# Patient Record
Sex: FEMALE | Race: BLACK OR AFRICAN AMERICAN | Employment: OTHER | URBAN - METROPOLITAN AREA
[De-identification: names, ages, dates, MRNs, and addresses within clinical notes are randomized per-mention and may not be internally consistent; named-entity substitution may affect disease eponyms.]

---

## 2018-10-16 ENCOUNTER — APPOINTMENT (OUTPATIENT)
Dept: GENERAL RADIOLOGY | Age: 63
End: 2018-10-16
Payer: COMMERCIAL

## 2018-10-16 ENCOUNTER — HOSPITAL ENCOUNTER (OUTPATIENT)
Age: 63
Setting detail: OBSERVATION
Discharge: HOME | End: 2018-10-17
Attending: EMERGENCY MEDICINE | Admitting: INTERNAL MEDICINE
Payer: COMMERCIAL

## 2018-10-16 DIAGNOSIS — R07.9 CHEST PAIN, UNSPECIFIED TYPE: Primary | ICD-10-CM

## 2018-10-16 LAB
ANION GAP SERPL CALCULATED.3IONS-SCNC: 13 MMOL/L (ref 3–16)
BACTERIA: ABNORMAL /HPF
BASOPHILS ABSOLUTE: 0.1 K/UL (ref 0–0.2)
BASOPHILS RELATIVE PERCENT: 1.2 %
BILIRUBIN URINE: NEGATIVE
BLOOD, URINE: ABNORMAL
BUN BLDV-MCNC: 18 MG/DL (ref 7–20)
CALCIUM SERPL-MCNC: 9.2 MG/DL (ref 8.3–10.6)
CHLORIDE BLD-SCNC: 100 MMOL/L (ref 99–110)
CLARITY: ABNORMAL
CO2: 27 MMOL/L (ref 21–32)
COLOR: YELLOW
CREAT SERPL-MCNC: 0.9 MG/DL (ref 0.6–1.2)
D DIMER: 223 NG/ML DDU (ref 0–229)
EOSINOPHILS ABSOLUTE: 0.2 K/UL (ref 0–0.6)
EOSINOPHILS RELATIVE PERCENT: 1.7 %
EPITHELIAL CELLS, UA: 9 /HPF (ref 0–5)
GFR AFRICAN AMERICAN: >60
GFR NON-AFRICAN AMERICAN: >60
GLUCOSE BLD-MCNC: 180 MG/DL (ref 70–99)
GLUCOSE BLD-MCNC: 224 MG/DL (ref 70–99)
GLUCOSE BLD-MCNC: 254 MG/DL (ref 70–99)
GLUCOSE BLD-MCNC: 273 MG/DL (ref 70–99)
GLUCOSE URINE: NEGATIVE MG/DL
HCT VFR BLD CALC: 37.5 % (ref 36–48)
HEMOGLOBIN: 12.4 G/DL (ref 12–16)
HYALINE CASTS: 0 /LPF (ref 0–8)
KETONES, URINE: NEGATIVE MG/DL
LEUKOCYTE ESTERASE, URINE: ABNORMAL
LV EF: 58 %
LV EF: 66 %
LVEF MODALITY: NORMAL
LVEF MODALITY: NORMAL
LYMPHOCYTES ABSOLUTE: 3.7 K/UL (ref 1–5.1)
LYMPHOCYTES RELATIVE PERCENT: 40.8 %
MCH RBC QN AUTO: 30 PG (ref 26–34)
MCHC RBC AUTO-ENTMCNC: 33.2 G/DL (ref 31–36)
MCV RBC AUTO: 90.2 FL (ref 80–100)
MICROSCOPIC EXAMINATION: YES
MONOCYTES ABSOLUTE: 0.5 K/UL (ref 0–1.3)
MONOCYTES RELATIVE PERCENT: 5.8 %
NEUTROPHILS ABSOLUTE: 4.6 K/UL (ref 1.7–7.7)
NEUTROPHILS RELATIVE PERCENT: 50.5 %
NITRITE, URINE: NEGATIVE
PDW BLD-RTO: 14.3 % (ref 12.4–15.4)
PERFORMED ON: ABNORMAL
PH UA: 5.5
PLATELET # BLD: 284 K/UL (ref 135–450)
PMV BLD AUTO: 7.7 FL (ref 5–10.5)
POTASSIUM REFLEX MAGNESIUM: 3.7 MMOL/L (ref 3.5–5.1)
PROTEIN UA: 100 MG/DL
RBC # BLD: 4.16 M/UL (ref 4–5.2)
RBC UA: 2 /HPF (ref 0–4)
SODIUM BLD-SCNC: 140 MMOL/L (ref 136–145)
SPECIFIC GRAVITY UA: 1.02
TROPONIN: <0.01 NG/ML
URINE REFLEX TO CULTURE: YES
URINE TYPE: ABNORMAL
UROBILINOGEN, URINE: 0.2 E.U./DL
WBC # BLD: 9 K/UL (ref 4–11)
WBC UA: 8 /HPF (ref 0–5)

## 2018-10-16 PROCEDURE — 3430000000 HC RX DIAGNOSTIC RADIOPHARMACEUTICAL: Performed by: INTERNAL MEDICINE

## 2018-10-16 PROCEDURE — 93017 CV STRESS TEST TRACING ONLY: CPT

## 2018-10-16 PROCEDURE — 6360000002 HC RX W HCPCS: Performed by: INTERNAL MEDICINE

## 2018-10-16 PROCEDURE — 36415 COLL VENOUS BLD VENIPUNCTURE: CPT

## 2018-10-16 PROCEDURE — 2580000003 HC RX 258: Performed by: INTERNAL MEDICINE

## 2018-10-16 PROCEDURE — 96374 THER/PROPH/DIAG INJ IV PUSH: CPT

## 2018-10-16 PROCEDURE — A9502 TC99M TETROFOSMIN: HCPCS | Performed by: INTERNAL MEDICINE

## 2018-10-16 PROCEDURE — 71046 X-RAY EXAM CHEST 2 VIEWS: CPT

## 2018-10-16 PROCEDURE — 6370000000 HC RX 637 (ALT 250 FOR IP): Performed by: INTERNAL MEDICINE

## 2018-10-16 PROCEDURE — 6370000000 HC RX 637 (ALT 250 FOR IP): Performed by: EMERGENCY MEDICINE

## 2018-10-16 PROCEDURE — 80048 BASIC METABOLIC PNL TOTAL CA: CPT

## 2018-10-16 PROCEDURE — 73610 X-RAY EXAM OF ANKLE: CPT

## 2018-10-16 PROCEDURE — G0378 HOSPITAL OBSERVATION PER HR: HCPCS

## 2018-10-16 PROCEDURE — 99285 EMERGENCY DEPT VISIT HI MDM: CPT

## 2018-10-16 PROCEDURE — 85379 FIBRIN DEGRADATION QUANT: CPT

## 2018-10-16 PROCEDURE — 78452 HT MUSCLE IMAGE SPECT MULT: CPT

## 2018-10-16 PROCEDURE — 78451 HT MUSCLE IMAGE SPECT SING: CPT

## 2018-10-16 PROCEDURE — 84484 ASSAY OF TROPONIN QUANT: CPT

## 2018-10-16 PROCEDURE — 96372 THER/PROPH/DIAG INJ SC/IM: CPT

## 2018-10-16 PROCEDURE — 85025 COMPLETE CBC W/AUTO DIFF WBC: CPT

## 2018-10-16 PROCEDURE — 81001 URINALYSIS AUTO W/SCOPE: CPT

## 2018-10-16 PROCEDURE — 87086 URINE CULTURE/COLONY COUNT: CPT

## 2018-10-16 PROCEDURE — 96376 TX/PRO/DX INJ SAME DRUG ADON: CPT

## 2018-10-16 PROCEDURE — 93005 ELECTROCARDIOGRAM TRACING: CPT | Performed by: EMERGENCY MEDICINE

## 2018-10-16 PROCEDURE — 93306 TTE W/DOPPLER COMPLETE: CPT

## 2018-10-16 RX ORDER — PREGABALIN 75 MG/1
150 CAPSULE ORAL 2 TIMES DAILY
Status: DISCONTINUED | OUTPATIENT
Start: 2018-10-16 | End: 2018-10-17 | Stop reason: HOSPADM

## 2018-10-16 RX ORDER — SODIUM CHLORIDE 0.9 % (FLUSH) 0.9 %
10 SYRINGE (ML) INJECTION PRN
Status: DISCONTINUED | OUTPATIENT
Start: 2018-10-16 | End: 2018-10-17 | Stop reason: HOSPADM

## 2018-10-16 RX ORDER — ATORVASTATIN CALCIUM 10 MG/1
10 TABLET, FILM COATED ORAL DAILY
Status: DISCONTINUED | OUTPATIENT
Start: 2018-10-16 | End: 2018-10-17 | Stop reason: HOSPADM

## 2018-10-16 RX ORDER — SPIRONOLACTONE 25 MG/1
25 TABLET ORAL DAILY
COMMUNITY
End: 2018-10-16 | Stop reason: ALTCHOICE

## 2018-10-16 RX ORDER — OLMESARTAN MEDOXOMIL 40 MG/1
40 TABLET ORAL DAILY
Status: ON HOLD | COMMUNITY
End: 2018-10-17

## 2018-10-16 RX ORDER — NITROGLYCERIN 0.4 MG/1
0.4 TABLET SUBLINGUAL EVERY 5 MIN PRN
Status: DISCONTINUED | OUTPATIENT
Start: 2018-10-16 | End: 2018-10-17 | Stop reason: HOSPADM

## 2018-10-16 RX ORDER — DEXTROSE MONOHYDRATE 50 MG/ML
100 INJECTION, SOLUTION INTRAVENOUS PRN
Status: DISCONTINUED | OUTPATIENT
Start: 2018-10-16 | End: 2018-10-17 | Stop reason: HOSPADM

## 2018-10-16 RX ORDER — SODIUM CHLORIDE 0.9 % (FLUSH) 0.9 %
10 SYRINGE (ML) INJECTION EVERY 12 HOURS SCHEDULED
Status: DISCONTINUED | OUTPATIENT
Start: 2018-10-16 | End: 2018-10-17 | Stop reason: HOSPADM

## 2018-10-16 RX ORDER — HYDROCHLOROTHIAZIDE 25 MG/1
25 TABLET ORAL DAILY
Status: DISCONTINUED | OUTPATIENT
Start: 2018-10-16 | End: 2018-10-17 | Stop reason: HOSPADM

## 2018-10-16 RX ORDER — INSULIN GLARGINE 100 [IU]/ML
25 INJECTION, SOLUTION SUBCUTANEOUS NIGHTLY
Status: DISCONTINUED | OUTPATIENT
Start: 2018-10-16 | End: 2018-10-16

## 2018-10-16 RX ORDER — LOSARTAN POTASSIUM 50 MG/1
100 TABLET ORAL DAILY
Status: DISCONTINUED | OUTPATIENT
Start: 2018-10-16 | End: 2018-10-17 | Stop reason: HOSPADM

## 2018-10-16 RX ORDER — NICOTINE POLACRILEX 4 MG
15 LOZENGE BUCCAL PRN
Status: DISCONTINUED | OUTPATIENT
Start: 2018-10-16 | End: 2018-10-17 | Stop reason: HOSPADM

## 2018-10-16 RX ORDER — PREGABALIN 150 MG/1
150 CAPSULE ORAL EVERY EVENING
Status: ON HOLD | COMMUNITY
End: 2022-05-20 | Stop reason: HOSPADM

## 2018-10-16 RX ORDER — ALBUTEROL SULFATE 90 UG/1
2 AEROSOL, METERED RESPIRATORY (INHALATION) EVERY 4 HOURS PRN
Status: DISCONTINUED | OUTPATIENT
Start: 2018-10-16 | End: 2018-10-17 | Stop reason: HOSPADM

## 2018-10-16 RX ORDER — ASPIRIN 81 MG/1
81 TABLET, CHEWABLE ORAL DAILY
Status: DISCONTINUED | OUTPATIENT
Start: 2018-10-17 | End: 2018-10-17 | Stop reason: HOSPADM

## 2018-10-16 RX ORDER — AMLODIPINE BESYLATE 5 MG/1
5 TABLET ORAL DAILY
Status: DISCONTINUED | OUTPATIENT
Start: 2018-10-16 | End: 2018-10-16

## 2018-10-16 RX ORDER — HYDRALAZINE HYDROCHLORIDE 20 MG/ML
5 INJECTION INTRAMUSCULAR; INTRAVENOUS EVERY 6 HOURS PRN
Status: DISCONTINUED | OUTPATIENT
Start: 2018-10-16 | End: 2018-10-17 | Stop reason: HOSPADM

## 2018-10-16 RX ORDER — ACETAMINOPHEN 325 MG/1
650 TABLET ORAL EVERY 4 HOURS PRN
Status: DISCONTINUED | OUTPATIENT
Start: 2018-10-16 | End: 2018-10-17 | Stop reason: HOSPADM

## 2018-10-16 RX ORDER — ONDANSETRON 2 MG/ML
4 INJECTION INTRAMUSCULAR; INTRAVENOUS EVERY 6 HOURS PRN
Status: DISCONTINUED | OUTPATIENT
Start: 2018-10-16 | End: 2018-10-17 | Stop reason: HOSPADM

## 2018-10-16 RX ORDER — ASPIRIN 325 MG
325 TABLET ORAL ONCE
Status: COMPLETED | OUTPATIENT
Start: 2018-10-16 | End: 2018-10-16

## 2018-10-16 RX ORDER — ATORVASTATIN CALCIUM 10 MG/1
10 TABLET, FILM COATED ORAL DAILY
Status: ON HOLD | COMMUNITY
End: 2018-10-17

## 2018-10-16 RX ORDER — INSULIN GLARGINE 100 [IU]/ML
10 INJECTION, SOLUTION SUBCUTANEOUS NIGHTLY
Status: DISCONTINUED | OUTPATIENT
Start: 2018-10-16 | End: 2018-10-17 | Stop reason: HOSPADM

## 2018-10-16 RX ORDER — NIFEDIPINE 30 MG/1
30 TABLET, EXTENDED RELEASE ORAL DAILY
Status: DISCONTINUED | OUTPATIENT
Start: 2018-10-16 | End: 2018-10-16

## 2018-10-16 RX ORDER — DEXTROSE MONOHYDRATE 25 G/50ML
12.5 INJECTION, SOLUTION INTRAVENOUS PRN
Status: DISCONTINUED | OUTPATIENT
Start: 2018-10-16 | End: 2018-10-17 | Stop reason: HOSPADM

## 2018-10-16 RX ORDER — OLMESARTAN MEDOXOMIL 20 MG/1
40 TABLET ORAL DAILY
Status: DISCONTINUED | OUTPATIENT
Start: 2018-10-16 | End: 2018-10-16

## 2018-10-16 RX ADMIN — HYDROCHLOROTHIAZIDE 25 MG: 25 TABLET ORAL at 14:52

## 2018-10-16 RX ADMIN — HYDRALAZINE HYDROCHLORIDE 5 MG: 20 INJECTION INTRAMUSCULAR; INTRAVENOUS at 15:57

## 2018-10-16 RX ADMIN — REGADENOSON 0.4 MG: 0.08 INJECTION, SOLUTION INTRAVENOUS at 13:09

## 2018-10-16 RX ADMIN — HYDRALAZINE HYDROCHLORIDE 5 MG: 20 INJECTION INTRAMUSCULAR; INTRAVENOUS at 22:39

## 2018-10-16 RX ADMIN — INSULIN GLARGINE 10 UNITS: 100 INJECTION, SOLUTION SUBCUTANEOUS at 22:38

## 2018-10-16 RX ADMIN — NITROGLYCERIN 0.4 MG: 0.4 TABLET, ORALLY DISINTEGRATING SUBLINGUAL at 09:23

## 2018-10-16 RX ADMIN — Medication 10 ML: at 22:40

## 2018-10-16 RX ADMIN — NITROGLYCERIN 0.4 MG: 0.4 TABLET, ORALLY DISINTEGRATING SUBLINGUAL at 09:28

## 2018-10-16 RX ADMIN — AMLODIPINE BESYLATE 5 MG: 5 TABLET ORAL at 15:57

## 2018-10-16 RX ADMIN — INSULIN LISPRO 2 UNITS: 100 INJECTION, SOLUTION INTRAVENOUS; SUBCUTANEOUS at 22:38

## 2018-10-16 RX ADMIN — ASPIRIN 325 MG ORAL TABLET 325 MG: 325 PILL ORAL at 07:46

## 2018-10-16 RX ADMIN — ENOXAPARIN SODIUM 40 MG: 40 INJECTION SUBCUTANEOUS at 14:52

## 2018-10-16 RX ADMIN — TETROFOSMIN 30 MILLICURIE: 0.23 INJECTION, POWDER, LYOPHILIZED, FOR SOLUTION INTRAVENOUS at 13:14

## 2018-10-16 RX ADMIN — ATORVASTATIN CALCIUM 10 MG: 10 TABLET, FILM COATED ORAL at 17:15

## 2018-10-16 RX ADMIN — LOSARTAN POTASSIUM 100 MG: 50 TABLET ORAL at 17:15

## 2018-10-16 RX ADMIN — INSULIN LISPRO 10 UNITS: 100 INJECTION, SOLUTION INTRAVENOUS; SUBCUTANEOUS at 17:13

## 2018-10-16 RX ADMIN — ACETAMINOPHEN 650 MG: 325 TABLET, FILM COATED ORAL at 22:38

## 2018-10-16 RX ADMIN — PREGABALIN 150 MG: 75 CAPSULE ORAL at 22:38

## 2018-10-16 RX ADMIN — INSULIN LISPRO 2 UNITS: 100 INJECTION, SOLUTION INTRAVENOUS; SUBCUTANEOUS at 17:13

## 2018-10-16 ASSESSMENT — ENCOUNTER SYMPTOMS
PHOTOPHOBIA: 0
ABDOMINAL PAIN: 0
SHORTNESS OF BREATH: 0
VOMITING: 0
WHEEZING: 0
BACK PAIN: 0
RHINORRHEA: 0
COUGH: 0
SINUS PAIN: 0
COLOR CHANGE: 0

## 2018-10-16 ASSESSMENT — PAIN DESCRIPTION - LOCATION
LOCATION: HEAD
LOCATION: CHEST
LOCATION: CHEST;LEG

## 2018-10-16 ASSESSMENT — PAIN DESCRIPTION - ORIENTATION
ORIENTATION: RIGHT
ORIENTATION: RIGHT;LEFT

## 2018-10-16 ASSESSMENT — PAIN DESCRIPTION - FREQUENCY: FREQUENCY: CONTINUOUS

## 2018-10-16 ASSESSMENT — PAIN DESCRIPTION - PROGRESSION: CLINICAL_PROGRESSION: GRADUALLY IMPROVING

## 2018-10-16 ASSESSMENT — PAIN DESCRIPTION - PAIN TYPE
TYPE: ACUTE PAIN

## 2018-10-16 ASSESSMENT — PAIN SCALES - GENERAL
PAINLEVEL_OUTOF10: 6
PAINLEVEL_OUTOF10: 0
PAINLEVEL_OUTOF10: 1
PAINLEVEL_OUTOF10: 5

## 2018-10-16 ASSESSMENT — PAIN DESCRIPTION - DESCRIPTORS
DESCRIPTORS: ACHING
DESCRIPTORS: ACHING
DESCRIPTORS: CONSTANT

## 2018-10-16 ASSESSMENT — HEART SCORE: ECG: 0

## 2018-10-16 NOTE — PROGRESS NOTES
Patient is admitted to room 4257 from ER. Patient has arrived to the floor at 1145 via stretcher and ambulated/transfered to bed. Oriented to room. Call light and bedside table within reach. Patient is a medium fall risk. Pt is agreeable to use call light before getting up. Denies further needs at this time. Will continue to monitor.

## 2018-10-16 NOTE — PROGRESS NOTES
Patient BP still elevated after receiving multiple BP meds PO and IV. RN notified MD. Orders to just watch BP for now.

## 2018-10-16 NOTE — H&P
mEq by mouth daily    Yes Historical Provider, MD   hydrochlorothiazide (HYDRODIURIL) 25 MG tablet Take 25 mg by mouth daily. Yes Historical Provider, MD   albuterol sulfate HFA (PROVENTIL HFA) 108 (90 Base) MCG/ACT inhaler Inhale 2 puffs into the lungs every 4 hours as needed for Wheezing or Shortness of Breath (Cough) PLEASE DISPENSE SPACER 1/16/18   Lexi Whitaker MD       Allergies:  Patient has no known allergies. Social History:       reports that she has been smoking. She has never used smokeless tobacco. She reports that she does not drink alcohol or use drugs. Family History:  Reviewed in detail and negative for DM, Early CAD, Cancer, CVA. Positive as follows:    History reviewed. No pertinent family history. REVIEW OF SYSTEMS:   Positive review  noted in the HPI. All other systems reviewed and negative.     PHYSICAL EXAM:    BP (!) 200/94   Pulse 93   Temp 98.5 °F (36.9 °C) (Oral)   Resp 14   Ht 5' (1.524 m)   Wt 178 lb 9.2 oz (81 kg)   SpO2 98%   BMI 34.88 kg/m²   General Appearance: alert and oriented to person, place and time, well developed and well- nourished, in no acute distress  Skin: warm and dry, no rash or erythema  Head: normocephalic and atraumatic  Eyes: pupils equal, round, and reactive to light, extraocular eye movements intact, conjunctivae normal  ENT: tympanic membrane, external ear and ear canal normal bilaterally, nose without deformity, nasal mucosa and turbinates normal without polyps  Neck: supple and non-tender without mass, no thyromegaly or thyroid nodules, no cervical lymphadenopathy  Pulmonary/Chest: clear to auscultation bilaterally- no wheezes, rales or rhonchi, normal air movement, no respiratory distress  Cardiovascular: normal rate, regular rhythm, normal S1 and S2, no murmurs, rubs, clicks, or gallops, Peripheral pulses good, Cap refill <3 sec, no carotid bruits  Abdomen: soft, non-tender, non-distended, normal bowel sounds, no masses or organomegaly  Extremities: no cyanosis, clubbing or edema  Musculoskeletal: normal range of motion, no joint swelling, deformity or tenderness  Neurologic: reflexes normal and symmetric, no cranial nerve deficit, gait, coordination and speech normal      LABS:    CXR:  I have reviewed the CXR with the following interpretation: no abnormality    EKG:  I have reviewed the EKG with the following interpretation: LVH with no st elevation   CBC   Recent Labs      10/16/18   0750   WBC  9.0   HGB  12.4   HCT  37.5   PLT  284      RENAL  Recent Labs      10/16/18   0750   NA  140   K  3.7   CL  100   CO2  27   BUN  18   CREATININE  0.9     LFT'S  No results for input(s): AST, ALT, ALB, BILIDIR, BILITOT, ALKPHOS in the last 72 hours. COAG  No results for input(s): INR in the last 72 hours. CARDIAC ENZYMES  Recent Labs      10/16/18   0750  10/16/18   1144   TROPONINI  <0.01  <0.01       U/A:    Lab Results   Component Value Date    COLORU YELLOW 10/16/2018    WBCUA 8 10/16/2018    RBCUA 2 10/16/2018    BACTERIA RARE 10/16/2018    CLARITYU CLOUDY 10/16/2018    SPECGRAV 1.016 10/16/2018    LEUKOCYTESUR SMALL 10/16/2018    BLOODU SMALL 10/16/2018    GLUCOSEU Negative 10/16/2018       ABG  No results found for: CUA1MBZ, BEART, O6TWKXQN, PHART, THGBART, OLH2URY, PO2ART, OLT2MHY    UA:  Recent Labs      10/16/18   0934   COLORU  YELLOW   PHUR  5.5   WBCUA  8*   RBCUA  2   BACTERIA  RARE*   CLARITYU  CLOUDY*   SPECGRAV  1.016   LEUKOCYTESUR  SMALL*   UROBILINOGEN  0.2   BILIRUBINUR  Negative   BLOODU  SMALL*   GLUCOSEU  Negative   KETUA  Negative       Microbiology:  No results for input(s): LABGRAM, LABANAE, ORG, CXSURG in the last 72 hours. Nasal Culture: No results for input(s): ORG, MRSAPCR in the last 72 hours. Blood Culture: No results for input(s): BC, BLOODCULT2, ORG in the last 72 hours. Fungal Culture:   No results for input(s): FUNGSM in the last 72 hours.   No results for input(s): FUNCXBLD in the last 72

## 2018-10-16 NOTE — ED NOTES
Patient states chest pain has completely resolved after 2 doses of Nitro.       Guillaume Ko RN  10/16/18 1136

## 2018-10-16 NOTE — ED PROVIDER NOTES
[facility]  eMERGENCY dEPARTMENTeNCOUnter      Pt Name: Monalisa Benítez  MRN: [id number]  [place] 1955  Date ofevaluation: 10/16/2018  Provider: Deandre Alcantara MD    CHIEF COMPLAINT       Chief Complaint   Patient presents with    Chest Pain     pt states that she has had chest pain and bilateral leg pain since Saturday    Leg Pain         HISTORY OF PRESENT ILLNESS   (Location/Symptom, Timing/Onset,Context/Setting, Quality, Duration, Modifying Factors, Severity)  Note limiting factors. Monalisa Benítez is a 66 y.o. female who presents to the emergency department With a chief complaint of chest pain, myalgias and left ankle pain. States she's been having persistent substernal chest pain that she notices more when she is exerting herself. Patient states she's never had pain like this before. Patient denies difficulties breathing, fevers nausea vomiting abdominal pain or change in bowel or urinary function. Patient states she's also been having bilateral lower extremity pain which she describes as muscle aches patient denies recent travel, estrogen use or history of DVT or PE. Patient denies a history of malignancy . Patient states she does have a history of diabetes hypertension hyperlipidemia and peripheral vascular disease . Patient will likely require admission due to her having many risk factors for coronary artery disease . HPI    NursingNotes were reviewed. REVIEW OF SYSTEMS    (2-9 systems for level 4, 10 or more for level 5)     Review of Systems   Constitutional: Negative for activity change, fatigue and fever. HENT: Negative for congestion, rhinorrhea and sinus pain. Eyes: Negative for photophobia and visual disturbance. Respiratory: Negative for cough, shortness of breath and wheezing. Cardiovascular: Positive for chest pain and leg swelling. Negative for palpitations. Gastrointestinal: Negative for abdominal pain and vomiting.

## 2018-10-17 VITALS
HEIGHT: 60 IN | HEART RATE: 92 BPM | BODY MASS INDEX: 34.37 KG/M2 | WEIGHT: 175.04 LBS | RESPIRATION RATE: 18 BRPM | DIASTOLIC BLOOD PRESSURE: 79 MMHG | TEMPERATURE: 98.5 F | OXYGEN SATURATION: 99 % | SYSTOLIC BLOOD PRESSURE: 151 MMHG

## 2018-10-17 LAB
ANION GAP SERPL CALCULATED.3IONS-SCNC: 12 MMOL/L (ref 3–16)
BUN BLDV-MCNC: 23 MG/DL (ref 7–20)
CALCIUM SERPL-MCNC: 8.9 MG/DL (ref 8.3–10.6)
CHLORIDE BLD-SCNC: 101 MMOL/L (ref 99–110)
CHOLESTEROL, TOTAL: 149 MG/DL (ref 0–199)
CO2: 29 MMOL/L (ref 21–32)
CREAT SERPL-MCNC: 1.3 MG/DL (ref 0.6–1.2)
GFR AFRICAN AMERICAN: 50
GFR NON-AFRICAN AMERICAN: 42
GLUCOSE BLD-MCNC: 154 MG/DL (ref 70–99)
GLUCOSE BLD-MCNC: 164 MG/DL (ref 70–99)
GLUCOSE BLD-MCNC: 203 MG/DL (ref 70–99)
HDLC SERPL-MCNC: 42 MG/DL (ref 40–60)
LDL CHOLESTEROL CALCULATED: 90 MG/DL
PERFORMED ON: ABNORMAL
PERFORMED ON: ABNORMAL
POTASSIUM REFLEX MAGNESIUM: 3.8 MMOL/L (ref 3.5–5.1)
SODIUM BLD-SCNC: 142 MMOL/L (ref 136–145)
TRIGL SERPL-MCNC: 86 MG/DL (ref 0–150)
URINE CULTURE, ROUTINE: NORMAL
VLDLC SERPL CALC-MCNC: 17 MG/DL

## 2018-10-17 PROCEDURE — 80048 BASIC METABOLIC PNL TOTAL CA: CPT

## 2018-10-17 PROCEDURE — 80061 LIPID PANEL: CPT

## 2018-10-17 PROCEDURE — 2580000003 HC RX 258: Performed by: INTERNAL MEDICINE

## 2018-10-17 PROCEDURE — 6370000000 HC RX 637 (ALT 250 FOR IP): Performed by: INTERNAL MEDICINE

## 2018-10-17 PROCEDURE — 96372 THER/PROPH/DIAG INJ SC/IM: CPT

## 2018-10-17 PROCEDURE — 94760 N-INVAS EAR/PLS OXIMETRY 1: CPT

## 2018-10-17 PROCEDURE — G0378 HOSPITAL OBSERVATION PER HR: HCPCS

## 2018-10-17 PROCEDURE — 36415 COLL VENOUS BLD VENIPUNCTURE: CPT

## 2018-10-17 PROCEDURE — 6360000002 HC RX W HCPCS: Performed by: INTERNAL MEDICINE

## 2018-10-17 RX ORDER — ATORVASTATIN CALCIUM 10 MG/1
10 TABLET, FILM COATED ORAL DAILY
Qty: 30 TABLET | Refills: 3 | Status: ON HOLD | OUTPATIENT
Start: 2018-10-17 | End: 2022-05-20 | Stop reason: HOSPADM

## 2018-10-17 RX ORDER — AMLODIPINE BESYLATE 5 MG/1
5 TABLET ORAL DAILY
Status: DISCONTINUED | OUTPATIENT
Start: 2018-10-17 | End: 2018-10-17 | Stop reason: HOSPADM

## 2018-10-17 RX ORDER — OLMESARTAN MEDOXOMIL 40 MG/1
40 TABLET ORAL DAILY
Qty: 30 TABLET | Refills: 3 | Status: ON HOLD | OUTPATIENT
Start: 2018-10-17 | End: 2022-05-06 | Stop reason: HOSPADM

## 2018-10-17 RX ORDER — HYDROCHLOROTHIAZIDE 25 MG/1
25 TABLET ORAL DAILY
Qty: 30 TABLET | Refills: 3 | Status: ON HOLD | OUTPATIENT
Start: 2018-10-17 | End: 2022-05-06 | Stop reason: HOSPADM

## 2018-10-17 RX ORDER — AMLODIPINE BESYLATE 5 MG/1
5 TABLET ORAL DAILY
Qty: 30 TABLET | Refills: 3 | Status: SHIPPED | OUTPATIENT
Start: 2018-10-17 | End: 2022-06-16 | Stop reason: SDUPTHER

## 2018-10-17 RX ADMIN — ASPIRIN 81 MG CHEWABLE TABLET 81 MG: 81 TABLET CHEWABLE at 08:08

## 2018-10-17 RX ADMIN — Medication 10 ML: at 08:10

## 2018-10-17 RX ADMIN — ATORVASTATIN CALCIUM 10 MG: 10 TABLET, FILM COATED ORAL at 08:08

## 2018-10-17 RX ADMIN — HYDROCHLOROTHIAZIDE 25 MG: 25 TABLET ORAL at 08:08

## 2018-10-17 RX ADMIN — LOSARTAN POTASSIUM 100 MG: 50 TABLET ORAL at 08:08

## 2018-10-17 RX ADMIN — INSULIN LISPRO 1 UNITS: 100 INJECTION, SOLUTION INTRAVENOUS; SUBCUTANEOUS at 08:06

## 2018-10-17 RX ADMIN — INSULIN LISPRO 1 UNITS: 100 INJECTION, SOLUTION INTRAVENOUS; SUBCUTANEOUS at 11:59

## 2018-10-17 RX ADMIN — PREGABALIN 150 MG: 75 CAPSULE ORAL at 08:08

## 2018-10-17 RX ADMIN — AMLODIPINE BESYLATE 5 MG: 5 TABLET ORAL at 12:00

## 2018-10-17 RX ADMIN — INSULIN LISPRO 10 UNITS: 100 INJECTION, SOLUTION INTRAVENOUS; SUBCUTANEOUS at 11:59

## 2018-10-17 RX ADMIN — ENOXAPARIN SODIUM 40 MG: 40 INJECTION SUBCUTANEOUS at 08:07

## 2018-10-17 ASSESSMENT — PAIN SCALES - GENERAL
PAINLEVEL_OUTOF10: 0

## 2018-10-17 NOTE — DISCHARGE SUMMARY
She has been counseled not to stop her blood pressure medicines abruptly      Consults:     None        Disposition: home    Discharged Condition: Stable    Code Status: Full Code    Activity: activity as tolerated    Diet: cardiac diet          SUBJECTIVE / Interval History:  Patient feels ok, no more CP     Exam:  TEMPERATURE:  Current - Temp: 98.3 °F (36.8 °C); Max - Temp  Av.1 °F (36.7 °C)  Min: 97.5 °F (36.4 °C)  Max: 98.6 °F (37 °C)  RESPIRATIONS RANGE: Resp  Av.2  Min: 14  Max: 18  PULSE RANGE: Pulse  Av  Min: 80  Max: 101  BLOOD PRESSURE RANGE:  Systolic (15OEH), KTD:576 , Min:136 , VNA:730   ; Diastolic (46ICP), OYX:78, Min:76, Max:98    PULSE OXIMETRY RANGE: SpO2  Av.5 %  Min: 97 %  Max: 100 %  24HR INTAKE/OUTPUT:      Intake/Output Summary (Last 24 hours) at 10/17/18 1400  Last data filed at 10/16/18 2200   Gross per 24 hour   Intake              240 ml   Output                0 ml   Net              240 ml      Wt Readings from Last 1 Encounters:   10/17/18 175 lb 0.7 oz (79.4 kg)     BMI Readings from Last 1 Encounters:   10/17/18 34.19 kg/m²     General appearance: No apparent distress, appears stated age and cooperative. HEENT Normal cephalic, atraumatic without obvious deformity. Pupils equal, round, and reactive to light. Extra ocular muscles intact. Conjunctivae/corneas clear. Neck: Supple, No jugular venous distention/bruits. Trachea midline without thyromegaly or adenopathy with full range of motion. Lungs: Clear to ascultation, bilaterally without Rales/Wheezes/Rhonchi with good respiratory effort. Heart: Regular rate and rhythm with Normal S1/S2 without  murmurs, rubs or gallops, point of maximum impulse non-displaced  Abdomen: Soft, non-tender or non-distended without rigidity or guarding and positive bowel sounds all four quadrants. Extremities: No clubbing, cyanosis, or edema bilaterally. Full range of motion without deformity and normal gait intact.   Skin: Skin color, texture, turgor normal.  No rashes or lesions. Neurologic: Alert and oriented X 3,  neurovascularly intact with sensory/motor intact upper extremities/lower extremities, bilaterally. Cranial nerves:II-XII intact, grossly non-focal.  Mental status: Alert, oriented, thought content appropriate      Labs: For convenience and continuity at follow-up the following most recent labs are provided:    CBC:   Lab Results   Component Value Date    WBC 9.0 10/16/2018    HGB 12.4 10/16/2018    HCT 37.5 10/16/2018     10/16/2018       RENAL:   Lab Results   Component Value Date     10/17/2018    K 3.8 10/17/2018     10/17/2018    CO2 29 10/17/2018    BUN 23 10/17/2018    CREATININE 1.3 10/17/2018           Discharge Medications:    Alayne Felty   [place] Medication Instructions KINJAL:[id number]    Printed on:10/17/18 1400   Medication Information                      albuterol sulfate HFA (PROVENTIL HFA) 108 (90 Base) MCG/ACT inhaler  Inhale 2 puffs into the lungs every 4 hours as needed for Wheezing or Shortness of Breath (Cough) PLEASE DISPENSE SPACER             amLODIPine (NORVASC) 5 MG tablet  Take 1 tablet by mouth daily             atorvastatin (LIPITOR) 10 MG tablet  Take 1 tablet by mouth daily             hydrochlorothiazide (HYDRODIURIL) 25 MG tablet  Take 1 tablet by mouth daily             insulin glargine (LANTUS) 100 UNIT/ML injection  Inject 25 Units into the skin nightly. insulin lispro (HUMALOG) 100 UNIT/ML injection  Inject 0-12 Units into the skin 3 times daily (with meals). L-METHYLFOLATE-B6-B12 PO  Take 1 tablet by mouth daily             olmesartan (BENICAR) 40 MG tablet  Take 1 tablet by mouth daily             pregabalin (LYRICA) 150 MG capsule  Take 150 mg by mouth 2 times daily. .                    Time Spent on discharge is more than 45 minutes in the examination, evaluation, counseling and review of medications and discharge

## 2018-10-18 LAB
EKG ATRIAL RATE: 88 BPM
EKG DIAGNOSIS: NORMAL
EKG P AXIS: 60 DEGREES
EKG P-R INTERVAL: 138 MS
EKG Q-T INTERVAL: 408 MS
EKG QRS DURATION: 78 MS
EKG QTC CALCULATION (BAZETT): 493 MS
EKG R AXIS: 7 DEGREES
EKG T AXIS: 27 DEGREES
EKG VENTRICULAR RATE: 88 BPM

## 2019-07-11 ENCOUNTER — APPOINTMENT (OUTPATIENT)
Dept: GENERAL RADIOLOGY | Age: 64
End: 2019-07-11
Payer: COMMERCIAL

## 2019-07-11 ENCOUNTER — HOSPITAL ENCOUNTER (EMERGENCY)
Age: 64
Discharge: HOME | End: 2019-07-11
Attending: EMERGENCY MEDICINE
Payer: COMMERCIAL

## 2019-07-11 VITALS
SYSTOLIC BLOOD PRESSURE: 182 MMHG | TEMPERATURE: 98 F | OXYGEN SATURATION: 96 % | HEART RATE: 85 BPM | DIASTOLIC BLOOD PRESSURE: 81 MMHG | RESPIRATION RATE: 19 BRPM

## 2019-07-11 DIAGNOSIS — R55 SYNCOPE AND COLLAPSE: Primary | ICD-10-CM

## 2019-07-11 DIAGNOSIS — N18.9 CHRONIC KIDNEY DISEASE, UNSPECIFIED CKD STAGE: ICD-10-CM

## 2019-07-11 LAB
A/G RATIO: 1 (ref 1.1–2.2)
ALBUMIN SERPL-MCNC: 3.8 G/DL (ref 3.4–5)
ALP BLD-CCNC: 87 U/L (ref 40–129)
ALT SERPL-CCNC: 21 U/L (ref 10–40)
ANION GAP SERPL CALCULATED.3IONS-SCNC: 10 MMOL/L (ref 3–16)
AST SERPL-CCNC: 27 U/L (ref 15–37)
BACTERIA: ABNORMAL /HPF
BASOPHILS ABSOLUTE: 0.1 K/UL (ref 0–0.2)
BASOPHILS RELATIVE PERCENT: 1.4 %
BILIRUB SERPL-MCNC: <0.2 MG/DL (ref 0–1)
BILIRUBIN URINE: NEGATIVE
BLOOD, URINE: NEGATIVE
BUN BLDV-MCNC: 29 MG/DL (ref 7–20)
CALCIUM SERPL-MCNC: 9.4 MG/DL (ref 8.3–10.6)
CHLORIDE BLD-SCNC: 105 MMOL/L (ref 99–110)
CLARITY: CLEAR
CO2: 28 MMOL/L (ref 21–32)
COLOR: YELLOW
CREAT SERPL-MCNC: 1.4 MG/DL (ref 0.6–1.2)
EOSINOPHILS ABSOLUTE: 0.1 K/UL (ref 0–0.6)
EOSINOPHILS RELATIVE PERCENT: 2.1 %
EPITHELIAL CELLS, UA: 4 /HPF (ref 0–5)
GFR AFRICAN AMERICAN: 46
GFR NON-AFRICAN AMERICAN: 38
GLOBULIN: 4 G/DL
GLUCOSE BLD-MCNC: 95 MG/DL (ref 70–99)
GLUCOSE URINE: NEGATIVE MG/DL
HCT VFR BLD CALC: 39.1 % (ref 36–48)
HEMOGLOBIN: 12.9 G/DL (ref 12–16)
HYALINE CASTS: 2 /LPF (ref 0–8)
KETONES, URINE: NEGATIVE MG/DL
LEUKOCYTE ESTERASE, URINE: NEGATIVE
LYMPHOCYTES ABSOLUTE: 3 K/UL (ref 1–5.1)
LYMPHOCYTES RELATIVE PERCENT: 44.3 %
MCH RBC QN AUTO: 29.7 PG (ref 26–34)
MCHC RBC AUTO-ENTMCNC: 32.9 G/DL (ref 31–36)
MCV RBC AUTO: 90.3 FL (ref 80–100)
MICROSCOPIC EXAMINATION: YES
MONOCYTES ABSOLUTE: 0.5 K/UL (ref 0–1.3)
MONOCYTES RELATIVE PERCENT: 6.8 %
NEUTROPHILS ABSOLUTE: 3.1 K/UL (ref 1.7–7.7)
NEUTROPHILS RELATIVE PERCENT: 45.4 %
NITRITE, URINE: NEGATIVE
PDW BLD-RTO: 14.3 % (ref 12.4–15.4)
PH UA: 6 (ref 5–8)
PLATELET # BLD: 245 K/UL (ref 135–450)
PMV BLD AUTO: 8.6 FL (ref 5–10.5)
POTASSIUM REFLEX MAGNESIUM: 4.1 MMOL/L (ref 3.5–5.1)
PROTEIN UA: 100 MG/DL
RBC # BLD: 4.33 M/UL (ref 4–5.2)
RBC UA: 2 /HPF (ref 0–4)
SODIUM BLD-SCNC: 143 MMOL/L (ref 136–145)
SPECIFIC GRAVITY UA: 1.02 (ref 1–1.03)
TOTAL PROTEIN: 7.8 G/DL (ref 6.4–8.2)
TROPONIN: <0.01 NG/ML
URINE REFLEX TO CULTURE: ABNORMAL
URINE TYPE: ABNORMAL
UROBILINOGEN, URINE: 0.2 E.U./DL
WBC # BLD: 6.8 K/UL (ref 4–11)
WBC UA: 4 /HPF (ref 0–5)

## 2019-07-11 PROCEDURE — 84484 ASSAY OF TROPONIN QUANT: CPT

## 2019-07-11 PROCEDURE — 80053 COMPREHEN METABOLIC PANEL: CPT

## 2019-07-11 PROCEDURE — 99284 EMERGENCY DEPT VISIT MOD MDM: CPT

## 2019-07-11 PROCEDURE — 81001 URINALYSIS AUTO W/SCOPE: CPT

## 2019-07-11 PROCEDURE — 85025 COMPLETE CBC W/AUTO DIFF WBC: CPT

## 2019-07-11 PROCEDURE — 93005 ELECTROCARDIOGRAM TRACING: CPT | Performed by: EMERGENCY MEDICINE

## 2019-07-11 PROCEDURE — 71046 X-RAY EXAM CHEST 2 VIEWS: CPT

## 2019-07-11 ASSESSMENT — PAIN SCALES - GENERAL: PAINLEVEL_OUTOF10: 5

## 2019-07-11 ASSESSMENT — PAIN DESCRIPTION - ORIENTATION: ORIENTATION: RIGHT

## 2019-07-11 ASSESSMENT — PAIN DESCRIPTION - LOCATION: LOCATION: SHOULDER

## 2019-07-11 ASSESSMENT — PAIN DESCRIPTION - PAIN TYPE: TYPE: ACUTE PAIN

## 2019-07-11 NOTE — ED NOTES
Bed: 22  Expected date:   Expected time:   Means of arrival:   Comments:  45 Evi Muñoz, JOSE  07/11/19 7527

## 2019-07-12 LAB
EKG ATRIAL RATE: 80 BPM
EKG DIAGNOSIS: NORMAL
EKG P AXIS: 59 DEGREES
EKG P-R INTERVAL: 142 MS
EKG Q-T INTERVAL: 416 MS
EKG QRS DURATION: 78 MS
EKG QTC CALCULATION (BAZETT): 479 MS
EKG R AXIS: 22 DEGREES
EKG T AXIS: 15 DEGREES
EKG VENTRICULAR RATE: 80 BPM

## 2019-07-12 PROCEDURE — 93010 ELECTROCARDIOGRAM REPORT: CPT | Performed by: INTERNAL MEDICINE

## 2019-07-12 NOTE — ED PROVIDER NOTES
Medical: Not on file     Non-medical: Not on file   Tobacco Use    Smoking status: Light Tobacco Smoker    Smokeless tobacco: Never Used   Substance and Sexual Activity    Alcohol use: No    Drug use: No    Sexual activity: Not on file   Lifestyle    Physical activity:     Days per week: Not on file     Minutes per session: Not on file    Stress: Not on file   Relationships    Social connections:     Talks on phone: Not on file     Gets together: Not on file     Attends Nondenominational service: Not on file     Active member of club or organization: Not on file     Attends meetings of clubs or organizations: Not on file     Relationship status: Not on file    Intimate partner violence:     Fear of current or ex partner: Not on file     Emotionally abused: Not on file     Physically abused: Not on file     Forced sexual activity: Not on file   Other Topics Concern    Not on file   Social History Narrative    Not on file     No current facility-administered medications for this encounter. Current Outpatient Medications   Medication Sig Dispense Refill    spironolactone (ALDACTONE) 25 MG tablet TAKE 1 TABLET BY MOUTH EVERY DAY 30 tablet 3    atorvastatin (LIPITOR) 10 MG tablet Take 1 tablet by mouth daily 30 tablet 3    olmesartan (BENICAR) 40 MG tablet Take 1 tablet by mouth daily 30 tablet 3    hydrochlorothiazide (HYDRODIURIL) 25 MG tablet Take 1 tablet by mouth daily 30 tablet 3    amLODIPine (NORVASC) 5 MG tablet Take 1 tablet by mouth daily 30 tablet 3    pregabalin (LYRICA) 150 MG capsule Take 150 mg by mouth 2 times daily. .      L-METHYLFOLATE-B6-B12 PO Take 1 tablet by mouth daily      albuterol sulfate HFA (PROVENTIL HFA) 108 (90 Base) MCG/ACT inhaler Inhale 2 puffs into the lungs every 4 hours as needed for Wheezing or Shortness of Breath (Cough) PLEASE DISPENSE SPACER 1 Inhaler 0    insulin glargine (LANTUS) 100 UNIT/ML injection Inject 25 Units into the skin nightly.  (Patient taking differently: Inject 10 Units into the skin nightly ) 1 vial 1    insulin lispro (HUMALOG) 100 UNIT/ML injection Inject 0-12 Units into the skin 3 times daily (with meals). (Patient taking differently: Inject 10-30 Units into the skin 3 times daily (with meals) ) 1 Pen 1     No Known Allergies    REVIEW OF SYSTEMS  10 systems reviewed, pertinent positives per HPI otherwise noted to be negative    PHYSICAL EXAM  BP (!) 182/81   Pulse 85   Temp 98 °F (36.7 °C) (Oral)   Resp 19   SpO2 96%   GENERAL APPEARANCE: Awake and alert. Cooperative. In no acute distress. HEAD: Normocephalic. Atraumatic. EYES: PERRL. EOM's grossly intact. ENT: Mucous membranes are pink and moist.   NECK: Supple. HEART: RRR. No murmurs. LUNGS: Respirations unlabored. CTAB. Good air exchange. ABDOMEN: Soft. Non-distended. Non-tender. No masses. No organomegaly. No guarding or rebound. EXTREMITIES: No peripheral edema. Moves all extremities equally. All extremities neurovascularly intact. SKIN: Warm and dry. No acute rashes. NEUROLOGICAL: Alert and oriented. Renal nerves II through XII intact. Strength 5/5, sensation intact. Gait normal.   PSYCHIATRIC: Normal mood and affect. No HI or SI expressed to me.     RADIOLOGY    See below     EKG:     See below      ED COURSE/MDM        ED Course as of Jul 11 2309   Thu Jul 11, 2019 2307 Troponin:    Troponin <0.01 [WL]   2307 Urinalysis Reflex to Culture(!):    Color, UA YELLOW   Clarity, UA Clear   Glucose, UA Negative   Bilirubin, Urine Negative   Ketones, Urine Negative   Specific Gravity, UA 1.020   Blood, Urine Negative   pH, UA 6.0   Protein, (!)   Urobilinogen, Urine 0.2   Nitrite, Urine Negative   Leukocyte Esterase, Urine Negative   Microscopic Examination YES   Urine Reflex to Culture Not Indicated   Urine Type Not Specified [WL]   2307 Comprehensive Metabolic Panel w/ Reflex to MG(!):    Sodium 143   Potassium 4.1   Chloride 105   CO2 28   Anion Gap 10   Glucose 95 BUN 29(!)   Creatinine 1.4(!)   GFR Non- 38(!)   GFR  46(!)   Calcium 9.4   Total Protein 7.8   Albumin 3.8   Albumin/Globulin Ratio 1.0(!)   Bilirubin <0.2   Alk Phos 87   ALT 21   AST 27   Globulin 4.0 [WL]   2307 Microscopic Urinalysis(!):    Bacteria, UA RARE(!)   Hyaline Casts, UA 2   WBC, UA 4   RBC, UA 2   Epi Cells 4 [WL]   2307 CBC Auto Differential:    WBC 6.8   RBC 4.33   Hemoglobin Quant 12.9   Hematocrit 39.1   MCV 90.3   MCH 29.7   MCHC 32.9   RDW 14.3   Platelet Count 410   MPV 8.6   Neutrophils % 45.4   Lymphocyte % 44.3   Monocytes % 6.8   Eosinophils % 2.1   Basophils % 1.4   Neutrophils # 3.1   Lymphocytes # 3.0   Monocytes # 0.5   Eosinophils # 0.1   Basophils # 0.1 [WL]   2308 Renal function appears at baseline but is steadily worsening. She additionally has proteinuria. Recommend close follow-up with nephrology in the setting of diabetes, hypertension, and worsening kidney disease    [WL]   2308 Sinus rhythm at 80. Normal axis. . No acute ST-T changes. EKG 12 Lead [WL]   2308 No infiltrate or effusion   XR CHEST STANDARD (2 VW) [WL]   2308 Patient with syncope yesterday, and worsening chronic kidney disease. Due to risk factors, I believe she does benefit from outpatient follow-up with cardiology. I do not believe she needs inpatient admission with no evidence or history of heart failure, no chest pain, symptoms occurring yesterday and did have some prodrome. Pain close return precautions were given an outpatient follow-up with cardiology and nephrology    [WL]      ED Course User Index  [WL] Retia Hence, DO       Old records were reviewed when applicable.  The ED course and plan were reviewed and results discussed with the patient    CLINICAL IMPRESSION and DISPOSITION  Ericka Haro was stable and diagnosed with syncope, CKD          CRITICAL CARE TIME:   N/A                       Retia Hence, DO  07/11/19 2310

## 2022-04-26 ENCOUNTER — APPOINTMENT (OUTPATIENT)
Dept: CT IMAGING | Age: 67
DRG: 286 | End: 2022-04-26
Payer: COMMERCIAL

## 2022-04-26 ENCOUNTER — HOSPITAL ENCOUNTER (INPATIENT)
Age: 67
LOS: 10 days | Discharge: HOME | DRG: 286 | End: 2022-05-06
Attending: INTERNAL MEDICINE | Admitting: INTERNAL MEDICINE
Payer: COMMERCIAL

## 2022-04-26 ENCOUNTER — APPOINTMENT (OUTPATIENT)
Dept: GENERAL RADIOLOGY | Age: 67
DRG: 286 | End: 2022-04-26
Payer: COMMERCIAL

## 2022-04-26 DIAGNOSIS — N17.9 AKI (ACUTE KIDNEY INJURY) (HCC): ICD-10-CM

## 2022-04-26 DIAGNOSIS — I50.31 ACUTE DIASTOLIC CONGESTIVE HEART FAILURE (HCC): ICD-10-CM

## 2022-04-26 DIAGNOSIS — R77.8 ELEVATED TROPONIN: ICD-10-CM

## 2022-04-26 DIAGNOSIS — J10.1 INFLUENZA A: Primary | ICD-10-CM

## 2022-04-26 DIAGNOSIS — J96.01 ACUTE RESPIRATORY FAILURE WITH HYPOXIA (HCC): ICD-10-CM

## 2022-04-26 DIAGNOSIS — I50.9 ACUTE CONGESTIVE HEART FAILURE, UNSPECIFIED HEART FAILURE TYPE (HCC): ICD-10-CM

## 2022-04-26 PROBLEM — I50.33 ACUTE ON CHRONIC DIASTOLIC (CONGESTIVE) HEART FAILURE (HCC): Status: ACTIVE | Noted: 2022-04-26

## 2022-04-26 LAB
A/G RATIO: 0.9 (ref 1.1–2.2)
ALBUMIN SERPL-MCNC: 3.1 G/DL (ref 3.4–5)
ALP BLD-CCNC: 159 U/L (ref 40–129)
ALT SERPL-CCNC: 77 U/L (ref 10–40)
ANION GAP SERPL CALCULATED.3IONS-SCNC: 8 MMOL/L (ref 3–16)
AST SERPL-CCNC: 47 U/L (ref 15–37)
BASOPHILS ABSOLUTE: 0.1 K/UL (ref 0–0.2)
BASOPHILS RELATIVE PERCENT: 1 %
BILIRUB SERPL-MCNC: <0.2 MG/DL (ref 0–1)
BUN BLDV-MCNC: 24 MG/DL (ref 7–20)
CALCIUM SERPL-MCNC: 8.9 MG/DL (ref 8.3–10.6)
CHLORIDE BLD-SCNC: 98 MMOL/L (ref 99–110)
CO2: 30 MMOL/L (ref 21–32)
CREAT SERPL-MCNC: 2 MG/DL (ref 0.6–1.2)
EOSINOPHILS ABSOLUTE: 0.1 K/UL (ref 0–0.6)
EOSINOPHILS RELATIVE PERCENT: 2.3 %
GFR AFRICAN AMERICAN: 30
GFR NON-AFRICAN AMERICAN: 25
GLUCOSE BLD-MCNC: 346 MG/DL (ref 70–99)
HCT VFR BLD CALC: 33.6 % (ref 36–48)
HEMOGLOBIN: 11.1 G/DL (ref 12–16)
LYMPHOCYTES ABSOLUTE: 2.1 K/UL (ref 1–5.1)
LYMPHOCYTES RELATIVE PERCENT: 35.1 %
MCH RBC QN AUTO: 29.9 PG (ref 26–34)
MCHC RBC AUTO-ENTMCNC: 33 G/DL (ref 31–36)
MCV RBC AUTO: 90.7 FL (ref 80–100)
MONOCYTES ABSOLUTE: 0.4 K/UL (ref 0–1.3)
MONOCYTES RELATIVE PERCENT: 6.1 %
NEUTROPHILS ABSOLUTE: 3.4 K/UL (ref 1.7–7.7)
NEUTROPHILS RELATIVE PERCENT: 55.5 %
PDW BLD-RTO: 15 % (ref 12.4–15.4)
PLATELET # BLD: 227 K/UL (ref 135–450)
PMV BLD AUTO: 8.5 FL (ref 5–10.5)
POTASSIUM REFLEX MAGNESIUM: 4 MMOL/L (ref 3.5–5.1)
PRO-BNP: 4266 PG/ML (ref 0–124)
RAPID INFLUENZA  B AGN: NEGATIVE
RAPID INFLUENZA A AGN: NEGATIVE
RBC # BLD: 3.71 M/UL (ref 4–5.2)
SODIUM BLD-SCNC: 136 MMOL/L (ref 136–145)
TOTAL PROTEIN: 6.7 G/DL (ref 6.4–8.2)
TROPONIN: 0.03 NG/ML
WBC # BLD: 6.1 K/UL (ref 4–11)

## 2022-04-26 PROCEDURE — 71045 X-RAY EXAM CHEST 1 VIEW: CPT

## 2022-04-26 PROCEDURE — 71046 X-RAY EXAM CHEST 2 VIEWS: CPT

## 2022-04-26 PROCEDURE — 96374 THER/PROPH/DIAG INJ IV PUSH: CPT

## 2022-04-26 PROCEDURE — 83880 ASSAY OF NATRIURETIC PEPTIDE: CPT

## 2022-04-26 PROCEDURE — 87804 INFLUENZA ASSAY W/OPTIC: CPT

## 2022-04-26 PROCEDURE — 99285 EMERGENCY DEPT VISIT HI MDM: CPT

## 2022-04-26 PROCEDURE — 85025 COMPLETE CBC W/AUTO DIFF WBC: CPT

## 2022-04-26 PROCEDURE — 6370000000 HC RX 637 (ALT 250 FOR IP): Performed by: PHYSICIAN ASSISTANT

## 2022-04-26 PROCEDURE — 1200000000 HC SEMI PRIVATE

## 2022-04-26 PROCEDURE — 93005 ELECTROCARDIOGRAM TRACING: CPT | Performed by: PHYSICIAN ASSISTANT

## 2022-04-26 PROCEDURE — 84484 ASSAY OF TROPONIN QUANT: CPT

## 2022-04-26 PROCEDURE — 80053 COMPREHEN METABOLIC PANEL: CPT

## 2022-04-26 PROCEDURE — 6360000002 HC RX W HCPCS: Performed by: PHYSICIAN ASSISTANT

## 2022-04-26 RX ORDER — FUROSEMIDE 10 MG/ML
40 INJECTION INTRAMUSCULAR; INTRAVENOUS ONCE
Status: COMPLETED | OUTPATIENT
Start: 2022-04-26 | End: 2022-04-26

## 2022-04-26 RX ORDER — ASPIRIN 81 MG/1
81 TABLET ORAL DAILY
COMMUNITY
End: 2022-06-16 | Stop reason: SDUPTHER

## 2022-04-26 RX ORDER — ACETAMINOPHEN 325 MG/1
650 TABLET ORAL ONCE
Status: COMPLETED | OUTPATIENT
Start: 2022-04-26 | End: 2022-04-26

## 2022-04-26 RX ORDER — OSELTAMIVIR PHOSPHATE 75 MG/1
75 CAPSULE ORAL ONCE
Status: COMPLETED | OUTPATIENT
Start: 2022-04-26 | End: 2022-04-26

## 2022-04-26 RX ORDER — METOPROLOL SUCCINATE 25 MG/1
25 TABLET, EXTENDED RELEASE ORAL DAILY
Status: ON HOLD | COMMUNITY
End: 2022-05-06 | Stop reason: HOSPADM

## 2022-04-26 RX ORDER — POTASSIUM CHLORIDE 750 MG/1
10 CAPSULE, EXTENDED RELEASE ORAL DAILY
COMMUNITY
End: 2022-06-16 | Stop reason: ALTCHOICE

## 2022-04-26 RX ADMIN — NITROGLYCERIN 1 INCH: 20 OINTMENT TOPICAL at 22:11

## 2022-04-26 RX ADMIN — FUROSEMIDE 40 MG: 10 INJECTION, SOLUTION INTRAMUSCULAR; INTRAVENOUS at 20:34

## 2022-04-26 RX ADMIN — ACETAMINOPHEN 650 MG: 325 TABLET ORAL at 22:51

## 2022-04-26 RX ADMIN — OSELTAMIVIR PHOSPHATE 75 MG: 75 CAPSULE ORAL at 20:34

## 2022-04-26 ASSESSMENT — ENCOUNTER SYMPTOMS
VOICE CHANGE: 0
COLOR CHANGE: 0
SINUS PRESSURE: 0
ABDOMINAL PAIN: 0
COUGH: 1
SHORTNESS OF BREATH: 1
SORE THROAT: 0
VOMITING: 0
DIARRHEA: 0
CHEST TIGHTNESS: 0
RHINORRHEA: 1
WHEEZING: 0
NAUSEA: 0
TROUBLE SWALLOWING: 0

## 2022-04-26 ASSESSMENT — VISUAL ACUITY: OU: 1

## 2022-04-26 ASSESSMENT — PAIN SCALES - GENERAL
PAINLEVEL_OUTOF10: 5
PAINLEVEL_OUTOF10: 2

## 2022-04-26 NOTE — LETTER
Sammie Veliz visited Doug Luh in the hospital on May 4th, 2022. If you have any questions or concerns, please don't hesitate to call.       Electronically signed by Kendra Batista RN on 5/4/2022 at 2:10 PM

## 2022-04-27 PROBLEM — R77.8 ELEVATED TROPONIN: Status: ACTIVE | Noted: 2022-04-27

## 2022-04-27 PROBLEM — J81.0 ACUTE PULMONARY EDEMA (HCC): Status: ACTIVE | Noted: 2022-04-27

## 2022-04-27 PROBLEM — R79.89 ELEVATED TROPONIN: Status: ACTIVE | Noted: 2022-04-27

## 2022-04-27 PROBLEM — N17.9 ACUTE RENAL FAILURE (ARF) (HCC): Status: ACTIVE | Noted: 2022-04-27

## 2022-04-27 PROBLEM — E66.01 MORBID OBESITY DUE TO EXCESS CALORIES (HCC): Status: ACTIVE | Noted: 2022-04-27

## 2022-04-27 PROBLEM — J10.1 INFLUENZA A: Status: ACTIVE | Noted: 2022-04-27

## 2022-04-27 PROBLEM — J96.01 ACUTE RESPIRATORY FAILURE WITH HYPOXIA (HCC): Status: ACTIVE | Noted: 2022-04-27

## 2022-04-27 PROBLEM — I16.0 HYPERTENSIVE URGENCY: Status: ACTIVE | Noted: 2022-04-27

## 2022-04-27 PROBLEM — E78.5 HYPERLIPIDEMIA: Status: ACTIVE | Noted: 2022-04-27

## 2022-04-27 LAB
ANION GAP SERPL CALCULATED.3IONS-SCNC: 10 MMOL/L (ref 3–16)
BASOPHILS ABSOLUTE: 0.1 K/UL (ref 0–0.2)
BASOPHILS RELATIVE PERCENT: 1 %
BUN BLDV-MCNC: 23 MG/DL (ref 7–20)
CALCIUM SERPL-MCNC: 8.2 MG/DL (ref 8.3–10.6)
CHLORIDE BLD-SCNC: 102 MMOL/L (ref 99–110)
CO2: 29 MMOL/L (ref 21–32)
CREAT SERPL-MCNC: 1.8 MG/DL (ref 0.6–1.2)
EKG ATRIAL RATE: 81 BPM
EKG DIAGNOSIS: NORMAL
EKG P AXIS: 63 DEGREES
EKG P-R INTERVAL: 138 MS
EKG Q-T INTERVAL: 406 MS
EKG QRS DURATION: 82 MS
EKG QTC CALCULATION (BAZETT): 471 MS
EKG R AXIS: 50 DEGREES
EKG T AXIS: -46 DEGREES
EKG VENTRICULAR RATE: 81 BPM
EOSINOPHILS ABSOLUTE: 0.1 K/UL (ref 0–0.6)
EOSINOPHILS RELATIVE PERCENT: 2.3 %
FERRITIN: 185.4 NG/ML (ref 15–150)
GFR AFRICAN AMERICAN: 34
GFR NON-AFRICAN AMERICAN: 28
GLUCOSE BLD-MCNC: 155 MG/DL (ref 70–99)
GLUCOSE BLD-MCNC: 180 MG/DL (ref 70–99)
GLUCOSE BLD-MCNC: 200 MG/DL (ref 70–99)
GLUCOSE BLD-MCNC: 230 MG/DL (ref 70–99)
GLUCOSE BLD-MCNC: 282 MG/DL (ref 70–99)
GLUCOSE BLD-MCNC: 299 MG/DL (ref 70–99)
HCT VFR BLD CALC: 32.3 % (ref 36–48)
HEMOGLOBIN: 10.6 G/DL (ref 12–16)
IRON SATURATION: 21 % (ref 15–50)
IRON: 48 UG/DL (ref 37–145)
LV EF: 58 %
LVEF MODALITY: NORMAL
LYMPHOCYTES ABSOLUTE: 2.5 K/UL (ref 1–5.1)
LYMPHOCYTES RELATIVE PERCENT: 42.8 %
MCH RBC QN AUTO: 29.7 PG (ref 26–34)
MCHC RBC AUTO-ENTMCNC: 32.9 G/DL (ref 31–36)
MCV RBC AUTO: 90.2 FL (ref 80–100)
MONOCYTES ABSOLUTE: 0.4 K/UL (ref 0–1.3)
MONOCYTES RELATIVE PERCENT: 6.7 %
NEUTROPHILS ABSOLUTE: 2.8 K/UL (ref 1.7–7.7)
NEUTROPHILS RELATIVE PERCENT: 47.2 %
PDW BLD-RTO: 14.7 % (ref 12.4–15.4)
PERFORMED ON: ABNORMAL
PLATELET # BLD: 224 K/UL (ref 135–450)
PMV BLD AUTO: 8.1 FL (ref 5–10.5)
POTASSIUM REFLEX MAGNESIUM: 3.6 MMOL/L (ref 3.5–5.1)
RBC # BLD: 3.59 M/UL (ref 4–5.2)
REPORT: NORMAL
RESPIRATORY PANEL PCR: NORMAL
SODIUM BLD-SCNC: 141 MMOL/L (ref 136–145)
TOTAL IRON BINDING CAPACITY: 231 UG/DL (ref 260–445)
TROPONIN: 0.02 NG/ML
TROPONIN: 0.03 NG/ML
WBC # BLD: 5.9 K/UL (ref 4–11)

## 2022-04-27 PROCEDURE — 1200000000 HC SEMI PRIVATE

## 2022-04-27 PROCEDURE — 2580000003 HC RX 258: Performed by: INTERNAL MEDICINE

## 2022-04-27 PROCEDURE — 93306 TTE W/DOPPLER COMPLETE: CPT

## 2022-04-27 PROCEDURE — 36415 COLL VENOUS BLD VENIPUNCTURE: CPT

## 2022-04-27 PROCEDURE — 6370000000 HC RX 637 (ALT 250 FOR IP): Performed by: INTERNAL MEDICINE

## 2022-04-27 PROCEDURE — 83550 IRON BINDING TEST: CPT

## 2022-04-27 PROCEDURE — 99255 IP/OBS CONSLTJ NEW/EST HI 80: CPT | Performed by: INTERNAL MEDICINE

## 2022-04-27 PROCEDURE — 93010 ELECTROCARDIOGRAM REPORT: CPT | Performed by: INTERNAL MEDICINE

## 2022-04-27 PROCEDURE — 99223 1ST HOSP IP/OBS HIGH 75: CPT | Performed by: INTERNAL MEDICINE

## 2022-04-27 PROCEDURE — 97162 PT EVAL MOD COMPLEX 30 MIN: CPT

## 2022-04-27 PROCEDURE — 6360000002 HC RX W HCPCS: Performed by: INTERNAL MEDICINE

## 2022-04-27 PROCEDURE — 80048 BASIC METABOLIC PNL TOTAL CA: CPT

## 2022-04-27 PROCEDURE — 0202U NFCT DS 22 TRGT SARS-COV-2: CPT

## 2022-04-27 PROCEDURE — 82728 ASSAY OF FERRITIN: CPT

## 2022-04-27 PROCEDURE — 97530 THERAPEUTIC ACTIVITIES: CPT

## 2022-04-27 PROCEDURE — 84484 ASSAY OF TROPONIN QUANT: CPT

## 2022-04-27 PROCEDURE — 2700000000 HC OXYGEN THERAPY PER DAY

## 2022-04-27 PROCEDURE — 85025 COMPLETE CBC W/AUTO DIFF WBC: CPT

## 2022-04-27 PROCEDURE — 83540 ASSAY OF IRON: CPT

## 2022-04-27 PROCEDURE — 94761 N-INVAS EAR/PLS OXIMETRY MLT: CPT

## 2022-04-27 PROCEDURE — 97166 OT EVAL MOD COMPLEX 45 MIN: CPT

## 2022-04-27 RX ORDER — ATORVASTATIN CALCIUM 10 MG/1
10 TABLET, FILM COATED ORAL DAILY
Status: DISCONTINUED | OUTPATIENT
Start: 2022-04-27 | End: 2022-05-06 | Stop reason: HOSPADM

## 2022-04-27 RX ORDER — DEXTROSE MONOHYDRATE 50 MG/ML
100 INJECTION, SOLUTION INTRAVENOUS PRN
Status: DISCONTINUED | OUTPATIENT
Start: 2022-04-27 | End: 2022-05-06 | Stop reason: HOSPADM

## 2022-04-27 RX ORDER — ONDANSETRON 2 MG/ML
4 INJECTION INTRAMUSCULAR; INTRAVENOUS EVERY 4 HOURS PRN
Status: DISCONTINUED | OUTPATIENT
Start: 2022-04-27 | End: 2022-05-06 | Stop reason: HOSPADM

## 2022-04-27 RX ORDER — SODIUM CHLORIDE 0.9 % (FLUSH) 0.9 %
10 SYRINGE (ML) INJECTION PRN
Status: DISCONTINUED | OUTPATIENT
Start: 2022-04-27 | End: 2022-05-06 | Stop reason: HOSPADM

## 2022-04-27 RX ORDER — AMLODIPINE BESYLATE 5 MG/1
5 TABLET ORAL NIGHTLY
Status: DISCONTINUED | OUTPATIENT
Start: 2022-04-27 | End: 2022-05-02

## 2022-04-27 RX ORDER — OSELTAMIVIR PHOSPHATE 6 MG/ML
30 FOR SUSPENSION ORAL DAILY
Status: DISCONTINUED | OUTPATIENT
Start: 2022-04-27 | End: 2022-04-27

## 2022-04-27 RX ORDER — ACETAMINOPHEN 650 MG/1
650 SUPPOSITORY RECTAL EVERY 4 HOURS PRN
Status: DISCONTINUED | OUTPATIENT
Start: 2022-04-27 | End: 2022-05-06 | Stop reason: HOSPADM

## 2022-04-27 RX ORDER — DEXTROSE MONOHYDRATE 25 G/50ML
12.5 INJECTION, SOLUTION INTRAVENOUS PRN
Status: DISCONTINUED | OUTPATIENT
Start: 2022-04-27 | End: 2022-05-06 | Stop reason: HOSPADM

## 2022-04-27 RX ORDER — SODIUM CHLORIDE 9 MG/ML
INJECTION, SOLUTION INTRAVENOUS PRN
Status: DISCONTINUED | OUTPATIENT
Start: 2022-04-27 | End: 2022-05-06 | Stop reason: HOSPADM

## 2022-04-27 RX ORDER — METOPROLOL SUCCINATE 50 MG/1
50 TABLET, EXTENDED RELEASE ORAL 2 TIMES DAILY
Status: DISCONTINUED | OUTPATIENT
Start: 2022-04-27 | End: 2022-04-28

## 2022-04-27 RX ORDER — NICOTINE POLACRILEX 4 MG
15 LOZENGE BUCCAL PRN
Status: DISCONTINUED | OUTPATIENT
Start: 2022-04-27 | End: 2022-05-06 | Stop reason: HOSPADM

## 2022-04-27 RX ORDER — INSULIN GLARGINE 100 [IU]/ML
10 INJECTION, SOLUTION SUBCUTANEOUS NIGHTLY
Status: DISCONTINUED | OUTPATIENT
Start: 2022-04-27 | End: 2022-05-04

## 2022-04-27 RX ORDER — OSELTAMIVIR PHOSPHATE 6 MG/ML
75 FOR SUSPENSION ORAL 2 TIMES DAILY
Status: DISCONTINUED | OUTPATIENT
Start: 2022-04-27 | End: 2022-04-27 | Stop reason: DRUGHIGH

## 2022-04-27 RX ORDER — HYDRALAZINE HYDROCHLORIDE 20 MG/ML
10 INJECTION INTRAMUSCULAR; INTRAVENOUS EVERY 4 HOURS PRN
Status: DISCONTINUED | OUTPATIENT
Start: 2022-04-27 | End: 2022-05-06 | Stop reason: HOSPADM

## 2022-04-27 RX ORDER — FUROSEMIDE 10 MG/ML
40 INJECTION INTRAMUSCULAR; INTRAVENOUS 2 TIMES DAILY
Status: DISCONTINUED | OUTPATIENT
Start: 2022-04-27 | End: 2022-04-28

## 2022-04-27 RX ORDER — ACETAMINOPHEN 325 MG/1
650 TABLET ORAL EVERY 4 HOURS PRN
Status: DISCONTINUED | OUTPATIENT
Start: 2022-04-27 | End: 2022-05-06 | Stop reason: HOSPADM

## 2022-04-27 RX ORDER — INSULIN LISPRO 100 [IU]/ML
0-6 INJECTION, SOLUTION INTRAVENOUS; SUBCUTANEOUS NIGHTLY
Status: DISCONTINUED | OUTPATIENT
Start: 2022-04-27 | End: 2022-04-29

## 2022-04-27 RX ORDER — HEPARIN SODIUM 5000 [USP'U]/ML
5000 INJECTION, SOLUTION INTRAVENOUS; SUBCUTANEOUS EVERY 8 HOURS SCHEDULED
Status: DISCONTINUED | OUTPATIENT
Start: 2022-04-27 | End: 2022-05-06 | Stop reason: HOSPADM

## 2022-04-27 RX ORDER — PREGABALIN 75 MG/1
150 CAPSULE ORAL 2 TIMES DAILY
Status: DISCONTINUED | OUTPATIENT
Start: 2022-04-27 | End: 2022-05-06 | Stop reason: HOSPADM

## 2022-04-27 RX ORDER — ALBUTEROL SULFATE 90 UG/1
2 AEROSOL, METERED RESPIRATORY (INHALATION) EVERY 6 HOURS PRN
Status: DISCONTINUED | OUTPATIENT
Start: 2022-04-27 | End: 2022-05-06 | Stop reason: HOSPADM

## 2022-04-27 RX ORDER — INSULIN LISPRO 100 [IU]/ML
0-12 INJECTION, SOLUTION INTRAVENOUS; SUBCUTANEOUS
Status: DISCONTINUED | OUTPATIENT
Start: 2022-04-27 | End: 2022-04-29

## 2022-04-27 RX ORDER — SODIUM CHLORIDE 0.9 % (FLUSH) 0.9 %
10 SYRINGE (ML) INJECTION EVERY 12 HOURS SCHEDULED
Status: DISCONTINUED | OUTPATIENT
Start: 2022-04-27 | End: 2022-05-06 | Stop reason: HOSPADM

## 2022-04-27 RX ORDER — POLYETHYLENE GLYCOL 3350 17 G/17G
17 POWDER, FOR SOLUTION ORAL DAILY PRN
Status: DISCONTINUED | OUTPATIENT
Start: 2022-04-27 | End: 2022-05-06 | Stop reason: HOSPADM

## 2022-04-27 RX ORDER — ASPIRIN 81 MG/1
81 TABLET ORAL DAILY
Status: DISCONTINUED | OUTPATIENT
Start: 2022-04-27 | End: 2022-05-06 | Stop reason: HOSPADM

## 2022-04-27 RX ORDER — METOPROLOL SUCCINATE 25 MG/1
25 TABLET, EXTENDED RELEASE ORAL DAILY
Status: DISCONTINUED | OUTPATIENT
Start: 2022-04-27 | End: 2022-04-27

## 2022-04-27 RX ADMIN — FUROSEMIDE 40 MG: 10 INJECTION, SOLUTION INTRAMUSCULAR; INTRAVENOUS at 17:47

## 2022-04-27 RX ADMIN — INSULIN GLARGINE 10 UNITS: 100 INJECTION, SOLUTION SUBCUTANEOUS at 01:28

## 2022-04-27 RX ADMIN — INSULIN LISPRO 2 UNITS: 100 INJECTION, SOLUTION INTRAVENOUS; SUBCUTANEOUS at 17:46

## 2022-04-27 RX ADMIN — INSULIN LISPRO 4 UNITS: 100 INJECTION, SOLUTION INTRAVENOUS; SUBCUTANEOUS at 09:49

## 2022-04-27 RX ADMIN — INSULIN LISPRO 2 UNITS: 100 INJECTION, SOLUTION INTRAVENOUS; SUBCUTANEOUS at 21:08

## 2022-04-27 RX ADMIN — PREGABALIN 150 MG: 75 CAPSULE ORAL at 01:26

## 2022-04-27 RX ADMIN — PREGABALIN 150 MG: 75 CAPSULE ORAL at 20:58

## 2022-04-27 RX ADMIN — SODIUM CHLORIDE, PRESERVATIVE FREE 10 ML: 5 INJECTION INTRAVENOUS at 21:00

## 2022-04-27 RX ADMIN — ATORVASTATIN CALCIUM 10 MG: 10 TABLET, FILM COATED ORAL at 09:49

## 2022-04-27 RX ADMIN — METOPROLOL SUCCINATE 50 MG: 50 TABLET, EXTENDED RELEASE ORAL at 20:58

## 2022-04-27 RX ADMIN — INSULIN GLARGINE 10 UNITS: 100 INJECTION, SOLUTION SUBCUTANEOUS at 21:08

## 2022-04-27 RX ADMIN — INSULIN LISPRO 3 UNITS: 100 INJECTION, SOLUTION INTRAVENOUS; SUBCUTANEOUS at 01:28

## 2022-04-27 RX ADMIN — ASPIRIN 81 MG: 81 TABLET, COATED ORAL at 09:49

## 2022-04-27 RX ADMIN — AMLODIPINE BESYLATE 5 MG: 5 TABLET ORAL at 01:25

## 2022-04-27 RX ADMIN — INSULIN LISPRO 2 UNITS: 100 INJECTION, SOLUTION INTRAVENOUS; SUBCUTANEOUS at 13:03

## 2022-04-27 RX ADMIN — HEPARIN SODIUM 5000 UNITS: 5000 INJECTION INTRAVENOUS; SUBCUTANEOUS at 06:29

## 2022-04-27 RX ADMIN — HEPARIN SODIUM 5000 UNITS: 5000 INJECTION INTRAVENOUS; SUBCUTANEOUS at 21:02

## 2022-04-27 RX ADMIN — FUROSEMIDE 40 MG: 10 INJECTION, SOLUTION INTRAMUSCULAR; INTRAVENOUS at 09:49

## 2022-04-27 RX ADMIN — SODIUM CHLORIDE, PRESERVATIVE FREE 10 ML: 5 INJECTION INTRAVENOUS at 09:48

## 2022-04-27 RX ADMIN — BENZOCAINE AND MENTHOL 1 LOZENGE: 15; 3.6 LOZENGE ORAL at 01:24

## 2022-04-27 RX ADMIN — ACETAMINOPHEN 650 MG: 325 TABLET ORAL at 20:58

## 2022-04-27 RX ADMIN — AMLODIPINE BESYLATE 5 MG: 5 TABLET ORAL at 20:58

## 2022-04-27 RX ADMIN — PREGABALIN 150 MG: 75 CAPSULE ORAL at 09:48

## 2022-04-27 RX ADMIN — METOPROLOL SUCCINATE 25 MG: 25 TABLET, EXTENDED RELEASE ORAL at 09:49

## 2022-04-27 ASSESSMENT — PAIN DESCRIPTION - LOCATION
LOCATION: HEAD
LOCATION: THROAT
LOCATION: THROAT

## 2022-04-27 ASSESSMENT — PAIN SCALES - GENERAL
PAINLEVEL_OUTOF10: 3
PAINLEVEL_OUTOF10: 0
PAINLEVEL_OUTOF10: 2
PAINLEVEL_OUTOF10: 3
PAINLEVEL_OUTOF10: 0

## 2022-04-27 ASSESSMENT — PAIN DESCRIPTION - ORIENTATION
ORIENTATION: ANTERIOR
ORIENTATION: INNER
ORIENTATION: INNER

## 2022-04-27 ASSESSMENT — PAIN DESCRIPTION - DESCRIPTORS
DESCRIPTORS: ACHING
DESCRIPTORS: SORE
DESCRIPTORS: SORE

## 2022-04-27 ASSESSMENT — PAIN - FUNCTIONAL ASSESSMENT
PAIN_FUNCTIONAL_ASSESSMENT: ACTIVITIES ARE NOT PREVENTED
PAIN_FUNCTIONAL_ASSESSMENT: ACTIVITIES ARE NOT PREVENTED

## 2022-04-27 NOTE — PLAN OF CARE
Problem: Discharge Planning  Goal: Discharge to home or other facility with appropriate resources  4/27/2022 1133 by Steven Gupta RN  Outcome: Progressing  4/27/2022 0139 by Austen Mendez RN  Outcome: Progressing     Problem: Pain  Goal: Verbalizes/displays adequate comfort level or baseline comfort level  4/27/2022 1133 by Steven Gupta RN  Outcome: Progressing  4/27/2022 0139 by Austen Mendez RN  Outcome: Progressing     Problem: Safety - Adult  Goal: Free from fall injury  4/27/2022 1133 by Steven Gupta RN  Outcome: Progressing  4/27/2022 0139 by Austen Mendez RN  Outcome: Progressing     Problem: Chronic Conditions and Co-morbidities  Goal: Patient's chronic conditions and co-morbidity symptoms are monitored and maintained or improved  Outcome: Progressing

## 2022-04-27 NOTE — CONSULTS
Infectious Diseases Inpatient Consult Note      Reason for Consult:  SOB, lower leg edema and INFLUENZA A on rapid testing     Requesting Physician:        Primary Care Physician:  Amber Abraham MD    History Obtained From:  [facility] and Patient     CHIEF COMPLAINT:     Chief Complaint   Patient presents with    Cough     cough, sob, chest pain. onset 4 ays ago. denies fever. nonproductive cough. BLE edema         HISTORY OF PRESENT ILLNESS:  61 y.o. woman with CKD and now admitted with acute onset of SOB, lower leg edema and BMI at  36,now on  3 lt oxygen due to hypoxic resp failure BP at  211/9 on admit and CXR with vascular congestion. She has no sick contact no fevers some chills cough + ,  no sputum and was tested at [facility] for influenza A + rapid testing before admit. Now sent to ED for admit and evaluation due to worsening resp status. BNP at  4266, Trop mild elevation, Lft elevation we are consulted for recommendations. DM poor control Hb1C at  10.3 and has Neuropathy and poor mobility.    Location :  Lower leg swelling  + pain + sob     Quality : aching +       Severity : 5/10     Duration : 4 days      Timing : intermittent   Context : SOB+ lower leg edema+    Modifying factors : none   Associated signs and symptoms: No fevers no chills        Past Medical History:    Past Medical History:   Diagnosis Date    Ankle fracture, left 1/17/2014    HTN (hypertension), benign 1/17/2014    Hypertension        Past Surgical History:    Past Surgical History:   Procedure Laterality Date    FRACTURE SURGERY Left 01/17/2014    orif tibula/fibula fixation    HYSTERECTOMY         Current Medications:    Outpatient Medications Marked as Taking for the 4/26/22 encounter [facility] Encounter)   Medication Sig Dispense Refill    aspirin 81 MG EC tablet Take 81 mg by mouth daily      metoprolol succinate (TOPROL XL) 25 MG extended release tablet Take 25 mg by mouth daily      potassium chloride (MICRO-K) 10 MEQ extended release capsule Take 10 mEq by mouth daily      Cholecalciferol (VITAMIN D3) 125 MCG (5000 UT) CAPS Take 2 capsules by mouth once a week         Allergies:  Patient has no known allergies. Immunizations :   Immunization History   Administered Date(s) Administered    COVID-19, Moderna, Primary or Immunocompromised, PF, 100mcg/0.5mL 03/18/2021         Social History:   Social History     Tobacco Use    Smoking status: Light Tobacco Smoker    Smokeless tobacco: Never Used   Substance Use Topics    Alcohol use: No    Drug use: No     Social History     Tobacco Use   Smoking Status Light Tobacco Smoker   Smokeless Tobacco Never Used      Family History : no DVT no COPD        REVIEW OF SYSTEMS:     Constitutional:  negative for fevers, chills, night sweats  Eyes:  negative for blurred vision, eye discharge, visual disturbance   HEENT:  negative for hearing loss, ear drainage,nasal congestion  Respiratory:  negative for cough + , shortness of breath +  or hemoptysis   Cardiovascular:  negative for chest pain, palpitations, syncope  Gastrointestinal:  negative for nausea, vomiting, diarrhea, constipation, abdominal pain  Genitourinary:  negative for frequency, dysuria, urinary incontinence, hematuria  Hematologic/Lymphatic:  negative for easy bruising, bleeding and lymphadenopathy  Allergic/Immunologic:  negative for recurrent infections, angioedema, anaphylaxis   Endocrine:  negative for weight changes, polyuria, polydipsia and polyphagia  Musculoskeletal:  negative for joint  Pain, ++  swelling, decreased range of motion  Integumentary: No rashes, skin lesions  Neurological:  negative for headaches, slurred speech, unilateral weakness  Psychiatric: negative for hallucinations,confusion,agitation.      PHYSICAL EXAM:      Vitals:    BP (!) 147/66   Pulse 73   Temp 98.7 °F (37.1 °C) (Oral)   Resp 16   Ht 5' (1.524 m)   Wt 185 lb 3 oz (84 kg)   SpO2 96%   BMI 36.17 kg/m²     General Appearance: alert,in some acute distress, ++ pallor, no icterus nasal cannula   Skin: warm and dry, no rash or erythema  Head: normocephalic and atraumatic  Eyes: pupils equal, round, and reactive to light, conjunctivae normal  ENT: tympanic membrane, external ear and ear canal normal bilaterally, nose without deformity, nasal mucosa and turbinates normal without polyps  Neck: supple and non-tender without mass, no thyromegaly  no cervical lymphadenopathy  Pulmonary/Chest: Bi basal crepts++  wheezes, rales or rhonchi, normal air movement, in some  respiratory distress  Cardiovascular:  S1 and S2, no murmurs, rubs, clicks, or gallops, no carotid bruits  Abdomen: soft, non-tender, non-distended, normal bowel sounds, no masses or organomegaly  Extremities: no cyanosis, clubbing or ++ edema  Musculoskeletal: normal range of motion, no joint swelling, deformity or tenderness  Integumentary: No rashes, no abnormal skin lesions, no petechiae  Neurologic: reflexes normal and symmetric, no cranial nerve deficit  Psych:  Orientation, sensorium, mood normal   Lines: IV    DATA:    CBC:   Lab Results   Component Value Date    WBC 5.9 04/27/2022    HGB 10.6 (L) 04/27/2022    HCT 32.3 (L) 04/27/2022    MCV 90.2 04/27/2022     04/27/2022     RENAL:   Lab Results   Component Value Date    CREATININE 1.8 (H) 04/27/2022    BUN 23 (H) 04/27/2022     04/27/2022    K 3.6 04/27/2022     04/27/2022    CO2 29 04/27/2022     SED RATE: No results found for: SEDRATE  CK: No results found for: CKTOTAL  CRP: No results found for: CRP  Hepatic Function Panel:   Lab Results   Component Value Date    ALKPHOS 159 04/26/2022    ALT 77 04/26/2022    AST 47 04/26/2022    PROT 6.7 04/26/2022    BILITOT <0.2 04/26/2022    LABALBU 3.1 04/26/2022     UA:  Lab Results   Component Value Date    COLORU YELLOW 07/11/2019    CLARITYU Clear 07/11/2019    GLUCOSEU Negative 07/11/2019    BILIRUBINUR Negative 07/11/2019    KETUA Negative 07/11/2019    SPECGRAV 1.020 07/11/2019    BLOODU Negative 07/11/2019    PHUR 6.0 07/11/2019    PROTEINU 100 07/11/2019    UROBILINOGEN 0.2 07/11/2019    NITRU Negative 07/11/2019    LEUKOCYTESUR Negative 07/11/2019    LABMICR YES 07/11/2019    URINETYPE Not Specified 07/11/2019      Urine Microscopic:   Lab Results   Component Value Date    BACTERIA RARE 07/11/2019    HYALCAST 2 07/11/2019    WBCUA 4 07/11/2019    RBCUA 2 07/11/2019    EPIU 4 07/11/2019     Urine Reflex to Culture:   Lab Results   Component Value Date    URRFLXCULT Not Indicated 07/11/2019     Creat  2    BNP  4266         MICRO: cultures reviewed and updated by me      Date/Time      Rapid influenza A/B antigens [[id number]] Collected: 04/26/22 2222   Order Status: Completed Specimen: Nasopharyngeal Updated: 04/26/22 2324    Rapid Influenza A Ag Negative    Rapid Influenza B Ag Negative         POCT QUIDEL ROC 2 FLU AND SARS ANTIIGEN MARIELA IMMUNOASSAY (04/26/2022 5:14 PM EDT)  POCT QUIDEL ROC 2 FLU AND SARS ANTIIGEN MARIELA IMMUNOASSAY (04/26/2022 5:14 PM EDT)   Component Value Ref Range Test Method Analysis Time Performed At [facility]    ,[address]       [address] [id number]       [facility]     COVID-19 ROC SARS ANTIGEN [facility]IELA Negative Negative     [facility]     RAPID INFLUENZA A AG EXTERNAL LAB Positive Nonreactive     [facility]     RAPID INFLUENZA B AG EXTERNAL LAB Negative       [facility]       POCT QUIDEL ROC 2 FLU AND SARS ANTIIGEN MARIELA IMMUNOASSAY (04/26/2022 5:14 PM EDT)   Specimen (Source) Anatomical Location / Laterality Collection Method / Volume Collection Time Received Time   Swab (Nasopharyngeal)     04/26/2022 5:14 PM EDT         Blood Culture: No results found for: BC, BLOODCULT2    Viral Culture:    No results found for: COVID19  Urine Culture: No results for input(s): Darnelle Eaves in the last 72 hours.     Scheduled Meds:   pregabalin  150 mg Oral BID    insulin glargine  10 Units SubCUTAneous Nightly    atorvastatin  10 mg Oral Daily    amLODIPine  5 mg Oral Nightly    furosemide  40 mg IntraVENous BID    metoprolol succinate  25 mg Oral Daily    aspirin  81 mg Oral Daily    insulin lispro  0-12 Units SubCUTAneous TID     insulin lispro  0-6 Units SubCUTAneous Nightly    sodium chloride flush  10 mL IntraVENous 2 times per day    heparin (porcine)  5,000 Units SubCUTAneous 3 times per day    oseltamivir 6mg/ml  30 mg Oral Daily       Continuous Infusions:   dextrose      sodium chloride         PRN Meds:  albuterol sulfate HFA, glucose, dextrose, glucagon (rDNA), dextrose, sodium chloride flush, sodium chloride, ondansetron, polyethylene glycol, acetaminophen **OR** acetaminophen, benzocaine-menthol, hydrALAZINE    Imaging:   XR CHEST (2 VW)   Final Result   Bilateral parahilar infiltrates could represent edema or atypical pneumonia         CT CHEST PULMONARY EMBOLISM W CONTRAST    (Results Pending)       All pertinent images and reports for the current Hospitalization were reviewed by me.     IMPRESSION:    Patient Active Problem List   Diagnosis    Ankle fracture, left    DMII (diabetes mellitus, type 2) ([address]    Essential hypertension    Chest pain    Acute on chronic diastolic (congestive) heart failure (HCC)    Acute respiratory failure with hypoxia (HCC)    Influenza A    Elevated troponin    Acute pulmonary edema (HCC)    Acute renal failure (ARF) ([address]    Hypertensive urgency    Hyperlipidemia    Morbid obesity due to excess calories (HCC)     Acute on chronic Diastolic CHF  Acute pulmonary edema  HTN Urgency  CHLOE on CKD 3a  Obesity +  Lower leg edema+  DM poor control   Influenza A on rapid test could be falsely positive       Suspect presentation is from CHF, pulmonary edema and worsening of renal failure no concern for infection    She is fully vaccinated and boosted against COVID 19 per patient     Will check Resp virus panel to confer and if negative can d/c isolation        Labs, Microbiology, Radiology and pertinent results from current hospitalization and care every where were reviewed by me as a part of the consultation. PLAN :  1. Check resp virus panel   2. Will be able to d/c Tamiflu if negative  3. Cont supportive care per Primary   4. Control DM   5. Cont HTN   6. Counseling done     Discussed with patient/Family and Nursing   Risk of Complications/Morbidity: High      · Illness(es)/ Infection present that pose threat to bodily function. · There is potential for severe exacerbation of infection/side effects of treatment. · Therapy requires intensive monitoring for antimicrobial agent toxicity. Thanks for allowing me to participate in your patient's care please call me with any questions or concerns.     Dr. Marisol Badillo MD  [address] [place] [address] Physician  Phone: [phone]   Fax : [phone]

## 2022-04-27 NOTE — H&P
Hospital Medicine  History and Physical    PCP: Adelaide Gamboa MD  Patient Name: Edis Cowart    Date of Service: Pt seen/examined on 4/26/22 and admitted to Inpatient with expected LOS greater than two midnights due to medical therapy. CHIEF COMPLAINT:  Pt c/o shortness of breath, non-productive cough, lower extremity edema and chest pain  HISTORY OF PRESENT ILLNESS: Pt is an 61y.o. year-old female with a history of hypertension, hyperlipidemia, type II diabetes mellitus and CHF. She presents to the emergency room for evaluation following a 4-day history of worsening shortness of breath, a nonproductive cough and increasing lower extremity edema. She reports that she has dull aching pain in her chest when she takes a deep breath. She states that her shortness of breath is now severe, worse with exertion and improved with rest.  She was seen at the clinic at [facility] and diagnosed with influenza A. In the emergency room here she was found to have an acute CHF exacerbation with increased lower extremity edema and pulmonary edema. She was also found to have acute renal failure. She is being admitted for further evaluation and treatment. Associated signs and symptoms do not include typical chest pain, lightheaded, dizziness, diaphoresis, orthopnea, paroxysmal nocturnal dyspnea, fever or chills. No recent medication changes. Past Medical History:        Diagnosis Date    Ankle fracture, left 1/17/2014    HTN (hypertension), benign 1/17/2014    Hypertension        Past Surgical History:        Procedure Laterality Date    FRACTURE SURGERY Left 01/17/2014    orif tibula/fibula fixation    HYSTERECTOMY         Allergies:  Patient has no known allergies. Medications Prior to Admission:    Prior to Admission medications    Medication Sig Start Date End Date Taking?  Authorizing Provider   aspirin 81 MG EC tablet Take 81 mg by mouth daily   Yes Historical Provider, MD   metoprolol succinate (TOPROL XL) 25 MG extended release tablet Take 25 mg by mouth daily   Yes Historical Provider, MD   potassium chloride (MICRO-K) 10 MEQ extended release capsule Take 10 mEq by mouth daily   Yes Historical Provider, MD   Cholecalciferol (VITAMIN D3) 125 MCG (5000 UT) CAPS Take 2 capsules by mouth once a week   Yes Historical Provider, MD   atorvastatin (LIPITOR) 10 MG tablet Take 1 tablet by mouth daily 10/17/18   Leonardo Robertson MD   olmesartan (BENICAR) 40 MG tablet Take 1 tablet by mouth daily 10/17/18   Leonardo Robertson MD   hydrochlorothiazide (HYDRODIURIL) 25 MG tablet Take 1 tablet by mouth daily 10/17/18   Leonardo Robertson MD   amLODIPine (NORVASC) 5 MG tablet Take 1 tablet by mouth daily  Patient taking differently: Take 5 mg by mouth every evening  10/17/18   Leonardo Robertson MD   pregabalin (LYRICA) 150 MG capsule Take 150 mg by mouth every evening. Historical Provider, MD   L-METHYLFOLATE-B6-B12 PO Take 1 tablet by mouth daily    Historical Provider, MD   insulin glargine (LANTUS) 100 UNIT/ML injection Inject 25 Units into the skin nightly. 1/22/14   Estefania Ibarra MD   insulin lispro (HUMALOG) 100 UNIT/ML injection Inject 0-12 Units into the skin 3 times daily (with meals). Patient taking differently: Inject 18-36 Units into the skin 3 times daily (with meals) 18 units with Breakfast and Lunch and 36 units with Dinner 1/20/14   Devangerik GuzmanKelsea       Family History:   Family history is negative for accelerated coronary artery disease, diabetes or malignancies. Social History:   TOBACCO:   reports that she has been smoking. She has never used smokeless tobacco.  ETOH:   reports no history of alcohol use.   OCCUPATION:      REVIEW OF SYSTEMS:  A full review of systems was performed and is negative except for that which appears in the HPI    Physical Exam:    Vitals: BP (!) 198/92   Pulse 83   Temp 98.6 °F (37 °C) (Oral)   Resp 16   Ht 5' (1.524 m)   Wt 190 lb 7.6 oz (86.4 kg)   SpO2 99%   BMI 37.20 kg/m²   General appearance: WD/WN 61y.o. year-old female who is alert, appears stated age and is cooperative  HEENT: Head: Normocephalic, no lesions, without obvious abnormality. Eye: Normal external eye, conjunctiva, lids cornea, PEERL. Ears: Normal external ears. Non-tender. Nose: Normal external nose, mucus membranes and septum. Pharynx: Dental Hygiene adequate. Normal buccal mucosa. Normal pharynx. Neck: no adenopathy, no carotid bruit, no JVD, supple, symmetrical, trachea midline and thyroid not enlarged, symmetric, no tenderness/mass/nodules  Lungs: scattered crackles on auscultation bilaterally and no use of accessory muscles  Heart: regular rate and rhythm, S1, S2 normal, no murmur, click, rub or gallop and normal apical impulse  Abdomen: soft, non-tender; bowel sounds normal; no masses, no organomegaly  Extremities: extremities atraumatic, no cyanosis, 2+ edema bilateral lower extremities and Homans sign is negative, no sign of DVT. Capillary Refill: Acceptable < 3 seconds   Peripheral Pulses: +3 easily felt, not easily obliterated with pressures   Skin: Skin color, texture, turgor normal. No rashes or lesions on exposed skin  Neurologic: Neurovascularly intact without any focal sensory/motor deficits. Cranial nerves: II-XII intact, grossly non-focal. Gait was not tested.   Mental Status: Alert and oriented, thought content appropriate, normal insight        CBC:   Recent Labs     04/26/22 2014   WBC 6.1   HGB 11.1*        BMP:    Recent Labs     04/26/22 2014      K 4.0   CL 98*   CO2 30   BUN 24*   CREATININE 2.0*   GLUCOSE 346*     Troponin:   Recent Labs     04/26/22 2014   TROPONINI 0.03*     PT/INR:  No results found for: PTINR  U/A:    Lab Results   Component Value Date    LEUKOCYTESUR Negative 07/11/2019    RBCUA 2 07/11/2019    SPECGRAV 1.020 07/11/2019    UROBILINOGEN 0.2 07/11/2019    BILIRUBINUR Negative 07/11/2019    BLOODU Negative 07/11/2019    GLUCOSEU Negative 07/11/2019    PROTEINU 100 07/11/2019         RAD:   I have independently reviewed and interpreted the imaging studies below and based my recommendations to the patient on those findings. XR CHEST (2 VW)    Result Date: 4/26/2022  EXAMINATION: TWO XRAY VIEWS OF THE CHEST 4/26/2022 7:30 pm COMPARISON: 07/11/2019 HISTORY: ORDERING SYSTEM PROVIDED HISTORY: influenza a, hypoxia TECHNOLOGIST PROVIDED HISTORY: Reason for exam:->influenza a, hypoxia Reason for Exam: influenza a, hypoxia FINDINGS: Mild cardiomegaly. Bilateral parahilar increased markings. No pneumothorax. No pleural effusion. Bilateral parahilar infiltrates could represent edema or atypical pneumonia         Assessment:   Principal Problem:    Acute on chronic diastolic (congestive) heart failure (HCC)  Active Problems:    Acute respiratory failure with hypoxia (HCC)    Influenza A    Elevated troponin    Acute pulmonary edema (HCC)    Acute renal failure (ARF) (HCC)    Hypertensive urgency    Hyperlipidemia    DMII (diabetes mellitus, type 2) ([facility]    Essential hypertension  Resolved Problems:    * No resolved hospital problems. *      Plan:       CHF - Acute on chronic diastolic dysfunction with acute pulmonary edema. A 2D Echocardiogram on 10/16/2018 shows an EF of 55-60%. A follow up Echocardiogram has been ordered to reassess cardiac function. Diurese with IV Lasix. Monitor strict I&Os and daily weights. A low sodium fluid restricted diet has been ordered. Acute respiratory failure with hypoxia - as evidenced by an oxygen saturation of less than 90% on room air  - Due to CHF exacerbaton  -We will provide oxygen as necessary to maintain an oxygen saturation of 92% or higher on room air      Influenza A - Pt tested positive at [facility], negative here. Will start Tamiflu 75 mg bid for 5 days and provide supportive care with antiemetics, fluids and will monitor and replace electrolytes as indicated. Droplet isolation.     Acute renal failure (ARF) ([facility] - likely poor clearance in the setting of CHF. hold Benicar and HCTZ due to renal failure. Will monitor for improvement with diuresis    Elevated troponin - initial Troponin is 0.03. Pt has chest pain, but seems pleuritic. We will monitor on telemetry and follow serial troponin levels. Diabetes mellitus II - Lantus, SSI and carb control diet    Hypertensive urgency, Essential (primary) hypertension - hold Benicar and HCTZ due to renal failure. Start prn IV Hydralazine and monitor blood pressure    Hyperlipidemia - No current evidence of Rhabdomyolysis or other adverse effects. Continue statin therapy while in the hospital    Morbid obesity due to excess calories (Body mass index is 37.2 kg/m². ) - Complicating assessment and treatment. Placing patient at risk for multiple co-morbidities as well as early death and contributing to the patient's presentation.  on weight loss when appropriate. Prognosis:  Good  Code Status: Full Code  DVT Prophylaxis: Heparin  GI prophylaxis: N/a  Antibiotic prophylaxis indicated:  No  VAP:  N/A      Diet: ADULT DIET; Regular; 5 carb choices (75 gm/meal); Low Sodium (2 gm); 1500 ml    (Advanced care planning has been discussed with patient and/or responsible family member and is reflected in the code status.  Further orders associated with this have been entered if appropriate)    Disposition: Anticipate that patient will remain in the hospital for 3 or more midnights depending on further evaluation and clinical course    Please note that over 50 minutes was spent in evaluating the patient, review of records and results, discussion with staff/family, etc.      Kyle Maradiaga MD

## 2022-04-27 NOTE — ED NOTES
Report called to Tha Brunson on 4N. Denies further questions.      Edith Christiansen RN  04/27/22 [id number]

## 2022-04-27 NOTE — PROGRESS NOTES
Physical Therapy  Facility/Department: [facility]  Physical Therapy Initial Assessment and Discharge    Name: Eddi Crane  : 1955  MRN: [id number]  Date of Service: 2022    Discharge Recommendations:  Home independently          Patient Diagnosis(es): The primary encounter diagnosis was Influenza A. Diagnoses of Acute respiratory failure with hypoxia ([facility] [address] Elevated troponin, Acute congestive heart failure, unspecified heart failure type ([facility] [address] and CHLOE (acute kidney injury) ([facility] [address] [facility] were also pertinent to this visit. Past Medical History:  has a past medical history of Ankle fracture, left, HTN (hypertension), benign, and Hypertension. Past Surgical History:  has a past surgical history that includes Hysterectomy and fracture surgery (Left, 2014). Assessment   Assessment: Pt is a 61 y.o. female who presented to the emergency room on 22 for evaluation following a 4-day history of worsening shortness of breath, a nonproductive cough and increasing lower extremity edema. Pt found to have influenza. Pt currently functioning near baseline. Anticipate return home independently.   Therapy Prognosis: Good  Decision Making: Medium Complexity  History: see above  Exam: see above  Clinical Presentation: evolving  Requires PT Follow-Up: No  Activity Tolerance  Activity Tolerance: Patient tolerated treatment well     Plan   Safety Devices  Type of Devices: Call light within reach,Gait belt,Left in chair,Chair alarm in place (chair alarm in place due to O2 line)     Restrictions  Restrictions/Precautions  Restrictions/Precautions: Isolation  Position Activity Restriction  Other position/activity restrictions: Influenza; 3L O2     Subjective   General  Chart Reviewed: Yes  Patient assessed for rehabilitation services?: Yes  Additional Pertinent Hx: Pt is a 61 y.o. female who presented to the emergency room on 22 for evaluation following a 4-day history of worsening shortness of breath, a nonproductive cough and increasing lower extremity edema. Pt found to have influenza. Response To Previous Treatment: Not applicable  Family / Caregiver Present: No  Referring Practitioner: Rachel Fallon MD  Referral Date : 04/27/22  Diagnosis: Influenza  Follows Commands: Within Functional Limits  Subjective  Subjective: Pt is agreeable to PT         Social/Functional History  Social/Functional History  Lives With:  (mother)  Type of Home: House  Home Layout: One level,Laundry in basement  Home Access: Level entry  Bathroom Shower/Tub: Tub/Shower unit,Shower chair with back  Bathroom Toilet: Standard  Bathroom Equipment: Shower chair  Home Equipment: [employer]  Has the patient had two or more falls in the past year or any fall with injury in the past year?: No  ADL Assistance: Independent  Homemaking Assistance: Independent  Ambulation Assistance: Independent (with [address])  Transfer Assistance: Independent  Active : Yes  Mode of Transportation: Car  IADL Comments: sleep in regular bed     Vision/Hearing  Hearing: Within functional limits      Cognition   Orientation  Overall Orientation Status: Within Functional Limits  Cognition  Overall Cognitive Status: WFL     Objective   Gross Assessment  Strength: Within functional limits      Transfers  Sit to Stand: Independent  Stand to sit: Independent  Ambulation  Surface: level tile  Device: Single point cane  Other Apparatus: O2 (3L)  Assistance: Supervision (due to O2)  Gait Deviations: None  Distance: 40'  Comments: Pt stood at sink to wash hands. Pt requires cues for O2 line.      Balance  Posture: Good  Sitting - Static: Good  Sitting - Dynamic: Good  Standing - Static: Good  Standing - Dynamic: Good             AM-PAC Score  AM-PAC Inpatient Mobility Raw Score : 24 (04/27/22 0915)  AM-PAC Inpatient T-Scale Score : 61.14 (04/27/22 0915)  Mobility Inpatient CMS 0-100% Score: 0 (04/27/22 0915)  Mobility Inpatient CMS G-Code Modifier : [facility] (04/27/22 5565)        Therapy Time   Individual Concurrent Group Co-treatment   Time In 7400         Time Out 0913         Minutes 23         Timed Code Treatment Minutes: 10 Minutes       Yonathan Gil PT

## 2022-04-27 NOTE — FLOWSHEET NOTE
Pt arrived to 4253 via stretcher from Ed with Dx:  CHF. AAO x 4 and able to answer all admission questions. From home alone. Pt states that CHF is a new dx for her. Pt has 2+ edema to BLE's. 1500 ml  fluid restriction. Up with SBA and use of cane. Wears pull ups for occasional incontinence. Pt is on fall precautions d/t being slightly unsteady and  Has O2 on at 3 lpm/nc. Pt does not wear O2 at home. Per Ed pt was flu tested at a clinic and was positive but was tested here and was neg. Oriented to room and call light. AC/HS. No skin impairments. Call light in reach.

## 2022-04-27 NOTE — FLOWSHEET NOTE
4 Eyes Skin Assessment     NAME:  Jasiel Becerra  YOB: 1955  MEDICAL RECORD NUMBER:  [id number]    The patient is being assess for  Admission    I agree that 2 RN's have performed a thorough Head to Toe Skin Assessment on the patient. ALL assessment sites listed below have been assessed. Areas assessed by both nurses:    Head, Face, Ears, Shoulders, Back, Chest, Arms, Elbows, Hands, Sacrum. Buttock, Coccyx, Ischium and Legs. Feet and Heels        Does the Patient have a Wound?  No noted wound(s)       Ric Prevention initiated:  No   Wound Care Orders initiated:  No    Pressure Injury (Stage 3,4, Unstageable, DTI, NWPT, and Complex wounds) if present place consult order under [de-identified] No    New and Established Ostomies if present place consult order under : No      Nurse 1 eSignature: Electronically signed by Michael Hardy RN on 4/27/22 at 2:27 AM EDT    **SHARE this note so that the co-signing nurse is able to place an eSignature**    Nurse 2 eSignature: Electronically signed by Cuate Reich RN on 4/27/22 at 2:59 AM EDT

## 2022-04-27 NOTE — PROGRESS NOTES
Hospital Medicine Progress Note      Admit Date: 4/26/2022       CC: F/U for Pt c/o shortness of breath, non-productive cough, lower extremity edema and chest pain    HPI: Pt is an 61y.o. year-old female with a history of hypertension, hyperlipidemia, type II diabetes mellitus and CHF. She presents to the emergency room for evaluation following a 4-day history of worsening shortness of breath, a nonproductive cough and increasing lower extremity edema. She reports that she has dull aching pain in her chest when she takes a deep breath. She states that her shortness of breath is now severe, worse with exertion and improved with rest.  She was seen at the clinic at [facility] and diagnosed with influenza A. In the emergency room here she was found to have an acute CHF exacerbation with increased lower extremity edema and pulmonary edema. She was also found to have acute renal failure. She is being admitted for further evaluation and treatment. Associated signs and symptoms do not include typical chest pain, lightheaded, dizziness, diaphoresis, orthopnea, paroxysmal nocturnal dyspnea, fever or chills. No recent medication changes.       Interval History/Subjective: patient states she started with SOB about 4 days ago and was tested for the flu at [facility], which was positive yesterday. She is indifferent on taking it at this point. Does have some leg edema bilaterally, 2+. She does not wear O2 normally at home. Is requiring 2L now here. She reports a cough but is nonproductive. She is not aware of any sick contacts lately. Review of Systems:       The patient denied headaches, visual changes, LOC, SOB, CP, ABD pain, N/V/D, skin changes, new or worsening weakness or neuromuscular deficits. Comprehensive ROS negative except as mentioned above.     Past Medical History:        Diagnosis Date    Ankle fracture, left 1/17/2014    HTN (hypertension), benign 1/17/2014    Hypertension        Past Surgical History:        Procedure Laterality Date    FRACTURE SURGERY Left 01/17/2014    orif tibula/fibula fixation    HYSTERECTOMY         Allergies:  Patient has no known allergies. Past medical and surgical history reviewed. Any changes have been noted. PHYSICAL EXAM:  BP (!) 147/66   Pulse 73   Temp 98.7 °F (37.1 °C) (Oral)   Resp 16   Ht 5' (1.524 m)   Wt 185 lb 3 oz (84 kg)   SpO2 96%   BMI 36.17 kg/m²       Intake/Output Summary (Last 24 hours) at 4/27/2022 1141  Last data filed at 4/27/2022 0057  Gross per 24 hour   Intake 350 ml   Output 500 ml   Net -150 ml        General appearance:   No apparent distress, appears stated age. Cooperative. HEENT:  Normocephalic, atraumatic. PERRLA. EOMi. Conjunctivae/corneas clear, no icterus, non-injected. Neck: Supple, with full range of motion. No jugular venous distention. Trachea midline. Respiratory:  Normal respiratory effort. Crackles in posterior RLL   Cardiovascular:  Regular rate and rhythm without murmurs, rubs or gallops. Abdomen: Soft, non-tender, non-distended, without rebound or guarding. Normal bowel sounds. Musculoskeletal:  No clubbing, cyanosis or edema bilaterally. Full range of motion without deformity. Skin: Skin color, texture, turgor normal.  No rashes or lesions. Neurologic:  Neurovascularly intact without any focal sensory/motor deficits. Cranial nerves: II-XII intact, grossly intact. No facial asymmetry, tongue midline.    Psychiatric:  Alert and oriented, thought content appropriate  Capillary Refill: Brisk,< 3 seconds   Peripheral Pulses: +2 palpable, equal bilaterally       LABS:    Lab Results   Component Value Date    WBC 5.9 04/27/2022    HGB 10.6 (L) 04/27/2022    HCT 32.3 (L) 04/27/2022    MCV 90.2 04/27/2022     04/27/2022    LYMPHOPCT 42.8 04/27/2022    RBC 3.59 (L) 04/27/2022    MCH 29.7 04/27/2022    MCHC 32.9 04/27/2022    RDW 14.7 04/27/2022       Lab Results   Component Value Date    CREATININE 1.8 (H) 04/27/2022    BUN 23 (H) 04/27/2022     04/27/2022    K 3.6 04/27/2022     04/27/2022    CO2 29 04/27/2022       No results found for: MG    Lab Results   Component Value Date    ALT 77 (H) 04/26/2022    AST 47 (H) 04/26/2022    ALKPHOS 159 (H) 04/26/2022    BILITOT <0.2 04/26/2022        No flowsheet data found. Lab Results   Component Value Date    LABA1C 12.4 01/18/2014       Imaging:  XR CHEST (2 VW)   Final Result   Bilateral parahilar infiltrates could represent edema or atypical pneumonia         CT CHEST PULMONARY EMBOLISM W CONTRAST    (Results Pending)       Scheduled and prn Medications:    Scheduled Meds:   pregabalin  150 mg Oral BID    insulin glargine  10 Units SubCUTAneous Nightly    atorvastatin  10 mg Oral Daily    amLODIPine  5 mg Oral Nightly    furosemide  40 mg IntraVENous BID    metoprolol succinate  25 mg Oral Daily    aspirin  81 mg Oral Daily    insulin lispro  0-12 Units SubCUTAneous TID WC    insulin lispro  0-6 Units SubCUTAneous Nightly    sodium chloride flush  10 mL IntraVENous 2 times per day    heparin (porcine)  5,000 Units SubCUTAneous 3 times per day    oseltamivir 6mg/ml  30 mg Oral Daily     Continuous Infusions:   dextrose      sodium chloride       PRN Meds:.albuterol sulfate HFA, glucose, dextrose, glucagon (rDNA), dextrose, sodium chloride flush, sodium chloride, ondansetron, polyethylene glycol, acetaminophen **OR** acetaminophen, benzocaine-menthol, hydrALAZINE    Assessment & Plan:          CHF - Acute on chronic diastolic dysfunction with acute pulmonary edema.   - Echo on 10/16/2018 shows an EF of 55-60%. - follow up Echocardiogram has been ordered to reassess cardiac function.   - Diurese with IV Lasix- currently on 40mg IV lasix bid  -  Monitor strict I&Os and daily weights. -  low sodium fluid restricted diet has been ordered.    - trop 0.02- 0.03 - denies chest pain  - home dose aldactone 25mg daily and HCTZ 25mg daily    Acute respiratory failure with hypoxia - as evidenced by an oxygen saturation of less than 90% on room air  - Due to CHF exacerbaton  -We will provide oxygen as necessary to maintain an oxygen saturation of 92% or higher on room air      Influenza A   - Pt tested positive at [facility], negative here. - Will start Tamiflu 75 mg bid for 5 days and provide supportive care with antiemetics, fluids and will monitor and replace electrolytes as indicated. - Droplet isolation.  - Infectious Disease has already been consulted for guidance since she was not positive here.      Acute renal failure (ARF) ([address] - likely poor clearance in the setting of CHF. hold Benicar and HCTZ due to renal failure. Will monitor for improvement with diuresis     Elevated troponin - initial Troponin is 0.03. Pt has chest pain, but seems pleuritic. We will monitor on telemetry and follow serial troponin levels.     Diabetes mellitus II - Lantus, SSI and carb control diet     Hypertensive urgency, Essential (primary) hypertension - hold Benicar and HCTZ due to renal failure. Start prn IV Hydralazine and monitor blood pressure     Hyperlipidemia - No current evidence of Rhabdomyolysis or other adverse effects. Continue statin therapy while in the hospital     Morbid obesity due to excess calories (Body mass index is 37.2 kg/m². ) - Complicating assessment and treatment. Placing patient at risk for multiple co-morbidities as well as early death and contributing to the patient's presentation.  on weight loss when appropriate.       Continue current regimen/therapies. Monitor. Adjust medical regimen as appropriate. Body mass index is 36.17 kg/m². The patient and / or the family were informed of the results of any tests, a time was given to answer questions, a plan was proposed and they agreed with plan. DVT ppx: hep       Diet: ADULT DIET; Regular; 5 carb choices (75 gm/meal);  Low Sodium (2 gm); 1500 ml    Consults:  IP CONSULT TO INFECTIOUS DISEASES    DISPO/placement plan: pending    Code Status: Full Code      Willo Denver, APRN - CNP  04/27/22

## 2022-04-27 NOTE — PLAN OF CARE
Problem: Discharge Planning  Goal: Discharge to home or other facility with appropriate resources  Outcome: Progressing   Continuing to work with patient and health care team on discharge plan. Discharge instructions and medication management will be reviewed prior to discharge. Problem: Pain  Goal: Verbalizes/displays adequate comfort level or baseline comfort level  Outcome: Progressing   Pt able to express presence/absence of pain and rate pain appropriately using numerical scale. Pain/discomfort being managed with PRN analgesics per MD orders (see MAR). Pain assessed every shift and after interventions. Problem: Safety - Adult  Goal: Free from fall injury  Outcome: Progressing   Pt free from falls this shift. Fall precautions in place at all times. Call light always within reach. Pt able and agreeable to contact for safety appropriately.

## 2022-04-27 NOTE — PROGRESS NOTES
Pharmacy Medication Reconciliation Note     List of medications Christiano Bal is currently taking is complete. Source of information:   1. Conversation with patient at bedside  2. EMR  3. Med list provided       Notes regarding home medications:   1. Patient reports taking all AM meds PTA in the ED  2.  Med list has bolus insulin at 18 units with breakfast and lunch and 36 units with supper and has basal insulin at 25 units QHS    Patient denies taking any OTC or herbal medications     Erica Henao, Pharmacy Intern  4/26/2022  10:41 PM

## 2022-04-27 NOTE — PROGRESS NOTES
Occupational Therapy  Facility/Department: [facility]  Occupational Therapy Initial Assessment    Name: Ruben Schaffer  : 1955  MRN: [id number]  Date of Service: 2022    Discharge Recommendations:  Home with assist PRN  OT Equipment Recommendations  Equipment Needed: No  Ruben Schaffer scored a 23/24 on the AM-PAC ADL Inpatient form. At this time, no further OT is recommended upon discharge due to pt nearing baseline. Recommend patient returns to prior setting with prior services. Patient Diagnosis(es): The primary encounter diagnosis was Influenza A. Diagnoses of Acute respiratory failure with hypoxia ([facility] [address] Elevated troponin, Acute congestive heart failure, unspecified heart failure type ([facility] [address] and CHLOE (acute kidney injury) ([facility] [address] were also pertinent to this visit. Past Medical History:  has a past medical history of Ankle fracture, left, HTN (hypertension), benign, and Hypertension. Past Surgical History:  has a past surgical history that includes Hysterectomy and fracture surgery (Left, 2014). Assessment   Assessment: Pt is a 62 yo F w/ PMHx HTN, HLD, DM, CHF, who presents with worsening SOB. PTA, pt lives in a house w/ her mother where she was independent in self-care, fxl mobility using SPC, and most homemaking. She is functioning near her baseline at this time, completing fxl transfers, fxl mobility, and standing grooming w/ supervision/SBA using SPC. Anticipate that the pt will be safe to return home w/ assist PRN at d/c. No further OT warranted at this time.   Prognosis: Good  Decision Making: Low Complexity  REQUIRES OT FOLLOW-UP: No  Activity Tolerance  Activity Tolerance: Patient Tolerated treatment well        Plan   Plan  Times per Week: d/c OT     Restrictions  Restrictions/Precautions  Restrictions/Precautions: Isolation  Position Activity Restriction  Other position/activity restrictions: Influenza; 3L O2    Subjective   General  Chart Reviewed: Yes  Patient assessed for rehabilitation services?: Yes  Additional Pertinent Hx: per H&P: \"Pt is an 61y.o. year-old female with a history of hypertension, hyperlipidemia, type II diabetes mellitus and CHF. She presents to the emergency room for evaluation following a 4-day history of worsening shortness of breath, a nonproductive cough and increasing lower extremity edema. She reports that she has dull aching pain in her chest when she takes a deep breath. She states that her shortness of breath is now severe, worse with exertion and improved with rest.  She was seen at the clinic at [place] and diagnosed with influenza A. In the emergency room here she was found to have an acute CHF exacerbation with increased lower extremity edema and pulmonary edema. She was also found to have acute renal failure. She is being admitted for further evaluation and treatment. Associated signs and symptoms do not include typical chest pain, lightheaded, dizziness, diaphoresis, orthopnea, paroxysmal nocturnal dyspnea, fever or chills\"  Family / Caregiver Present: No  Referring Practitioner: Denise Dia  Diagnosis: CHF  Subjective  Subjective: Pt met b/s for OT eval/tx w/ PT. Pt in bed, agreeable to therapy.  Pt denied pain  General Comment  Comments: Per RN ok to see    Social/Functional History  Social/Functional History  Lives With:  (mother)  Type of Home: House  Home Layout: One level,Laundry in basement  Home Access: Level entry  Bathroom Shower/Tub: Tub/Shower unit,Shower chair with back  Bathroom Toilet: Standard  Bathroom Equipment: Shower chair  Home Equipment: [employer]  Has the patient had two or more falls in the past year or any fall with injury in the past year?: No  ADL Assistance: Independent  Homemaking Assistance: Independent  Ambulation Assistance: Independent (with [facility])  Transfer Assistance: Independent  Active : Yes  Mode of Transportation: Car  IADL Comments: sleep in regular bed Objective   Hearing: Within functional limits          Safety Devices  Type of Devices: Call light within reach;Gait belt;Left in chair;Chair alarm in place (chair alarm in place due to O2 line)  Balance  Sitting Balance: Independent  Standing Balance: Supervision  Functional Mobility  Functional - Mobility Device: Cane  Activity: To/from bathroom  Assist Level: Stand by assistance  Functional Mobility Comments: Pt completed fxl mobility from bed > BR > recliner w/ SBA using SPC.  Required cues/assist to manage O2 line     ADL  Grooming: Supervision (Pt washed hands and face standing at the sink)  Additional Comments: Anticipate pt would be independent w/ feeding and UB ADLs, SBA for LB ADLs/toileting based on ROM, strength, endurance this session     Activity Tolerance  Activity Tolerance: Patient tolerated treatment well  Bed mobility  Supine to Sit: Modified independent  Sit to Supine: Unable to assess  Transfers  Sit to stand: Supervision  Stand to sit: Supervision     Cognition  Overall Cognitive Status: [facility]                  Education Given To: Patient  Education Provided: Role of Therapy  Barriers to Learning: None  LUE AROM (degrees)  LUE AROM : WFL  RUE AROM (degrees)  RUE AROM : [facility]        AM-PAC Score  AM-[facility] Inpatient Daily Activity Raw Score: 23 (04/27/22 0915)  AM-PAC Inpatient ADL T-Scale Score : 51.12 (04/27/22 0915)  ADL Inpatient CMS 0-100% Score: 15.86 (04/27/22 0915)  ADL Inpatient CMS G-Code Modifier : CI (04/27/22 0915)    Goals  Short Term Goals  Time Frame for Short term goals: No acute care OT goals identified, pt near baseline  Patient Goals   Patient goals : to go home       Therapy Time   Individual Concurrent Group Co-treatment   Time In 0850         Time Out 0913         Minutes 23         Timed Code Treatment Minutes: 8 Minutes (15 min eval)       Amaya Callahan, OTR/L [id number]

## 2022-04-28 LAB
ANION GAP SERPL CALCULATED.3IONS-SCNC: 10 MMOL/L (ref 3–16)
BASOPHILS ABSOLUTE: 0 K/UL (ref 0–0.2)
BASOPHILS RELATIVE PERCENT: 0.8 %
BUN BLDV-MCNC: 30 MG/DL (ref 7–20)
CALCIUM SERPL-MCNC: 8.8 MG/DL (ref 8.3–10.6)
CHLORIDE BLD-SCNC: 100 MMOL/L (ref 99–110)
CO2: 32 MMOL/L (ref 21–32)
CREAT SERPL-MCNC: 1.9 MG/DL (ref 0.6–1.2)
EOSINOPHILS ABSOLUTE: 0.2 K/UL (ref 0–0.6)
EOSINOPHILS RELATIVE PERCENT: 3.4 %
GFR AFRICAN AMERICAN: 32
GFR NON-AFRICAN AMERICAN: 27
GLUCOSE BLD-MCNC: 102 MG/DL (ref 70–99)
GLUCOSE BLD-MCNC: 130 MG/DL (ref 70–99)
GLUCOSE BLD-MCNC: 170 MG/DL (ref 70–99)
GLUCOSE BLD-MCNC: 236 MG/DL (ref 70–99)
GLUCOSE BLD-MCNC: 269 MG/DL (ref 70–99)
HCT VFR BLD CALC: 31.3 % (ref 36–48)
HEMOGLOBIN: 10.6 G/DL (ref 12–16)
LYMPHOCYTES ABSOLUTE: 3 K/UL (ref 1–5.1)
LYMPHOCYTES RELATIVE PERCENT: 50.9 %
MCH RBC QN AUTO: 30.4 PG (ref 26–34)
MCHC RBC AUTO-ENTMCNC: 34 G/DL (ref 31–36)
MCV RBC AUTO: 89.5 FL (ref 80–100)
MONOCYTES ABSOLUTE: 0.5 K/UL (ref 0–1.3)
MONOCYTES RELATIVE PERCENT: 7.7 %
NEUTROPHILS ABSOLUTE: 2.2 K/UL (ref 1.7–7.7)
NEUTROPHILS RELATIVE PERCENT: 37.2 %
PDW BLD-RTO: 14.8 % (ref 12.4–15.4)
PERFORMED ON: ABNORMAL
PLATELET # BLD: 219 K/UL (ref 135–450)
PMV BLD AUTO: 8.2 FL (ref 5–10.5)
POTASSIUM REFLEX MAGNESIUM: 3.6 MMOL/L (ref 3.5–5.1)
RBC # BLD: 3.49 M/UL (ref 4–5.2)
SODIUM BLD-SCNC: 142 MMOL/L (ref 136–145)
WBC # BLD: 5.9 K/UL (ref 4–11)

## 2022-04-28 PROCEDURE — 2700000000 HC OXYGEN THERAPY PER DAY

## 2022-04-28 PROCEDURE — 99233 SBSQ HOSP IP/OBS HIGH 50: CPT | Performed by: INTERNAL MEDICINE

## 2022-04-28 PROCEDURE — 85025 COMPLETE CBC W/AUTO DIFF WBC: CPT

## 2022-04-28 PROCEDURE — 6360000002 HC RX W HCPCS: Performed by: INTERNAL MEDICINE

## 2022-04-28 PROCEDURE — 6370000000 HC RX 637 (ALT 250 FOR IP): Performed by: INTERNAL MEDICINE

## 2022-04-28 PROCEDURE — 36415 COLL VENOUS BLD VENIPUNCTURE: CPT

## 2022-04-28 PROCEDURE — 1200000000 HC SEMI PRIVATE

## 2022-04-28 PROCEDURE — 2580000003 HC RX 258: Performed by: INTERNAL MEDICINE

## 2022-04-28 PROCEDURE — 94760 N-INVAS EAR/PLS OXIMETRY 1: CPT

## 2022-04-28 PROCEDURE — 80048 BASIC METABOLIC PNL TOTAL CA: CPT

## 2022-04-28 RX ORDER — POTASSIUM CHLORIDE 750 MG/1
20 TABLET, FILM COATED, EXTENDED RELEASE ORAL ONCE
Status: COMPLETED | OUTPATIENT
Start: 2022-04-28 | End: 2022-04-28

## 2022-04-28 RX ORDER — FUROSEMIDE 10 MG/ML
60 INJECTION INTRAMUSCULAR; INTRAVENOUS 2 TIMES DAILY
Status: DISCONTINUED | OUTPATIENT
Start: 2022-04-28 | End: 2022-05-04

## 2022-04-28 RX ADMIN — HYDRALAZINE HYDROCHLORIDE 10 MG: 20 INJECTION INTRAMUSCULAR; INTRAVENOUS at 13:46

## 2022-04-28 RX ADMIN — HEPARIN SODIUM 5000 UNITS: 5000 INJECTION INTRAVENOUS; SUBCUTANEOUS at 21:10

## 2022-04-28 RX ADMIN — INSULIN LISPRO 4 UNITS: 100 INJECTION, SOLUTION INTRAVENOUS; SUBCUTANEOUS at 18:21

## 2022-04-28 RX ADMIN — SODIUM CHLORIDE, PRESERVATIVE FREE 10 ML: 5 INJECTION INTRAVENOUS at 21:12

## 2022-04-28 RX ADMIN — METOPROLOL SUCCINATE 50 MG: 50 TABLET, EXTENDED RELEASE ORAL at 09:23

## 2022-04-28 RX ADMIN — HEPARIN SODIUM 5000 UNITS: 5000 INJECTION INTRAVENOUS; SUBCUTANEOUS at 13:46

## 2022-04-28 RX ADMIN — SODIUM CHLORIDE, PRESERVATIVE FREE 10 ML: 5 INJECTION INTRAVENOUS at 09:23

## 2022-04-28 RX ADMIN — HEPARIN SODIUM 5000 UNITS: 5000 INJECTION INTRAVENOUS; SUBCUTANEOUS at 06:19

## 2022-04-28 RX ADMIN — METOPROLOL SUCCINATE 75 MG: 50 TABLET, EXTENDED RELEASE ORAL at 21:10

## 2022-04-28 RX ADMIN — PREGABALIN 150 MG: 75 CAPSULE ORAL at 09:23

## 2022-04-28 RX ADMIN — INSULIN LISPRO 3 UNITS: 100 INJECTION, SOLUTION INTRAVENOUS; SUBCUTANEOUS at 21:13

## 2022-04-28 RX ADMIN — AMLODIPINE BESYLATE 5 MG: 5 TABLET ORAL at 21:10

## 2022-04-28 RX ADMIN — FUROSEMIDE 60 MG: 10 INJECTION, SOLUTION INTRAMUSCULAR; INTRAVENOUS at 18:22

## 2022-04-28 RX ADMIN — PREGABALIN 150 MG: 75 CAPSULE ORAL at 21:11

## 2022-04-28 RX ADMIN — ACETAMINOPHEN 650 MG: 325 TABLET ORAL at 09:25

## 2022-04-28 RX ADMIN — ASPIRIN 81 MG: 81 TABLET, COATED ORAL at 09:23

## 2022-04-28 RX ADMIN — ATORVASTATIN CALCIUM 10 MG: 10 TABLET, FILM COATED ORAL at 09:23

## 2022-04-28 RX ADMIN — FUROSEMIDE 40 MG: 10 INJECTION, SOLUTION INTRAMUSCULAR; INTRAVENOUS at 09:22

## 2022-04-28 RX ADMIN — ACETAMINOPHEN 650 MG: 325 TABLET ORAL at 13:46

## 2022-04-28 RX ADMIN — POTASSIUM CHLORIDE 20 MEQ: 750 TABLET, EXTENDED RELEASE ORAL at 13:46

## 2022-04-28 RX ADMIN — INSULIN GLARGINE 10 UNITS: 100 INJECTION, SOLUTION SUBCUTANEOUS at 21:13

## 2022-04-28 ASSESSMENT — PAIN SCALES - GENERAL
PAINLEVEL_OUTOF10: 6
PAINLEVEL_OUTOF10: 3
PAINLEVEL_OUTOF10: 0
PAINLEVEL_OUTOF10: 6

## 2022-04-28 ASSESSMENT — PAIN DESCRIPTION - LOCATION
LOCATION: HEAD

## 2022-04-28 ASSESSMENT — PAIN - FUNCTIONAL ASSESSMENT: PAIN_FUNCTIONAL_ASSESSMENT: ACTIVITIES ARE NOT PREVENTED

## 2022-04-28 ASSESSMENT — PAIN DESCRIPTION - DESCRIPTORS
DESCRIPTORS: ACHING;SORE
DESCRIPTORS: ACHING

## 2022-04-28 NOTE — PLAN OF CARE
Problem: Discharge Planning  Goal: Discharge to home or other facility with appropriate resources  4/28/2022 1407 by Sarahy Drake RN  Outcome: Progressing  4/28/2022 0059 by Samir Love RN  Outcome: Progressing     Problem: Pain  Goal: Verbalizes/displays adequate comfort level or baseline comfort level  4/28/2022 1407 by Sarahy Drake RN  Outcome: Progressing  4/28/2022 0059 by Samir Love RN  Outcome: Progressing     Problem: Safety - Adult  Goal: Free from fall injury  4/28/2022 1407 by Sarahy Drake RN  Outcome: Progressing  4/28/2022 0059 by Samir Love RN  Outcome: Progressing     Problem: Chronic Conditions and Co-morbidities  Goal: Patient's chronic conditions and co-morbidity symptoms are monitored and maintained or improved  4/28/2022 1407 by Sarahy Drake RN  Outcome: Progressing  4/28/2022 0059 by Samir Love RN  Outcome: Progressing     Problem: ABCDS Injury Assessment  Goal: Absence of physical injury  4/28/2022 1407 by Sarahy Drake RN  Outcome: Progressing  4/28/2022 0059 by Samir oLve RN  Outcome: Progressing

## 2022-04-28 NOTE — ACP (ADVANCE CARE PLANNING)
Advance Care Planning     Advance Care Planning Activator (Inpatient)  Conversation Note      Date of ACP Conversation: 4/28/2022     Conversation Conducted with: Patient with Decision Making Capacity    ACP Activator: [address] Horner Decision Maker:     Current Designated Health Care Decision Maker:     Primary Decision Maker: Marisol Mike - [phone]    Today we documented Decision Maker(s). The patient will provide ACP documents. Care Preferences    Ventilation: \"If you were in your present state of health and suddenly became very ill and were unable to breathe on your own, what would your preference be about the use of a ventilator (breathing machine) if it were available to you? \"      Would the patient desire the use of ventilator (breathing machine)?: no    \"If your health worsens and it becomes clear that your chance of recovery is unlikely, what would your preference be about the use of a ventilator (breathing machine) if it were available to you? \"     Would the patient desire the use of ventilator (breathing machine)?: No      Resuscitation  \"CPR works best to restart the heart when there is a sudden event, like a heart attack, in someone who is otherwise healthy. Unfortunately, CPR does not typically restart the heart for people who have serious health conditions or who are very sick. \"    \"In the event your heart stopped as a result of an underlying serious health condition, would you want attempts to be made to restart your heart (answer \"yes\" for attempt to resuscitate) or would you prefer a natural death (answer \"no\" for do not attempt to resuscitate)? \" no       [] Yes   [x] No   Educated Patient / [place] regarding differences between Advance Directives and portable DNR orders.     Length of ACP Conversation in minutes:  5    Conversation Outcomes:  [x] ACP discussion completed  [] Existing advance directive reviewed with patient; no changes to patient's previously recorded wishes  [] New Advance Directive completed  [] Portable Do Not Rescitate prepared for Provider review and signature  [] POLST/POST/MOLST/MOST prepared for Provider review and signature      Follow-up plan:    [x] Schedule follow-up conversation to continue planning  [x] Referred individual to Provider for additional questions/concerns   [] Advised patient/agent/surrogate to review completed ACP document and update if needed with changes in condition, patient preferences or care setting    [x] This note routed to one or more involved healthcare providers    Perfectserve sent to Dr. Cindy Deal to request that he talk with pt about her request for a DNR.     Electronically signed by Lyndon Luque on 4/28/2022 at 9:38 AM

## 2022-04-28 NOTE — PROGRESS NOTES
Hospital Medicine Progress Note      Admit Date: 4/26/2022       CC: F/U shortness of breath, non-productive cough, lower extremity edema and chest pain    HPI:  Pt is an 61 y. o. year-old female with a history of hypertension, hyperlipidemia, type II diabetes mellitus and CHF.  She presents to the emergency room for evaluation following a 4-day history of worsening shortness of breath, a nonproductive cough and increasing lower extremity edema.  She reports that she has dull aching pain in her chest when she takes a deep breath.  She states that her shortness of breath is now severe, worse with exertion and improved with rest.  She was seen at the clinic at [place] and diagnosed with influenza A.  In the emergency room here she was found to have an acute CHF exacerbation with increased lower extremity edema and pulmonary edema.  She was also found to have acute renal failure.  She is being admitted for further evaluation and treatment. Associated signs and symptoms do not include typical chest pain, lightheaded, dizziness, diaphoresis, orthopnea, paroxysmal nocturnal dyspnea, fever or chills. No recent medication changes.        4/27: patient states she started with SOB about 4 days ago and was tested for the flu at [place], which was positive yesterday. She is indifferent on taking it at this point and is actually 4 days after her sx onset, so unlikely it will help much. Does have some leg edema bilaterally, 2+. She does not wear O2 normally at home. Is requiring 2L now here. She reports a cough but is nonproductive. She is not aware of any sick contacts lately.      Patient being treated for acute on chronic diastolic decompensated heart failure and pulmonary hypertension. IV lasix was increased to 60mg IV bid for better diuresis. Still on O2 and does not wear any oxygen at baseline. Viral resp panel all neg. Cont to diurese with IV lasix for CHF. Hopefully home soon when off O2.      Interval History/Subjective: in chair on my exam. Feeling a little better. Still on O2, cont to try to wean down, as she is not on home O2. Viral resp panel all neg. Cont to diurese with IV lasix for CHF. Hopefully home soon when off O2. Review of Systems:     The patient denied headaches, visual changes, LOC, SOB, CP, ABD pain, N/V/D, skin changes, new or worsening weakness or neuromuscular deficits. Comprehensive ROS negative except as mentioned above. Past Medical History:        Diagnosis Date    Ankle fracture, left 1/17/2014    HTN (hypertension), benign 1/17/2014    Hypertension        Past Surgical History:        Procedure Laterality Date    FRACTURE SURGERY Left 01/17/2014    orif tibula/fibula fixation    HYSTERECTOMY         Allergies:  Patient has no known allergies. Past medical and surgical history reviewed. Any changes have been noted. PHYSICAL EXAM:  BP (!) 166/91   Pulse 66   Temp 97.8 °F (36.6 °C) (Oral)   Resp 16   Ht 5' (1.524 m)   Wt 184 lb 11.9 oz (83.8 kg)   SpO2 95%   BMI 36.08 kg/m²       Intake/Output Summary (Last 24 hours) at 4/28/2022 0943  Last data filed at 4/28/2022 3159  Gross per 24 hour   Intake 820 ml   Output 2100 ml   Net -1280 ml        General appearance:   No apparent distress, appears stated age. Cooperative. HEENT:  Normocephalic, atraumatic. PERRLA. EOMi. Conjunctivae/corneas clear, no icterus, non-injected. Neck: Supple, with full range of motion. No jugular venous distention. Trachea midline. Respiratory:  Normal respiratory effort. Crackles in posterior RLL   Cardiovascular:  Regular rate and rhythm without murmurs, rubs or gallops. Abdomen: Soft, non-tender, non-distended, without rebound or guarding. Normal bowel sounds. Musculoskeletal:  No clubbing, cyanosis or edema bilaterally. Full range of motion without deformity. Skin: Skin color, texture, turgor normal.  No rashes or lesions.   Neurologic:  Neurovascularly intact without any focal sensory/motor deficits. Cranial nerves: II-XII intact, grossly intact. No facial asymmetry, tongue midline. Psychiatric:  Alert and oriented, thought content appropriate  Capillary Refill: Brisk,< 3 seconds   Peripheral Pulses: +2 palpable, equal bilaterally          LABS:    Lab Results   Component Value Date    WBC 5.9 04/28/2022    HGB 10.6 (L) 04/28/2022    HCT 31.3 (L) 04/28/2022    MCV 89.5 04/28/2022     04/28/2022    LYMPHOPCT 50.9 04/28/2022    RBC 3.49 (L) 04/28/2022    MCH 30.4 04/28/2022    MCHC 34.0 04/28/2022    RDW 14.8 04/28/2022       Lab Results   Component Value Date    CREATININE 1.9 (H) 04/28/2022    BUN 30 (H) 04/28/2022     04/28/2022    K 3.6 04/28/2022     04/28/2022    CO2 32 04/28/2022       No results found for: MG    Lab Results   Component Value Date    ALT 77 (H) 04/26/2022    AST 47 (H) 04/26/2022    ALKPHOS 159 (H) 04/26/2022    BILITOT <0.2 04/26/2022        No flowsheet data found.     Lab Results   Component Value Date    LABA1C 12.4 01/18/2014       Imaging:  XR CHEST (2 VW)   Final Result   Bilateral parahilar infiltrates could represent edema or atypical pneumonia         CT CHEST PULMONARY EMBOLISM W CONTRAST    (Results Pending)       Scheduled and prn Medications:    Scheduled Meds:   pregabalin  150 mg Oral BID    insulin glargine  10 Units SubCUTAneous Nightly    atorvastatin  10 mg Oral Daily    amLODIPine  5 mg Oral Nightly    furosemide  40 mg IntraVENous BID    aspirin  81 mg Oral Daily    insulin lispro  0-12 Units SubCUTAneous TID WC    insulin lispro  0-6 Units SubCUTAneous Nightly    sodium chloride flush  10 mL IntraVENous 2 times per day    heparin (porcine)  5,000 Units SubCUTAneous 3 times per day    metoprolol succinate  50 mg Oral BID     Continuous Infusions:   dextrose      sodium chloride       PRN Meds:.albuterol sulfate HFA, glucose, dextrose, glucagon (rDNA), dextrose, sodium chloride flush, sodium chloride, ondansetron, polyethylene glycol, acetaminophen **OR** acetaminophen, benzocaine-menthol, hydrALAZINE    Assessment & Plan:      Patient being treated for acute on chronic diastolic decompensated heart failure and pulmonary hypertension. IV lasix was increased to 60mg IV bid for better diuresis. Still on O2 and does not wear any oxygen at baseline. Viral resp panel all neg. Cont to diurese with IV lasix for CHF. Hopefully home soon when off O2. CHF - Acute on chronic diastolic dysfunction with acute pulmonary edema. NYHA class III. Decompensated. - Echo on 10/16/2018 shows an EF of 55-60%. - repeat echo:EF 55% - 60 %. Grade 2 DD and pulm htn  - Diurese with IV Lasix- 60mg IV bid now up from 40mg IV lasix bid for better diuresis  -  Monitor strict I&Os and daily weights. -  low sodium fluid restricted diet  - trop 0.02- 0.03 - denies chest pain  - home dose aldactone 25mg daily and HCTZ 25mg daily   - will need chemical stress test when HF improves to r/o ischemia       Acute respiratory failure with hypoxia   - as evidenced by an oxygen saturation of less than 90% on room air  - Due to CHF exacerbaton  -maintain an oxygen saturation of 92% or higher  - resp viral panel ordered- all neg   - d/c tamiflu  - supportive care         Influenza A- ruled out with viral panel.    - Pt tested positive at [facility], negative here.   - Was started onTamiflu initially, now d/c'd in light of days since sx onset and neg viral panel   - Droplet isolation--removed  - resp viral panel neg  - has been vaccinated fully and boosted against COVID 19 per pt  - Infectious Disease was consulted on admission by Adm MD    Acute renal failure likely due to DM and HTN  - appears baseline Cr around 1.42-1.65 based on labs 6/2021 and 3/18/22 respectively with [facility]   - likely poor clearance in the setting of CHF.    hold Benicar and HCTZ due to renal failure and while on lasix IV   Will monitor for improvement with diuresis       Elevated troponin   - initial Troponin is 0.03. Pt has chest pain, but seems pleuritic.  We will monitor on telemetry and follow serial troponin levels. - cardiology following  - stress myoview when stable with improvement of HF to r/o ischemia   - cont to monitor        Diabetes mellitus II   - Lantus, SSI and carb control diet       Hypertensive urgency, Essential (primary) hypertension   - hold Benicar and HCTZ due to renal failure. Start prn IV Hydralazine and monitor blood pressure  - metoprolol dose increased for better BP control- 50mg daily now       Hyperlipidemia - No current evidence of Rhabdomyolysis or other adverse effects. Continue statin therapy while in the hospital       Morbid obesity due to excess calories (Body mass index is 37.2 kg/m². ) - Complicating assessment and treatment. Placing patient at risk for multiple co-morbidities as well as early death and contributing to the patient's presentation.  on weight loss when appropriate. Continue current regimen/therapies. Monitor. Adjust medical regimen as appropriate. Body mass index is 36.08 kg/m². The patient and / or the family were informed of the results of any tests, a time was given to answer questions, a plan was proposed and they agreed with plan. DVT ppx: hep       Diet: ADULT DIET; Regular; 5 carb choices (75 gm/meal);  Low Sodium (2 gm); 1500 ml    Consults:  IP CONSULT TO INFECTIOUS DISEASES  IP CONSULT TO CARDIOLOGY  IP CONSULT TO HEART FAILURE NURSE/COORDINATOR    DISPO/placement plan: pending     Code Status: DNR-SARA Andrew CNP  04/28/22

## 2022-04-28 NOTE — PLAN OF CARE
Problem: Discharge Planning  Goal: Discharge to home or other facility with appropriate resources  4/28/2022 0059 by Missael Mejia RN  Outcome: Progressing  4/27/2022 1133 by Juliana Dobbs RN  Outcome: Progressing     Problem: Pain  Goal: Verbalizes/displays adequate comfort level or baseline comfort level  4/28/2022 0059 by Missael Mejia RN  Outcome: Progressing  4/27/2022 1133 by Juliana Dobbs RN  Outcome: Progressing     Problem: Safety - Adult  Goal: Free from fall injury  4/28/2022 0059 by Missael Mejia RN  Outcome: Progressing  4/27/2022 1133 by Juliana Dobbs RN  Outcome: Progressing     Problem: Chronic Conditions and Co-morbidities  Goal: Patient's chronic conditions and co-morbidity symptoms are monitored and maintained or improved  4/28/2022 0059 by Missael Mejia RN  Outcome: Progressing  4/27/2022 1133 by Juliana Dobbs RN  Outcome: Progressing     Problem: ABCDS Injury Assessment  Goal: Absence of physical injury  Outcome: Progressing

## 2022-04-28 NOTE — PROGRESS NOTES
Pt c/o of headache tylenol given for HA. Pt /77. Gave PRN Hydralazine and will recheck BP.      1535:  Pt /74 with still having complaints of HA. Will continue to monitor.

## 2022-04-28 NOTE — CARE COORDINATION
DISCHARGE PLAN: Pt plans to return home at d/c. Pts friend or son will transport her home. No d/c needs noted at d/c.    ______________________________________  INITIAL ASSESSMENT  Spoke w/pt to address barriers to dc. HOME: Pt reported that she resides with her mother in a single family home. Laundry is located in the basement of the home. There are 0 JUAN ALBERTO the home. Disease Specific: Influenza A, CHLOE, Acute respiratory failure    COVID Vaccination: Yes    DME/O2: Pt reported that she has a shower chair, w/c and a cane. No other DME needs noted at this time. ACTIVE SERVICES: Pt reported that she was independent with all self care PTA. Pt denied the need for any additional assistance upon d/c. TRANSPORTATION: Pt is an active  and reported that her friend or son will transport her home. PHARMACY: Denies difficulty obtaining/taking meds. Pt has all medication filled at a mail order pharmacy and they are delivered. PCP: Dr. Jone Hendrix  Last appointment on 3/18/22    DEMOGRAPHICS: Verified address/phone number as correct    INSURANCE:  [address] [place] [address] Drive: [place]    HD/PD: No    THERAPY RECS    PHYSICAL THERAPY  \"Discharge Recommendations:  Home independently \"    OCCUPATIONAL THERAPY  \"Discharge Recommendations:  Home with assist PRN  OT Equipment Recommendations  Equipment Needed: Marcy Crane scored a 23/24 on the AM-PAC ADL Inpatient form. At this time, no further OT is recommended upon discharge due to pt nearing baseline. Recommend patient returns to prior setting with prior services. \"    Discharge planning team will remain available for needs. Please consult for any specifics not addressed in this note.     Melody Myers [place]  [phone]  Electronically signed by Geovanny Hi on 4/28/2022 at 9:33 AM

## 2022-04-28 NOTE — DISCHARGE INSTR - COC
Continuity of Care Form    Patient Name: Gordo Aguirre   :  1955  MRN:  [id number]    Admit date:  2022  Discharge date:  ***    Code Status Order: DNR-CCA   Advance Directives:      Admitting Physician:  Ishmael Greco MD  PCP: Blair Randel MD    Discharging Nurse: [facility] Unit/Room#: [id number]/9237-46  Discharging Unit Phone Number: ***    Emergency Contact:   Extended Emergency Contact Information  Primary Emergency Contact: Suni Vallejo, [address] Phone: [phone]  Relation: Child    Past Surgical History:  Past Surgical History:   Procedure Laterality Date    FRACTURE SURGERY Left 2014    orif tibula/fibula fixation    HYSTERECTOMY         Immunization History:   Immunization History   Administered Date(s) Administered    COVID-19, [place] [facility], Primary or Immunocompromised, PF, 100mcg/0.5mL 2021       Active Problems:  Patient Active Problem List   Diagnosis Code    Ankle fracture, left S82.892A    DMII (diabetes mellitus, type 2) ([facility] E11.9    Primary hypertension I10    Chest pain R07.9    Acute on chronic diastolic (congestive) heart failure (HCC) I50.33    Acute respiratory failure with hypoxia ([facility] J96.01    Influenza A J10.1    Elevated troponin R77.8    Acute pulmonary edema ([facility] J81.0    Acute renal failure (ARF) ([facility] N17.9    Hypertensive urgency I16.0    Hyperlipidemia E78.5    Morbid obesity due to excess calories (HCC) E66.01    Acute congestive heart failure (HCC) I50.9       Isolation/Infection:   Isolation            No Isolation          Patient Infection Status       Infection Onset Added Last Indicated Last Indicated By Review Planned Expiration Resolved Resolved By    None active    Resolved    COVID-19 (Rule Out) 22 Respiratory Panel, Molecular, with COVID-19 (Restricted: peds pts or suitable admitted adults) (Ordered)   22 Rule-Out Test Resulted    INFLUENZA 22 Nadiya Tavares RN 04/28/22 Kellen Mccarty RN    Added from external infection. Nurse Assessment:  Last Vital Signs: BP (!) 166/91   Pulse 66   Temp 97.8 °F (36.6 °C) (Oral)   Resp 16   Ht 5' (1.524 m)   Wt 184 lb 11.9 oz (83.8 kg)   SpO2 95%   BMI 36.08 kg/m²     Last documented pain score (0-10 scale): Pain Level: 3  Last Weight:   Wt Readings from Last 1 Encounters:   04/28/22 184 lb 11.9 oz (83.8 kg)     Mental Status:  {IP PT MENTAL STATUS:20030}    IV Access:  { JEFF IV ACCESS:927697399}    Nursing Mobility/ADLs:  Walking   {CHP DME UJBC:635533905}  Transfer  {CHP DME UQFR:641378930}  Bathing  {CHP DME HUAF:658662609}  Dressing  {CHP DME ZMDQ:444149775}  Toileting  {CHP DME KAPE:388837191}  Feeding  {P DME BWRL:960565770}  Med Admin  {P DME IGJS:136540626}  Med Delivery   { JEFF MED Delivery:977106769}    Wound Care Documentation and Therapy:  Incision 01/17/14 Ankle Left (Active)   Number of days: 7917        Elimination:  Continence: Bowel: {YES / KZ:84005}  Bladder: {YES / BB:96243}  Urinary Catheter: {Urinary Catheter:941296779}   Colostomy/Ileostomy/Ileal Conduit: {YES / JF:90592}       Date of Last BM: ***    Intake/Output Summary (Last 24 hours) at 4/28/2022 1140  Last data filed at 4/28/2022 0659  Gross per 24 hour   Intake 700 ml   Output 2100 ml   Net -1400 ml     I/O last 3 completed shifts:   In: 9829 [P.O.:1170]  Out: 2600 [Urine:2600]    Safety Concerns:     [address] Safety Concerns:793914361}    Impairments/Disabilities:      [address] Impairments/Disabilities:442490583}    Nutrition Therapy:  Current Nutrition Therapy:   [address] Diet List:537441470}    Routes of Feeding: {CHP DME Other Feedings:869363893}  Liquids: {Slp liquid thickness:94493}  Daily Fluid Restriction: {CHP DME Yes amt example:862651764}  Last Modified Barium Swallow with Video (Video Swallowing Test): {Done Not Done YEOO:613526341}    Treatments at the Time of Hospital Discharge:   Respiratory Treatments: ***  Oxygen Therapy:  {Therapy; copd oxygen:36600}  Ventilator:    { JATINDER Vent TBZI:307712100}    Rehab Therapies: {THERAPEUTIC INTERVENTION:6300072870}  Weight Bearing Status/Restrictions: { CC Weight Bearin}  Other Medical Equipment (for information only, NOT a DME order):  {EQUIPMENT:320445689}  Other Treatments: ***    Heart Failure Instructions for Daily Management  Patient was treated for acute diastolic heart failure. she  will require the following:    Please weigh daily on the same scale and approximately the same time of day. Report weight gain of 3 pounds/day or 5 pounds/week to : Amber Abraham [phone] and [facility] [phone]. Please use hospital discharge weight as baseline reference. Please monitor for signs and symptoms of and report to MD:  Worsening Heart Failure: sudden weight gain, shortness of breath, lower extremity or general edema/swelling, abdominal bloating/swelling, inability to lie flat, intolerance to usual activity, or cough (especially at night). Report these finding even if no increase in weight. Dehydration:  having difficulty or a decrease in urination, dizziness, worsening fatigue, or new onset/worsening of generalized weakness. Please continue a LOW SODIUM diet and LIMIT fluid intake to 48 - 64 ounces ( 1.5 - 2 liters) per day. Call Amber Abraham [phone] and [facility] [phone] and/or Radha Horner @ [phone] with any questions or concerns. Please continue heart failure education to patient and family/support system. See After Visit Summary for hospital follow up appointment details. Consider spiritual care referral for support and/or completion of advance directives [phone].   [facility] if patient agreeable and able to participate, palliative care consult for ongoing goals of care, end of life, and/or chronic disease management discussions, and referral to [facility] ([phone]) once SNF/HHC complete. Patient's personal belongings (please select all that are sent with patient):  {CHP DME Belongings:498120301}    RN SIGNATURE:  {Esignature:241131961}    CASE MANAGEMENT/SOCIAL WORK SECTION    Inpatient Status Date: ***    Readmission Risk Assessment Score:  Readmission Risk              Risk of Unplanned Readmission:  14           Discharging to Facility/ Agency   Name:   Address:  Phone:  Fax:    Dialysis Facility (if applicable)   Name:  Address:  Dialysis Schedule:  Phone:  Fax:    / signature: {Esignature:738962762}    PHYSICIAN SECTION    Prognosis: {Prognosis:1301109306}    Condition at Discharge: [address] [place] [address] Patient Condition:356220739}    Rehab Potential (if transferring to Rehab): {Prognosis:8426542951}    Recommended Labs or Other Treatments After Discharge: ***    Physician Certification: I certify the above information and transfer of [place]  is necessary for the continuing treatment of the diagnosis listed and that she requires {Admit to Appropriate Level of Care:97875} for {GREATER/LESS:004416480} 30 days.      Update Admission H&P: {CHP DME Changes in VJLZX:121737098}    PHYSICIAN SIGNATURE:  {Esignature:846195452}

## 2022-04-28 NOTE — PROGRESS NOTES
[address] [address]   Daily Progress Note      Admit Date:  4/26/2022    CC: \"  This is a 42-year-old female who presented  to the hospital with worsening shortness of breath, nonproductive cough,  lower extremity edema, and chest pain for the last four days. She says  her symptoms started four days ago and has been steadily worsening. She  gets short of breath with minimal amount of exertion. She also noticed  that she had fever. She went to [facility] where she was  diagnosed with influenza A. Since her symptoms steadily worsened, she  decided to come to the emergency room. In the emergency room, her  workup was consistent with decompensated heart failure. She also was  found to have worsening renal insufficiency and she was admitted. She  complains of burning in her chest which is constant and not worsened  with the exertion. There is no diaphoresis with this. She has no  palpitation. Subjective:  Pt with no acute overnight events. Denies chest pain, palpitations, and dyspnea. Objective:   BP (!) 166/91   Pulse 66   Temp 97.8 °F (36.6 °C) (Oral)   Resp 16   Ht 5' (1.524 m)   Wt 184 lb 11.9 oz (83.8 kg)   SpO2 95%   BMI 36.08 kg/m²     Intake/Output Summary (Last 24 hours) at 4/28/2022 1152  Last data filed at 4/28/2022 8334  Gross per 24 hour   Intake 700 ml   Output 2100 ml   Net -1400 ml     Wt Readings from Last 3 Encounters:   04/28/22 184 lb 11.9 oz (83.8 kg)   10/17/18 175 lb 0.7 oz (79.4 kg)   01/16/18 160 lb (72.6 kg)     Telemetry:none    Physical Exam:  General:  NAD, Awake, alert and oriented X4  Skin:  Warm and dry  Neck:  Supple, no JVP appreciated, no bruit  Chest:  Clear to auscultation, no wheezes/rhonchi/rales  Cardiovascular:  Regular rate.  S1S2  Abdomen:  Soft, nontender, +bowel sounds  Extremities:  No LE edema    Cardiac Diagnosis:  diabetes, hypertension and hyperlipidemia    Medications:    pregabalin  150 mg Oral BID    insulin glargine  10 Units SubCUTAneous Nightly    atorvastatin  10 mg Oral Daily    amLODIPine  5 mg Oral Nightly    furosemide  40 mg IntraVENous BID    aspirin  81 mg Oral Daily    insulin lispro  0-12 Units SubCUTAneous TID WC    insulin lispro  0-6 Units SubCUTAneous Nightly    sodium chloride flush  10 mL IntraVENous 2 times per day    heparin (porcine)  5,000 Units SubCUTAneous 3 times per day    metoprolol succinate  50 mg Oral BID      dextrose      sodium chloride       albuterol sulfate HFA, glucose, dextrose, glucagon (rDNA), dextrose, sodium chloride flush, sodium chloride, ondansetron, polyethylene glycol, acetaminophen **OR** acetaminophen, benzocaine-menthol, hydrALAZINE    Lab Data:  CBC:   Recent Labs     04/26/22 2014 04/27/22 0333 04/28/22  0625   WBC 6.1 5.9 5.9   HGB 11.1* 10.6* 10.6*    224 219     BMP:    Recent Labs     04/26/22 2014 04/27/22 0333 04/28/22  0625    141 142   K 4.0 3.6 3.6   CO2 30 29 32   BUN 24* 23* 30*   CREATININE 2.0* 1.8* 1.9*     LIVR:   Recent Labs     04/26/22 2014   AST 47*   ALT 77*     INR:  No results for input(s): INR in the last 72 hours. APTT: No results for input(s): APTT in the last 72 hours. BNP:  No results for input(s): BNP in the last 72 hours. Imaging:Global left ventricular function is normal with ejection fraction estimated   from 55 % to 60 %. Moderate concentric left ventricular hypertrophy. Grade II diastolic dysfunction with elevated LV filling pressures. Mild mitral regurgitation is present. The left atrium is mildly dilated. The right ventricle is normal in size and function. Systolic pulmonary artery pressure (SPAP) is estimated at 56 mmHg consistent   with moderate pulmonary hypertension (RA pressure 3 mmHg). Mild tricuspid   regurgitation. Assessment:Plan:  1) acute on chronic diastolic heart failure. NYHA class III on admission  -Patient had mild diuresis in last 24 hours and loss 1 pound.   -BP still remains elevated  -We will continue IV Lasix and increase it to 60 mg IV twice daily  -Will increase metoprolol 75 twice daily  -Echocardiogram shows normal LV systolic function with evidence of grade 2 diastolic dysfunction and pulmonary hypertension  -Patient will need chemical stress test once her heart failure improves to rule out underlying ischemic heart disease.     Hypertension  -Blood pressure is elevated today  -We will increase metoprolol  -BP goal is less than 130/80      Chronic kidney disease  -Renal function abnormal but remained stable in spite of being on IV Lasix  -We will continue to monitor closely    Hypokalemia  -K is 3.6 today due to being on diuretic therapy  -We will add potassium supplement    Anemia  -Most likely due to chronic kidney disease as anemia work-up shows no evidence of iron deficiency anemia      Electronically signed by Bhavin Mchugh MD on 4/28/2022 at 11:52 AM

## 2022-04-28 NOTE — CONSULTS
[address] [facility]  HEART FAILURE PROGRAM      NAME:  Frandy Almanza  MEDICAL RECORD NUMBER:  [id number]  AGE: 61 y.o.    GENDER: female  : 1955  TODAY'S DATE:  2022    Subjective:     VISIT TYPE: evaluation     ADMITTING PHYSICIAN:  Phillip Gibson MD    PAST MEDICAL HISTORY        Diagnosis Date    Ankle fracture, left 2014    HTN (hypertension), benign 2014    Hypertension        SOCIAL HISTORY    Social History     Tobacco Use    Smoking status: Light Tobacco Smoker    Smokeless tobacco: Never Used   Substance Use Topics    Alcohol use: No    Drug use: No       ALLERGIES    No Known Allergies    MEDICATIONS  Scheduled Meds:   metoprolol succinate  75 mg Oral BID    furosemide  60 mg IntraVENous BID    pregabalin  150 mg Oral BID    insulin glargine  10 Units SubCUTAneous Nightly    atorvastatin  10 mg Oral Daily    amLODIPine  5 mg Oral Nightly    aspirin  81 mg Oral Daily    insulin lispro  0-12 Units SubCUTAneous TID WC    insulin lispro  0-6 Units SubCUTAneous Nightly    sodium chloride flush  10 mL IntraVENous 2 times per day    heparin (porcine)  5,000 Units SubCUTAneous 3 times per day       ADMIT DATE: 2022      Objective:     ADMISSION DIAGNOSIS:   Influenza A [J10.1]  Elevated troponin [R77.8]  CHLOE (acute kidney injury) ([facility] [address] [N17.9]  Acute respiratory failure with hypoxia (HCC) [J96.01]  Acute on chronic diastolic (congestive) heart failure (HCC) [I50.33]  Acute congestive heart failure, unspecified heart failure type (HCC) [I50.9]     BP (!) 179/77   Pulse 73   Temp 98.8 °F (37.1 °C) (Oral)   Resp 16   Ht 5' (1.524 m)   Wt 184 lb 11.9 oz (83.8 kg)   SpO2 100%   BMI 36.08 kg/m²     ADMIT:  Weight: 183 lb 13.8 oz (83.4 kg)    TODAY: Weight: 184 lb 11.9 oz (83.8 kg)    Wt Readings from Last 10 Encounters:   22 184 lb 11.9 oz (83.8 kg)   10/17/18 175 lb 0.7 oz (79.4 kg)   18 160 lb (72.6 kg)   01/16/15 170 lb (77.1 kg) 07/15/14 166 lb (75.3 kg)   05/14/14 170 lb (77.1 kg)   03/18/14 170 lb (77.1 kg)   02/04/14 170 lb (77.1 kg)          Intake/Output Summary (Last 24 hours) at 4/28/2022 1424  Last data filed at 4/28/2022 1310  Gross per 24 hour   Intake 580 ml   Output 1500 ml   Net -920 ml       LABS  BMP:   Lab Results   Component Value Date     04/28/2022    K 3.6 04/28/2022     04/28/2022    CO2 32 04/28/2022    BUN 30 04/28/2022    LABALBU 3.1 04/26/2022    CREATININE 1.9 04/28/2022    CALCIUM 8.8 04/28/2022    GFRAA 32 04/28/2022    LABGLOM 27 04/28/2022    GLUCOSE 130 04/28/2022     CBC:   Recent Labs     04/26/22  2014 04/27/22  0333 04/28/22  0625   WBC 6.1 5.9 5.9   HGB 11.1* 10.6* 10.6*   HCT 33.6* 32.3* 31.3*   MCV 90.7 90.2 89.5    224 219     BNP: No results found for: BNP    ECHOCARDIOGRAM:   4/27/22  Summary   Global left ventricular function is normal with ejection fraction estimated   from 55 % to 60 %. Moderate concentric left ventricular hypertrophy. Grade II diastolic dysfunction with elevated LV filling pressures. Mild mitral regurgitation is present. The left atrium is mildly dilated. The right ventricle is normal in size and function. Systolic pulmonary artery pressure (SPAP) is estimated at 56 mmHg consistent   with moderate pulmonary hypertension (RA pressure 3 mmHg). Mild tricuspid   regurgitation. Assessment:     CONSULTS:   IP CONSULT TO INFECTIOUS DISEASES  IP CONSULT TO CARDIOLOGY  IP CONSULT TO HEART FAILURE NURSE/COORDINATOR    Patient has a CARDIOLOGY CONSULT: Yes- Dr Abelardo Davis. Pt had no prior cardiologist so will be new to [facility]. I showed her in the booklet where their address is and phone number.         Patient taking an ACEI/ARB:  No:  EF > 40%      Patient taking a BETA BLOCKER:  Yes: toprol     SCALE AVAILABLE:  Yes- pt states she has a working digital scale at home and is agreeable to initiate daily wts     [place] (inclued but not limited to the checked below): Patient   [x]  Provided both written and verbal education on Heart Failure signs/symptoms. [x]  Provided instructions on daily medications. (and we reviewed med page in HF ed book)  [x]  Provided instructions to monitor and record weight daily. [x]  Provided instructions to call if weight increases 3 lbs in one day or 5 lbs in one week. [x]  Received verbal acknowledgment/understanding of Heart Failure related causes. [x]  Provided instructions on how to maintain a low sodium diet and 2 liter FR.   []  Provided recommendations for smoking cessation programs (n/a)  [x]  Provided recommendations on activity and exercise      [x]  Other:    HF RN consulted, chart reviewed. Pt came in with c/o SOB, cough, and some chest pain/pressure. It was noted she tested + for flu at a [place] clinic. She was also found to be in a new onset HF. Infectious disease was consulted, viral panel, ran, flu negative (likely false + on the rapid). Cardiology was consulted and echo ordered. Pt found to have Grade II diastolic dysfunction with EF 55-60%. Pt being diuresed with IV lasix 40mg BID and is negative 1.3 liters thus far. Upon entering room, pt was sitting up in recliner. I introduced myself and my role in her care. She was agreeable to spend time with me. \"So do I really have congestive heart failure? \"  I explained her viral panel came back negative and she is being treated for new HF. I reviewed the [place] HF Pt education booklet in detail with pt. Teaching done on 'What is HF', an echocardiogram, diastolic dysfunction \"stiff heart\", the different grades of DD, risk factors for HF to include HTN, DM, and possible CAD, S&S to monitor for, and importance of initiation daily wts. Pt was very much engaged and asked appropriate questions. She states she does have a working digital scale at home. I went over the need to log her daily wts, 3/5 rule (pt even asked \"to clarify is it overnight or in a day. \" I praised her understanding to question that.) I explained it is overnight so reviewed how she would weigh herself daily (same time, same scale, hard surface etc). We discussed importance of safety while weighing. I went over the HF Zones in detail and who and when to call with any S&S. She is new to [facility] so I explained the process of calling their office. Teaching done on the need to follow a low Na diet and 2 liter FR. Pt shared she stopped using a salt shaker \"years ago\" but is aware there is \"hidden salt in a lot of foods. \" We reviewed foods that are high in Na content and some alternatives as well as some alternative seasoning. Pt shared for breakfast she usually eats \"oatmeal, 1 piece toast, eggs, and rodriguez\". For lunch she eats \"taco bell or usually something fast food\" and for dinner she stated \"it varies but usually healthier foods like chicken and vegetables. \" After review of her fluids, it doesn't appear she drinks over 2 liters but she does drink regular pop and with her DM I instructed better alternative. Teaching done. I have placed a consult to the Dietician so can reiterate this material. I will also place a consult to our Diabetic Educator as pt's DM appears uncontrolled with her high HgbA1C of 10. I reviewed her new medications with her. She denies any issues affording her meds. She uses a mail order pharmacy and if a new script can use a local pharmacy. Pt is an active . She resides in a home with her mother. She does not have any other family but does have some friends as a support system. She was independent with her ADLs prior to her admit. I talked with her about activity/ exercise as well as need to decrease caloric intake to be in healthy BMI range (BMI 36). She shared she does have neuropathy which can limit her at times. Teaching done on need to be safe with activity ie use hand rales, grab bars, etc.    Pt was very much engaged and asked good questions.  I do feel she would benefit from further education. I will place a consult to our [facility] and ask them to reach out to pt after a week so that way there will be no confusion with her 7 day hospital f/u appt. I did inform pt that I will be making her a 7 day hospital follow up appt and it will be on her AVS.    Pt was very appreciative of the time spent (30+ mins) and \"for explaining things in a way I can understand them. \" Pt's goal is to better her health. Praise given to pt for being engaged and for showing motivation to better her health. CURRENT DIET: ADULT DIET; Regular; 5 carb choices (75 gm/meal); Low Sodium (2 gm); 1500 ml    EDUCATIONAL PACKETS PROVIDED- PRINTED FROM [employer]. Titles and material given:  Yes   []  What is Heart Failure? []  Heart Failure: Warning Signs of a Flare-Up  []  Heart Failure: Making Changes to Your Diet  []  Heart Failure: Medications to Help Your Heart   [x]  Other:  [facility] HF Education Booklet    PATIENT/CAREGIVER TEACHING:    Level of patient/caregiver understanding able to:   [x] Verbalize understanding   [] Demonstrate understanding       [x] Teach back        [] Needs reinforcement     []  Other:      TEACHING TIME:  30+ minutes       Plan:       DISCHARGE PLAN:  Placement for patient upon discharge: home with support of mother and friends   Hospice Care:  no  Code Status: DNR-CCA  Discharge appointment scheduled: I spoke with [facility] (Isaias Guerrero). She is aware of the need for a 7 day hospital f/u appt. Pt informed appt will be on her AVS.      RECOMMENDATIONS:   [x]  Encourage to call Heart Failure Resource Line with any questions or concerns. [x]  Educate further Ms. Shavon Cary on fluid restriction 48 oz- 64 oz during inpatient stay so she can understand how to measure intake at home. [x]  Continue to educate on S/S of Heart Failure. [x]  Emphasize daily weights, diet, and if changes, to call Heart Failure Resource Line  [x]  Other: Cardiac Rehab n/a as EF > 40%.   I have placed a referral to our [facility].             Electronically signed by Malina Justice RN, BSN CHFN  on 4/28/2022 at 2:24 PM

## 2022-04-28 NOTE — CONSULTS
[address]                                  CONSULTATION    PATIENT NAME: Sumit Christiansen                    :        1955  MED REC NO:   [id number]                          ROOM:       9425  ACCOUNT NO:   [de-identified]                           ADMIT DATE: 2022  PROVIDER:     Rai Arreola MD    CARDIOLOGY CONSULTATION    CONSULT DATE:  2022    HISTORY OF PRESENT ILLNESS:  This is a 81-year-old female who presented  to the hospital with worsening shortness of breath, nonproductive cough,  lower extremity edema, and chest pain for the last four days. She says  her symptoms started four days ago and has been steadily worsening. She  gets short of breath with minimal amount of exertion. She also noticed  that she had fever. She went to [address] [place] [address] where she was  diagnosed with influenza A. Since her symptoms steadily worsened, she  decided to come to the emergency room. In the emergency room, her  workup was consistent with decompensated heart failure. She also was  found to have worsening renal insufficiency and she was admitted. She  complains of burning in her chest which is constant and not worsened  with the exertion. There is no diaphoresis with this. She has no  palpitation. REVIEW OF SYSTEMS:  Please see HPI. All other systems are reviewed and  they are negative. PAST MEDICAL HISTORY:  1. History of diabetes. 2.  History of hypertension. 3.  Hyperlipidemia. SOCIAL HISTORY:  Denies any smoking or alcohol abuse. PAST SURGICAL HISTORY:  She had a hysterectomy and tibia-fibula fixation  on the left side. FAMILY HISTORY:  Negative for any early or premature atherosclerosis. MEDICINES:  Have been reviewed. ALLERGIES:  Have been reviewed.     PHYSICAL EXAMINATION:  VITAL SIGNS:  Her pulse is 76 and regular, blood pressure is 186/93,  respirations are 16, oxygen saturation 100%. CONSTITUTIONAL:  She is alert and oriented. HEENT:  Neck is supple. I am unable to appreciate her jugular venous  pulse. No carotid bruit. No thyromegaly. Eyes show no pale  conjunctivae or icterus. CARDIAC:  Regular rate and rhythm. No gallop, murmur, or rub  appreciated. LUNGS:  Bilateral extensive crackles. ABDOMEN:  Soft, nontender. Bowel sounds are present. EXTREMITIES:  Show 2 to 3+ lower extremity edema. NEUROLOGICAL:  Alert, oriented. Cranial nerves II through XII intact. No focal deficit. SKIN:  No rashes. LABORATORY DATA:  Sodium is 141, potassium 3.6, chloride 102, bicarb 29,  BUN is 23, creatinine 1.8. Her ProBNP was 4268. Troponin was 0.03 x2. Albumin 3.1. Her liver functions are mildly abnormal.  White count 5.9,  hemoglobin 10.6, hematocrit is 32.2. Chest x-ray shows pulmonary congestion. EKG showed sinus rhythm, no acute ST and T-wave abnormalities. Echocardiogram currently pending. IMPRESSION:  1. This is a 47-year-old female who has presented with worsening  shortness of breath, appears to have acute-on-chronic hypoxic  respiratory failure. This is most likely due to her decompensated heart  failure. She was positive for influenza in the [facility] though her  repeat influenza workup is currently negative. 2.  Acute decompensated heart failure, need to rule out systolic versus  diastolic heart failure. Multiple etiologies will have to be considered  including hypertensive heart disease, ischemic heart disease, occult  cardiomyopathy. 3.  Acute kidney injury, probably related to her diabetes and  hypertension. 4.  Hypertension, currently uncontrolled. RECOMMENDATIONS:  1. We will agree with keeping the patient on IV Lasix and follow I's  and O's, electrolytes, kidney function very closely. 2.  We will evaluate the patient's echocardiogram which has been done  not too long ago. 3.  Fluid and sodium restriction.   4.  We will increase the patient's metoprolol to 50 mg daily due to  uncontrolled blood pressure. 5.  The patient was on hydrochlorothiazide which is currently on hold. 6.  We will make further recommendation after initial response to the  above-mentioned measures. I appreciate the opportunity to participate in the care of this pleasant  female.         Eduarda Prado MD    D: 04/27/2022 15:54:35       T: 04/27/2022 16:52:15     [email or web address]  Job#: [id number]     Doc#: [id number]    CC:

## 2022-04-29 ENCOUNTER — APPOINTMENT (OUTPATIENT)
Dept: CT IMAGING | Age: 67
DRG: 286 | End: 2022-04-29
Payer: COMMERCIAL

## 2022-04-29 LAB
ANION GAP SERPL CALCULATED.3IONS-SCNC: 9 MMOL/L (ref 3–16)
BUN BLDV-MCNC: 35 MG/DL (ref 7–20)
CALCIUM SERPL-MCNC: 8.3 MG/DL (ref 8.3–10.6)
CHLORIDE BLD-SCNC: 102 MMOL/L (ref 99–110)
CO2: 32 MMOL/L (ref 21–32)
CREAT SERPL-MCNC: 1.8 MG/DL (ref 0.6–1.2)
GFR AFRICAN AMERICAN: 34
GFR NON-AFRICAN AMERICAN: 28
GLUCOSE BLD-MCNC: 105 MG/DL (ref 70–99)
GLUCOSE BLD-MCNC: 151 MG/DL (ref 70–99)
GLUCOSE BLD-MCNC: 158 MG/DL (ref 70–99)
GLUCOSE BLD-MCNC: 191 MG/DL (ref 70–99)
GLUCOSE BLD-MCNC: 99 MG/DL (ref 70–99)
MAGNESIUM: 1.5 MG/DL (ref 1.8–2.4)
PERFORMED ON: ABNORMAL
POTASSIUM REFLEX MAGNESIUM: 3.2 MMOL/L (ref 3.5–5.1)
PRO-BNP: 3434 PG/ML (ref 0–124)
SODIUM BLD-SCNC: 143 MMOL/L (ref 136–145)

## 2022-04-29 PROCEDURE — 94760 N-INVAS EAR/PLS OXIMETRY 1: CPT

## 2022-04-29 PROCEDURE — 6360000002 HC RX W HCPCS: Performed by: INTERNAL MEDICINE

## 2022-04-29 PROCEDURE — 83880 ASSAY OF NATRIURETIC PEPTIDE: CPT

## 2022-04-29 PROCEDURE — 6370000000 HC RX 637 (ALT 250 FOR IP): Performed by: INTERNAL MEDICINE

## 2022-04-29 PROCEDURE — 2580000003 HC RX 258: Performed by: INTERNAL MEDICINE

## 2022-04-29 PROCEDURE — 2700000000 HC OXYGEN THERAPY PER DAY

## 2022-04-29 PROCEDURE — 80048 BASIC METABOLIC PNL TOTAL CA: CPT

## 2022-04-29 PROCEDURE — 6370000000 HC RX 637 (ALT 250 FOR IP): Performed by: STUDENT IN AN ORGANIZED HEALTH CARE EDUCATION/TRAINING PROGRAM

## 2022-04-29 PROCEDURE — 99233 SBSQ HOSP IP/OBS HIGH 50: CPT | Performed by: INTERNAL MEDICINE

## 2022-04-29 PROCEDURE — 83735 ASSAY OF MAGNESIUM: CPT

## 2022-04-29 PROCEDURE — 36415 COLL VENOUS BLD VENIPUNCTURE: CPT

## 2022-04-29 PROCEDURE — 1200000000 HC SEMI PRIVATE

## 2022-04-29 RX ORDER — SPIRONOLACTONE 25 MG/1
25 TABLET ORAL DAILY
Status: DISCONTINUED | OUTPATIENT
Start: 2022-04-29 | End: 2022-05-01

## 2022-04-29 RX ORDER — INSULIN LISPRO 100 [IU]/ML
3 INJECTION, SOLUTION INTRAVENOUS; SUBCUTANEOUS
Status: DISCONTINUED | OUTPATIENT
Start: 2022-04-29 | End: 2022-05-04

## 2022-04-29 RX ORDER — INSULIN LISPRO 100 [IU]/ML
0-3 INJECTION, SOLUTION INTRAVENOUS; SUBCUTANEOUS NIGHTLY
Status: DISCONTINUED | OUTPATIENT
Start: 2022-04-29 | End: 2022-05-05

## 2022-04-29 RX ORDER — POTASSIUM CHLORIDE 750 MG/1
40 TABLET, FILM COATED, EXTENDED RELEASE ORAL 2 TIMES DAILY
Status: COMPLETED | OUTPATIENT
Start: 2022-04-29 | End: 2022-04-29

## 2022-04-29 RX ORDER — INSULIN LISPRO 100 [IU]/ML
0-6 INJECTION, SOLUTION INTRAVENOUS; SUBCUTANEOUS
Status: DISCONTINUED | OUTPATIENT
Start: 2022-04-29 | End: 2022-05-05

## 2022-04-29 RX ORDER — METOLAZONE 5 MG/1
2.5 TABLET ORAL ONCE
Status: COMPLETED | OUTPATIENT
Start: 2022-04-29 | End: 2022-04-29

## 2022-04-29 RX ADMIN — SODIUM CHLORIDE, PRESERVATIVE FREE 10 ML: 5 INJECTION INTRAVENOUS at 09:41

## 2022-04-29 RX ADMIN — METOPROLOL SUCCINATE 75 MG: 50 TABLET, EXTENDED RELEASE ORAL at 09:41

## 2022-04-29 RX ADMIN — AMLODIPINE BESYLATE 5 MG: 5 TABLET ORAL at 21:25

## 2022-04-29 RX ADMIN — METOLAZONE 2.5 MG: 5 TABLET ORAL at 18:15

## 2022-04-29 RX ADMIN — SPIRONOLACTONE 25 MG: 25 TABLET ORAL at 18:15

## 2022-04-29 RX ADMIN — POTASSIUM CHLORIDE 40 MEQ: 750 TABLET, EXTENDED RELEASE ORAL at 09:42

## 2022-04-29 RX ADMIN — INSULIN GLARGINE 10 UNITS: 100 INJECTION, SOLUTION SUBCUTANEOUS at 21:26

## 2022-04-29 RX ADMIN — FUROSEMIDE 60 MG: 10 INJECTION, SOLUTION INTRAMUSCULAR; INTRAVENOUS at 09:41

## 2022-04-29 RX ADMIN — FUROSEMIDE 60 MG: 10 INJECTION, SOLUTION INTRAMUSCULAR; INTRAVENOUS at 18:14

## 2022-04-29 RX ADMIN — SODIUM CHLORIDE, PRESERVATIVE FREE 10 ML: 5 INJECTION INTRAVENOUS at 21:54

## 2022-04-29 RX ADMIN — INSULIN LISPRO 1 UNITS: 100 INJECTION, SOLUTION INTRAVENOUS; SUBCUTANEOUS at 18:16

## 2022-04-29 RX ADMIN — PREGABALIN 150 MG: 75 CAPSULE ORAL at 21:25

## 2022-04-29 RX ADMIN — POTASSIUM CHLORIDE 40 MEQ: 750 TABLET, EXTENDED RELEASE ORAL at 21:25

## 2022-04-29 RX ADMIN — HEPARIN SODIUM 5000 UNITS: 5000 INJECTION INTRAVENOUS; SUBCUTANEOUS at 05:49

## 2022-04-29 RX ADMIN — METOPROLOL SUCCINATE 75 MG: 50 TABLET, EXTENDED RELEASE ORAL at 21:25

## 2022-04-29 RX ADMIN — HEPARIN SODIUM 5000 UNITS: 5000 INJECTION INTRAVENOUS; SUBCUTANEOUS at 15:19

## 2022-04-29 RX ADMIN — INSULIN LISPRO 1 UNITS: 100 INJECTION, SOLUTION INTRAVENOUS; SUBCUTANEOUS at 21:26

## 2022-04-29 RX ADMIN — PREGABALIN 150 MG: 75 CAPSULE ORAL at 09:41

## 2022-04-29 RX ADMIN — INSULIN LISPRO 2 UNITS: 100 INJECTION, SOLUTION INTRAVENOUS; SUBCUTANEOUS at 12:58

## 2022-04-29 RX ADMIN — INSULIN LISPRO 3 UNITS: 100 INJECTION, SOLUTION INTRAVENOUS; SUBCUTANEOUS at 18:16

## 2022-04-29 RX ADMIN — ASPIRIN 81 MG: 81 TABLET, COATED ORAL at 09:41

## 2022-04-29 RX ADMIN — HEPARIN SODIUM 5000 UNITS: 5000 INJECTION INTRAVENOUS; SUBCUTANEOUS at 21:25

## 2022-04-29 RX ADMIN — ATORVASTATIN CALCIUM 10 MG: 10 TABLET, FILM COATED ORAL at 09:41

## 2022-04-29 NOTE — PLAN OF CARE
Problem: Pain  Goal: Verbalizes/displays adequate comfort level or baseline comfort level  4/28/2022 2221 by Aurora Boston RN  Outcome: Progressing  4/28/2022 1407 by Luca Franco RN  Outcome: Progressing    Pain/discomfort being managed with PRN analgesics per MD orders. Pt able to express presence and absence of pain and rate pain appropriately using numerical scale. Non-pharmacologic comfort measures implemented. Rest and comfort promoted. Problem: Safety - Adult  Goal: Free from fall injury  4/28/2022 2221 by Aurora Boston RN  Outcome: Progressing  4/28/2022 1407 by Luca Franco RN  Outcome: Progressing     Problem: ABCDS Injury Assessment  Goal: Absence of physical injury  4/28/2022 2221 by Aurora Boston RN  Outcome: Progressing  4/28/2022 1407 by Luca Franco RN  Outcome: Progressing    Patient uses call light appropriately to express needs. Bed to lowest position with door open and call light in reach. All fall precautions implemented at this time. Siderails up x2. Non skid footwear in place. Seen at intervals to ensure safety. All needs attended.       Problem: Chronic Conditions and Co-morbidities  Goal: Patient's chronic conditions and co-morbidity symptoms are monitored and maintained or improved  4/28/2022 2221 by Aurora Boston RN  Outcome: Progressing  4/28/2022 1407 by Luca Franco RN  Outcome: Progressing     Problem: Cardiovascular - Adult  Goal: Maintains optimal cardiac output and hemodynamic stability  Outcome: Progressing     Problem: Musculoskeletal - Adult  Goal: Return mobility to safest level of function  Outcome: Progressing     Problem: Musculoskeletal - Adult  Goal: Maintain proper alignment of affected body part  Outcome: Progressing     Problem: Musculoskeletal - Adult  Goal: Return ADL status to a safe level of function  Outcome: Progressing     Problem: Discharge Planning  Goal: Discharge to home or other facility with appropriate resources  4/28/2022 2221 by Chirag Purvis JOSE Hemphill  Outcome: Progressing  4/28/2022 1407 by Riana Chen RN  Outcome: Progressing

## 2022-04-29 NOTE — PROGRESS NOTES
Nutrition Education    Heart Failure Diet Education:    Per hospital protocol or consult, patient being seen for Heart Failure diet guidelines. Patient's Lifestyle Questions:   Is HF a new Diagnosis? []Yes [x]No    Has patient had prior education? [x]Yes []No    Who does Grocery shopping and cooking? Patient    Frequency of eating out? Regularly goes to [facility]    Typical Eating Habits:  Breakfast- Scrambled egg, rodriguez  Lunch soup and sandwich/[employer]  [place] and vegetable      Instructed the Patient on:   [x] High/Low Sodium foods    [x] Moderation/portion control  [x] Eating out  [x] Fluid Restriction    [x] Label reading  [x] Carb Counting   [] Other:      Educational Materials Provided:   [x] Nutrition Care Manual (NCM) Heart Failure Nutrition Therapy   [x] NCM Sodium (Salt) Content of Foods  [] NCM Sodium Free Flavoring Tips    [x] Heart Healthy Eating Label Reading Tips   [] Healthy Eating when Eating Out  [] Controlling Your Fluids   [x] Fast Food Guide (from [employer])  [] NCM Carbohydrate Counting for People with Diabetes   [] Managing Your Diabetes Plate Method     -Diet Recommendations: 2000 mg Sodium/day, 64 oz Fluids/day         Monitoring/Evaluation:   -Barriers: Overwhelmed with information and combination of diets  -Evaluation of education: Needs reinforcement. -Expected compliance: fair. Additional Comments:   I feel patient will initially attempt to follow information given, but may find difficult. She seems to be somewhat overwhelmed with information and the combination of diets. Stressed fluid restriction as she is currently drinking a lot of juice and sweetened soft drinks. Suggested using Crystal Lite type product as she does not like water, however needs to limit amounts of the unsweetened due to the CHF. Made lunch time suggestions to replace the use of deli meats. Also provided fast food list specific for [facility]. Suggested limiting visits there. Did not do extensive CC education as patient had just been seen by Diabetes Educator. She did make reference to what both CHF nurse and Diabetes Educator had told her about. Total time involved in patient education: 20 minutes       5/2/22  Saw pt today to review diet education and reinforce what was covered last week. Pt had read some of the materials and asked questions. Seemed to have much better understanding. Discussed fluid restriction further. Clarified why changes were needed regarding both the CHF and CCC diet. Fluids seems to be of concern. She was taking sugar free so fine for DM, but stressed even if calorie free needs to be within allowed fluid restriction. Pt mentioned her sister is stressing to her the need to follow diet and stressed the value of a support person to help make and maintain diet changes. Seems to understand much better and be much more committed to following than on initial visit. Encouraged to call with questions after discharge. Pt very appreciative of time spent.  15 minutes     Electronically signed by Alli Londono RD, LD on 4/29/2022 at 11:55 AM              Alli Londono RD, LD  Contact Number: [phone]

## 2022-04-29 NOTE — PROGRESS NOTES
[facility]   Daily Progress Note      Admit Date:  4/26/2022    CC: \"  This is a 45-year-old female who presented  to the hospital with worsening shortness of breath, nonproductive cough,  lower extremity edema, and chest pain for the last four days. She says  her symptoms started four days ago and has been steadily worsening. She  gets short of breath with minimal amount of exertion. She also noticed  that she had fever. She went to [facility] where she was  diagnosed with influenza A. Since her symptoms steadily worsened, she  decided to come to the emergency room. In the emergency room, her  workup was consistent with decompensated heart failure. She also was  found to have worsening renal insufficiency and she was admitted. She  complains of burning in her chest which is constant and not worsened  with the exertion. There is no diaphoresis with this. She has no  palpitation. Subjective:  Pt with no acute overnight events. Denies chest pain, palpitations, and dyspnea. Interval history 4/29/2022  -Patient continues to show improvement now on 1 L of oxygen. He had modest urine output but have lost 5 pounds in last 24 hours. She continues to have at least 2+ lower extremity edema  Objective:   BP (!) 144/63   Pulse 64   Temp 98.3 °F (36.8 °C) (Oral)   Resp 18   Ht 5' (1.524 m)   Wt 179 lb 3.7 oz (81.3 kg)   SpO2 99%   BMI 35.00 kg/m²       Intake/Output Summary (Last 24 hours) at 4/29/2022 1607  Last data filed at 4/29/2022 1247  Gross per 24 hour   Intake 720 ml   Output 900 ml   Net -180 ml     Wt Readings from Last 3 Encounters:   04/29/22 179 lb 3.7 oz (81.3 kg)   10/17/18 175 lb 0.7 oz (79.4 kg)   01/16/18 160 lb (72.6 kg)     Telemetry:none    Physical Exam:  General:  NAD, Awake, alert and oriented X4  Skin:  Warm and dry  Neck:  Supple, no JVP appreciated, no bruit  Chest: Bibasilar  Cardiovascular:  Regular rate.  S1S2  Abdomen:  Soft, nontender, +bowel sounds  Extremities: 2+ LE edema    Cardiac Diagnosis:  diabetes, hypertension and hyperlipidemia    Medications:    potassium chloride  40 mEq Oral BID    insulin lispro  3 Units SubCUTAneous TID WC    insulin lispro  0-6 Units SubCUTAneous TID WC    insulin lispro  0-3 Units SubCUTAneous Nightly    metoprolol succinate  75 mg Oral BID    furosemide  60 mg IntraVENous BID    pregabalin  150 mg Oral BID    insulin glargine  10 Units SubCUTAneous Nightly    atorvastatin  10 mg Oral Daily    amLODIPine  5 mg Oral Nightly    aspirin  81 mg Oral Daily    sodium chloride flush  10 mL IntraVENous 2 times per day    heparin (porcine)  5,000 Units SubCUTAneous 3 times per day      dextrose      sodium chloride       albuterol sulfate HFA, glucose, dextrose, glucagon (rDNA), dextrose, sodium chloride flush, sodium chloride, ondansetron, polyethylene glycol, acetaminophen **OR** acetaminophen, benzocaine-menthol, hydrALAZINE    Lab Data:  CBC:   Recent Labs     04/26/22 2014 04/27/22 0333 04/28/22  0625   WBC 6.1 5.9 5.9   HGB 11.1* 10.6* 10.6*    224 219     BMP:    Recent Labs     04/27/22 0333 04/28/22  0625 04/29/22  0619    142 143   K 3.6 3.6 3.2*   CO2 29 32 32   BUN 23* 30* 35*   CREATININE 1.8* 1.9* 1.8*     LIVR:   Recent Labs     04/26/22 2014   AST 47*   ALT 77*     INR:  No results for input(s): INR in the last 72 hours. APTT: No results for input(s): APTT in the last 72 hours. BNP:  No results for input(s): BNP in the last 72 hours. Imaging:Global left ventricular function is normal with ejection fraction estimated   from 55 % to 60 %. Moderate concentric left ventricular hypertrophy. Grade II diastolic dysfunction with elevated LV filling pressures. Mild mitral regurgitation is present. The left atrium is mildly dilated. The right ventricle is normal in size and function.    Systolic pulmonary artery pressure (SPAP) is estimated at 56 mmHg consistent   with moderate pulmonary hypertension (RA pressure 3 mmHg). Mild tricuspid   regurgitation. Assessment:Plan:  1) acute on chronic diastolic heart failure. NYHA class III on admission  -Patient had mild diuresis in last 24 hours and loss 5 pounds since yesterday. -BP still remains elevated  -We will continue IV Lasix 60 mg  twice daily  -Still appears mildly decompensated.  -Will give her 2.5 mg of Zaroxolyn before next dose of Lasix  -We will consider switching her to oral diuretic therapy in the next 24 to 48 hours depending on her response to Zaroxolyn and Lasix combination   metoprolol 75 twice daily  -Echocardiogram shows normal LV systolic function with evidence of grade 2 diastolic dysfunction and pulmonary hypertension  -Patient will need chemical stress test once her heart failure improves to rule out underlying ischemic heart disease.     Hypertension  -Blood pressure is elevated today but improved from before  -We will  continue Toprol  -BP goal is less than 130/80      Chronic kidney disease  -Renal function abnormal but remained stable in spite of being on IV Lasix  -We will continue to monitor closely    Hypokalemia  -K is 3.3 today due to being on diuretic therapy  -We will add potassium supplement  -We will add Aldactone but will closely follow patient's renal function and potassium  Anemia  -Most likely due to chronic kidney disease as anemia work-up shows no evidence of iron deficiency anemia      Electronically signed by Dolores Frias MD on 4/29/2022 at 4:07 PM

## 2022-04-29 NOTE — PROGRESS NOTES
Hospitalist Progress Note      PCP: Comfort Ratliff MD    Date of Admission: 4/26/2022    Chief Complaint: shortness of breath and lower limb edema. Hospital Course:   61 y. o. year-old female with a history of hypertension, hyperlipidemia, type II diabetes mellitus and CHF.  She presents to the emergency room for evaluation following a 4-day history of worsening shortness of breath, a nonproductive cough and increasing lower extremity edema. Admitted with diastolic heart failure exacerbation as well as CHLOE on CKD. Subjective:   Output is 300 ml in the last 24 hours, weight is down to 179lb from 185  Patient continues to be on 2 L of oxygen via nasal cannula. Medications:  Reviewed    Infusion Medications    dextrose      sodium chloride       Scheduled Medications    metoprolol succinate  75 mg Oral BID    furosemide  60 mg IntraVENous BID    pregabalin  150 mg Oral BID    insulin glargine  10 Units SubCUTAneous Nightly    atorvastatin  10 mg Oral Daily    amLODIPine  5 mg Oral Nightly    aspirin  81 mg Oral Daily    insulin lispro  0-12 Units SubCUTAneous TID WC    insulin lispro  0-6 Units SubCUTAneous Nightly    sodium chloride flush  10 mL IntraVENous 2 times per day    heparin (porcine)  5,000 Units SubCUTAneous 3 times per day     PRN Meds: albuterol sulfate HFA, glucose, dextrose, glucagon (rDNA), dextrose, sodium chloride flush, sodium chloride, ondansetron, polyethylene glycol, acetaminophen **OR** acetaminophen, benzocaine-menthol, hydrALAZINE      Intake/Output Summary (Last 24 hours) at 4/29/2022 0829  Last data filed at 4/29/2022 0010  Gross per 24 hour   Intake 360 ml   Output 900 ml   Net -540 ml       Physical Exam Performed:    /64   Pulse 64   Temp 97.5 °F (36.4 °C) (Oral)   Resp 20   Ht 5' (1.524 m)   Wt 179 lb 3.7 oz (81.3 kg)   SpO2 100%   BMI 35.00 kg/m²     General appearance: No apparent distress, appears stated age and cooperative.   On oxygen via nasal cannula  HEENT: Pupils equal, round, and reactive to light. Conjunctivae/corneas clear. Neck: Supple, with full range of motion. No jugular venous distention. Trachea midline. Respiratory: Crackles in lower zones cardiovascular: Regular rate and rhythm with normal S1/S2 without murmurs, rubs or gallops. Abdomen: Soft, non-tender, non-distended with normal bowel sounds. Musculoskeletal: Bilateral lower limb edema present  Skin: Skin color, texture, turgor normal.  No rashes or lesions. Neurologic:  Neurovascularly intact without any focal sensory/motor deficits. Cranial nerves: II-XII intact, grossly non-focal.  Psychiatric: Alert and oriented, thought content appropriate, normal insight  Capillary Refill: Brisk,3 seconds, normal   Peripheral Pulses: +2 palpable, equal bilaterally       Labs:   Recent Labs     04/26/22 2014 04/27/22 0333 04/28/22  0625   WBC 6.1 5.9 5.9   HGB 11.1* 10.6* 10.6*   HCT 33.6* 32.3* 31.3*    224 219     Recent Labs     04/27/22 0333 04/28/22  0625 04/29/22  0619    142 143   K 3.6 3.6 3.2*    100 102   CO2 29 32 32   BUN 23* 30* 35*   CREATININE 1.8* 1.9* 1.8*   CALCIUM 8.2* 8.8 8.3     Recent Labs     04/26/22 2014   AST 47*   ALT 77*   BILITOT <0.2   ALKPHOS 159*     No results for input(s): INR in the last 72 hours.   Recent Labs     04/26/22 2014 04/27/22  0112 04/27/22  0333   TROPONINI 0.03* 0.02* 0.03*       Urinalysis:      Lab Results   Component Value Date    NITRU Negative 07/11/2019    WBCUA 4 07/11/2019    BACTERIA RARE 07/11/2019    RBCUA 2 07/11/2019    BLOODU Negative 07/11/2019    SPECGRAV 1.020 07/11/2019    GLUCOSEU Negative 07/11/2019       Radiology:  XR CHEST (2 VW)   Final Result   Bilateral parahilar infiltrates could represent edema or atypical pneumonia         CT CHEST PULMONARY EMBOLISM W CONTRAST    (Results Pending)           Assessment/Plan:    Active Hospital Problems    Diagnosis     Acute respiratory failure with hypoxia ([facility] [address] [J96.01]      Priority: Medium    Influenza A [J10.1]      Priority: Medium    Elevated troponin [R77.8]      Priority: Medium    Acute pulmonary edema (HCC) [J81.0]      Priority: Medium    Acute renal failure (ARF) (HCC) [N17.9]      Priority: Medium    Hypertensive urgency [I16.0]      Priority: Medium    Hyperlipidemia [E78.5]      Priority: Medium    Morbid obesity due to excess calories ([facility] [address] [E66.01]      Priority: Medium    Acute congestive heart failure (HCC) [I50.9]      Priority: Medium    Acute on chronic diastolic (congestive) heart failure (HCC) [I50.33]      Priority: Medium    DMII (diabetes mellitus, type 2) (HCC) [E11.9]     Primary hypertension [I10]      Acute hypoxic respiratory failure. Acute on chronic diastolic heart failure exacerbation. Patient presented to emergency with worsening shortness of breath, echo 55 to 60% with grade 2 diastolic dysfunction, followed by cardiology. Currently on Lasix 60 mg twice daily, weight improving, improved urine output. Plan.    -Continue on Lasix 60 mg twice daily.  -Continue monitor intake and output.  -Weight daily. -Appreciate cardiology input.  -Resume home medications. Influenza A. Tested positive at private pharmacy, negative in our facility. Infectious disease involved, no need for Tamiflu. Acute renal failure. Patient's baseline is around 1.4-1.6. Creatinine continues to be maintained at 1.7    Diabetes mellitus. Blood sugar well maintained. Add prandial 3 units and change sliding scale to low  Continue on insulin glargine 10 units as well as sliding scale.    hypertension. Resume home medications. Hyperlipidemia. Continue on statin    DVT Prophylaxis: heparin  Diet: ADULT DIET; Regular; 5 carb choices (75 gm/meal); Low Sodium (2 gm); 1500 ml  Code Status: DNR-CCA    PT/OT Eval Status: ambulatory. ,     Dispo - pending clinical improvement      Henrique Domínguez MD

## 2022-04-29 NOTE — PROGRESS NOTES
[facility]  Diabetes Education   Progress Note       NAME:  Juan Murray  MEDICAL RECORD NUMBER:  [id number]  AGE: 61 y.o. GENDER: female  : 1955  TODAY'S DATE:  2022    Subjective   Reason for Diabetic Education Evaluation and Assessment: hyperglycemia, general diabetes support    Martina Brothers agrees to meet with me for diabetes education. She is interested in reviewing what to eat. Visit Type: evaluation      Juan Murray is a 61 y.o. female referred by:     [] Physician  [x] Nursing  [] Chart Review   [] Other:     PAST MEDICAL HISTORY        Diagnosis Date    Ankle fracture, left 2014    HTN (hypertension), benign 2014    Hypertension        PAST SURGICAL HISTORY    Past Surgical History:   Procedure Laterality Date    FRACTURE SURGERY Left 2014    orif tibula/fibula fixation    HYSTERECTOMY         FAMILY HISTORY    History reviewed. No pertinent family history.     SOCIAL HISTORY    Social History     Tobacco Use    Smoking status: Light Tobacco Smoker    Smokeless tobacco: Never Used   Substance Use Topics    Alcohol use: No    Drug use: No       ALLERGIES    No Known Allergies    MEDICATIONS     potassium chloride  40 mEq Oral BID    metoprolol succinate  75 mg Oral BID    furosemide  60 mg IntraVENous BID    pregabalin  150 mg Oral BID    insulin glargine  10 Units SubCUTAneous Nightly    atorvastatin  10 mg Oral Daily    amLODIPine  5 mg Oral Nightly    aspirin  81 mg Oral Daily    insulin lispro  0-12 Units SubCUTAneous TID WC    insulin lispro  0-6 Units SubCUTAneous Nightly    sodium chloride flush  10 mL IntraVENous 2 times per day    heparin (porcine)  5,000 Units SubCUTAneous 3 times per day       Objective        Patient Active Problem List   Diagnosis Code    Ankle fracture, left T63.143V    DMII (diabetes mellitus, type 2) ([facility] E11.9    Primary hypertension I10    Chest pain R07.9    Acute on chronic diastolic (congestive) heart failure (HCC) I50.33    Acute respiratory failure with hypoxia (HCC) J96.01    Influenza A J10.1    Elevated troponin R77.8    Acute pulmonary edema (HCC) J81.0    Acute renal failure (ARF) (HCC) N17.9    Hypertensive urgency I16.0    Hyperlipidemia E78.5    Morbid obesity due to excess calories (HCC) E66.01    Acute congestive heart failure (HCC) I50.9        BP (!) 170/77   Pulse 70   Temp 98.1 °F (36.7 °C) (Oral)   Resp 18   Ht 5' (1.524 m)   Wt 179 lb 3.7 oz (81.3 kg)   SpO2 100%   BMI 35.00 kg/m²     HgBA1c:    Lab Results   Component Value Date    LABA1C 12.4 01/18/2014       Recent Labs     04/28/22  1218 04/28/22  1604 04/28/22 2017 04/29/22  0731   POCGLU 170* 236* 269* 105*       BUN/Creatinine:    Lab Results   Component Value Date    BUN 35 04/29/2022    CREATININE 1.8 04/29/2022       Assessment        Diabetes Management and Education    Does the patient have a Primary Care Physician? Yes, Talha Rhodes MD       Does the patient require new medication instruction? Yes - requiring less insulin in the hospital than at home. Reviewed basal and prandial insulin concepts. Person responsible for administration of Insulin/Medication:        [x] Self     [] Caregiver       [] Spouse       [] Other Family Member   []  Other    Level of patient/caregiver understanding able to:       [] Verbalized Understanding   [] Demonstrated Understanding       [] Teach Back       [x] Needs Reinforcement     []  Other:         Does the patient/caregiver monitor Blood Glucoses? Yes. Usually tests twice daily. Recommend testing before meals and bedtime. Reviewed glycemic control targets, testing frequency and when to call PCP. Level of patient/caregiver understanding able to:        [x] Verbalized Understanding   [] Demonstrated Understanding       [] Teach Back       [] Needs Reinforcement     []  Other:         Does the patient/caregiver follow a Meal Plan?  No: Skips meals. Eats fast food. Drinks juice and soda. Recommend making water, unsweetened tea or coffee primary drinks. Identified common carbs and portions. Reviewed importance of eating three meals per day and plate method for consistent carb intake. Reinforced sodium guidelines and fast food references. Level of patient/caregiver understanding able to:      [] Verbalized Understanding   [] Demonstrated Understanding       [] Teach Back       [x] Needs Reinforcement     []  Other:      Does the patient/caregiver understand S/S of Hypoglycemia? No: Reports having a previous episode of < 40 but no episodes in the last few weeks. Shakiness was chief symptom. Reviewed symptoms, prevention and treatment. Recommend carrying rescue carbs. Level of patient/caregiver understanding able to:       [x] Verbalized Understanding   [] Demonstrated Understanding       [] Teach Back       [] Needs Reinforcement     [x]  Other:  Agrees to notify staff if she has any symptoms. She has juice at home but is interested in purchasing glucose tablets. Does the patient/caregiver understand S/S of Hyperglycemia? No:    Reviewed symptoms, prevention and treatment. Level of patient/caregiver understanding able to:        [] Verbalized Understanding   [] Demonstrated Understanding       [] Teach Back       [x] Needs Reinforcement     []  Other:           Plan        Ongoing diabetes education and blood glucose monitoring. Recommend adding prandial Humalog 3 units and decreasing correction to low. Notified Michael Swenson MD    The following educational and support materials were provided:  · My contact information  · Nutrition in the [facility].             · The Diabetes Education Program:  Planning Healthy Meals   · Academy of Nutrition and Dietetics handout - Carbohydrate Counting for People with Diabetes  · Blood Glucose Log Book                                           Discharge Plan:  Discharge needs: no prescription needs identified    Teaching Time Diabetes Education:  40 minutes     Electronically signed by Elizabeth Moreau on 4/29/2022 at 11:15 AM

## 2022-04-29 NOTE — PLAN OF CARE
Problem: Discharge Planning  Goal: Discharge to home or other facility with appropriate resources  Outcome: Progressing     Problem: Pain  Goal: Verbalizes/displays adequate comfort level or baseline comfort level  Outcome: Progressing     Problem: Safety - Adult  Goal: Free from fall injury  Outcome: Progressing     Problem: Chronic Conditions and Co-morbidities  Goal: Patient's chronic conditions and co-morbidity symptoms are monitored and maintained or improved  Outcome: Progressing     Problem: ABCDS Injury Assessment  Goal: Absence of physical injury  Outcome: Progressing     Problem: Cardiovascular - Adult  Goal: Maintains optimal cardiac output and hemodynamic stability  Outcome: Progressing     Problem: Musculoskeletal - Adult  Goal: Return mobility to safest level of function  Outcome: Progressing  Goal: Maintain proper alignment of affected body part  Outcome: Progressing  Goal: Return ADL status to a safe level of function  Outcome: Progressing

## 2022-04-30 PROBLEM — N18.9 ACUTE KIDNEY INJURY SUPERIMPOSED ON CKD (HCC): Status: ACTIVE | Noted: 2022-04-27

## 2022-04-30 LAB
ANION GAP SERPL CALCULATED.3IONS-SCNC: 9 MMOL/L (ref 3–16)
BUN BLDV-MCNC: 38 MG/DL (ref 7–20)
CALCIUM SERPL-MCNC: 8.9 MG/DL (ref 8.3–10.6)
CHLORIDE BLD-SCNC: 100 MMOL/L (ref 99–110)
CO2: 32 MMOL/L (ref 21–32)
CREAT SERPL-MCNC: 2 MG/DL (ref 0.6–1.2)
GFR AFRICAN AMERICAN: 30
GFR NON-AFRICAN AMERICAN: 25
GLUCOSE BLD-MCNC: 114 MG/DL (ref 70–99)
GLUCOSE BLD-MCNC: 116 MG/DL (ref 70–99)
GLUCOSE BLD-MCNC: 185 MG/DL (ref 70–99)
GLUCOSE BLD-MCNC: 185 MG/DL (ref 70–99)
GLUCOSE BLD-MCNC: 258 MG/DL (ref 70–99)
PERFORMED ON: ABNORMAL
POTASSIUM REFLEX MAGNESIUM: 4.4 MMOL/L (ref 3.5–5.1)
SODIUM BLD-SCNC: 141 MMOL/L (ref 136–145)

## 2022-04-30 PROCEDURE — 2580000003 HC RX 258: Performed by: INTERNAL MEDICINE

## 2022-04-30 PROCEDURE — 6360000002 HC RX W HCPCS: Performed by: INTERNAL MEDICINE

## 2022-04-30 PROCEDURE — 99232 SBSQ HOSP IP/OBS MODERATE 35: CPT | Performed by: NURSE PRACTITIONER

## 2022-04-30 PROCEDURE — 80048 BASIC METABOLIC PNL TOTAL CA: CPT

## 2022-04-30 PROCEDURE — 94760 N-INVAS EAR/PLS OXIMETRY 1: CPT

## 2022-04-30 PROCEDURE — 6370000000 HC RX 637 (ALT 250 FOR IP): Performed by: NURSE PRACTITIONER

## 2022-04-30 PROCEDURE — 6370000000 HC RX 637 (ALT 250 FOR IP): Performed by: STUDENT IN AN ORGANIZED HEALTH CARE EDUCATION/TRAINING PROGRAM

## 2022-04-30 PROCEDURE — 6370000000 HC RX 637 (ALT 250 FOR IP): Performed by: INTERNAL MEDICINE

## 2022-04-30 PROCEDURE — 36415 COLL VENOUS BLD VENIPUNCTURE: CPT

## 2022-04-30 PROCEDURE — 1200000000 HC SEMI PRIVATE

## 2022-04-30 RX ORDER — HYDRALAZINE HYDROCHLORIDE 25 MG/1
25 TABLET, FILM COATED ORAL EVERY 12 HOURS SCHEDULED
Status: DISCONTINUED | OUTPATIENT
Start: 2022-04-30 | End: 2022-05-01

## 2022-04-30 RX ADMIN — SODIUM CHLORIDE, PRESERVATIVE FREE 10 ML: 5 INJECTION INTRAVENOUS at 10:04

## 2022-04-30 RX ADMIN — AMLODIPINE BESYLATE 5 MG: 5 TABLET ORAL at 20:53

## 2022-04-30 RX ADMIN — HYDRALAZINE HYDROCHLORIDE 25 MG: 25 TABLET, FILM COATED ORAL at 17:52

## 2022-04-30 RX ADMIN — ATORVASTATIN CALCIUM 10 MG: 10 TABLET, FILM COATED ORAL at 10:05

## 2022-04-30 RX ADMIN — INSULIN GLARGINE 10 UNITS: 100 INJECTION, SOLUTION SUBCUTANEOUS at 20:53

## 2022-04-30 RX ADMIN — PREGABALIN 150 MG: 75 CAPSULE ORAL at 20:53

## 2022-04-30 RX ADMIN — HEPARIN SODIUM 5000 UNITS: 5000 INJECTION INTRAVENOUS; SUBCUTANEOUS at 20:53

## 2022-04-30 RX ADMIN — HEPARIN SODIUM 5000 UNITS: 5000 INJECTION INTRAVENOUS; SUBCUTANEOUS at 06:40

## 2022-04-30 RX ADMIN — INSULIN LISPRO 3 UNITS: 100 INJECTION, SOLUTION INTRAVENOUS; SUBCUTANEOUS at 10:14

## 2022-04-30 RX ADMIN — METOPROLOL SUCCINATE 75 MG: 50 TABLET, EXTENDED RELEASE ORAL at 20:52

## 2022-04-30 RX ADMIN — ASPIRIN 81 MG: 81 TABLET, COATED ORAL at 10:05

## 2022-04-30 RX ADMIN — FUROSEMIDE 60 MG: 10 INJECTION, SOLUTION INTRAMUSCULAR; INTRAVENOUS at 10:06

## 2022-04-30 RX ADMIN — SPIRONOLACTONE 25 MG: 25 TABLET ORAL at 10:05

## 2022-04-30 RX ADMIN — FUROSEMIDE 60 MG: 10 INJECTION, SOLUTION INTRAMUSCULAR; INTRAVENOUS at 17:53

## 2022-04-30 RX ADMIN — INSULIN LISPRO 1 UNITS: 100 INJECTION, SOLUTION INTRAVENOUS; SUBCUTANEOUS at 18:06

## 2022-04-30 RX ADMIN — INSULIN LISPRO 3 UNITS: 100 INJECTION, SOLUTION INTRAVENOUS; SUBCUTANEOUS at 18:06

## 2022-04-30 RX ADMIN — SODIUM CHLORIDE, PRESERVATIVE FREE 10 ML: 5 INJECTION INTRAVENOUS at 20:53

## 2022-04-30 RX ADMIN — INSULIN LISPRO 2 UNITS: 100 INJECTION, SOLUTION INTRAVENOUS; SUBCUTANEOUS at 20:53

## 2022-04-30 RX ADMIN — HEPARIN SODIUM 5000 UNITS: 5000 INJECTION INTRAVENOUS; SUBCUTANEOUS at 17:52

## 2022-04-30 RX ADMIN — METOPROLOL SUCCINATE 75 MG: 50 TABLET, EXTENDED RELEASE ORAL at 10:05

## 2022-04-30 RX ADMIN — PREGABALIN 150 MG: 75 CAPSULE ORAL at 10:04

## 2022-04-30 NOTE — PROGRESS NOTES
Cardiology - PROGRESS NOTE    Admit Date: 4/26/2022     Reason for follow up: CHF     82-year-old female who presented  to the hospital with worsening shortness of breath, nonproductive cough,  lower extremity edema, and chest pain for the last four days. Sofia Schultz says  her symptoms started four days ago and has been steadily worsening.  She  gets short of breath with minimal amount of exertion.  She also noticed  that she had fever.  She went to [facility] where she was  diagnosed with influenza A.  Since her symptoms steadily worsened, she  decided to come to the emergency room.  In the emergency room, her  workup was consistent with decompensated heart failure.  She also was  found to have worsening renal insufficiency and she was admitted.  She  complains of burning in her chest which is constant and not worsened  with the exertion.  There is no diaphoresis with this.  She has no  palpitation. Social History:   reports that she has been smoking. She has never used smokeless tobacco. She reports that she does not drink alcohol and does not use drugs. Family History: family history is not on file. Interval History:  Patient seen and examined and notes reviewed   Reports she is feeling better overall   Continues to sleep with head elevated  Denies chest pain   Reports edema slightly improved however still present   Wt 179-178 lb   All other systems reviewed and negative except as above. Diet: ADULT DIET; Regular; 5 carb choices (75 gm/meal);  Low Sodium (2 gm); 1500 ml  Pain is:None  Nausea:None    In: 1080 [P.O.:1080]  Out: 700    Wt Readings from Last 3 Encounters:   04/30/22 178 lb 9.2 oz (81 kg)   10/17/18 175 lb 0.7 oz (79.4 kg)   01/16/18 160 lb (72.6 kg)           Data:   Scheduled Meds:   Scheduled Meds:   insulin lispro  3 Units SubCUTAneous TID WC    insulin lispro  0-6 Units SubCUTAneous TID WC    insulin lispro  0-3 Units SubCUTAneous Nightly    spironolactone  25 mg Oral Daily    metoprolol succinate  75 mg Oral BID    furosemide  60 mg IntraVENous BID    pregabalin  150 mg Oral BID    insulin glargine  10 Units SubCUTAneous Nightly    atorvastatin  10 mg Oral Daily    amLODIPine  5 mg Oral Nightly    aspirin  81 mg Oral Daily    sodium chloride flush  10 mL IntraVENous 2 times per day    heparin (porcine)  5,000 Units SubCUTAneous 3 times per day     Continuous Infusions:   dextrose      sodium chloride       PRN Meds:.albuterol sulfate HFA, glucose, dextrose, glucagon (rDNA), dextrose, sodium chloride flush, sodium chloride, ondansetron, polyethylene glycol, acetaminophen **OR** acetaminophen, benzocaine-menthol, hydrALAZINE  Continuous Infusions:   dextrose      sodium chloride         Intake/Output Summary (Last 24 hours) at 4/30/2022 0853  Last data filed at 4/29/2022 1824  Gross per 24 hour   Intake 1080 ml   Output 700 ml   Net 380 ml       CBC:   Recent Labs     04/28/22  0625   WBC 5.9   HGB 10.6*        BMP:  Recent Labs     04/30/22  0731      K 4.4      CO2 32   BUN 38*   CREATININE 2.0*   GLUCOSE 114*     ABGs: No results found for: PHART, PO2ART, EQE1ATB  INR: No results for input(s): INR in the last 72 hours. CARDIAC LABS     ENZYMES:No results for input(s): CKMB, CKMBINDEX, TROPONINI in the last 72 hours. Invalid input(s): CKTOTAL;3  FASTING LIPID PANEL:  Lab Results   Component Value Date    HDL 42 10/17/2018    LDLCALC 90 10/17/2018    TRIG 86 10/17/2018     LIVER PROFILE:No results for input(s): AST, ALT, ALB in the last 72 hours. -----------------------------------------------------------------  Telemetry: personally reviewed     RAD:     CXR:  FINDINGS:   Mild cardiomegaly.  Bilateral parahilar increased markings.  No pneumothorax.    No pleural effusion.           Impression   Bilateral parahilar infiltrates could represent edema or atypical pneumonia       EKG: 4/26/22   SR with non specific ST and T wave abnormalities     Echo:     Imaging:Global left ventricular function is normal with ejection fraction estimated   from 55 % to 60 %.   Moderate concentric left ventricular hypertrophy.   Grade II diastolic dysfunction with elevated LV filling pressures.   Mild mitral regurgitation is present.   The left atrium is mildly dilated.   The right ventricle is normal in size and function.   Systolic pulmonary artery pressure (SPAP) is estimated at 56 mmHg consistent   with moderate pulmonary hypertension (RA pressure 3 mmHg). Mild tricuspid   regurgitation. Objective:   Vitals: BP (!) 150/68   Pulse 73   Temp 98 °F (36.7 °C) (Oral)   Resp 14   Ht 5' (1.524 m)   Wt 178 lb 9.2 oz (81 kg)   SpO2 96%   BMI 34.88 kg/m²   General appearance: alert, appears stated age and cooperative, No acute distress   Skin: Skin color, texture, turgor normal. No rashes or ecchymosis. HEENT: Head: Normocephalic, no lesions, without obvious abnormality. Neck: no adenopathy, no carotid bruit, no JVD, supple, symmetrical, trachea midline and thyroid not enlarged, symmetric, no tenderness/mass/nodules  Lungs: clear to auscultation bilaterally, no accessory muscle use, no respiratory distress, on RA  Heart: regular rate and rhythm, S1, S2 normal, no murmur, click, rub or gallop  Abdomen: soft, non-tender; bowel sounds normal; no masses,  no organomegaly  Extremities: +1 BLE , pulses: DP +2/+2, PT +2/+2  Neurologic: Mental status: Alert, oriented, thought content appropriate, no tremors, no gross sensory motor deficit,   Psychiatric: normal insight and affect      Assessment & Plan:    Patient Active Problem List:    Plan:  1.  Heart failure   Acute on chronic diastolic    NYHA III on admission    Wt 178 lb    On lasix IVP    Given Zaroxolyn    On Toprol XL  75 mg BID    On spironolactone    If renal fx worsens will need to stop   No ACE/ARB secondary to renal fx    Needs ischemic evaluation (Stress testing) once fluid status improves  2. HTN    Improved however remains elevated    Goal < 130/80   On beta-blocker and norvasc    Add scheduled hydralazine       3. CHLOE on CKD   Baseline 1.4-1.6   Creatinine 2.0 today     Given zaroxolyn 4/29     4.  Electrolyte abnormalities   Improved    K 4.4 today       Harman Phalen, APRN-CNP   [address]  Cardiology   4/30/2022  8:53 AM

## 2022-04-30 NOTE — PROGRESS NOTES
Hospitalist Progress Note      PCP: Talha Rhodes MD    Date of Admission: 4/26/2022    Chief Complaint: shortness of breath and lower limb edema. Hospital Course:   61 y. o. year-old female with a history of hypertension, hyperlipidemia, type II diabetes mellitus and CHF.  She presents to the emergency room for evaluation following a 4-day history of worsening shortness of breath, a nonproductive cough and increasing lower extremity edema. Admitted with diastolic heart failure exacerbation as well as CHLOE on CKD.      Subjective:   Output is 1.6 L in the last 24 hours, weight is down to 178 from 185  Patient weaned off oxygen    Medications:  Reviewed    Infusion Medications    dextrose      sodium chloride       Scheduled Medications    hydrALAZINE  25 mg Oral 2 times per day    insulin lispro  3 Units SubCUTAneous TID WC    insulin lispro  0-6 Units SubCUTAneous TID WC    insulin lispro  0-3 Units SubCUTAneous Nightly    spironolactone  25 mg Oral Daily    metoprolol succinate  75 mg Oral BID    furosemide  60 mg IntraVENous BID    pregabalin  150 mg Oral BID    insulin glargine  10 Units SubCUTAneous Nightly    atorvastatin  10 mg Oral Daily    amLODIPine  5 mg Oral Nightly    aspirin  81 mg Oral Daily    sodium chloride flush  10 mL IntraVENous 2 times per day    heparin (porcine)  5,000 Units SubCUTAneous 3 times per day     PRN Meds: albuterol sulfate HFA, glucose, dextrose, glucagon (rDNA), dextrose, sodium chloride flush, sodium chloride, ondansetron, polyethylene glycol, acetaminophen **OR** acetaminophen, benzocaine-menthol, hydrALAZINE      Intake/Output Summary (Last 24 hours) at 4/30/2022 1403  Last data filed at 4/30/2022 1013  Gross per 24 hour   Intake 960 ml   Output 700 ml   Net 260 ml       Physical Exam Performed:    BP (!) 171/78   Pulse 65   Temp 98.3 °F (36.8 °C) (Oral)   Resp 16   Ht 5' (1.524 m)   Wt 178 lb 9.2 oz (81 kg)   SpO2 94%   BMI 34.88 kg/m² General appearance: No apparent distress, appears stated age and cooperative. On oxygen via nasal cannula  HEENT: Pupils equal, round, and reactive to light. Conjunctivae/corneas clear. Neck: Supple, with full range of motion. No jugular venous distention. Trachea midline. Respiratory: Crackles in lower zones cardiovascular: Regular rate and rhythm with normal S1/S2 without murmurs, rubs or gallops. Abdomen: Soft, non-tender, non-distended with normal bowel sounds. Musculoskeletal: Bilateral lower limb edema present  Skin: Skin color, texture, turgor normal.  No rashes or lesions. Neurologic:  Neurovascularly intact without any focal sensory/motor deficits. Cranial nerves: II-XII intact, grossly non-focal.  Psychiatric: Alert and oriented, thought content appropriate, normal insight  Capillary Refill: Brisk,3 seconds, normal   Peripheral Pulses: +2 palpable, equal bilaterally       Labs:   Recent Labs     04/28/22  0625   WBC 5.9   HGB 10.6*   HCT 31.3*        Recent Labs     04/28/22  0625 04/29/22  0619 04/30/22  0731    143 141   K 3.6 3.2* 4.4    102 100   CO2 32 32 32   BUN 30* 35* 38*   CREATININE 1.9* 1.8* 2.0*   CALCIUM 8.8 8.3 8.9     No results for input(s): AST, ALT, BILIDIR, BILITOT, ALKPHOS in the last 72 hours. No results for input(s): INR in the last 72 hours. No results for input(s): Jadene Moh in the last 72 hours.     Urinalysis:      Lab Results   Component Value Date    NITRU Negative 07/11/2019    WBCUA 4 07/11/2019    BACTERIA RARE 07/11/2019    RBCUA 2 07/11/2019    BLOODU Negative 07/11/2019    SPECGRAV 1.020 07/11/2019    GLUCOSEU Negative 07/11/2019       Radiology:  XR CHEST (2 VW)   Final Result   Bilateral parahilar infiltrates could represent edema or atypical pneumonia                 Assessment/Plan:    Active Hospital Problems    Diagnosis     Acute respiratory failure with hypoxia (HCC) [J96.01]      Priority: Medium    Influenza A [J10.1] Priority: Medium    Elevated troponin [R77.8]      Priority: Medium    Acute pulmonary edema (HCC) [J81.0]      Priority: Medium    Acute kidney injury superimposed on CKD ([facility] [address] [N17.9, N18.9]      Priority: Medium    Hypertensive urgency [I16.0]      Priority: Medium    Hyperlipidemia [E78.5]      Priority: Medium    Morbid obesity due to excess calories ([facility] [address] [E66.01]      Priority: Medium    Acute congestive heart failure (HCC) [I50.9]      Priority: Medium    Acute on chronic diastolic (congestive) heart failure (HCC) [I50.33]      Priority: Medium    DMII (diabetes mellitus, type 2) (HCC) [E11.9]     Primary hypertension [I10]      Acute hypoxic respiratory failure. Acute on chronic diastolic heart failure exacerbation. Patient presented to emergency with worsening shortness of breath, echo 55 to 60% with grade 2 diastolic dysfunction, followed by cardiology. Currently on Lasix 60 mg twice daily, weight improving, improved urine output. Creatinine slightly above baseline at 2, would continue diuresis for now  Plan.    -Continue on Lasix 60 mg twice daily.  -Continue monitor intake and output.  -Weight daily. -Appreciate cardiology input.  -Resume home medications. Influenza A. Tested positive at private pharmacy, negative in our facility. Infectious disease involved, no need for Tamiflu. Acute renal failure. Patient's baseline is around 1.4-1.6. Creatinine slightly above baseline, 2 today, will continue to monitor closely    Diabetes mellitus. Blood sugar well maintained. Add prandial 3 units and change sliding scale to low  Continue on insulin glargine 10 units as well as sliding scale.    hypertension. Resume home medications. Hyperlipidemia. Continue on statin    DVT Prophylaxis: heparin  Diet: ADULT DIET; Regular; 5 carb choices (75 gm/meal); Low Sodium (2 gm); 1500 ml  Code Status: DNR-CCA    PT/OT Eval Status: ambulatory. ,     Dispo - pending clinical improvement      Gordy Johnston MD

## 2022-05-01 LAB
ANION GAP SERPL CALCULATED.3IONS-SCNC: 11 MMOL/L (ref 3–16)
BUN BLDV-MCNC: 48 MG/DL (ref 7–20)
CALCIUM SERPL-MCNC: 9.3 MG/DL (ref 8.3–10.6)
CHLORIDE BLD-SCNC: 94 MMOL/L (ref 99–110)
CO2: 33 MMOL/L (ref 21–32)
CREAT SERPL-MCNC: 2.3 MG/DL (ref 0.6–1.2)
GFR AFRICAN AMERICAN: 26
GFR NON-AFRICAN AMERICAN: 21
GLUCOSE BLD-MCNC: 143 MG/DL (ref 70–99)
GLUCOSE BLD-MCNC: 145 MG/DL (ref 70–99)
GLUCOSE BLD-MCNC: 149 MG/DL (ref 70–99)
GLUCOSE BLD-MCNC: 151 MG/DL (ref 70–99)
GLUCOSE BLD-MCNC: 277 MG/DL (ref 70–99)
PERFORMED ON: ABNORMAL
POTASSIUM REFLEX MAGNESIUM: 3.6 MMOL/L (ref 3.5–5.1)
SODIUM BLD-SCNC: 138 MMOL/L (ref 136–145)

## 2022-05-01 PROCEDURE — 6360000002 HC RX W HCPCS: Performed by: INTERNAL MEDICINE

## 2022-05-01 PROCEDURE — 6370000000 HC RX 637 (ALT 250 FOR IP): Performed by: NURSE PRACTITIONER

## 2022-05-01 PROCEDURE — 6370000000 HC RX 637 (ALT 250 FOR IP): Performed by: INTERNAL MEDICINE

## 2022-05-01 PROCEDURE — 80048 BASIC METABOLIC PNL TOTAL CA: CPT

## 2022-05-01 PROCEDURE — 1200000000 HC SEMI PRIVATE

## 2022-05-01 PROCEDURE — 6370000000 HC RX 637 (ALT 250 FOR IP): Performed by: STUDENT IN AN ORGANIZED HEALTH CARE EDUCATION/TRAINING PROGRAM

## 2022-05-01 PROCEDURE — 94760 N-INVAS EAR/PLS OXIMETRY 1: CPT

## 2022-05-01 PROCEDURE — 99232 SBSQ HOSP IP/OBS MODERATE 35: CPT | Performed by: NURSE PRACTITIONER

## 2022-05-01 PROCEDURE — 36415 COLL VENOUS BLD VENIPUNCTURE: CPT

## 2022-05-01 PROCEDURE — 2580000003 HC RX 258: Performed by: INTERNAL MEDICINE

## 2022-05-01 RX ORDER — ISOSORBIDE MONONITRATE 30 MG/1
30 TABLET, EXTENDED RELEASE ORAL DAILY
Status: DISCONTINUED | OUTPATIENT
Start: 2022-05-01 | End: 2022-05-06 | Stop reason: HOSPADM

## 2022-05-01 RX ORDER — HYDRALAZINE HYDROCHLORIDE 50 MG/1
50 TABLET, FILM COATED ORAL EVERY 12 HOURS SCHEDULED
Status: DISCONTINUED | OUTPATIENT
Start: 2022-05-01 | End: 2022-05-02

## 2022-05-01 RX ADMIN — METOPROLOL SUCCINATE 75 MG: 50 TABLET, EXTENDED RELEASE ORAL at 21:17

## 2022-05-01 RX ADMIN — ISOSORBIDE MONONITRATE 30 MG: 30 TABLET, EXTENDED RELEASE ORAL at 12:18

## 2022-05-01 RX ADMIN — INSULIN LISPRO 3 UNITS: 100 INJECTION, SOLUTION INTRAVENOUS; SUBCUTANEOUS at 17:06

## 2022-05-01 RX ADMIN — INSULIN LISPRO 1 UNITS: 100 INJECTION, SOLUTION INTRAVENOUS; SUBCUTANEOUS at 17:05

## 2022-05-01 RX ADMIN — INSULIN LISPRO 3 UNITS: 100 INJECTION, SOLUTION INTRAVENOUS; SUBCUTANEOUS at 12:18

## 2022-05-01 RX ADMIN — INSULIN LISPRO 2 UNITS: 100 INJECTION, SOLUTION INTRAVENOUS; SUBCUTANEOUS at 21:20

## 2022-05-01 RX ADMIN — SODIUM CHLORIDE, PRESERVATIVE FREE 10 ML: 5 INJECTION INTRAVENOUS at 08:32

## 2022-05-01 RX ADMIN — SPIRONOLACTONE 25 MG: 25 TABLET ORAL at 08:31

## 2022-05-01 RX ADMIN — HEPARIN SODIUM 5000 UNITS: 5000 INJECTION INTRAVENOUS; SUBCUTANEOUS at 06:29

## 2022-05-01 RX ADMIN — SODIUM CHLORIDE, PRESERVATIVE FREE 10 ML: 5 INJECTION INTRAVENOUS at 21:20

## 2022-05-01 RX ADMIN — PREGABALIN 150 MG: 75 CAPSULE ORAL at 21:17

## 2022-05-01 RX ADMIN — HYDRALAZINE HYDROCHLORIDE 50 MG: 50 TABLET, FILM COATED ORAL at 21:17

## 2022-05-01 RX ADMIN — INSULIN GLARGINE 10 UNITS: 100 INJECTION, SOLUTION SUBCUTANEOUS at 21:20

## 2022-05-01 RX ADMIN — PREGABALIN 150 MG: 75 CAPSULE ORAL at 08:31

## 2022-05-01 RX ADMIN — ASPIRIN 81 MG: 81 TABLET, COATED ORAL at 08:31

## 2022-05-01 RX ADMIN — AMLODIPINE BESYLATE 5 MG: 5 TABLET ORAL at 21:16

## 2022-05-01 RX ADMIN — INSULIN LISPRO 3 UNITS: 100 INJECTION, SOLUTION INTRAVENOUS; SUBCUTANEOUS at 08:43

## 2022-05-01 RX ADMIN — HEPARIN SODIUM 5000 UNITS: 5000 INJECTION INTRAVENOUS; SUBCUTANEOUS at 14:09

## 2022-05-01 RX ADMIN — METOPROLOL SUCCINATE 75 MG: 50 TABLET, EXTENDED RELEASE ORAL at 08:31

## 2022-05-01 RX ADMIN — INSULIN LISPRO 1 UNITS: 100 INJECTION, SOLUTION INTRAVENOUS; SUBCUTANEOUS at 08:42

## 2022-05-01 RX ADMIN — INSULIN LISPRO 1 UNITS: 100 INJECTION, SOLUTION INTRAVENOUS; SUBCUTANEOUS at 12:19

## 2022-05-01 RX ADMIN — HYDRALAZINE HYDROCHLORIDE 25 MG: 25 TABLET, FILM COATED ORAL at 08:31

## 2022-05-01 RX ADMIN — HEPARIN SODIUM 5000 UNITS: 5000 INJECTION INTRAVENOUS; SUBCUTANEOUS at 21:20

## 2022-05-01 RX ADMIN — ATORVASTATIN CALCIUM 10 MG: 10 TABLET, FILM COATED ORAL at 08:31

## 2022-05-01 RX ADMIN — FUROSEMIDE 60 MG: 10 INJECTION, SOLUTION INTRAMUSCULAR; INTRAVENOUS at 08:31

## 2022-05-01 NOTE — PROGRESS NOTES
Hospitalist Progress Note      PCP: Calvin Lindo MD    Date of Admission: 4/26/2022    Chief Complaint: shortness of breath and lower limb edema. Hospital Course:   61 y. o. year-old female with a history of hypertension, hyperlipidemia, type II diabetes mellitus and CHF.  She presents to the emergency room for evaluation following a 4-day history of worsening shortness of breath, a nonproductive cough and increasing lower extremity edema. Admitted with diastolic heart failure exacerbation as well as CHLOE on CKD.      Subjective:   Output 1.7 the last 24 hours, [place] any complaints    Medications:  Reviewed    Infusion Medications    dextrose      sodium chloride       Scheduled Medications    hydrALAZINE  50 mg Oral 2 times per day    isosorbide mononitrate  30 mg Oral Daily    insulin lispro  3 Units SubCUTAneous TID WC    insulin lispro  0-6 Units SubCUTAneous TID WC    insulin lispro  0-3 Units SubCUTAneous Nightly    metoprolol succinate  75 mg Oral BID    [Held by provider] furosemide  60 mg IntraVENous BID    pregabalin  150 mg Oral BID    insulin glargine  10 Units SubCUTAneous Nightly    atorvastatin  10 mg Oral Daily    amLODIPine  5 mg Oral Nightly    aspirin  81 mg Oral Daily    sodium chloride flush  10 mL IntraVENous 2 times per day    heparin (porcine)  5,000 Units SubCUTAneous 3 times per day     PRN Meds: albuterol sulfate HFA, glucose, dextrose, glucagon (rDNA), dextrose, sodium chloride flush, sodium chloride, ondansetron, polyethylene glycol, acetaminophen **OR** acetaminophen, benzocaine-menthol, hydrALAZINE      Intake/Output Summary (Last 24 hours) at 5/1/2022 1051  Last data filed at 5/1/2022 0920  Gross per 24 hour   Intake 800 ml   Output 1750 ml   Net -950 ml       Physical Exam Performed:    BP (!) 162/68   Pulse 61   Temp 97.7 °F (36.5 °C) (Oral)   Resp 18   Ht 5' (1.524 m)   Wt 177 lb 7.5 oz (80.5 kg)   SpO2 95%   BMI 34.66 kg/m²     General appearance: No apparent distress, appears stated age and cooperative. On oxygen via nasal cannula  HEENT: Pupils equal, round, and reactive to light. Conjunctivae/corneas clear. Neck: Supple, with full range of motion. No jugular venous distention. Trachea midline. Respiratory: Crackles in lower zones cardiovascular: Regular rate and rhythm with normal S1/S2 without murmurs, rubs or gallops. Abdomen: Soft, non-tender, non-distended with normal bowel sounds. Musculoskeletal: Bilateral lower limb edema present  Skin: Skin color, texture, turgor normal.  No rashes or lesions. Neurologic:  Neurovascularly intact without any focal sensory/motor deficits. Cranial nerves: II-XII intact, grossly non-focal.  Psychiatric: Alert and oriented, thought content appropriate, normal insight  Capillary Refill: Brisk,3 seconds, normal   Peripheral Pulses: +2 palpable, equal bilaterally       Labs:   No results for input(s): WBC, HGB, HCT, PLT in the last 72 hours. Recent Labs     04/29/22  0619 04/30/22  0731 05/01/22  0711    141 138   K 3.2* 4.4 3.6    100 94*   CO2 32 32 33*   BUN 35* 38* 48*   CREATININE 1.8* 2.0* 2.3*   CALCIUM 8.3 8.9 9.3     No results for input(s): AST, ALT, BILIDIR, BILITOT, ALKPHOS in the last 72 hours. No results for input(s): INR in the last 72 hours. No results for input(s): Funmilayo Usman in the last 72 hours.     Urinalysis:      Lab Results   Component Value Date    NITRU Negative 07/11/2019    WBCUA 4 07/11/2019    BACTERIA RARE 07/11/2019    RBCUA 2 07/11/2019    BLOODU Negative 07/11/2019    SPECGRAV 1.020 07/11/2019    GLUCOSEU Negative 07/11/2019       Radiology:  XR CHEST (2 VW)   Final Result   Bilateral parahilar infiltrates could represent edema or atypical pneumonia                 Assessment/Plan:    Active Hospital Problems    Diagnosis     Acute respiratory failure with hypoxia ([facility] [address] [J96.01]      Priority: Medium    Influenza A [J10.1]      Priority: Medium    Elevated troponin [R77.8]      Priority: Medium    Acute pulmonary edema (HCC) [J81.0]      Priority: Medium    Acute kidney injury superimposed on CKD ([facility] [address] [N17.9, N18.9]      Priority: Medium    Hypertensive urgency [I16.0]      Priority: Medium    Hyperlipidemia [E78.5]      Priority: Medium    Morbid obesity due to excess calories ([facility] [address] [E66.01]      Priority: Medium    Acute congestive heart failure (HCC) [I50.9]      Priority: Medium    Acute on chronic diastolic (congestive) heart failure (HCC) [I50.33]      Priority: Medium    DMII (diabetes mellitus, type 2) (HCC) [E11.9]     Primary hypertension [I10]      Acute hypoxic respiratory failure. Acute on chronic diastolic heart failure exacerbation. Patient presented to emergency with worsening shortness of breath, echo 55 to 60% with grade 2 diastolic dysfunction, followed by cardiology. Currently on Lasix 60 mg twice daily, weight improving, improved urine output. Creatinine increased to 2.3, will hold Lasix  Plan.    -Agree with holding Lasix  -Continue monitor intake and output.  -Weight daily. -Appreciate cardiology input. (Patient is planned for ischemic work-up after fluid status improves)   -Resume home medications. Influenza A. Tested positive at private pharmacy, negative in our facility. Infectious disease involved, no need for Tamiflu. Acute renal failure. Patient's baseline is around 1.4-1.6. Creatinine slightly above baseline, 2-->2.3 today, hold ACE inhibitors and diuretics  Diabetes mellitus. Blood sugar well maintained. Add prandial 3 units and change sliding scale to low  Continue on insulin glargine 10 units as well as sliding scale.    hypertension. Resume home medications. Hyperlipidemia. Continue on statin    DVT Prophylaxis: heparin  Diet: ADULT DIET; Regular; 5 carb choices (75 gm/meal); Low Sodium (2 gm); 1500 ml  Code Status: DNR-CCA    PT/OT Eval Status: ambulatory. ,     Dispo - pending clinical improvement      Lorri Gomez MD

## 2022-05-01 NOTE — PLAN OF CARE
Problem: Pain  Goal: Verbalizes/displays adequate comfort level or baseline comfort level  Outcome: Progressing     Problem: Safety - Adult  Goal: Free from fall injury  Outcome: Progressing     Problem: Cardiovascular - Adult  Goal: Maintains optimal cardiac output and hemodynamic stability  Outcome: Progressing

## 2022-05-01 NOTE — PROGRESS NOTES
Cardiology - PROGRESS NOTE    Admit Date: 4/26/2022     Reason for follow up: CHF     71-year-old female who presented  to the hospital with worsening shortness of breath, nonproductive cough,  lower extremity edema, and chest pain for the last four days. Nancy Marx says  her symptoms started four days ago and has been steadily worsening.  She  gets short of breath with minimal amount of exertion.  She also noticed  that she had fever.  She went to [address] [place] [address] where she was  diagnosed with influenza A.  Since her symptoms steadily worsened, she  decided to come to the emergency room.  In the emergency room, her  workup was consistent with decompensated heart failure.  She also was  found to have worsening renal insufficiency and she was admitted.  She  complains of burning in her chest which is constant and not worsened  with the exertion.  There is no diaphoresis with this.  She has no  palpitation. Social History:   reports that she has been smoking. She has never used smokeless tobacco. She reports that she does not drink alcohol and does not use drugs. Family History: family history is not on file. Interval History:  Patient seen and examined and notes reviewed     Wt [phone] lb   Remains HTN   Sitting in chair  Reports she feels OK however still with orthopnea and fatigue with ADL   No chest pain or palpitations    All other systems reviewed and negative except as above. Diet: ADULT DIET; Regular; 5 carb choices (75 gm/meal);  Low Sodium (2 gm); 1500 ml  Pain is:None  Nausea:None    In: 800 [P.O.:800]  Out: 1950    Wt Readings from Last 3 Encounters:   05/01/22 177 lb 7.5 oz (80.5 kg)   10/17/18 175 lb 0.7 oz (79.4 kg)   01/16/18 160 lb (72.6 kg)           Data:   Scheduled Meds:   Scheduled Meds:   hydrALAZINE  25 mg Oral 2 times per day    insulin lispro  3 Units SubCUTAneous TID WC    insulin lispro  0-6 Units SubCUTAneous TID WC    insulin lispro  0-3 Units SubCUTAneous Nightly    spironolactone  25 mg Oral Daily    metoprolol succinate  75 mg Oral BID    furosemide  60 mg IntraVENous BID    pregabalin  150 mg Oral BID    insulin glargine  10 Units SubCUTAneous Nightly    atorvastatin  10 mg Oral Daily    amLODIPine  5 mg Oral Nightly    aspirin  81 mg Oral Daily    sodium chloride flush  10 mL IntraVENous 2 times per day    heparin (porcine)  5,000 Units SubCUTAneous 3 times per day     Continuous Infusions:   dextrose      sodium chloride       PRN Meds:.albuterol sulfate HFA, glucose, dextrose, glucagon (rDNA), dextrose, sodium chloride flush, sodium chloride, ondansetron, polyethylene glycol, acetaminophen **OR** acetaminophen, benzocaine-menthol, hydrALAZINE  Continuous Infusions:   dextrose      sodium chloride         Intake/Output Summary (Last 24 hours) at 5/1/2022 0905  Last data filed at 5/1/2022 0655  Gross per 24 hour   Intake 800 ml   Output 1950 ml   Net -1150 ml       CBC:   No results for input(s): WBC, HGB, PLT in the last 72 hours. BMP:  Recent Labs     05/01/22  0711      K 3.6   CL 94*   CO2 33*   BUN 48*   CREATININE 2.3*   GLUCOSE 143*     ABGs: No results found for: PHART, PO2ART, LPT0JTZ  INR: No results for input(s): INR in the last 72 hours. CARDIAC LABS     ENZYMES:No results for input(s): CKMB, CKMBINDEX, TROPONINI in the last 72 hours. Invalid input(s): CKTOTAL;3  FASTING LIPID PANEL:  Lab Results   Component Value Date    HDL 42 10/17/2018    LDLCALC 90 10/17/2018    TRIG 86 10/17/2018     LIVER PROFILE:No results for input(s): AST, ALT, ALB in the last 72 hours. -----------------------------------------------------------------  Telemetry: personally reviewed     RAD:     CXR:  FINDINGS:   Mild cardiomegaly.  Bilateral parahilar increased markings.  No pneumothorax.    No pleural effusion.           Impression   Bilateral parahilar infiltrates could represent edema or atypical pneumonia       EK22   SR with non specific ST and T wave abnormalities     Echo:     Imaging:Global left ventricular function is normal with ejection fraction estimated   from 55 % to 60 %.   Moderate concentric left ventricular hypertrophy.   Grade II diastolic dysfunction with elevated LV filling pressures.   Mild mitral regurgitation is present.   The left atrium is mildly dilated.   The right ventricle is normal in size and function.   Systolic pulmonary artery pressure (SPAP) is estimated at 56 mmHg consistent   with moderate pulmonary hypertension (RA pressure 3 mmHg). Mild tricuspid   regurgitation. Objective:   Vitals: BP (!) 162/68   Pulse 61   Temp 97.7 °F (36.5 °C) (Oral)   Resp 18   Ht 5' (1.524 m)   Wt 177 lb 7.5 oz (80.5 kg)   SpO2 95%   BMI 34.66 kg/m²   General appearance: alert, appears stated age and cooperative, No acute distress   Skin: Skin color, texture, turgor normal. No rashes or ecchymosis. HEENT: Head: Normocephalic, no lesions, without obvious abnormality. Neck: no adenopathy, no carotid bruit, no JVD, supple, symmetrical, trachea midline and thyroid not enlarged, symmetric, no tenderness/mass/nodules  Lungs: clear to auscultation bilaterally, no accessory muscle use, no respiratory distress, on RA  Heart: regular rate and rhythm, S1, S2 normal, no murmur, click, rub or gallop  Abdomen: soft, non-tender; bowel sounds normal; no masses,  no organomegaly  Extremities: +1-+2 BLE , pulses: DP +2/+2, PT +2/+2  Neurologic: Mental status: Alert, oriented, thought content appropriate, no tremors, no gross sensory motor deficit,   Psychiatric: normal insight and affect      Assessment & Plan:    Patient Active Problem List:    Plan:  1.  Heart failure   Acute on chronic diastolic    NYHA III on admission    Wt 177 lb    On lasix IVP-hold today secondary to renal fx    Given Zaroxolyn     On Toprol XL  75 mg BID     Continue    On spironolactone-stop secondary to renal fx    No ACE/ARB secondary to renal fx    Needs ischemic evaluation (Stress testing) once fluid status improves   Continue fluid and Na restrictions   2. HTN    Remains elevated    Goal < 130/80   On beta-blocker and norvasc    Increase hydralazine    Add Imdur for afterload reduction       3. CHLOE on CKD   Baseline 1.4-1.6   Creatinine 2.3 today     Hold lasix   Stop spironolactone       4.  Electrolyte abnormalities   Improved    K 3.6 today       Harman Phalen, APRN-CNP   [facility]  Cardiology   5/1/2022  9:05 AM

## 2022-05-01 NOTE — PLAN OF CARE
Problem: Pain  Goal: Verbalizes/displays adequate comfort level or baseline comfort level  5/1/2022 1320 by Mina Fine RN  Outcome: Progressing     Problem: Safety - Adult  Goal: Free from fall injury  5/1/2022 1320 by Mina Fine RN  Outcome: Progressing     Problem: ABCDS Injury Assessment  Goal: Absence of physical injury  Outcome: Progressing     Problem: Chronic Conditions and Co-morbidities  Goal: Patient's chronic conditions and co-morbidity symptoms are monitored and maintained or improved  Outcome: Progressing     Problem: Cardiovascular - Adult  Goal: Maintains optimal cardiac output and hemodynamic stability  5/1/2022 1320 by Mina Fine RN  Outcome: Progressing     Problem: Musculoskeletal - Adult  Goal: Return mobility to safest level of function  Outcome: Progressing     Problem: Musculoskeletal - Adult  Goal: Maintain proper alignment of affected body part  Outcome: Progressing     Problem: Musculoskeletal - Adult  Goal: Return ADL status to a safe level of function  Outcome: Progressing     Problem: Discharge Planning  Goal: Discharge to home or other facility with appropriate resources  Outcome: Progressing

## 2022-05-02 LAB
ANION GAP SERPL CALCULATED.3IONS-SCNC: 9 MMOL/L (ref 3–16)
BUN BLDV-MCNC: 56 MG/DL (ref 7–20)
CALCIUM SERPL-MCNC: 8.8 MG/DL (ref 8.3–10.6)
CHLORIDE BLD-SCNC: 96 MMOL/L (ref 99–110)
CO2: 32 MMOL/L (ref 21–32)
CREAT SERPL-MCNC: 2.8 MG/DL (ref 0.6–1.2)
GFR AFRICAN AMERICAN: 21
GFR NON-AFRICAN AMERICAN: 17
GLUCOSE BLD-MCNC: 164 MG/DL (ref 70–99)
GLUCOSE BLD-MCNC: 171 MG/DL (ref 70–99)
GLUCOSE BLD-MCNC: 180 MG/DL (ref 70–99)
GLUCOSE BLD-MCNC: 188 MG/DL (ref 70–99)
GLUCOSE BLD-MCNC: 358 MG/DL (ref 70–99)
PERFORMED ON: ABNORMAL
POTASSIUM REFLEX MAGNESIUM: 3.7 MMOL/L (ref 3.5–5.1)
SODIUM BLD-SCNC: 137 MMOL/L (ref 136–145)

## 2022-05-02 PROCEDURE — 36415 COLL VENOUS BLD VENIPUNCTURE: CPT

## 2022-05-02 PROCEDURE — 6370000000 HC RX 637 (ALT 250 FOR IP): Performed by: STUDENT IN AN ORGANIZED HEALTH CARE EDUCATION/TRAINING PROGRAM

## 2022-05-02 PROCEDURE — 6370000000 HC RX 637 (ALT 250 FOR IP): Performed by: NURSE PRACTITIONER

## 2022-05-02 PROCEDURE — 6360000002 HC RX W HCPCS: Performed by: INTERNAL MEDICINE

## 2022-05-02 PROCEDURE — 2580000003 HC RX 258: Performed by: INTERNAL MEDICINE

## 2022-05-02 PROCEDURE — 6370000000 HC RX 637 (ALT 250 FOR IP): Performed by: INTERNAL MEDICINE

## 2022-05-02 PROCEDURE — 99232 SBSQ HOSP IP/OBS MODERATE 35: CPT | Performed by: NURSE PRACTITIONER

## 2022-05-02 PROCEDURE — 81001 URINALYSIS AUTO W/SCOPE: CPT

## 2022-05-02 PROCEDURE — 80048 BASIC METABOLIC PNL TOTAL CA: CPT

## 2022-05-02 PROCEDURE — 1200000000 HC SEMI PRIVATE

## 2022-05-02 RX ORDER — AMLODIPINE BESYLATE 5 MG/1
5 TABLET ORAL DAILY
Status: DISCONTINUED | OUTPATIENT
Start: 2022-05-02 | End: 2022-05-06 | Stop reason: HOSPADM

## 2022-05-02 RX ORDER — HYDRALAZINE HYDROCHLORIDE 25 MG/1
25 TABLET, FILM COATED ORAL EVERY 12 HOURS SCHEDULED
Status: DISCONTINUED | OUTPATIENT
Start: 2022-05-02 | End: 2022-05-06 | Stop reason: HOSPADM

## 2022-05-02 RX ADMIN — AMLODIPINE BESYLATE 5 MG: 5 TABLET ORAL at 19:45

## 2022-05-02 RX ADMIN — PREGABALIN 150 MG: 75 CAPSULE ORAL at 20:44

## 2022-05-02 RX ADMIN — ATORVASTATIN CALCIUM 10 MG: 10 TABLET, FILM COATED ORAL at 08:15

## 2022-05-02 RX ADMIN — INSULIN LISPRO 5 UNITS: 100 INJECTION, SOLUTION INTRAVENOUS; SUBCUTANEOUS at 17:33

## 2022-05-02 RX ADMIN — INSULIN LISPRO 3 UNITS: 100 INJECTION, SOLUTION INTRAVENOUS; SUBCUTANEOUS at 17:33

## 2022-05-02 RX ADMIN — METOPROLOL SUCCINATE 75 MG: 50 TABLET, EXTENDED RELEASE ORAL at 20:43

## 2022-05-02 RX ADMIN — INSULIN LISPRO 1 UNITS: 100 INJECTION, SOLUTION INTRAVENOUS; SUBCUTANEOUS at 12:50

## 2022-05-02 RX ADMIN — INSULIN GLARGINE 10 UNITS: 100 INJECTION, SOLUTION SUBCUTANEOUS at 20:45

## 2022-05-02 RX ADMIN — SODIUM CHLORIDE, PRESERVATIVE FREE 10 ML: 5 INJECTION INTRAVENOUS at 20:45

## 2022-05-02 RX ADMIN — INSULIN LISPRO 1 UNITS: 100 INJECTION, SOLUTION INTRAVENOUS; SUBCUTANEOUS at 08:19

## 2022-05-02 RX ADMIN — PREGABALIN 150 MG: 75 CAPSULE ORAL at 08:15

## 2022-05-02 RX ADMIN — METOPROLOL SUCCINATE 75 MG: 50 TABLET, EXTENDED RELEASE ORAL at 08:15

## 2022-05-02 RX ADMIN — HEPARIN SODIUM 5000 UNITS: 5000 INJECTION INTRAVENOUS; SUBCUTANEOUS at 17:23

## 2022-05-02 RX ADMIN — ASPIRIN 81 MG: 81 TABLET, COATED ORAL at 08:15

## 2022-05-02 RX ADMIN — SODIUM CHLORIDE, PRESERVATIVE FREE 10 ML: 5 INJECTION INTRAVENOUS at 08:19

## 2022-05-02 RX ADMIN — INSULIN LISPRO 3 UNITS: 100 INJECTION, SOLUTION INTRAVENOUS; SUBCUTANEOUS at 08:19

## 2022-05-02 RX ADMIN — HYDRALAZINE HYDROCHLORIDE 25 MG: 50 TABLET, FILM COATED ORAL at 08:15

## 2022-05-02 RX ADMIN — HEPARIN SODIUM 5000 UNITS: 5000 INJECTION INTRAVENOUS; SUBCUTANEOUS at 20:44

## 2022-05-02 RX ADMIN — HYDRALAZINE HYDROCHLORIDE 25 MG: 50 TABLET, FILM COATED ORAL at 20:44

## 2022-05-02 RX ADMIN — HEPARIN SODIUM 5000 UNITS: 5000 INJECTION INTRAVENOUS; SUBCUTANEOUS at 06:02

## 2022-05-02 RX ADMIN — INSULIN LISPRO 1 UNITS: 100 INJECTION, SOLUTION INTRAVENOUS; SUBCUTANEOUS at 20:45

## 2022-05-02 RX ADMIN — INSULIN LISPRO 3 UNITS: 100 INJECTION, SOLUTION INTRAVENOUS; SUBCUTANEOUS at 12:50

## 2022-05-02 RX ADMIN — ISOSORBIDE MONONITRATE 30 MG: 30 TABLET, EXTENDED RELEASE ORAL at 08:14

## 2022-05-02 ASSESSMENT — PAIN SCALES - GENERAL: PAINLEVEL_OUTOF10: 0

## 2022-05-02 NOTE — PLAN OF CARE
Problem: Discharge Planning  Goal: Discharge to home or other facility with appropriate resources  5/1/2022 2237 by Sheng Coleman RN  Outcome: Progressing  5/1/2022 1320 by Hermelinda Monzon RN  Outcome: Progressing     Problem: Pain  Goal: Verbalizes/displays adequate comfort level or baseline comfort level  5/1/2022 2237 by Sheng Coleman RN  Outcome: Progressing  5/1/2022 1320 by Hermelinda Monzon RN  Outcome: Progressing     Problem: Safety - Adult  Goal: Free from fall injury  5/1/2022 2237 by Sheng Coleman RN  Outcome: Progressing  5/1/2022 1320 by Hermelinda Monzon RN  Outcome: Progressing     Problem: Chronic Conditions and Co-morbidities  Goal: Patient's chronic conditions and co-morbidity symptoms are monitored and maintained or improved  5/1/2022 2237 by Sheng Coleman RN  Outcome: Progressing  5/1/2022 1320 by Hermelinda Monzon RN  Outcome: Progressing     Problem: ABCDS Injury Assessment  Goal: Absence of physical injury  5/1/2022 2237 by Sheng Coleman RN  Outcome: Progressing  5/1/2022 1320 by Hermelinda Monzon RN  Outcome: Progressing     Problem: Cardiovascular - Adult  Goal: Maintains optimal cardiac output and hemodynamic stability  5/1/2022 2237 by Sheng Coleman RN  Outcome: Progressing  5/1/2022 1320 by Hermelinda Monzon RN  Outcome: Progressing     Problem: Musculoskeletal - Adult  Goal: Return mobility to safest level of function  5/1/2022 2237 by Sheng oCleman RN  Outcome: Progressing  5/1/2022 1320 by Hermelinda Monzon RN  Outcome: Progressing  Goal: Maintain proper alignment of affected body part  5/1/2022 2237 by Sheng Coleman RN  Outcome: Progressing  5/1/2022 1320 by Hermelinda Monzon RN  Outcome: Progressing  Goal: Return ADL status to a safe level of function  5/1/2022 2237 by Sheng Coleman RN  Outcome: Progressing  5/1/2022 1320 by Hermelinda Monzon RN  Outcome: Progressing     Problem: Musculoskeletal - Adult  Goal: Maintain proper alignment of affected body part  5/1/2022 2237 by Evan Lowery RN  Outcome: Progressing  5/1/2022 1320 by Boy Estrada RN  Outcome: Progressing     Problem: Musculoskeletal - Adult  Goal: Return ADL status to a safe level of function  5/1/2022 2237 by Evan Lowery RN  Outcome: Progressing  5/1/2022 1320 by Boy Estrada RN  Outcome: Progressing

## 2022-05-02 NOTE — PROGRESS NOTES
Physician Progress Note      PATIENT:               Rupesh Villa  CSN #:                  [id number]  :                       1955  ADMIT DATE:       2022 7:00 PM  [address] [place] DATE:  RESPONDING  PROVIDER #:        Kashif Burgos          QUERY TEXT:    Patient admitted with acute on chronic diastolic CHF. Per attending progress   notes Alana Jacome. Tested positive at private pharmacy, negative in our   facility. Infectious disease involved, no need for Tamiflu. If possible,   please document in the progress notes and discharge summary if Influenza A   was: The medical record reflects the following:  Risk Factors: tested positive for Influenza A at private pharmacy  Clinical Indicators: Influenza A PCR not detected  Treatment: Tamiflu discontinued    Thank you,  Elba Triplett RN, BSN, CCDS  [email or web address]  Options provided:  -- Influenza A ruled out after study  -- Other - I will add my own diagnosis  -- Disagree - Not applicable / Not valid  -- Disagree - Clinically unable to determine / Unknown  -- Refer to Clinical Documentation Reviewer    PROVIDER RESPONSE TEXT:    Influenza A ruled out after study.     Query created by: Vielka Galarza on 2022 9:19 AM      Electronically signed by:  Kashif Burgos 2022 5:04 PM

## 2022-05-02 NOTE — PROGRESS NOTES
Cardiology - PROGRESS NOTE    Admit Date: 4/26/2022     Reason for follow up: CHF     53-year-old female who presented  to the hospital with worsening shortness of breath, nonproductive cough,  lower extremity edema, and chest pain for the last four days. Hunter Wong says  her symptoms started four days ago and has been steadily worsening.  She  gets short of breath with minimal amount of exertion.  She also noticed  that she had fever.  She went to [address] [place] [address] where she was  diagnosed with influenza A.  Since her symptoms steadily worsened, she  decided to come to the emergency room.  In the emergency room, her  workup was consistent with decompensated heart failure.  She also was  found to have worsening renal insufficiency and she was admitted.  She  complains of burning in her chest which is constant and not worsened  with the exertion.  There is no diaphoresis with this.  She has no  palpitation. Social History:   reports that she has been smoking. She has never used smokeless tobacco. She reports that she does not drink alcohol and does not use drugs. Family History: family history is not on file. Interval History:  Patient seen and examined and notes reviewed   Sitting in chair  Reports she is feeling well  Wt [phone] lb   Worsening renal fx   Denies chest pain or shortness of breath     All other systems reviewed and negative except as above. Diet: ADULT DIET; Regular; 5 carb choices (75 gm/meal);  Low Sodium (2 gm); 1500 ml  Pain is:None  Nausea:None    In: 960 [P.O.:960]  Out: 1750    Wt Readings from Last 3 Encounters:   05/02/22 172 lb 2.9 oz (78.1 kg)   10/17/18 175 lb 0.7 oz (79.4 kg)   01/16/18 160 lb (72.6 kg)           Data:   Scheduled Meds:   Scheduled Meds:   hydrALAZINE  50 mg Oral 2 times per day    isosorbide mononitrate  30 mg Oral Daily    insulin lispro  3 Units SubCUTAneous TID WC    insulin lispro  0-6 Units SubCUTAneous TID     insulin lispro  0-3 Units SubCUTAneous Nightly    metoprolol succinate  75 mg Oral BID    [Held by provider] furosemide  60 mg IntraVENous BID    pregabalin  150 mg Oral BID    insulin glargine  10 Units SubCUTAneous Nightly    atorvastatin  10 mg Oral Daily    amLODIPine  5 mg Oral Nightly    aspirin  81 mg Oral Daily    sodium chloride flush  10 mL IntraVENous 2 times per day    heparin (porcine)  5,000 Units SubCUTAneous 3 times per day     Continuous Infusions:   dextrose      sodium chloride       PRN Meds:.albuterol sulfate HFA, glucose, dextrose, glucagon (rDNA), dextrose, sodium chloride flush, sodium chloride, ondansetron, polyethylene glycol, acetaminophen **OR** acetaminophen, benzocaine-menthol, hydrALAZINE  Continuous Infusions:   dextrose      sodium chloride         Intake/Output Summary (Last 24 hours) at 5/2/2022 0810  Last data filed at 5/2/2022 6261  Gross per 24 hour   Intake 960 ml   Output 1750 ml   Net -790 ml       CBC:   No results for input(s): WBC, HGB, PLT in the last 72 hours. BMP:  Recent Labs     05/02/22  0632      K 3.7   CL 96*   CO2 32   BUN 56*   CREATININE 2.8*   GLUCOSE 188*     ABGs: No results found for: PHART, PO2ART, [id number]  INR: No results for input(s): INR in the last 72 hours. CARDIAC LABS     ENZYMES:No results for input(s): CKMB, CKMBINDEX, TROPONINI in the last 72 hours. Invalid input(s): CKTOTAL;3  FASTING LIPID PANEL:  Lab Results   Component Value Date    HDL 42 10/17/2018    LDLCALC 90 10/17/2018    TRIG 86 10/17/2018     LIVER PROFILE:No results for input(s): AST, ALT, ALB in the last 72 hours. -----------------------------------------------------------------  Telemetry: personally reviewed     RAD:     CXR:  FINDINGS:   Mild cardiomegaly.  Bilateral parahilar increased markings.  No pneumothorax.    No pleural effusion.           Impression   Bilateral parahilar infiltrates could represent edema or atypical pneumonia       EK22   SR with non specific ST and T wave abnormalities     Echo:     Imaging:Global left ventricular function is normal with ejection fraction estimated   from 55 % to 60 %.   Moderate concentric left ventricular hypertrophy.   Grade II diastolic dysfunction with elevated LV filling pressures.   Mild mitral regurgitation is present.   The left atrium is mildly dilated.   The right ventricle is normal in size and function.   Systolic pulmonary artery pressure (SPAP) is estimated at 56 mmHg consistent   with moderate pulmonary hypertension (RA pressure 3 mmHg). Mild tricuspid   regurgitation. Objective:   Vitals: BP (!) 103/51   Pulse 65   Temp 97.6 °F (36.4 °C) (Oral)   Resp 18   Ht 5' (1.524 m)   Wt 172 lb 2.9 oz (78.1 kg)   SpO2 96%   BMI 33.63 kg/m²   General appearance: alert, appears stated age and cooperative, No acute distress   Skin: Skin color, texture, turgor normal. No rashes or ecchymosis. HEENT: Head: Normocephalic, no lesions, without obvious abnormality. Neck: no adenopathy, no carotid bruit, no JVD, supple, symmetrical, trachea midline and thyroid not enlarged, symmetric, no tenderness/mass/nodules  Lungs: clear to auscultation bilaterally, no accessory muscle use, no respiratory distress, on RA  Heart: regular rate and rhythm, S1, S2 normal, no murmur, click, rub or gallop  Abdomen: soft, non-tender; bowel sounds normal; no masses,  no organomegaly  Extremities: +1-+2 BLE , pulses: DP +2/+2, PT +2/+2  Neurologic: Mental status: Alert, oriented, thought content appropriate, no tremors, no gross sensory motor deficit,   Psychiatric: normal insight and affect      Assessment & Plan:    Patient Active Problem List:    Plan:  1.  Heart failure   Acute on chronic diastolic    NYHA III on admission    Wt 172 lb    Compensated on exam    Continue to hold lasix    Given Zaroxolyn     On Toprol XL  75 mg BID     Continue    Hold On spironolactone-stop secondary to renal fx    No ACE/ARB secondary to renal fx    Needs ischemic evaluation (Stress testing) once fluid status improves (likely outpt)    Continue fluid and Na restrictions   2. HTN    Previously refractory to medications    Episode hypotension this AM    Stop Norvasc   Decrease hydralazine with hold parameters    Goal < 130/80   Continue beta-blocker and imdur      3. CHLOE on CKD   Baseline 1.4-1.6   Creatinine 2.8 today     Hold lasix   Stop spironolactone   Renal consult        4.  Electrolyte abnormalities   Stable    K 3.7 today       Ale Dasilva, APRN-CNP   [address]  Cardiology   5/2/2022  8:10 AM

## 2022-05-02 NOTE — PROGRESS NOTES
Hospitalist Progress Note      PCP: Talha Rhodes MD    Date of Admission: 4/26/2022    Chief Complaint: shortness of breath and lower limb edema. Hospital Course:   61 y. o. year-old female with a history of hypertension, hyperlipidemia, type II diabetes mellitus and CHF.  She presents to the emergency room for evaluation following a 4-day history of worsening shortness of breath, a nonproductive cough and increasing lower extremity edema. Admitted with diastolic heart failure exacerbation as well as CHLOE on CKD. 5/2 Cr increased to 2.8 with diuresis, nephrology consulted.      Subjective:   Output 1.5 the last 24 hours, denies any complaints    Medications:  Reviewed    Infusion Medications    dextrose      sodium chloride       Scheduled Medications    hydrALAZINE  50 mg Oral 2 times per day    isosorbide mononitrate  30 mg Oral Daily    insulin lispro  3 Units SubCUTAneous TID WC    insulin lispro  0-6 Units SubCUTAneous TID WC    insulin lispro  0-3 Units SubCUTAneous Nightly    metoprolol succinate  75 mg Oral BID    [Held by provider] furosemide  60 mg IntraVENous BID    pregabalin  150 mg Oral BID    insulin glargine  10 Units SubCUTAneous Nightly    atorvastatin  10 mg Oral Daily    amLODIPine  5 mg Oral Nightly    aspirin  81 mg Oral Daily    sodium chloride flush  10 mL IntraVENous 2 times per day    heparin (porcine)  5,000 Units SubCUTAneous 3 times per day     PRN Meds: albuterol sulfate HFA, glucose, dextrose, glucagon (rDNA), dextrose, sodium chloride flush, sodium chloride, ondansetron, polyethylene glycol, acetaminophen **OR** acetaminophen, benzocaine-menthol, hydrALAZINE      Intake/Output Summary (Last 24 hours) at 5/2/2022 0748  Last data filed at 5/2/2022 3013  Gross per 24 hour   Intake 960 ml   Output 1750 ml   Net -790 ml       Physical Exam Performed:    BP (!) 103/51   Pulse 65   Temp 97.6 °F (36.4 °C) (Oral)   Resp 18   Ht 5' (1.524 m)   Wt 172 lb 2.9 oz (78.1 kg)   SpO2 96%   BMI 33.63 kg/m²     General appearance: No apparent distress, appears stated age and cooperative. On oxygen via nasal cannula  HEENT: Pupils equal, round, and reactive to light. Conjunctivae/corneas clear. Neck: Supple, with full range of motion. No jugular venous distention. Trachea midline. Respiratory: Crackles in lower zones cardiovascular: Regular rate and rhythm with normal S1/S2 without murmurs, rubs or gallops. Abdomen: Soft, non-tender, non-distended with normal bowel sounds. Musculoskeletal: Bilateral lower limb edema ( slightly worsened today)  Skin: Skin color, texture, turgor normal.  No rashes or lesions. Neurologic:  Neurovascularly intact without any focal sensory/motor deficits. Cranial nerves: II-XII intact, grossly non-focal.  Psychiatric: Alert and oriented, thought content appropriate, normal insight  Capillary Refill: Brisk,3 seconds, normal   Peripheral Pulses: +2 palpable, equal bilaterally       Labs:   No results for input(s): WBC, HGB, HCT, PLT in the last 72 hours. Recent Labs     04/30/22  0731 05/01/22  0711 05/02/22  0632    138 137   K 4.4 3.6 3.7    94* 96*   CO2 32 33* 32   BUN 38* 48* 56*   CREATININE 2.0* 2.3* 2.8*   CALCIUM 8.9 9.3 8.8     No results for input(s): AST, ALT, BILIDIR, BILITOT, ALKPHOS in the last 72 hours. No results for input(s): INR in the last 72 hours. No results for input(s): Chloéjess Doe in the last 72 hours.     Urinalysis:      Lab Results   Component Value Date    NITRU Negative 07/11/2019    WBCUA 4 07/11/2019    BACTERIA RARE 07/11/2019    RBCUA 2 07/11/2019    BLOODU Negative 07/11/2019    SPECGRAV 1.020 07/11/2019    GLUCOSEU Negative 07/11/2019       Radiology:  XR CHEST (2 VW)   Final Result   Bilateral parahilar infiltrates could represent edema or atypical pneumonia                 Assessment/Plan:    Active Hospital Problems    Diagnosis     Acute respiratory failure with hypoxia ([address] [J96.01]      Priority: Medium    Influenza A [J10.1]      Priority: Medium    Elevated troponin [R77.8]      Priority: Medium    Acute pulmonary edema (HCC) [J81.0]      Priority: Medium    Acute kidney injury superimposed on CKD ([facility] [address] [N17.9, N18.9]      Priority: Medium    Hypertensive urgency [I16.0]      Priority: Medium    Hyperlipidemia [E78.5]      Priority: Medium    Morbid obesity due to excess calories ([facility] [address] [E66.01]      Priority: Medium    Acute congestive heart failure (HCC) [I50.9]      Priority: Medium    Acute on chronic diastolic (congestive) heart failure (HCC) [I50.33]      Priority: Medium    DMII (diabetes mellitus, type 2) (HCC) [E11.9]     Primary hypertension [I10]      Acute hypoxic respiratory failure. Acute on chronic diastolic heart failure exacerbation. Patient presented to emergency with worsening shortness of breath, echo 55 to 60% with grade 2 diastolic dysfunction, followed by cardiology. Currently on Lasix 60 mg twice daily, weight improving, improved urine output. Creatinine increased to 2.3-->2.8, will hold Lasix  Plan.    -continue holding lasix   -Continue monitor intake and output.  -Weight daily. -Appreciate cardiology input. (Patient is planned for ischemic work-up after fluid status improves)   -Resume home medications. Acute renal failure on CKD . Patient's baseline is around 1.4-1.6. Creatinine slightly above baseline, 2-->2.3--> 2.8 today, hold ACE inhibitors and diuretics. Consult nephrology     Influenza A. Tested positive at private pharmacy, negative in our facility. Infectious disease involved, no need for Tamiflu. Diabetes mellitus. Blood sugar well maintained. Add prandial 3 units and change sliding scale to low  Continue on insulin glargine 10 units as well as sliding scale.    hypertension. Resume home medications. Hyperlipidemia. Continue on statin    DVT Prophylaxis: heparin  Diet: ADULT DIET;  Regular; 5 carb choices (75 gm/meal); Low Sodium (2 gm); 1500 ml  Code Status: DNR-CCA    PT/OT Eval Status: ambulatory. ,     Dispo - pending clinical improvement      Hunter Matute MD

## 2022-05-02 NOTE — PLAN OF CARE
Problem: Discharge Planning  Goal: Discharge to home or other facility with appropriate resources  5/2/2022 0951 by Lotus Lacey RN  Outcome: Progressing  5/1/2022 2237 by Silvia Mcrae RN  Outcome: Progressing     Problem: Pain  Goal: Verbalizes/displays adequate comfort level or baseline comfort level  5/2/2022 0951 by Lotus Lacey RN  Outcome: Progressing  5/1/2022 2237 by Silvia Mcrae RN  Outcome: Progressing     Problem: Safety - Adult  Goal: Free from fall injury  5/2/2022 0951 by Lotus Lacey RN  Outcome: Progressing  5/1/2022 2237 by Silvia Mcrae RN  Outcome: Progressing     Problem: Chronic Conditions and Co-morbidities  Goal: Patient's chronic conditions and co-morbidity symptoms are monitored and maintained or improved  5/2/2022 0951 by Lotus Lacey RN  Outcome: Progressing  5/1/2022 2237 by Silvia Mcrae RN  Outcome: Progressing     Problem: ABCDS Injury Assessment  Goal: Absence of physical injury  5/2/2022 0951 by Lotus Lacey RN  Outcome: Progressing  5/1/2022 2237 by Silvia Mcrae RN  Outcome: Progressing     Problem: Cardiovascular - Adult  Goal: Maintains optimal cardiac output and hemodynamic stability  5/2/2022 0951 by Lotus Lacey RN  Outcome: Progressing  5/1/2022 2237 by Silvia Mcrae RN  Outcome: Progressing     Problem: Musculoskeletal - Adult  Goal: Return mobility to safest level of function  5/2/2022 0951 by Lotus Lacey RN  Outcome: Progressing  5/1/2022 2237 by Silvia Mcrae RN  Outcome: Progressing  Goal: Maintain proper alignment of affected body part  5/2/2022 0951 by Lotus Lacey RN  Outcome: Progressing  5/1/2022 2237 by Silvia Mcrae RN  Outcome: Progressing  Goal: Return ADL status to a safe level of function  5/2/2022 0951 by Lotus Lacey RN  Outcome: Progressing  5/1/2022 2237 by Silvia Mcrae RN  Outcome: Progressing

## 2022-05-02 NOTE — PROGRESS NOTES
Continuing to follow (peripherally at this time). Pt was seen in HF RN consult on 4/28, see note. HF education is on-going at bedside. Today's standing scale wt is noted at 172 lbs. Diuretics are on hold due to increase in Cr. Nephrology has been consulted. It appears per further chart review, pt admits to have been smoking some. On-going education has been provided by bedside RN on this and I added 'Smoking Cessation' education to the AVS. I have a hospital f/u appt in place for 5/5 at 2:00 with Cardiology. I will likely need to push this appt back.  I will continue to follow to assist.

## 2022-05-02 NOTE — PROGRESS NOTES
Nutrition Assessment     Type and Reason for Visit: Initial,RD Nutrition Re-Screen/LOS    Nutrition Recommendations/Plan:   1. Continue on 5 carb, 2 gm Na diet with 1500 ml fluid restriction  2. Monitor meal intake  3. Monitor pertinent labs  4. Continue to reinforce diet ed whenever pt is contacted     Malnutrition Assessment:  Malnutrition Status: No malnutrition    Nutrition Assessment:  Pt admitted with CHF. PMH includes:HTN, HLD, DM@ ARF on CKD. Pt is eating well on 5 carb, 2 gm sodium, 1500 ml diet. Pt was instructed on diet last week and appeared overwhelmed with information. Pt seems to have understanding of diet and has been reviewing materials. Will contnue to monitor. Nutrition Related Findings:   +2 BLE edema, labs reviewed GLU  Wound Type: None    Current Nutrition Therapies:    ADULT DIET; Regular; 5 carb choices (75 gm/meal);  Low Sodium (2 gm); 1500 ml    Anthropometric Measures:  · Height: 5' (152.4 cm)  · Current Body Wt: 172 lb 2.9 oz (78.1 kg)   · BMI: 33.6    Nutrition Diagnosis:   · No nutrition diagnosis at this time related to   as evidenced by        Nutrition Interventions:   Food and/or Nutrient Delivery: Continue Current Diet  Nutrition Education/Counseling: Education completed  Coordination of Nutrition Care: Continue to monitor while inpatient       Goals:     Goals: PO intake 50% or greater       Nutrition Monitoring and Evaluation:   Behavioral-Environmental Outcomes: None Identified  Food/Nutrient Intake Outcomes: Food and Nutrient Intake  Physical Signs/Symptoms Outcomes: Biochemical Data,Fluid Status or Edema,Nutrition Focused Physical Findings,Weight    Discharge Planning:    Continue current diet     Altagracia Patterson, [address],   Contact: [phone]

## 2022-05-03 LAB
ANION GAP SERPL CALCULATED.3IONS-SCNC: 10 MMOL/L (ref 3–16)
BACTERIA: ABNORMAL /HPF
BILIRUBIN URINE: NEGATIVE
BLOOD, URINE: NEGATIVE
BUN BLDV-MCNC: 70 MG/DL (ref 7–20)
CALCIUM SERPL-MCNC: 8.8 MG/DL (ref 8.3–10.6)
CHLORIDE BLD-SCNC: 97 MMOL/L (ref 99–110)
CLARITY: CLEAR
CO2: 31 MMOL/L (ref 21–32)
COLOR: YELLOW
CREAT SERPL-MCNC: 2.5 MG/DL (ref 0.6–1.2)
CREATININE URINE: 68.2 MG/DL (ref 28–259)
EPITHELIAL CELLS, UA: 1 /HPF (ref 0–5)
ESTIMATED AVERAGE GLUCOSE: 214.5 MG/DL
FOLATE: 15.64 NG/ML (ref 4.78–24.2)
GFR AFRICAN AMERICAN: 23
GFR NON-AFRICAN AMERICAN: 19
GLUCOSE BLD-MCNC: 166 MG/DL (ref 70–99)
GLUCOSE BLD-MCNC: 187 MG/DL (ref 70–99)
GLUCOSE BLD-MCNC: 190 MG/DL (ref 70–99)
GLUCOSE BLD-MCNC: 218 MG/DL (ref 70–99)
GLUCOSE BLD-MCNC: 275 MG/DL (ref 70–99)
GLUCOSE URINE: NEGATIVE MG/DL
HBA1C MFR BLD: 9.1 %
HYALINE CASTS: 0 /LPF (ref 0–8)
KETONES, URINE: NEGATIVE MG/DL
LEUKOCYTE ESTERASE, URINE: ABNORMAL
MICROSCOPIC EXAMINATION: YES
NITRITE, URINE: NEGATIVE
PARATHYROID HORMONE INTACT: 126.6 PG/ML (ref 14–72)
PERFORMED ON: ABNORMAL
PH UA: 7.5 (ref 5–8)
POTASSIUM REFLEX MAGNESIUM: 4.1 MMOL/L (ref 3.5–5.1)
PROTEIN PROTEIN: 149 MG/DL
PROTEIN UA: 100 MG/DL
PROTEIN/CREAT RATIO: 2.2 MG/DL
RBC UA: 2 /HPF (ref 0–4)
SODIUM BLD-SCNC: 138 MMOL/L (ref 136–145)
SPECIFIC GRAVITY UA: 1.01 (ref 1–1.03)
URINE TYPE: ABNORMAL
UROBILINOGEN, URINE: 0.2 E.U./DL
VITAMIN B-12: >2000 PG/ML (ref 211–911)
VITAMIN D 25-HYDROXY: 11.4 NG/ML
WBC UA: 23 /HPF (ref 0–5)

## 2022-05-03 PROCEDURE — 6370000000 HC RX 637 (ALT 250 FOR IP): Performed by: NURSE PRACTITIONER

## 2022-05-03 PROCEDURE — 82306 VITAMIN D 25 HYDROXY: CPT

## 2022-05-03 PROCEDURE — 6370000000 HC RX 637 (ALT 250 FOR IP): Performed by: INTERNAL MEDICINE

## 2022-05-03 PROCEDURE — 82746 ASSAY OF FOLIC ACID SERUM: CPT

## 2022-05-03 PROCEDURE — 6360000002 HC RX W HCPCS: Performed by: INTERNAL MEDICINE

## 2022-05-03 PROCEDURE — 1200000000 HC SEMI PRIVATE

## 2022-05-03 PROCEDURE — 94760 N-INVAS EAR/PLS OXIMETRY 1: CPT

## 2022-05-03 PROCEDURE — 2580000003 HC RX 258: Performed by: INTERNAL MEDICINE

## 2022-05-03 PROCEDURE — 83036 HEMOGLOBIN GLYCOSYLATED A1C: CPT

## 2022-05-03 PROCEDURE — 82570 ASSAY OF URINE CREATININE: CPT

## 2022-05-03 PROCEDURE — 82607 VITAMIN B-12: CPT

## 2022-05-03 PROCEDURE — 84156 ASSAY OF PROTEIN URINE: CPT

## 2022-05-03 PROCEDURE — 80048 BASIC METABOLIC PNL TOTAL CA: CPT

## 2022-05-03 PROCEDURE — 2580000003 HC RX 258: Performed by: NURSE PRACTITIONER

## 2022-05-03 PROCEDURE — 6370000000 HC RX 637 (ALT 250 FOR IP): Performed by: STUDENT IN AN ORGANIZED HEALTH CARE EDUCATION/TRAINING PROGRAM

## 2022-05-03 PROCEDURE — 36415 COLL VENOUS BLD VENIPUNCTURE: CPT

## 2022-05-03 PROCEDURE — 83970 ASSAY OF PARATHORMONE: CPT

## 2022-05-03 PROCEDURE — 99233 SBSQ HOSP IP/OBS HIGH 50: CPT | Performed by: NURSE PRACTITIONER

## 2022-05-03 RX ORDER — SODIUM CHLORIDE 0.9 % (FLUSH) 0.9 %
5-40 SYRINGE (ML) INJECTION PRN
Status: DISCONTINUED | OUTPATIENT
Start: 2022-05-03 | End: 2022-05-06 | Stop reason: HOSPADM

## 2022-05-03 RX ORDER — SODIUM CHLORIDE 9 MG/ML
INJECTION, SOLUTION INTRAVENOUS PRN
Status: DISCONTINUED | OUTPATIENT
Start: 2022-05-03 | End: 2022-05-06 | Stop reason: HOSPADM

## 2022-05-03 RX ORDER — SODIUM CHLORIDE 0.9 % (FLUSH) 0.9 %
5-40 SYRINGE (ML) INJECTION EVERY 12 HOURS SCHEDULED
Status: DISCONTINUED | OUTPATIENT
Start: 2022-05-03 | End: 2022-05-06 | Stop reason: HOSPADM

## 2022-05-03 RX ADMIN — INSULIN LISPRO 1 UNITS: 100 INJECTION, SOLUTION INTRAVENOUS; SUBCUTANEOUS at 20:31

## 2022-05-03 RX ADMIN — HYDRALAZINE HYDROCHLORIDE 25 MG: 50 TABLET, FILM COATED ORAL at 09:09

## 2022-05-03 RX ADMIN — SODIUM CHLORIDE, PRESERVATIVE FREE 10 ML: 5 INJECTION INTRAVENOUS at 23:01

## 2022-05-03 RX ADMIN — SODIUM CHLORIDE, PRESERVATIVE FREE 10 ML: 5 INJECTION INTRAVENOUS at 09:10

## 2022-05-03 RX ADMIN — FUROSEMIDE 60 MG: 40 TABLET ORAL at 16:50

## 2022-05-03 RX ADMIN — ASPIRIN 81 MG: 81 TABLET, COATED ORAL at 09:09

## 2022-05-03 RX ADMIN — PREGABALIN 150 MG: 75 CAPSULE ORAL at 09:09

## 2022-05-03 RX ADMIN — INSULIN LISPRO 1 UNITS: 100 INJECTION, SOLUTION INTRAVENOUS; SUBCUTANEOUS at 09:11

## 2022-05-03 RX ADMIN — HEPARIN SODIUM 5000 UNITS: 5000 INJECTION INTRAVENOUS; SUBCUTANEOUS at 13:39

## 2022-05-03 RX ADMIN — HEPARIN SODIUM 5000 UNITS: 5000 INJECTION INTRAVENOUS; SUBCUTANEOUS at 05:45

## 2022-05-03 RX ADMIN — ISOSORBIDE MONONITRATE 30 MG: 30 TABLET, EXTENDED RELEASE ORAL at 09:10

## 2022-05-03 RX ADMIN — METOPROLOL SUCCINATE 75 MG: 50 TABLET, EXTENDED RELEASE ORAL at 09:09

## 2022-05-03 RX ADMIN — HYDRALAZINE HYDROCHLORIDE 25 MG: 50 TABLET, FILM COATED ORAL at 20:29

## 2022-05-03 RX ADMIN — INSULIN LISPRO 3 UNITS: 100 INJECTION, SOLUTION INTRAVENOUS; SUBCUTANEOUS at 09:11

## 2022-05-03 RX ADMIN — ATORVASTATIN CALCIUM 10 MG: 10 TABLET, FILM COATED ORAL at 09:09

## 2022-05-03 RX ADMIN — INSULIN LISPRO 3 UNITS: 100 INJECTION, SOLUTION INTRAVENOUS; SUBCUTANEOUS at 16:51

## 2022-05-03 RX ADMIN — PREGABALIN 150 MG: 75 CAPSULE ORAL at 20:29

## 2022-05-03 RX ADMIN — AMLODIPINE BESYLATE 5 MG: 5 TABLET ORAL at 09:09

## 2022-05-03 RX ADMIN — INSULIN GLARGINE 10 UNITS: 100 INJECTION, SOLUTION SUBCUTANEOUS at 20:31

## 2022-05-03 RX ADMIN — SODIUM CHLORIDE, PRESERVATIVE FREE 10 ML: 5 INJECTION INTRAVENOUS at 20:29

## 2022-05-03 RX ADMIN — METOPROLOL SUCCINATE 75 MG: 50 TABLET, EXTENDED RELEASE ORAL at 20:29

## 2022-05-03 RX ADMIN — INSULIN LISPRO 3 UNITS: 100 INJECTION, SOLUTION INTRAVENOUS; SUBCUTANEOUS at 12:21

## 2022-05-03 RX ADMIN — INSULIN LISPRO 1 UNITS: 100 INJECTION, SOLUTION INTRAVENOUS; SUBCUTANEOUS at 16:51

## 2022-05-03 ASSESSMENT — PAIN SCALES - GENERAL: PAINLEVEL_OUTOF10: 0

## 2022-05-03 NOTE — PLAN OF CARE
Problem: Discharge Planning  Goal: Discharge to home or other facility with appropriate resources  5/3/2022 1154 by Wilfred Nur RN  Outcome: Progressing  Flowsheets (Taken 5/2/2022 0949 by Ron Ervin RN)  Discharge to home or other facility with appropriate resources: Identify barriers to discharge with patient and caregiver     Problem: Safety - Adult  Goal: Free from fall injury  5/3/2022 1154 by Wilfred Nur RN  Outcome: Progressing  Flowsheets (Taken 5/3/2022 1153)  Free From Fall Injury: Instruct family/caregiver on patient safety     Problem: Cardiovascular - Adult  Goal: Maintains optimal cardiac output and hemodynamic stability  5/3/2022 1154 by Wilfred Nur RN  Outcome: Progressing  Flowsheets (Taken 5/2/2022 0949 by Ron Ervin RN)  Maintains optimal cardiac output and hemodynamic stability: Monitor urine output and notify Licensed Independent Practitioner for values outside of normal range     Problem: Musculoskeletal - Adult  Goal: Return mobility to safest level of function  Outcome: Progressing  Flowsheets (Taken 5/2/2022 0949 by Ron Ervin RN)  Return Mobility to Safest Level of Function: Assess patient stability and activity tolerance for standing, transferring and ambulating with or without assistive devices

## 2022-05-03 NOTE — PROGRESS NOTES
[facility], [place]   Daily Progress Note      Admit Date:  4/26/2022    Reason for follow up visit: CHF    CC: \"I feel good today. \"    60 y/o female with PMH significant for HTN, HLP, diabetes mellitus, CKD and CHF admitted to [facility] after presenting with worsening SOB, nonproductive cough and increasing LE edema. She has been diuresed and developed and nephrology following. Interval History:  Pt. seen and examined; records reviewed  BP remains elevated at times (labile)  Denies chest pain  SOB and edema much improved  Wt 176#; Net diuresis -5.2L since admit  BUN/Cr 70/2.5    + smoker    Subjective:  Pt with no acute overnight cardiac events. Denies chest pain, SOB, cough, palpitations or dizziness    Review of Systems:   · Constitutional: no unanticipated weight loss. There's been no change in energy level, sleep pattern, or activity level. No fevers, chills. · Eyes: No visual changes or diplopia. No scleral icterus. · ENT: No Headaches, hearing loss or vertigo. No mouth sores or sore throat. · Cardiovascular: as reviewed in HPI  · Respiratory: No cough or wheezing, no sputum production. No hematemesis. · Gastrointestinal: No abdominal pain, appetite loss, blood in stools. No change in bowel or bladder habits. · Genitourinary: No dysuria, trouble voiding, or hematuria. · Musculoskeletal:  No gait disturbance, no joint complaints. · Integumentary: No rash or pruritis. · Neurological: No headache, diplopia, change in muscle strength, numbness or tingling. · Psychiatric: No anxiety or depression. · Endocrine: No temperature intolerance. No excessive thirst, fluid intake, or urination. No tremor. · Hematologic/Lymphatic: No abnormal bruising or bleeding, blood clots or swollen lymph nodes. · Allergic/Immunologic: No nasal congestion or hives.     Objective:   BP (!) 164/72   Pulse 68   Temp 97.3 °F (36.3 °C) (Oral)   Resp 17   Ht 5' (1.524 m)   Wt 176 lb 9.4 oz (80.1 kg)   SpO2 96%   BMI 34.49 kg/m²     Intake/Output Summary (Last 24 hours) at 5/3/2022 1341  Last data filed at 5/3/2022 1031  Gross per 24 hour   Intake 1020 ml   Output 1000 ml   Net 20 ml     Wt Readings from Last 3 Encounters:   05/03/22 176 lb 9.4 oz (80.1 kg)   10/17/18 175 lb 0.7 oz (79.4 kg)   01/16/18 160 lb (72.6 kg)       Physical Exam:  General: In no acute distress. Awake, alert, and oriented X4. Up in room  Skin:  Warm and dry. No new appearing rashes or lesions. Neck:  Supple. No JVD or carotid bruit appreciated. Chest: Lungs clear to auscultation. No wheezes/rhonchi/rales  Cardiovascular:  RRR. Normal S1 and S2. No murmur/gallop or rub  Abdomen:  soft, nontender, nondistended, +bowel sounds. No hepatomegaly  Extremities:  1+ bilateral ankle and very lower pretibial edema. No clubbing or cyanosis. 2+ bilateral radial/DP/PT pulses. Cap refill brisk  Neurologic: no focal deficits    Medications:    furosemide  60 mg Oral BID    hydrALAZINE  25 mg Oral 2 times per day    amLODIPine  5 mg Oral Daily    isosorbide mononitrate  30 mg Oral Daily    insulin lispro  3 Units SubCUTAneous TID WC    insulin lispro  0-6 Units SubCUTAneous TID WC    insulin lispro  0-3 Units SubCUTAneous Nightly    metoprolol succinate  75 mg Oral BID    [Held by provider] furosemide  60 mg IntraVENous BID    pregabalin  150 mg Oral BID    insulin glargine  10 Units SubCUTAneous Nightly    atorvastatin  10 mg Oral Daily    aspirin  81 mg Oral Daily    sodium chloride flush  10 mL IntraVENous 2 times per day    heparin (porcine)  5,000 Units SubCUTAneous 3 times per day      dextrose      sodium chloride       albuterol sulfate HFA, glucose, dextrose, glucagon (rDNA), dextrose, sodium chloride flush, sodium chloride, ondansetron, polyethylene glycol, acetaminophen **OR** acetaminophen, benzocaine-menthol, hydrALAZINE    Lab Data:  CBC: No results for input(s): WBC, HGB, PLT in the last 72 hours.   BMP:    Recent Labs 05/01/22  0711 05/02/22  0632 05/03/22  0620    137 138   K 3.6 3.7 4.1   CO2 33* 32 31   BUN 48* 56* 70*   CREATININE 2.3* 2.8* 2.5*     Results for Michell Hernandez (MRN [id number]) as of 5/3/2022 13:44   Ref. Range 4/26/2022 20:14 4/27/2022 01:12 4/27/2022 03:33 4/29/2022 17:37   Pro-BNP Latest Ref Range: 0 - 124 pg/mL 4,266 (H)   3,434 (H)   Troponin Latest Ref Range: <0.01 ng/mL 0.03 (H) 0.02 (H) 0.03 (H)      Results for Michell Hernandez (MRN [id number]) as of 5/3/2022 13:44   Ref. Range 4/26/2022 20:14   ALT Latest Ref Range: 10 - 40 U/L 77 (H)   AST Latest Ref Range: 15 - 37 U/L 47 (H)     4/27/2022 Echo:   Global left ventricular function is normal with ejection fraction estimated   from 55 % to 60 %. Moderate concentric left ventricular hypertrophy. Grade II diastolic dysfunction with elevated LV filling pressures. Mild mitral regurgitation is present. The left atrium is mildly dilated. The right ventricle is normal in size and function. Systolic pulmonary artery pressure (SPAP) is estimated at 56 mmHg consistent   with moderate pulmonary hypertension (RA pressure 3 mmHg). Mild tricuspid   regurgitation. 10/16/2018 Echo:   Normal left ventricle size, wall thickness, and systolic function with an   estimated ejection fraction of 55-60%. No regional wall motion abnormalities   are seen. Normal right ventricular size and function. Trivial mitral and tricuspid regurgitation. 10/16/2018 Lexiscan-Myoview:  Normal myocardial perfusion at stress.    Normal LV size and systolic function.    Uncontrolled hypertension.    Overall findings represent a low risk study. ECG  Sinus rhythm with nonspecific TW abnormalities      Assessment/Plan:    1.  Acute on chronic diastolic HF  -NYHA class III; LVEF 55-60%; grade; grade II diastolic dysfunction   -appears to be compensated on exam  -continue Toprol, hydralazine and imdur  -not on aldactone/ACE/ARB d/t CHLOE  -low sodium diet and fluid restriction  -eventual ischemic evaluation as outpatient (stress testing)  -sleep evaluation as outpatient    2. Pulmonary Hypertension  -elevated PASP on echo  -suspect multifactorial and d/t smoking +/- sleep apnea  -given her CHF and now CHLOE (will hold diuretics): recommend R heart cath to determine filling pressures since diuresed    3. Primary Hypertension  -BP labile and now with -160's  -goal BP < 130/80  -she has had multiple med adjustments  -continue amlodipine, hydralazine, imdur and toprol  -would consider increasing hydralazine to TID (defer to nephrology)    4. CHLOE on CKD  -baseline Cr 1.4-1.6  -Cr 2.5 today  -diuretics resumed by nephro    5. Elevated LFT's  -? Secondary to CHF    Discussed with Dr. Derrick Lu: I suspect she is dry. After discussion, will proceed with R heart cath. Discussed procedure, indications and reviewed risks/complications and she verbalizes understanding and agrees to proceed. Will arrange with Dr. Derrick Lu tomorrow.     Electronically signed by SARA Sierra CNP on 5/3/2022 at 1:41 PM

## 2022-05-03 NOTE — PROGRESS NOTES
Admit Date: 4/26/2022    REASON FOR ADMISSION:   The patient with congestive heart failure. REASON FOR follow up:    patient with acute-on-chronic kidney disease. Interval history  Feels well  Some edema  discusse needs good control of BP/diabetes  Low salt diet   Has stage 4 CKD  Unfortunately will eventually need renal replacement   Creat 2.8-->2.5  Urine 1400  /70  Wt 176    Proteinuria 2.2 G     A1c 9.1. B12 >2000   folate. 15      PLAN:    resume diuretics  Lasix 60 mg bid    amlodipine. add back olmesartan in the next day or two. Can be discharged in next 24 hours if responds to PO lasix   Will closely fu as out pt     CHLOE/CKD 3b.   due to congestive heart failure  diastolic dysfunction, was given diuretics with her weight down 172 from 184. high proBNP 4268. Currently off of diuretics     Admission creatinine 2   went up to 2.8. Creatinine 1.6 on 03/20/2022   1.42 in 2021   1.5 in 2020   1.4 in 2019   0.9 in 2018. Urine in 2019 has 100 mg protein, 2 rbc. Repeat urine requested. SG 1.012,protein 100,RBC 2, WBC 23   No renal imaging. Echo grade 2 diastolic dysfunction with high filling   pressure. CKD, most likely underlying diabetic nephropathy, has protein in the urine even in 2019. Check protein to creatinine ratio. No retinopathy at least in 2021 exam.    Does have neuropathy. Diabetes at least 2014 likely longer    A1c was 12.4 in 2014  Positive neuropathy as per the patient. No retinopathy. Diabetes   at least since 2014   A1c was 12.4 in 2014  No retinopathy   Does complain of some neuropathy. Hypertension. Blood pressure 153/74. Add amlodipine. We will get her off hydralazine which can cause lupus or even ANCA vasculitis. HFpEF   normal ejection fraction   grade 2 diastolic dysfunction, increased filling  pressure, left atrium mildly enlarged. Pulmonary artery pressure was 56.     Abnormal LFTs   with ALT of 77   could be due to congestive heart failure  in the past.    Hepatitis C in 2019 was negative. hospitals  HISTORY OF PRESENTING COMPLAINT:  I was asked by Dr. Coni Castanon to render  opinion on this 68-year-old single  female, no smoking,  occasional alcohol, has two children in good health, no family history  of kidney disease, who presents with increasing lower extremity edema  and shortness of breath and was found to be in congestive heart failure  on clinical exam, imaging studies and lab work for which she was given  diuretics and she has worsening renal function.     REVIEW OF SYSTEMS:  GENERAL:  No fever or chills. CARDIAC:  No chest pain. PULMONARY:  Positive shortness of breath. GENITOURINARY:  No dysuria or hematuria. VASCULAR:  No claudication. ENDOCRINE:  Diabetes, longstanding, uncontrolled. GASTROINTESTINAL:  No nausea, vomiting. Review of the rest of the systems is negative.       Objective:     Patient Vitals for the past 8 hrs:   BP Temp Temp src Pulse Resp SpO2 Weight   05/03/22 1000 (!) 164/72 97.3 °F (36.3 °C) Oral 68 17 96 % --   05/03/22 0908 (!) 146/70 -- -- 65 -- -- --   05/03/22 0830 -- -- -- -- -- 98 % --   05/03/22 0642 -- -- -- -- -- -- 176 lb 9.4 oz (80.1 kg)   05/03/22 0630 123/74 98.4 °F (36.9 °C) Oral 63 16 98 % 176 lb 9.4 oz (80.1 kg)       I/O last 3 completed shifts: In: 1060 [P.O.:1060]  Out: 1400 [Urine:1400]    GENERAL:  On examination, awake, alert, no distress. HEENT:  Head normocephalic, atraumatic. Conjunctivae:  No icterus. CARDIAC:  She has normal heart sounds. No rub, murmur, or gallop. NECK:  JVD was not raised. Carotids are normal.  Thyroid was not  enlarged. CHEST:  Clear to auscultation. ABDOMEN:  Soft, nontender. Liver, spleen, kidneys, bladder not  palpable. Bowel sounds present. EXTREMITIES:  Bilateral lower extremities, she still has 1+ edema  bilaterally. NEUROLOGICAL:  Awake and alert.   No focal neurological signs.     .l  Lab Results   Component Value Date    CREATININE 2.5 (H) 05/03/2022    BUN 70 (H) 05/03/2022     05/03/2022    K 4.1 05/03/2022    CL 97 (L) 05/03/2022    CO2 31 05/03/2022     Lab Results   Component Value Date    WBC 5.9 04/28/2022    HGB 10.6 (L) 04/28/2022    HCT 31.3 (L) 04/28/2022    MCV 89.5 04/28/2022     04/28/2022            AGLUCOSE)Magnesium:    Lab Results   Component Value Date    MG 1.50 04/29/2022     Phosphorus:  No results found for: PHOS    Uric Acid:  No components found for: URIC    Principal Problem:    Acute on chronic diastolic (congestive) heart failure (HCC)  Active Problems:    Acute respiratory failure with hypoxia (HCC)    Influenza A    Elevated troponin    Acute pulmonary edema (HCC)    Acute kidney injury superimposed on CKD ([facility] [address]    Hypertensive urgency    Hyperlipidemia    Morbid obesity due to excess calories (HCC)    Acute congestive heart failure (HCC)    DMII (diabetes mellitus, type 2) ([facility] [address]    Primary hypertension  Resolved Problems:    * No resolved hospital problems.  *

## 2022-05-03 NOTE — PROGRESS NOTES
Hospitalist Progress Note      PCP: Blair Randle MD    Date of Admission: 4/26/2022    Chief Complaint: shortness of breath and lower limb edema. Hospital Course:   61 y. o. year-old female with a history of hypertension, hyperlipidemia, type II diabetes mellitus and CHF.  She presents to the emergency room for evaluation following a 4-day history of worsening shortness of breath, a nonproductive cough and increasing lower extremity edema. Admitted with diastolic heart failure exacerbation as well as CHLOE on CKD. 5/2 Cr increased to 2.8 with diuresis, nephrology consulted. Subjective:   Continues to deny any complaints, vitally stable. Output is 1.4 L, weight 176 lbs.      Medications:  Reviewed    Infusion Medications    dextrose      sodium chloride       Scheduled Medications    furosemide  60 mg Oral BID    hydrALAZINE  25 mg Oral 2 times per day    amLODIPine  5 mg Oral Daily    isosorbide mononitrate  30 mg Oral Daily    insulin lispro  3 Units SubCUTAneous TID WC    insulin lispro  0-6 Units SubCUTAneous TID WC    insulin lispro  0-3 Units SubCUTAneous Nightly    metoprolol succinate  75 mg Oral BID    [Held by provider] furosemide  60 mg IntraVENous BID    pregabalin  150 mg Oral BID    insulin glargine  10 Units SubCUTAneous Nightly    atorvastatin  10 mg Oral Daily    aspirin  81 mg Oral Daily    sodium chloride flush  10 mL IntraVENous 2 times per day    heparin (porcine)  5,000 Units SubCUTAneous 3 times per day     PRN Meds: albuterol sulfate HFA, glucose, dextrose, glucagon (rDNA), dextrose, sodium chloride flush, sodium chloride, ondansetron, polyethylene glycol, acetaminophen **OR** acetaminophen, benzocaine-menthol, hydrALAZINE      Intake/Output Summary (Last 24 hours) at 5/3/2022 1350  Last data filed at 5/3/2022 1031  Gross per 24 hour   Intake 1020 ml   Output 1000 ml   Net 20 ml       Physical Exam Performed:    BP (!) 164/72   Pulse 68   Temp 97.3 °F (36.3 °C) (Oral)   Resp 17   Ht 5' (1.524 m)   Wt 176 lb 9.4 oz (80.1 kg)   SpO2 96%   BMI 34.49 kg/m²     General appearance: No apparent distress, appears stated age and cooperative. On oxygen via nasal cannula  HEENT: Pupils equal, round, and reactive to light. Conjunctivae/corneas clear. Neck: Supple, with full range of motion. No jugular venous distention. Trachea midline. Respiratory: Crackles in lower zones cardiovascular: Regular rate and rhythm with normal S1/S2 without murmurs, rubs or gallops. Abdomen: Soft, non-tender, non-distended with normal bowel sounds. Musculoskeletal: Bilateral lower limb edema ( slightly worsened today)  Skin: Skin color, texture, turgor normal.  No rashes or lesions. Neurologic:  Neurovascularly intact without any focal sensory/motor deficits. Cranial nerves: II-XII intact, grossly non-focal.  Psychiatric: Alert and oriented, thought content appropriate, normal insight  Capillary Refill: Brisk,3 seconds, normal   Peripheral Pulses: +2 palpable, equal bilaterally       Labs:   No results for input(s): WBC, HGB, HCT, PLT in the last 72 hours. Recent Labs     05/01/22  0711 05/02/22  0632 05/03/22  0620    137 138   K 3.6 3.7 4.1   CL 94* 96* 97*   CO2 33* 32 31   BUN 48* 56* 70*   CREATININE 2.3* 2.8* 2.5*   CALCIUM 9.3 8.8 8.8     No results for input(s): AST, ALT, BILIDIR, BILITOT, ALKPHOS in the last 72 hours. No results for input(s): INR in the last 72 hours. No results for input(s): Odette Basques in the last 72 hours.     Urinalysis:      Lab Results   Component Value Date    NITRU Negative 05/02/2022    WBCUA 23 05/02/2022    BACTERIA None Seen 05/02/2022    RBCUA 2 05/02/2022    BLOODU Negative 05/02/2022    SPECGRAV 1.012 05/02/2022    GLUCOSEU Negative 05/02/2022       Radiology:  XR CHEST (2 VW)   Final Result   Bilateral parahilar infiltrates could represent edema or atypical pneumonia                 Assessment/Plan:    Active Hospital Problems Diagnosis     Acute respiratory failure with hypoxia (HCC) [J96.01]      Priority: Medium    Influenza A [J10.1]      Priority: Medium    Elevated troponin [R77.8]      Priority: Medium    Acute pulmonary edema (HCC) [J81.0]      Priority: Medium    Acute kidney injury superimposed on CKD ([facility] [address] [N17.9, N18.9]      Priority: Medium    Hypertensive urgency [I16.0]      Priority: Medium    Hyperlipidemia [E78.5]      Priority: Medium    Morbid obesity due to excess calories ([facility] [address] [E66.01]      Priority: Medium    Acute congestive heart failure (HCC) [I50.9]      Priority: Medium    Acute on chronic diastolic (congestive) heart failure (HCC) [I50.33]      Priority: Medium    DMII (diabetes mellitus, type 2) (HCC) [E11.9]     Primary hypertension [I10]      Acute hypoxic respiratory failure. Acute on chronic diastolic heart failure exacerbation. Patient presented to emergency with worsening shortness of breath, echo 55 to 60% with grade 2 diastolic dysfunction, followed by cardiology. Currently on Lasix 60 mg twice daily, weight improving, improved urine output. Creatinine increased to 2.3-->2.8--> 2.5. Plan.    -Agree with p.o. Lasix 60 mg twice daily  -Continue monitor intake and output.  -Weight daily. -Appreciate cardiology input. (Patient planned for right heart cath tomorrow)   -Resume home medications. Acute renal failure on CKD . Patient's baseline is around 1.4-1.6. Creatinine slightly above baseline, 2-->2.3--> 2.8-->2.5 today. Continue to hold ACE inhibitors    Appreciate nephrology input    Influenza A. Tested positive at private pharmacy, negative in our facility. Infectious disease involved, no need for Tamiflu. Diabetes mellitus. Blood sugar well maintained. Add prandial 3 units and change sliding scale to low  Continue on insulin glargine 10 units as well as sliding scale.    hypertension. Resume home medications. Hyperlipidemia.   Continue on statin    DVT Prophylaxis: heparin  Diet: ADULT DIET; Regular; 5 carb choices (75 gm/meal); Low Sodium (2 gm); 1500 ml  Code Status: DNR-CCA    PT/OT Eval Status: ambulatory. ,     Dispo - pending clinical improvement (planned for right heart cath tomorrow)       Briana Byrd MD

## 2022-05-03 NOTE — CONSULTS
[address] [place] [address] 16                                  CONSULTATION    PATIENT NAME: Sumit Christiansen                    :        1955  MED REC NO:   [id number]                          ROOM:       [id number]  ACCOUNT NO:   [de-identified]                           ADMIT DATE: 2022  PROVIDER:     Ed Toscano MD    RENAL CONSULTATION    CONSULT DATE:  2022    ADMITTING PROVIDER:  Dr. Risa Coburn:   The patient with congestive heart failure. REASON FOR CONSULTATION:    patient with acute-on-chronic kidney disease. PLAN:    Check A1c. Check B12 and folate. Check UA. Check protein to creatinine ratio. We will resume diuretics   Add amlodipine. add back olmesartan in the next day or two. CHLOE/CKD 3b.   due to congestive heart failure  diastolic dysfunction, was given diuretics with her weight down 172 from 184. high proBNP 4268. Currently off of diuretics     Admission creatinine 2   went up to 2.8. Creatinine 1.6 on 2022   1.42 in    1.5 in 2020   1.4 in 2019   0.9 in 2018. Urine in 2019 has 100 mg protein, 2 rbc. Repeat urine requested. SG 1.012,protein 100,RBC 2, WBC 23   No renal imaging. Echo grade 2 diastolic dysfunction with high filling   pressure. CKD, most likely underlying diabetic nephropathy, has protein in the urine even in 2019. Check protein to creatinine ratio. No retinopathy at least in  exam.    Does have neuropathy. Diabetes at least  likely longer    A1c was 12.4 in   Positive neuropathy as per the patient. No retinopathy. Diabetes   at least since    A1c was 12.4 in 2014  No retinopathy   Does complain of some neuropathy. Hypertension. Blood pressure 153/74. Add amlodipine. We will get her off hydralazine which can cause lupus or even ANCA vasculitis.     HFpEF   normal ejection fraction   grade 2 diastolic dysfunction, increased filling  pressure, left atrium mildly enlarged. Pulmonary artery pressure was 56. Abnormal LFTs   with ALT of 77   could be due to congestive heart failure  in the past.    Hepatitis C in 2019 was negative. HISTORY OF PRESENTING COMPLAINT:  I was asked by Dr. Leo Ramos to render  opinion on this 61-year-old single  female, no smoking,  occasional alcohol, has two children in good health, no family history  of kidney disease, who presents with increasing lower extremity edema  and shortness of breath and was found to be in congestive heart failure  on clinical exam, imaging studies and lab work for which she was given  diuretics and she has worsening renal function. REVIEW OF SYSTEMS:  GENERAL:  No fever or chills. CARDIAC:  No chest pain. PULMONARY:  Positive shortness of breath. GENITOURINARY:  No dysuria or hematuria. VASCULAR:  No claudication. ENDOCRINE:  Diabetes, longstanding, uncontrolled. GASTROINTESTINAL:  No nausea, vomiting. Review of the rest of the systems is negative. PAST MEDICAL HISTORY:  Previous ankle fracture, left; hypertension,  diabetes, previous hysterectomy. ADMISSION MEDICATIONS:  Include aspirin, metoprolol extended-release,  potassium, vitamin D3, Lipitor, olmesartan, hydrochlorothiazide,  amlodipine, Lyrica, B6, B12, insulin. PERSONAL AND SOCIAL HISTORY:  She told me no smoking but in the history,  it is certain that she has been smoking. Occasional alcohol. FAMILY HISTORY:  No family history of kidney disease. PHYSICAL EXAMINATION:  GENERAL:  On examination, awake, alert, no distress. VITAL SIGNS:  Blood pressure 153/74. HEENT:  Head normocephalic, atraumatic. Conjunctivae:  No icterus. CARDIAC:  She has normal heart sounds. No rub, murmur, or gallop. NECK:  JVD was not raised. Carotids are normal.  Thyroid was not  enlarged. CHEST:  Clear to auscultation. ABDOMEN:  Soft, nontender.   Liver, spleen, kidneys, bladder not  palpable. Bowel sounds present. EXTREMITIES:  Bilateral lower extremities, she still has 1+ edema  bilaterally. NEUROLOGICAL:  Awake and alert. No focal neurological signs. LABORATORY DATA:  Sodium 137, potassium 3.7, chloride 96, CO2 32, BUN  56, creatinine 2.8. ProBNP 4268. Her white cell count 5.9, hemoglobin  10.6, platelet count is normal, no eosinophilia. Ferritin 185. I thank Dr. Katt Elizalde for letting me be involved in the care of this patient.         William Pantoja MD    D: 05/02/2022 18:18:01       T: 05/02/2022 20:31:37     [email or web address]  Job#: [id number]     Doc#: [id number]    CC:

## 2022-05-03 NOTE — PROGRESS NOTES
Hospitalist Progress Note      PCP: Rhianna Jett MD    Date of Admission: 4/26/2022    Chief Complaint: shortness of breath and lower limb edema. Hospital Course:   61 y. o. year-old female with a history of hypertension, hyperlipidemia, type II diabetes mellitus and CHF.  She presents to the emergency room for evaluation following a 4-day history of worsening shortness of breath, a nonproductive cough and increasing lower extremity edema. Admitted with diastolic heart failure exacerbation as well as CHLOE on CKD. 5/2 Cr increased to 2.8 with diuresis, nephrology consulted.      Subjective:   Output 1.5 the last 24 hours, denies any complaints    Medications:  Reviewed    Infusion Medications    dextrose      sodium chloride       Scheduled Medications    hydrALAZINE  25 mg Oral 2 times per day    amLODIPine  5 mg Oral Daily    isosorbide mononitrate  30 mg Oral Daily    insulin lispro  3 Units SubCUTAneous TID WC    insulin lispro  0-6 Units SubCUTAneous TID WC    insulin lispro  0-3 Units SubCUTAneous Nightly    metoprolol succinate  75 mg Oral BID    [Held by provider] furosemide  60 mg IntraVENous BID    pregabalin  150 mg Oral BID    insulin glargine  10 Units SubCUTAneous Nightly    atorvastatin  10 mg Oral Daily    aspirin  81 mg Oral Daily    sodium chloride flush  10 mL IntraVENous 2 times per day    heparin (porcine)  5,000 Units SubCUTAneous 3 times per day     PRN Meds: albuterol sulfate HFA, glucose, dextrose, glucagon (rDNA), dextrose, sodium chloride flush, sodium chloride, ondansetron, polyethylene glycol, acetaminophen **OR** acetaminophen, benzocaine-menthol, hydrALAZINE      Intake/Output Summary (Last 24 hours) at 5/3/2022 0821  Last data filed at 5/2/2022 2353  Gross per 24 hour   Intake 780 ml   Output 1000 ml   Net -220 ml       Physical Exam Performed:    /74   Pulse 63   Temp 98.4 °F (36.9 °C) (Oral)   Resp 16   Ht 5' (1.524 m)   Wt 176 lb 9.4 oz (80.1 kg)   SpO2 98%   BMI 34.49 kg/m²     General appearance: No apparent distress, appears stated age and cooperative. On oxygen via nasal cannula  HEENT: Pupils equal, round, and reactive to light. Conjunctivae/corneas clear. Neck: Supple, with full range of motion. No jugular venous distention. Trachea midline. Respiratory: Crackles in lower zones cardiovascular: Regular rate and rhythm with normal S1/S2 without murmurs, rubs or gallops. Abdomen: Soft, non-tender, non-distended with normal bowel sounds. Musculoskeletal: Bilateral lower limb edema ( slightly worsened today)  Skin: Skin color, texture, turgor normal.  No rashes or lesions. Neurologic:  Neurovascularly intact without any focal sensory/motor deficits. Cranial nerves: II-XII intact, grossly non-focal.  Psychiatric: Alert and oriented, thought content appropriate, normal insight  Capillary Refill: Brisk,3 seconds, normal   Peripheral Pulses: +2 palpable, equal bilaterally       Labs:   No results for input(s): WBC, HGB, HCT, PLT in the last 72 hours. Recent Labs     05/01/22  0711 05/02/22  0632 05/03/22  0620    137 138   K 3.6 3.7 4.1   CL 94* 96* 97*   CO2 33* 32 31   BUN 48* 56* 70*   CREATININE 2.3* 2.8* 2.5*   CALCIUM 9.3 8.8 8.8     No results for input(s): AST, ALT, BILIDIR, BILITOT, ALKPHOS in the last 72 hours. No results for input(s): INR in the last 72 hours. No results for input(s): Jhon Mai in the last 72 hours.     Urinalysis:      Lab Results   Component Value Date    NITRU Negative 05/02/2022    WBCUA 23 05/02/2022    BACTERIA None Seen 05/02/2022    RBCUA 2 05/02/2022    BLOODU Negative 05/02/2022    SPECGRAV 1.012 05/02/2022    GLUCOSEU Negative 05/02/2022       Radiology:  XR CHEST (2 VW)   Final Result   Bilateral parahilar infiltrates could represent edema or atypical pneumonia                 Assessment/Plan:    Active Hospital Problems    Diagnosis     Acute respiratory failure with hypoxia ([address] [J96.01]      Priority: Medium    Influenza A [J10.1]      Priority: Medium    Elevated troponin [R77.8]      Priority: Medium    Acute pulmonary edema (HCC) [J81.0]      Priority: Medium    Acute kidney injury superimposed on CKD ([facility] [N17.9, N18.9]      Priority: Medium    Hypertensive urgency [I16.0]      Priority: Medium    Hyperlipidemia [E78.5]      Priority: Medium    Morbid obesity due to excess calories ([facility] [E66.01]      Priority: Medium    Acute congestive heart failure (HCC) [I50.9]      Priority: Medium    Acute on chronic diastolic (congestive) heart failure (HCC) [I50.33]      Priority: Medium    DMII (diabetes mellitus, type 2) (HCC) [E11.9]     Primary hypertension [I10]      Acute hypoxic respiratory failure. Acute on chronic diastolic heart failure exacerbation. Patient presented to emergency with worsening shortness of breath, echo 55 to 60% with grade 2 diastolic dysfunction, followed by cardiology. Currently on Lasix 60 mg twice daily, weight improving, improved urine output. Creatinine increased to 2.3-->2.8, will hold Lasix  Plan.    -continue holding lasix   -Continue monitor intake and output.  -Weight daily. -Appreciate cardiology input. (Patient is planned for ischemic work-up after fluid status improves)   -Resume home medications. Acute renal failure on CKD . Patient's baseline is around 1.4-1.6. Creatinine slightly above baseline, 2-->2.3--> 2.8 today, hold ACE inhibitors and diuretics. Consult nephrology     Influenza A. Tested positive at private pharmacy, negative in our facility. Infectious disease involved, no need for Tamiflu. Diabetes mellitus. Blood sugar well maintained. Add prandial 3 units and change sliding scale to low  Continue on insulin glargine 10 units as well as sliding scale.    hypertension. Resume home medications. Hyperlipidemia. Continue on statin    DVT Prophylaxis: heparin  Diet: ADULT DIET;  Regular; 5 carb choices (75 gm/meal); Low Sodium (2 gm); 1500 ml  Code Status: DNR-CCA    PT/OT Eval Status: ambulatory. ,     Dispo - pending clinical improvement      Gordy Johnston MD

## 2022-05-03 NOTE — PLAN OF CARE
Problem: Pain  Goal: Verbalizes/displays adequate comfort level or baseline comfort level  5/2/2022 2209 by Tabitha Ibarra RN  Outcome: Progressing  5/2/2022 0951 by Riana Chen RN  Outcome: Progressing    Pain/discomfort being managed with PRN analgesics per MD orders. Pt able to express presence and absence of pain and rate pain appropriately using numerical scale. Problem: Safety - Adult  Goal: Free from fall injury  5/2/2022 2209 by Tabitha Ibarra RN  Outcome: Progressing  5/2/2022 0951 by Riana Chen RN  Outcome: Progressing     Patient uses call light appropriately to express needs. Bed to lowest position with door open and call light in reach. All fall precautions implemented at this time. Siderails up x2. Non skid footwear in place. Seen at intervals to ensure safety. All needs attended.       Problem: Cardiovascular - Adult  Goal: Maintains optimal cardiac output and hemodynamic stability  5/2/2022 2209 by Tabitha Ibarra RN  Outcome: Progressing  5/2/2022 0951 by Riana Chen RN  Outcome: Progressing     Problem: Cardiovascular - Adult  Goal: Absence of cardiac dysrhythmias or at baseline  Outcome: Progressing     Problem: Respiratory - Adult  Goal: Achieves optimal ventilation and oxygenation  Outcome: Progressing     Problem: Discharge Planning  Goal: Discharge to home or other facility with appropriate resources  5/2/2022 2209 by Tabitha Ibarra RN  Outcome: Progressing  5/2/2022 0951 by Riana Chen RN  Outcome: Progressing

## 2022-05-04 LAB
ANION GAP SERPL CALCULATED.3IONS-SCNC: 12 MMOL/L (ref 3–16)
ANION GAP SERPL CALCULATED.3IONS-SCNC: 14 MMOL/L (ref 3–16)
BUN BLDV-MCNC: 75 MG/DL (ref 7–20)
BUN BLDV-MCNC: 77 MG/DL (ref 7–20)
CALCIUM SERPL-MCNC: 8.7 MG/DL (ref 8.3–10.6)
CALCIUM SERPL-MCNC: 8.7 MG/DL (ref 8.3–10.6)
CHLORIDE BLD-SCNC: 95 MMOL/L (ref 99–110)
CHLORIDE BLD-SCNC: 96 MMOL/L (ref 99–110)
CO2: 27 MMOL/L (ref 21–32)
CO2: 29 MMOL/L (ref 21–32)
CREAT SERPL-MCNC: 3.1 MG/DL (ref 0.6–1.2)
CREAT SERPL-MCNC: 3.2 MG/DL (ref 0.6–1.2)
GFR AFRICAN AMERICAN: 18
GFR AFRICAN AMERICAN: 18
GFR NON-AFRICAN AMERICAN: 15
GFR NON-AFRICAN AMERICAN: 15
GLUCOSE BLD-MCNC: 137 MG/DL (ref 70–99)
GLUCOSE BLD-MCNC: 175 MG/DL (ref 70–99)
GLUCOSE BLD-MCNC: 200 MG/DL (ref 70–99)
GLUCOSE BLD-MCNC: 206 MG/DL (ref 70–99)
GLUCOSE BLD-MCNC: 228 MG/DL (ref 70–99)
GLUCOSE BLD-MCNC: 251 MG/DL (ref 70–99)
GLUCOSE BLD-MCNC: 334 MG/DL (ref 70–99)
HCT VFR BLD CALC: 30.8 % (ref 36–48)
HEMOGLOBIN: 10.2 G/DL (ref 12–16)
MCH RBC QN AUTO: 30 PG (ref 26–34)
MCHC RBC AUTO-ENTMCNC: 33.2 G/DL (ref 31–36)
MCV RBC AUTO: 90.2 FL (ref 80–100)
PDW BLD-RTO: 15.3 % (ref 12.4–15.4)
PERFORMED ON: ABNORMAL
PLATELET # BLD: 232 K/UL (ref 135–450)
PMV BLD AUTO: 8.9 FL (ref 5–10.5)
POTASSIUM REFLEX MAGNESIUM: 4.5 MMOL/L (ref 3.5–5.1)
POTASSIUM SERPL-SCNC: 4.5 MMOL/L (ref 3.5–5.1)
RBC # BLD: 3.42 M/UL (ref 4–5.2)
SODIUM BLD-SCNC: 136 MMOL/L (ref 136–145)
SODIUM BLD-SCNC: 137 MMOL/L (ref 136–145)
WBC # BLD: 6.7 K/UL (ref 4–11)

## 2022-05-04 PROCEDURE — 93451 RIGHT HEART CATH: CPT | Performed by: INTERNAL MEDICINE

## 2022-05-04 PROCEDURE — 2500000003 HC RX 250 WO HCPCS

## 2022-05-04 PROCEDURE — 4A023N6 MEASUREMENT OF CARDIAC SAMPLING AND PRESSURE, RIGHT HEART, PERCUTANEOUS APPROACH: ICD-10-PCS | Performed by: INTERNAL MEDICINE

## 2022-05-04 PROCEDURE — 6370000000 HC RX 637 (ALT 250 FOR IP): Performed by: NURSE PRACTITIONER

## 2022-05-04 PROCEDURE — 6370000000 HC RX 637 (ALT 250 FOR IP): Performed by: INTERNAL MEDICINE

## 2022-05-04 PROCEDURE — B2141ZZ FLUOROSCOPY OF RIGHT HEART USING LOW OSMOLAR CONTRAST: ICD-10-PCS | Performed by: INTERNAL MEDICINE

## 2022-05-04 PROCEDURE — B2111ZZ FLUOROSCOPY OF MULTIPLE CORONARY ARTERIES USING LOW OSMOLAR CONTRAST: ICD-10-PCS | Performed by: INTERNAL MEDICINE

## 2022-05-04 PROCEDURE — 80048 BASIC METABOLIC PNL TOTAL CA: CPT

## 2022-05-04 PROCEDURE — 6360000002 HC RX W HCPCS

## 2022-05-04 PROCEDURE — C1751 CATH, INF, PER/CENT/MIDLINE: HCPCS

## 2022-05-04 PROCEDURE — 2580000003 HC RX 258: Performed by: INTERNAL MEDICINE

## 2022-05-04 PROCEDURE — 6360000002 HC RX W HCPCS: Performed by: INTERNAL MEDICINE

## 2022-05-04 PROCEDURE — 93451 RIGHT HEART CATH: CPT

## 2022-05-04 PROCEDURE — 2580000003 HC RX 258: Performed by: NURSE PRACTITIONER

## 2022-05-04 PROCEDURE — 99152 MOD SED SAME PHYS/QHP 5/>YRS: CPT

## 2022-05-04 PROCEDURE — 6370000000 HC RX 637 (ALT 250 FOR IP): Performed by: STUDENT IN AN ORGANIZED HEALTH CARE EDUCATION/TRAINING PROGRAM

## 2022-05-04 PROCEDURE — 99152 MOD SED SAME PHYS/QHP 5/>YRS: CPT | Performed by: INTERNAL MEDICINE

## 2022-05-04 PROCEDURE — 85027 COMPLETE CBC AUTOMATED: CPT

## 2022-05-04 PROCEDURE — 1200000000 HC SEMI PRIVATE

## 2022-05-04 PROCEDURE — 99153 MOD SED SAME PHYS/QHP EA: CPT

## 2022-05-04 PROCEDURE — 94760 N-INVAS EAR/PLS OXIMETRY 1: CPT

## 2022-05-04 PROCEDURE — 2580000003 HC RX 258

## 2022-05-04 PROCEDURE — 36415 COLL VENOUS BLD VENIPUNCTURE: CPT

## 2022-05-04 PROCEDURE — C1894 INTRO/SHEATH, NON-LASER: HCPCS

## 2022-05-04 RX ORDER — INSULIN LISPRO 100 [IU]/ML
4 INJECTION, SOLUTION INTRAVENOUS; SUBCUTANEOUS
Status: DISCONTINUED | OUTPATIENT
Start: 2022-05-04 | End: 2022-05-05

## 2022-05-04 RX ORDER — SODIUM CHLORIDE 0.9 % (FLUSH) 0.9 %
5-40 SYRINGE (ML) INJECTION EVERY 12 HOURS SCHEDULED
Status: DISCONTINUED | OUTPATIENT
Start: 2022-05-04 | End: 2022-05-06 | Stop reason: HOSPADM

## 2022-05-04 RX ORDER — SODIUM CHLORIDE 9 MG/ML
INJECTION, SOLUTION INTRAVENOUS PRN
Status: DISCONTINUED | OUTPATIENT
Start: 2022-05-04 | End: 2022-05-06 | Stop reason: HOSPADM

## 2022-05-04 RX ORDER — ACETAMINOPHEN 325 MG/1
650 TABLET ORAL EVERY 4 HOURS PRN
Status: DISCONTINUED | OUTPATIENT
Start: 2022-05-04 | End: 2022-05-04 | Stop reason: SDUPTHER

## 2022-05-04 RX ORDER — SODIUM CHLORIDE 0.9 % (FLUSH) 0.9 %
5-40 SYRINGE (ML) INJECTION PRN
Status: DISCONTINUED | OUTPATIENT
Start: 2022-05-04 | End: 2022-05-06 | Stop reason: HOSPADM

## 2022-05-04 RX ORDER — ONDANSETRON 2 MG/ML
4 INJECTION INTRAMUSCULAR; INTRAVENOUS EVERY 6 HOURS PRN
Status: DISCONTINUED | OUTPATIENT
Start: 2022-05-04 | End: 2022-05-04 | Stop reason: SDUPTHER

## 2022-05-04 RX ORDER — INSULIN GLARGINE 100 [IU]/ML
12 INJECTION, SOLUTION SUBCUTANEOUS NIGHTLY
Status: DISCONTINUED | OUTPATIENT
Start: 2022-05-04 | End: 2022-05-05

## 2022-05-04 RX ADMIN — METOPROLOL SUCCINATE 75 MG: 50 TABLET, EXTENDED RELEASE ORAL at 15:02

## 2022-05-04 RX ADMIN — HYDRALAZINE HYDROCHLORIDE 25 MG: 50 TABLET, FILM COATED ORAL at 15:02

## 2022-05-04 RX ADMIN — ATORVASTATIN CALCIUM 10 MG: 10 TABLET, FILM COATED ORAL at 15:02

## 2022-05-04 RX ADMIN — INSULIN LISPRO 3 UNITS: 100 INJECTION, SOLUTION INTRAVENOUS; SUBCUTANEOUS at 17:21

## 2022-05-04 RX ADMIN — PREGABALIN 150 MG: 75 CAPSULE ORAL at 20:27

## 2022-05-04 RX ADMIN — ISOSORBIDE MONONITRATE 30 MG: 30 TABLET, EXTENDED RELEASE ORAL at 15:02

## 2022-05-04 RX ADMIN — HEPARIN SODIUM 5000 UNITS: 5000 INJECTION INTRAVENOUS; SUBCUTANEOUS at 15:01

## 2022-05-04 RX ADMIN — SODIUM CHLORIDE, PRESERVATIVE FREE 10 ML: 5 INJECTION INTRAVENOUS at 08:59

## 2022-05-04 RX ADMIN — ASPIRIN 81 MG: 81 TABLET, COATED ORAL at 08:59

## 2022-05-04 RX ADMIN — SODIUM CHLORIDE, PRESERVATIVE FREE 10 ML: 5 INJECTION INTRAVENOUS at 20:27

## 2022-05-04 RX ADMIN — METOPROLOL SUCCINATE 75 MG: 50 TABLET, EXTENDED RELEASE ORAL at 20:27

## 2022-05-04 RX ADMIN — SODIUM CHLORIDE, PRESERVATIVE FREE 10 ML: 5 INJECTION INTRAVENOUS at 20:28

## 2022-05-04 RX ADMIN — INSULIN LISPRO 2 UNITS: 100 INJECTION, SOLUTION INTRAVENOUS; SUBCUTANEOUS at 20:29

## 2022-05-04 RX ADMIN — HEPARIN SODIUM 5000 UNITS: 5000 INJECTION INTRAVENOUS; SUBCUTANEOUS at 22:37

## 2022-05-04 RX ADMIN — HYDRALAZINE HYDROCHLORIDE 25 MG: 50 TABLET, FILM COATED ORAL at 20:28

## 2022-05-04 RX ADMIN — PREGABALIN 150 MG: 75 CAPSULE ORAL at 15:02

## 2022-05-04 RX ADMIN — AMLODIPINE BESYLATE 5 MG: 5 TABLET ORAL at 15:02

## 2022-05-04 RX ADMIN — INSULIN LISPRO 4 UNITS: 100 INJECTION, SOLUTION INTRAVENOUS; SUBCUTANEOUS at 17:21

## 2022-05-04 RX ADMIN — INSULIN GLARGINE 12 UNITS: 100 INJECTION, SOLUTION SUBCUTANEOUS at 20:30

## 2022-05-04 ASSESSMENT — PAIN SCALES - GENERAL
PAINLEVEL_OUTOF10: 0

## 2022-05-04 NOTE — PRE SEDATION
Brief Pre-Op Note/Sedation Assessment      Zbigniew Schreiber  1955  [id number]      [id number]  12:56 PM    Planned Procedure: Cardiac Catheterization Procedure    Post Procedure Plan: Return to same level of care    Consent: I have discussed with the patient and/or the patient representative the indication, alternatives, and the possible risks and/or complications of the planned procedure and the anesthesia methods. The patient and/or patient representative appear to understand and agree to proceed. Chief Complaint: Dyspnea      Indications for Cath Procedure:  LV Dysfunction  Anginal Classification within 2 weeks:  No symptoms  NYHA Heart Failure Class within 2 weeks: Class III - Symptoms of HF on less-than-ordinary exertion, Newly Diagnosed?  No, Heart Failure Type: Diastolic  Is Cath Lab Visit Valve-related?: No  Surgical Risk: Low  Functional Type: Unknown    Anti- Anginal Meds within 2 weeks:   Yes: Statin (Any)    Stress or Imaging Studies Performed:  None     Vital Signs:  /61   Pulse 58   Temp 98.4 °F (36.9 °C) (Oral)   Resp 17   Ht 5' (1.524 m)   Wt 177 lb 0.5 oz (80.3 kg)   SpO2 96%   BMI 34.57 kg/m²     Allergies:  No Known Allergies    Past Medical History:  Past Medical History:   Diagnosis Date    Ankle fracture, left 1/17/2014    HTN (hypertension), benign 1/17/2014    Hypertension          Surgical History:  Past Surgical History:   Procedure Laterality Date    FRACTURE SURGERY Left 01/17/2014    orif tibula/fibula fixation    HYSTERECTOMY           Medications:  Current Facility-Administered Medications   Medication Dose Route Frequency Provider Last Rate Last Admin    insulin lispro (HUMALOG) injection vial 4 Units  4 Units SubCUTAneous TID  Kermit Dubin, MD        insulin glargine (LANTUS) injection vial 12 Units  12 Units SubCUTAneous Nightly Kermit Dubin, MD        [Held by provider] furosemide (LASIX) tablet 60 mg  60 mg Oral BID Otoniel Del Rosario MD 60 mg at 05/03/22 1650    sodium chloride flush 0.9 % injection 5-40 mL  5-40 mL IntraVENous 2 times per day SARA Tan - CNP   10 mL at 05/04/22 0859    sodium chloride flush 0.9 % injection 5-40 mL  5-40 mL IntraVENous PRN Lawyer Bobby APRN - CNP        0.9 % sodium chloride infusion   IntraVENous PRN Lawyer Rosales, APRN - CNP        hydrALAZINE (APRESOLINE) tablet 25 mg  25 mg Oral 2 times per day Merryl Messenger, APRN - CNP   25 mg at 05/03/22 2029    amLODIPine (NORVASC) tablet 5 mg  5 mg Oral Daily Beckie Cutler MD   5 mg at 05/03/22 2860    isosorbide mononitrate (IMDUR) extended release tablet 30 mg  30 mg Oral Daily Merryl Madiha, APRN - CNP   30 mg at 05/03/22 0910    insulin lispro (HUMALOG) injection vial 0-6 Units  0-6 Units SubCUTAneous TID WC Cori Head MD   1 Units at 05/03/22 1651    insulin lispro (HUMALOG) injection vial 0-3 Units  0-3 Units SubCUTAneous Nightly Cori Head MD   1 Units at 05/03/22 2031    metoprolol succinate (TOPROL XL) extended release tablet 75 mg  75 mg Oral BID Walker Gay MD   75 mg at 05/03/22 2029    pregabalin (LYRICA) capsule 150 mg  150 mg Oral BID Kyle Maradiaga MD   150 mg at 05/03/22 2029    atorvastatin (LIPITOR) tablet 10 mg  10 mg Oral Daily Kyle Maradiaga MD   10 mg at 05/03/22 0909    albuterol sulfate  (90 Base) MCG/ACT inhaler 2 puff  2 puff Inhalation Q6H PRN Kyle Maradiaga MD        aspirin EC tablet 81 mg  81 mg Oral Daily Kyle Maradiaga MD   81 mg at 05/04/22 0859    glucose (GLUTOSE) 40 % oral gel 15 g  15 g Oral PRN Kyle Maradiaga MD        dextrose 50 % IV solution  12.5 g IntraVENous PRN Kyle Maradiaga MD        glucagon (rDNA) injection 1 mg  1 mg IntraMUSCular PRN Kyle Maradiaga MD        dextrose 5 % solution  100 mL/hr IntraVENous PRN Kyle Maradiaga MD        sodium chloride flush 0.9 % injection 10 mL  10 mL IntraVENous 2 times per day Kyle Maradiaga MD   10 mL at 05/04/22 0859    sodium chloride flush 0.9 % injection 10 mL  10 mL IntraVENous PRN Cathy Andersen MD        0.9 % sodium chloride infusion   IntraVENous PRN Cathy Andersen MD        [facility]dansetron [facility]) injection 4 mg  4 mg IntraVENous Q4H PRN Cathy Andersen MD        polyethylene glycol [facility]) packet 17 g  17 g Oral Daily PRN Cathy Andersen MD        acetaminophen (TYLENOL) tablet 650 mg  650 mg Oral Q4H PRN Cathy Andersen MD   650 mg at 04/28/22 1346    Or    acetaminophen (TYLENOL) suppository 650 mg  650 mg Rectal Q4H PRMATEO Andersen MD        heparin (porcine) injection 5,000 Units  5,000 Units SubCUTAneous 3 times per day Cathy Andersen MD   5,000 Units at 05/03/22 1339    benzocaine-menthol (CEPACOL SORE THROAT) lozenge 1 lozenge  1 lozenge Oral Q2H PRMATEO Andersen MD   1 lozenge at 04/27/22 0124    hydrALAZINE (APRESOLINE) injection 10 mg  10 mg IntraVENous Q4H PRMATEO Andersen MD   10 mg at 04/28/22 1346           Pre-Sedation:    Pre-Sedation Documentation and Exam:  I have personally completed a history, physical exam & review of systems for this patient (see notes). Prior History of Anesthesia Complications:   none    Modified Mallampati:  I (soft palate, uvula, fauces, tonsillar pillars visible)    ASA Classification:  Class 3 - A patient with severe systemic disease that limits activity but is not incapacitating      Gem Scale: Activity:  2 - Able to move 4 extremities voluntarily on command  Respiration:  2 - Able to breathe deeply and cough freely  Circulation:  2 - BP+/- 20mmHg of normal  Consciousness:  2 - Fully awake  Oxygen Saturation (color):  2 - Able to maintain oxygen saturation >92% on room air    Sedation/Anesthesia Plan:  Guard the patient's safety and welfare. Minimize physical discomfort and pain. Minimize negative psychological responses to treatment by providing sedation and analgesia and maximize the potential amnesia.   Patient to meet pre-procedure discharge plan.     Medication Planned:  midazolam intravenously and fentanyl intravenously    Patient is an appropriate candidate for plan of sedation: yes      Electronically signed by Gretchen Pascual MD on 5/4/2022 at 12:56 PM

## 2022-05-04 NOTE — PROGRESS NOTES
Hospitalist Progress Note      PCP: Devin Harrell MD    Date of Admission: 4/26/2022    Chief Complaint: shortness of breath and lower limb edema. Hospital Course:   61 y. o. year-old female with a history of hypertension, hyperlipidemia, type II diabetes mellitus and CHF.  She presents to the emergency room for evaluation following a 4-day history of worsening shortness of breath, a nonproductive cough and increasing lower extremity edema. Admitted with diastolic heart failure exacerbation as well as CHLOE on CKD. 5/2 Cr increased to 2.8 with diuresis, nephrology consulted. 5/4 Cr increased to 3.2    Subjective:   Pt seen and examined. Has no complaints. Breathing stable. Scheduled for RHC today.     Medications:  Reviewed    Infusion Medications    sodium chloride      dextrose      sodium chloride       Scheduled Medications    furosemide  60 mg Oral BID    sodium chloride flush  5-40 mL IntraVENous 2 times per day    hydrALAZINE  25 mg Oral 2 times per day    amLODIPine  5 mg Oral Daily    isosorbide mononitrate  30 mg Oral Daily    insulin lispro  3 Units SubCUTAneous TID WC    insulin lispro  0-6 Units SubCUTAneous TID WC    insulin lispro  0-3 Units SubCUTAneous Nightly    metoprolol succinate  75 mg Oral BID    [Held by provider] furosemide  60 mg IntraVENous BID    pregabalin  150 mg Oral BID    insulin glargine  10 Units SubCUTAneous Nightly    atorvastatin  10 mg Oral Daily    aspirin  81 mg Oral Daily    sodium chloride flush  10 mL IntraVENous 2 times per day    heparin (porcine)  5,000 Units SubCUTAneous 3 times per day     PRN Meds: sodium chloride flush, sodium chloride, albuterol sulfate HFA, glucose, dextrose, glucagon (rDNA), dextrose, sodium chloride flush, sodium chloride, ondansetron, polyethylene glycol, acetaminophen **OR** acetaminophen, benzocaine-menthol, hydrALAZINE      Intake/Output Summary (Last 24 hours) at 5/4/2022 1103  Last data filed at 5/4/2022 1526  Gross per 24 hour   Intake 640 ml   Output 1900 ml   Net -1260 ml       Physical Exam Performed:    /61   Pulse 58   Temp 98.4 °F (36.9 °C) (Oral)   Resp 17   Ht 5' (1.524 m)   Wt 177 lb 0.5 oz (80.3 kg)   SpO2 96%   BMI 34.57 kg/m²     General appearance: No apparent distress, appears stated age and cooperative. On oxygen via nasal cannula  HEENT: Pupils equal, round, and reactive to light. Conjunctivae/corneas clear. Neck: Supple, with full range of motion. No jugular venous distention. Trachea midline. Respiratory: Lungs clear to auscultation. cardiovascular: Regular rate and rhythm with normal S1/S2 without murmurs, rubs or gallops. Abdomen: Soft, non-tender, non-distended with normal bowel sounds. Musculoskeletal: Bilateral lower limb edema   Skin: Skin color, texture, turgor normal.  No rashes or lesions. Neurologic:  Neurovascularly intact without any focal sensory/motor deficits. Cranial nerves: II-XII intact, grossly non-focal.  Psychiatric: Alert and oriented, thought content appropriate, normal insight  Capillary Refill: Brisk,3 seconds, normal   Peripheral Pulses: +2 palpable, equal bilaterally       Labs:   Recent Labs     05/04/22  0550   WBC 6.7   HGB 10.2*   HCT 30.8*        Recent Labs     05/02/22  0632 05/02/22  0632 05/03/22  0620 05/04/22  0550     --  138 137  136   K 3.7   < > 4.1 4.5  4.5   CL 96*  --  97* 96*  95*   CO2 32  --  31 27  29   BUN 56*  --  70* 75*  77*   CREATININE 2.8*  --  2.5* 3.2*  3.1*   CALCIUM 8.8  --  8.8 8.7  8.7    < > = values in this interval not displayed. No results for input(s): AST, ALT, BILIDIR, BILITOT, ALKPHOS in the last 72 hours. No results for input(s): INR in the last 72 hours. No results for input(s): Eva Stoneer in the last 72 hours.     Urinalysis:      Lab Results   Component Value Date    NITRU Negative 05/02/2022    WBCUA 23 05/02/2022    BACTERIA None Seen 05/02/2022    RBCUA 2 05/02/2022 BLOODU Negative 05/02/2022    SPECGRAV 1.012 05/02/2022    GLUCOSEU Negative 05/02/2022       Radiology:  XR CHEST (2 VW)   Final Result   Bilateral parahilar infiltrates could represent edema or atypical pneumonia                 Assessment/Plan:    Active Hospital Problems    Diagnosis     Acute respiratory failure with hypoxia (HCC) [J96.01]      Priority: Medium    Influenza A [J10.1]      Priority: Medium    Elevated troponin [R77.8]      Priority: Medium    Acute pulmonary edema (HCC) [J81.0]      Priority: Medium    Acute kidney injury superimposed on CKD ([facility] [address] [N17.9, N18.9]      Priority: Medium    Hypertensive urgency [I16.0]      Priority: Medium    Hyperlipidemia [E78.5]      Priority: Medium    Morbid obesity due to excess calories ([facility] [address] [E66.01]      Priority: Medium    Acute congestive heart failure (HCC) [I50.9]      Priority: Medium    Acute on chronic diastolic (congestive) heart failure (HCC) [I50.33]      Priority: Medium    DMII (diabetes mellitus, type 2) ([facility] [address] [E11.9]     Primary hypertension [I10]      Acute hypoxic respiratory failure. Acute on chronic diastolic heart failure exacerbation. Patient presented to emergency with worsening shortness of breath, echo 55 to 60% with grade 2 diastolic dysfunction, followed by cardiology. Currently on Lasix 60 mg twice daily, weight improving, improved urine output. Creatinine increased to 2.3-->2.8--> 2.5-->3. 2. Plan.    -Hold p.o. Lasix 60 mg twice daily  -Continue monitor intake and output.  -Weight daily. -Appreciate cardiology input. [facility] today  -Resume home medications. Acute renal failure on CKD . Patient's baseline is around 1.4-1.6. Creatinine slightly above baseline, 2-->2.3--> 2.8-->2.5-->3.2 today. Continue to hold ACE inhibitors and Lasix today  Appreciate nephrology input    Influenza A. Tested positive at private pharmacy, negative in our facility.   Infectious disease involved, no need for Tamiflu. Diabetes mellitus. Blood sugar well maintained. Adjust Lantus and Humalog    hypertension. Resume home medications. Hyperlipidemia. Continue on statin    DVT Prophylaxis: heparin  Diet: Diet NPO Exceptions are: Sips of Water with Meds  Code Status: DNR-CCA    PT/OT Eval Status: ambulatory. ,     Dispo - pending clinical improvement (planned for right heart cath today)       John Frank MD

## 2022-05-04 NOTE — PROGRESS NOTES
Telemetry monitoring ordered by ISI Alfred.  Electronically signed by Cherry Alvarado RN on 5/3/2022 at 8:05 PM

## 2022-05-04 NOTE — PLAN OF CARE
Problem: Pain  Goal: Verbalizes/displays adequate comfort level or baseline comfort level  Outcome: Progressing     Pain/discomfort being managed with PRN analgesics per MD orders. Pt able to express presence and absence of pain and rate pain appropriately using numerical scale. Non-pharmacologic comfort measures implemented. Rest and comfort promoted.       Problem: Safety - Adult  Goal: Free from fall injury  5/3/2022 2153 by Miguel Buckner RN  Outcome: Progressing  5/3/2022 1154 by Addis Cruz RN  Outcome: Progressing  Flowsheets (Taken 5/3/2022 1153)  Free From Fall Injury: Katie Quan family/caregiver on patient safety      Problem: Cardiovascular - Adult  Goal: Maintains optimal cardiac output and hemodynamic stability  5/3/2022 2153 by Miguel Buckner RN  Outcome: Progressing  5/3/2022 1154 by Addis Cruz RN  Outcome: Progressing  Flowsheets  Taken 5/3/2022 0909 by Addis Cruz RN  Maintains optimal cardiac output and hemodynamic stability:   Monitor blood pressure and heart rate   Monitor urine output and notify Licensed Independent Practitioner for values outside of normal range  Taken 5/2/2022 0949 by Alan Rain RN  Maintains optimal cardiac output and hemodynamic stability: Monitor urine output and notify Licensed Independent Practitioner for values outside of normal range     Problem: Cardiovascular - Adult  Goal: Absence of cardiac dysrhythmias or at baseline  Outcome: Progressing     Problem: Musculoskeletal - Adult  Goal: Return mobility to safest level of function  5/3/2022 2153 by Miguel Buckner RN  Outcome: Progressing  5/3/2022 1154 by Addis Cruz RN  Outcome: Progressing  Flowsheets (Taken 5/2/2022 0949 by Alan Rain RN)  Return Mobility to Safest Level of Function: Assess patient stability and activity tolerance for standing, transferring and ambulating with or without assistive devices     Problem: Musculoskeletal - Adult  Goal: Maintain proper alignment of affected body part  Outcome: Progressing     Problem: Discharge Planning  Goal: Discharge to home or other facility with appropriate resources  5/3/2022 2153 by Mecca Byrd RN  Outcome: Progressing  5/3/2022 1154 by Elton Barker RN  Outcome: Progressing  Flowsheets (Taken 5/2/2022 0949 by Magdalena Jacome, JOSE)  Discharge to home or other facility with appropriate resources: Identify barriers to discharge with patient and caregiver

## 2022-05-04 NOTE — PROGRESS NOTES
Admit Date: 4/26/2022    REASON FOR ADMISSION:   The patient with congestive heart failure. REASON FOR follow up:    patient with acute-on-chronic kidney disease. Interval history  Feels well  No more edema  seems euvolemic  discusse needs good control of BP/diabetes  Low salt diet   Has stage 4 CKD  Unfortunately will eventually need renal replacement   Creat 2.8-->2.5-->3.1  Urine 1400-->2100  /70-->114/57  Wt 176-->177      PLAN:    hold Lasix 60 mg bid   For Rt heart Cath   If discharged    closely fu as out pt     CHLOE/CKD 3b.   due to congestive heart failure  diastolic dysfunction, was given diuretics with her weight down 172 from 184. high proBNP 4268. Currently off of diuretics     Admission creatinine 2   went up to 2.8. Creatinine 1.6 on 03/20/2022   1.42 in 2021   1.5 in 2020   1.4 in 2019   0.9 in 2018. Proteinuria 2.2 G   Urine in 2019 has 100 mg protein, 2 rbc. SG 1.012,protein 100,RBC 2, WBC 23   No renal imaging. Echo grade 2 diastolic dysfunction with high filling   pressure. CKD, most likely underlying diabetic nephropathy, has protein in the urine even in 2019. No retinopathy at least in 2021 exam.    Does have neuropathy. Diabetes at least 2014 likely longer    A1c was 12.4 in 2014  Positive neuropathy as per the patient. No retinopathy. Diabetes   at least since 2014   A1c  9.1   was 12.4 in 2014  No retinopathy   Does complain of some neuropathy. Hypertension. Blood pressure 153/74. Add amlodipine. We will get her off hydralazine which can cause lupus or even ANCA vasculitis. HFpEF   normal ejection fraction   grade 2 diastolic dysfunction, increased filling  pressure, left atrium mildly enlarged. Pulmonary artery pressure was 56. Anemia   B12 >2000   folate. 15    Abnormal LFTs   with ALT of 77   could be due to congestive heart failure  in the past.    Hepatitis C in 2019 was negative.     \[facility]\""  HISTORY OF PRESENTING COMPLAINT:  I was asked by Dr. Edis Cortez to render  opinion on this 68-year-old single  female, no smoking,  occasional alcohol, has two children in good health, no family history  of kidney disease, who presents with increasing lower extremity edema  and shortness of breath and was found to be in congestive heart failure  on clinical exam, imaging studies and lab work for which she was given  diuretics and she has worsening renal function.     REVIEW OF SYSTEMS:  GENERAL:  No fever or chills. CARDIAC:  No chest pain. PULMONARY:  Positive shortness of breath. GENITOURINARY:  No dysuria or hematuria. VASCULAR:  No claudication. ENDOCRINE:  Diabetes, longstanding, uncontrolled. GASTROINTESTINAL:  No nausea, vomiting. Review of the rest of the systems is negative.       Objective:     Patient Vitals for the past 8 hrs:   BP Temp Temp src Pulse Resp SpO2 Weight   05/04/22 0850 116/61 98.4 °F (36.9 °C) Oral 58 17 96 % --   05/04/22 0429 -- -- -- -- -- -- 177 lb 0.5 oz (80.3 kg)   05/04/22 0405 (!) 114/57 97.5 °F (36.4 °C) Axillary 59 16 97 % --       I/O last 3 completed shifts: In: 1120 [P.O.:1120]  Out: 2100 [Urine:2100]    GENERAL:  On examination, awake, alert, no distress. HEENT:  Head normocephalic, atraumatic. Conjunctivae:  No icterus. CARDIAC:  She has normal heart sounds. No rub, murmur, or gallop. NECK:  JVD was not raised. Carotids are normal.  Thyroid was not  enlarged. CHEST:  Clear to auscultation. ABDOMEN:  Soft, nontender. Liver, spleen, kidneys, bladder not  palpable. Bowel sounds present. EXTREMITIES:  Bilateral lower extremities, she still has 1+ edema  bilaterally. NEUROLOGICAL:  Awake and alert.   No focal neurological signs.     .l  Lab Results   Component Value Date    CREATININE 3.2 (H) 05/04/2022    CREATININE 3.1 (H) 05/04/2022    BUN 75 (H) 05/04/2022    BUN 77 (H) 05/04/2022     05/04/2022     05/04/2022    K 4.5 05/04/2022    K 4.5 05/04/2022    CL 96 (L) 05/04/2022    CL 95 (L) 05/04/2022    CO2 27 05/04/2022    CO2 29 05/04/2022     Lab Results   Component Value Date    WBC 6.7 05/04/2022    HGB 10.2 (L) 05/04/2022    HCT 30.8 (L) 05/04/2022    MCV 90.2 05/04/2022     05/04/2022            AGLUCOSE)Magnesium:    Lab Results   Component Value Date    MG 1.50 04/29/2022     Phosphorus:  No results found for: PHOS    Uric Acid:  No components found for: URIC    Principal Problem:    Acute on chronic diastolic (congestive) heart failure (HCC)  Active Problems:    Acute respiratory failure with hypoxia (HCC)    Influenza A    Elevated troponin    Acute pulmonary edema (HCC)    Acute kidney injury superimposed on CKD ([facility] [address]    Hypertensive urgency    Hyperlipidemia    Morbid obesity due to excess calories (HCC)    Acute congestive heart failure (HCC)    DMII (diabetes mellitus, type 2) ([facility] [address]    Primary hypertension  Resolved Problems:    * No resolved hospital problems.  *

## 2022-05-04 NOTE — BRIEF OP NOTE
Brief Postoperative Note    Loren Charles  YOB: 1955  [id number]    Pre-operative Diagnosis: Shortness of breath, CHF    Post-operative Diagnosis: Same    Procedure: RHC, O2 saturation, cardiac output/CI    Anesthesia: Moderate Sedation    Surgeons/Assistants: Glenroy Lee    Estimated Blood Loss: less than 50     Complications: None    Specimens: Was Not Obtained    Findings:     Chamber       (mmHg)               O2 sat                       RA                17/10(M 15)          58%                       RV                 49/5(17)               62%                       PA                  43/12(23)             59%                       PCW                8 mmHg                     C0/CI       4.04 /2.28(Tara)                                    3.29/1.86(Thermo)    Moderate Sedation:  Start time: 1302 p.m. Stop time: 13 ;25 PM  1 mg versed   25 mcg fentanyl   An independent trained observer pushed medications at my direction. We monitored the patient's level of consciousness and vital signs/physiologic status throughout the procedure duration (see start and start times above).        Electronically signed by Dolores Frias MD on 5/4/2022 at 1:41 PM

## 2022-05-04 NOTE — CARE COORDINATION
5/4 Right Heart Cath today. Plan is home. Has transport. Follow for needs.  Electronically signed by Melody Pedraza RN on 5/4/2022 at 1:34 PM

## 2022-05-05 LAB
ANION GAP SERPL CALCULATED.3IONS-SCNC: 15 MMOL/L (ref 3–16)
BUN BLDV-MCNC: 88 MG/DL (ref 7–20)
CALCIUM SERPL-MCNC: 8.5 MG/DL (ref 8.3–10.6)
CHLORIDE BLD-SCNC: 96 MMOL/L (ref 99–110)
CO2: 25 MMOL/L (ref 21–32)
CREAT SERPL-MCNC: 3.3 MG/DL (ref 0.6–1.2)
GFR AFRICAN AMERICAN: 17
GFR NON-AFRICAN AMERICAN: 14
GLUCOSE BLD-MCNC: 259 MG/DL (ref 70–99)
GLUCOSE BLD-MCNC: 260 MG/DL (ref 70–99)
GLUCOSE BLD-MCNC: 272 MG/DL (ref 70–99)
GLUCOSE BLD-MCNC: 279 MG/DL (ref 70–99)
GLUCOSE BLD-MCNC: 426 MG/DL (ref 70–99)
PERFORMED ON: ABNORMAL
POTASSIUM REFLEX MAGNESIUM: 4.2 MMOL/L (ref 3.5–5.1)
SODIUM BLD-SCNC: 136 MMOL/L (ref 136–145)

## 2022-05-05 PROCEDURE — 6360000002 HC RX W HCPCS: Performed by: INTERNAL MEDICINE

## 2022-05-05 PROCEDURE — 6370000000 HC RX 637 (ALT 250 FOR IP): Performed by: STUDENT IN AN ORGANIZED HEALTH CARE EDUCATION/TRAINING PROGRAM

## 2022-05-05 PROCEDURE — 94760 N-INVAS EAR/PLS OXIMETRY 1: CPT

## 2022-05-05 PROCEDURE — 2580000003 HC RX 258: Performed by: INTERNAL MEDICINE

## 2022-05-05 PROCEDURE — 6370000000 HC RX 637 (ALT 250 FOR IP): Performed by: INTERNAL MEDICINE

## 2022-05-05 PROCEDURE — 6370000000 HC RX 637 (ALT 250 FOR IP): Performed by: NURSE PRACTITIONER

## 2022-05-05 PROCEDURE — 36415 COLL VENOUS BLD VENIPUNCTURE: CPT

## 2022-05-05 PROCEDURE — 99232 SBSQ HOSP IP/OBS MODERATE 35: CPT | Performed by: NURSE PRACTITIONER

## 2022-05-05 PROCEDURE — 2580000003 HC RX 258: Performed by: NURSE PRACTITIONER

## 2022-05-05 PROCEDURE — 80048 BASIC METABOLIC PNL TOTAL CA: CPT

## 2022-05-05 PROCEDURE — 1200000000 HC SEMI PRIVATE

## 2022-05-05 RX ORDER — INSULIN GLARGINE 100 [IU]/ML
15 INJECTION, SOLUTION SUBCUTANEOUS NIGHTLY
Status: DISCONTINUED | OUTPATIENT
Start: 2022-05-05 | End: 2022-05-06 | Stop reason: HOSPADM

## 2022-05-05 RX ORDER — INSULIN LISPRO 100 [IU]/ML
0-18 INJECTION, SOLUTION INTRAVENOUS; SUBCUTANEOUS
Status: DISCONTINUED | OUTPATIENT
Start: 2022-05-05 | End: 2022-05-06 | Stop reason: HOSPADM

## 2022-05-05 RX ORDER — INSULIN LISPRO 100 [IU]/ML
0-9 INJECTION, SOLUTION INTRAVENOUS; SUBCUTANEOUS NIGHTLY
Status: DISCONTINUED | OUTPATIENT
Start: 2022-05-05 | End: 2022-05-06 | Stop reason: HOSPADM

## 2022-05-05 RX ORDER — INSULIN LISPRO 100 [IU]/ML
5 INJECTION, SOLUTION INTRAVENOUS; SUBCUTANEOUS
Status: DISCONTINUED | OUTPATIENT
Start: 2022-05-05 | End: 2022-05-06 | Stop reason: HOSPADM

## 2022-05-05 RX ADMIN — HEPARIN SODIUM 5000 UNITS: 5000 INJECTION INTRAVENOUS; SUBCUTANEOUS at 21:32

## 2022-05-05 RX ADMIN — ISOSORBIDE MONONITRATE 30 MG: 30 TABLET, EXTENDED RELEASE ORAL at 08:52

## 2022-05-05 RX ADMIN — INSULIN LISPRO 5 UNITS: 100 INJECTION, SOLUTION INTRAVENOUS; SUBCUTANEOUS at 12:07

## 2022-05-05 RX ADMIN — HYDRALAZINE HYDROCHLORIDE 25 MG: 50 TABLET, FILM COATED ORAL at 21:32

## 2022-05-05 RX ADMIN — INSULIN GLARGINE 15 UNITS: 100 INJECTION, SOLUTION SUBCUTANEOUS at 21:33

## 2022-05-05 RX ADMIN — PREGABALIN 150 MG: 75 CAPSULE ORAL at 08:52

## 2022-05-05 RX ADMIN — METOPROLOL SUCCINATE 75 MG: 50 TABLET, EXTENDED RELEASE ORAL at 08:51

## 2022-05-05 RX ADMIN — SODIUM CHLORIDE, PRESERVATIVE FREE 10 ML: 5 INJECTION INTRAVENOUS at 21:33

## 2022-05-05 RX ADMIN — INSULIN LISPRO 3 UNITS: 100 INJECTION, SOLUTION INTRAVENOUS; SUBCUTANEOUS at 08:52

## 2022-05-05 RX ADMIN — SODIUM CHLORIDE, PRESERVATIVE FREE 10 ML: 5 INJECTION INTRAVENOUS at 08:53

## 2022-05-05 RX ADMIN — HEPARIN SODIUM 5000 UNITS: 5000 INJECTION INTRAVENOUS; SUBCUTANEOUS at 06:14

## 2022-05-05 RX ADMIN — ASPIRIN 81 MG: 81 TABLET, COATED ORAL at 08:52

## 2022-05-05 RX ADMIN — INSULIN LISPRO 5 UNITS: 100 INJECTION, SOLUTION INTRAVENOUS; SUBCUTANEOUS at 17:26

## 2022-05-05 RX ADMIN — HYDRALAZINE HYDROCHLORIDE 25 MG: 50 TABLET, FILM COATED ORAL at 08:52

## 2022-05-05 RX ADMIN — INSULIN LISPRO 4 UNITS: 100 INJECTION, SOLUTION INTRAVENOUS; SUBCUTANEOUS at 08:52

## 2022-05-05 RX ADMIN — AMLODIPINE BESYLATE 5 MG: 5 TABLET ORAL at 08:52

## 2022-05-05 RX ADMIN — HEPARIN SODIUM 5000 UNITS: 5000 INJECTION INTRAVENOUS; SUBCUTANEOUS at 12:07

## 2022-05-05 RX ADMIN — ATORVASTATIN CALCIUM 10 MG: 10 TABLET, FILM COATED ORAL at 08:52

## 2022-05-05 RX ADMIN — INSULIN LISPRO 18 UNITS: 100 INJECTION, SOLUTION INTRAVENOUS; SUBCUTANEOUS at 12:07

## 2022-05-05 RX ADMIN — METOPROLOL SUCCINATE 75 MG: 50 TABLET, EXTENDED RELEASE ORAL at 21:32

## 2022-05-05 RX ADMIN — INSULIN LISPRO 9 UNITS: 100 INJECTION, SOLUTION INTRAVENOUS; SUBCUTANEOUS at 17:26

## 2022-05-05 RX ADMIN — PREGABALIN 150 MG: 75 CAPSULE ORAL at 21:32

## 2022-05-05 RX ADMIN — INSULIN LISPRO 5 UNITS: 100 INJECTION, SOLUTION INTRAVENOUS; SUBCUTANEOUS at 21:33

## 2022-05-05 ASSESSMENT — PAIN SCALES - GENERAL: PAINLEVEL_OUTOF10: 0

## 2022-05-05 NOTE — PROGRESS NOTES
Cardiology - PROGRESS NOTE    Admit Date: 4/26/2022     Reason for follow up: CHF     68-year-old female who presented  to the hospital with worsening shortness of breath, nonproductive cough,  lower extremity edema, and chest pain for the last four days. Jamia Pastrana says  her symptoms started four days ago and has been steadily worsening.  She  gets short of breath with minimal amount of exertion.  She also noticed  that she had fever.  She went to [address] [place] [address] where she was  diagnosed with influenza A.  Since her symptoms steadily worsened, she  decided to come to the emergency room.  In the emergency room, her  workup was consistent with decompensated heart failure.  She also was  found to have worsening renal insufficiency and she was admitted.  She  complains of burning in her chest which is constant and not worsened  with the exertion.  There is no diaphoresis with this.  She has no  palpitation. Social History:   reports that she has been smoking. She has never used smokeless tobacco. She reports that she does not drink alcohol and does not use drugs. Family History: family history is not on file. Interval History:  Patient seen and examined and notes reviewed   In bed  Sleepy  Lying flat and comfortable   NAD   Wt 177   S/p RHC     All other systems reviewed and negative except as above. Diet: ADULT DIET;  Regular; Low Fat/Low Chol/High Fiber/2 gm Na  Pain is:None  Nausea:None    In: 4107 [P.O.:1401]  Out: 2100    Wt Readings from Last 3 Encounters:   05/05/22 177 lb 14.6 oz (80.7 kg)   10/17/18 175 lb 0.7 oz (79.4 kg)   01/16/18 160 lb (72.6 kg)           Data:   Scheduled Meds:   Scheduled Meds:   insulin glargine  15 Units SubCUTAneous Nightly    insulin lispro  5 Units SubCUTAneous TID WC    sodium chloride flush  5-40 mL IntraVENous 2 times per day    [Held by provider] furosemide  60 mg Oral BID    sodium chloride flush 5-40 mL IntraVENous 2 times per day    hydrALAZINE  25 mg Oral 2 times per day    amLODIPine  5 mg Oral Daily    isosorbide mononitrate  30 mg Oral Daily    insulin lispro  0-6 Units SubCUTAneous TID WC    insulin lispro  0-3 Units SubCUTAneous Nightly    metoprolol succinate  75 mg Oral BID    pregabalin  150 mg Oral BID    atorvastatin  10 mg Oral Daily    aspirin  81 mg Oral Daily    sodium chloride flush  10 mL IntraVENous 2 times per day    heparin (porcine)  5,000 Units SubCUTAneous 3 times per day     Continuous Infusions:   sodium chloride      sodium chloride      dextrose      sodium chloride       PRN Meds:.sodium chloride flush, sodium chloride, sodium chloride flush, sodium chloride, albuterol sulfate HFA, glucose, dextrose, glucagon (rDNA), dextrose, sodium chloride flush, sodium chloride, ondansetron, polyethylene glycol, acetaminophen **OR** acetaminophen, benzocaine-menthol, hydrALAZINE  Continuous Infusions:   sodium chloride      sodium chloride      dextrose      sodium chloride         Intake/Output Summary (Last 24 hours) at 5/5/2022 0826  Last data filed at 5/5/2022 0445  Gross per 24 hour   Intake 1401 ml   Output 2100 ml   Net -699 ml       CBC:   Recent Labs     05/04/22  0550   WBC 6.7   HGB 10.2*        BMP:  Recent Labs     05/05/22  0614      K 4.2   CL 96*   CO2 25   BUN 88*   CREATININE 3.3*   GLUCOSE 259*     ABGs: No results found for: PHART, PO2ART, BRA6PAL  INR: No results for input(s): INR in the last 72 hours. CARDIAC LABS     ENZYMES:No results for input(s): CKMB, CKMBINDEX, TROPONINI in the last 72 hours. Invalid input(s): CKTOTAL;3  FASTING LIPID PANEL:  Lab Results   Component Value Date    HDL 42 10/17/2018    LDLCALC 90 10/17/2018    TRIG 86 10/17/2018     LIVER PROFILE:No results for input(s): AST, ALT, ALB in the last 72 hours.     -----------------------------------------------------------------  Telemetry: personally reviewed RAD:     CXR:  FINDINGS:   Mild cardiomegaly.  Bilateral parahilar increased markings.  No pneumothorax. No pleural effusion.           Impression   Bilateral parahilar infiltrates could represent edema or atypical pneumonia       EK22   SR with non specific ST and T wave abnormalities     Echo:     Imaging:Global left ventricular function is normal with ejection fraction estimated   from 55 % to 60 %.   Moderate concentric left ventricular hypertrophy.   Grade II diastolic dysfunction with elevated LV filling pressures.   Mild mitral regurgitation is present.   The left atrium is mildly dilated.   The right ventricle is normal in size and function.   Systolic pulmonary artery pressure (SPAP) is estimated at 56 mmHg consistent   with moderate pulmonary hypertension (RA pressure 3 mmHg). Mild tricuspid   regurgitation. RHC:  HEMODYNAMIC DATA:  1. Right atrial pressure mean was 15 mmHg with an oxygen saturation of  58%. 2.  RV pressure of 49/5 with mean of 17 with oxygen saturation of 52%. 3.  PA pressure 43/12, mean of 23, oxygen saturation 59%. 4.  Pulmonary capillary wedge pressure was 8 mmHg. 5.  Cardiac output was 4.04 liters per minute, index was 2.28 liters per  minute per body surface area. Thermodilution cardiac output was 3.29  liters per minute, index was 1.86 liters per minute per body surface  area.     OVERALL IMPRESSION:  1. Significantly elevated right atrial pressure. 2.  Mild to moderate pulmonary hypertension. 3.  Normal pulmonary capillary wedge pressure at 8 mmHg.   4.  Low normal cardiac output and indices.     In view of the above findings, we will continue to hold the patient's  Lasix and consider getting a cardiac MRI at a later stage to rule out  any evidence of infiltrative heart disease.       Objective:   Vitals: BP (!) 108/55   Pulse 53   Temp 98.3 °F (36.8 °C) (Oral)   Resp 16   Ht 5' (1.524 m)   Wt 177 lb 14.6 oz (80.7 kg)   SpO2 96%   BMI 34.75 kg/m² General appearance: alert, appears stated age and cooperative, No acute distress   Skin: Skin color, texture, turgor normal. No rashes or ecchymosis. HEENT: Head: Normocephalic, no lesions, without obvious abnormality. Neck: no adenopathy, no carotid bruit, no JVD, supple, symmetrical, trachea midline and thyroid not enlarged, symmetric, no tenderness/mass/nodules  Lungs: clear to auscultation bilaterally, no accessory muscle use, no respiratory distress, on RA  Heart: regular rate and rhythm, S1, S2 normal, no murmur, click, rub or gallop  Abdomen: soft, non-tender; bowel sounds normal; no masses,  no organomegaly  Extremities: +1-+2 BLE , pulses: DP +2/+2, PT +2/+2  Neurologic: Mental status: Alert, oriented, thought content appropriate, no tremors, no gross sensory motor deficit,   Psychiatric: normal insight and affect      Assessment & Plan:    Patient Active Problem List:    Plan:  1. Heart failure   Acute on chronic diastolic    NYHA III on admission    Wt 177 lb    Compensated on exam    S/p RHC with low pressures    Plan for outpt cardiac MRI to rule out infiltrative heart disease   2. HTN    Stable  Decrease hydralazine with hold parameters    Goal < 130/80   On Norvasc, hydralazine, Imdur, Torpol XL       3. CHLOE on CKD   Baseline 1.4-1.6   Creatinine 3.3    Followed by renal          Patient is stable from a CV standpoint. Cardiology will sign off with outpatient follow up in 1 week. Please call with questions.         SARA Domínguez-CNP   [facility]  Cardiology   5/5/2022  8:26 AM

## 2022-05-05 NOTE — CARE COORDINATION
5/5 Plan: Home. Has transport. Creatinine worse today 3.3. Follow for needs.  Electronically signed by Carolina Maya RN on 5/5/2022 at 2:04 PM

## 2022-05-05 NOTE — PROGRESS NOTES
Admit Date: 4/26/2022    REASON FOR ADMISSION:   The patient with congestive heart failure. REASON FOR follow up:    patient with acute-on-chronic kidney disease. Interval history  Feels well  No more edema  Creat 2.8-->2.5-->3.1-->3.3  Urine 1400-->3000  /61  Wt 176-->177    Rt heart cath   Significantly elevated right atrial pressure. Mild to moderate pulmonary hypertension. Normal pulmonary capillary wedge pressure at 8 mmHg. Low normal cardiac output and indices.     As per cardiology  consider getting a cardiac MRI at a later stage to rule out  any evidence of infiltrative heart disease. PLAN:    hold Lasix  If discharged    closely fu as out pt     CHLOE/CKD 3b.   due to congestive heart failure  diastolic dysfunction, was given diuretics with her weight down 172 from 184. high proBNP 4268. Currently off of diuretics     Admission creatinine 2   went up to 2.8. Creatinine 1.6 on 03/20/2022   1.42 in 2021   1.5 in 2020   1.4 in 2019   0.9 in 2018. Proteinuria 2.2 G   Urine in 2019 has 100 mg protein, 2 rbc. SG 1.012,protein 100,RBC 2, WBC 23   No renal imaging. Echo grade 2 diastolic dysfunction with high filling   pressure. CKD, most likely underlying diabetic nephropathy, has protein in the urine even in 2019. No retinopathy at least in 2021 exam.    Does have neuropathy. Diabetes at least 2014 likely longer    A1c was 12.4 in 2014  Positive neuropathy as per the patient. No retinopathy. discusse needs good control of BP/diabetes  Low salt diet   Has stage 4 CKD  Unfortunately will eventually need renal replacement     Diabetes   at least since 2014   A1c  9.1   was 12.4 in 2014  No retinopathy   Does complain of some neuropathy. Hypertension. Blood pressure 153/74. Add amlodipine. We will get her off hydralazine which can cause lupus or even ANCA vasculitis.     HFpEF   normal ejection fraction   grade 2 diastolic dysfunction, increased filling  pressure, left atrium mildly enlarged. Pulmonary artery pressure was 56. Anemia   B12 >2000   folate. 15    Abnormal LFTs   with ALT of 77   could be due to congestive heart failure  in the past.    Hepatitis C in 2019 was negative. [facility]  HISTORY OF PRESENTING COMPLAINT:  I was asked by Dr. Todd Carlson to render  opinion on this 74-year-old single  female, no smoking,  occasional alcohol, has two children in good health, no family history  of kidney disease, who presents with increasing lower extremity edema  and shortness of breath and was found to be in congestive heart failure  on clinical exam, imaging studies and lab work for which she was given  diuretics and she has worsening renal function.     REVIEW OF SYSTEMS:  GENERAL:  No fever or chills. CARDIAC:  No chest pain. PULMONARY:  Positive shortness of breath. GENITOURINARY:  No dysuria or hematuria. VASCULAR:  No claudication. ENDOCRINE:  Diabetes, longstanding, uncontrolled. GASTROINTESTINAL:  No nausea, vomiting. Review of the rest of the systems is negative.       Objective:     Patient Vitals for the past 8 hrs:   BP Temp Temp src Pulse Resp SpO2 Weight   05/05/22 1110 -- -- -- -- -- 96 % --   05/05/22 0856 121/61 98.6 °F (37 °C) Oral 57 18 -- --   05/05/22 0442 (!) 108/55 98.3 °F (36.8 °C) Oral 53 16 96 % --   05/05/22 0442 -- -- -- -- -- -- 177 lb 14.6 oz (80.7 kg)       I/O last 3 completed shifts: In: 2591 [P.O.:1801]  Out: 3000 [Urine:3000]    GENERAL:  On examination, awake, alert, no distress. HEENT:  Head normocephalic, atraumatic. Conjunctivae:  No icterus. CARDIAC:  She has normal heart sounds. No rub, murmur, or gallop. NECK:  JVD was not raised. Carotids are normal.  Thyroid was not  enlarged. CHEST:  Clear to auscultation. ABDOMEN:  Soft, nontender. Liver, spleen, kidneys, bladder not  palpable. Bowel sounds present.   EXTREMITIES:  Bilateral lower extremities, she still has 1+ edema  bilaterally. NEUROLOGICAL:  Awake and alert. No focal neurological signs.     .l  Lab Results   Component Value Date    CREATININE 3.3 (H) 05/05/2022    BUN 88 (HH) 05/05/2022     05/05/2022    K 4.2 05/05/2022    CL 96 (L) 05/05/2022    CO2 25 05/05/2022     Lab Results   Component Value Date    WBC 6.7 05/04/2022    HGB 10.2 (L) 05/04/2022    HCT 30.8 (L) 05/04/2022    MCV 90.2 05/04/2022     05/04/2022            AGLUCOSE)Magnesium:    Lab Results   Component Value Date    MG 1.50 04/29/2022     Phosphorus:  No results found for: PHOS    Uric Acid:  No components found for: URIC    Principal Problem:    Acute on chronic diastolic (congestive) heart failure (HCC)  Active Problems:    Acute respiratory failure with hypoxia (HCC)    Influenza A    Elevated troponin    Acute pulmonary edema (HCC)    Acute kidney injury superimposed on CKD ([facility]    Hypertensive urgency    Hyperlipidemia    Morbid obesity due to excess calories (HCC)    Acute congestive heart failure (HCC)    DMII (diabetes mellitus, type 2) ([facility] [facility]    Primary hypertension  Resolved Problems:    * No resolved hospital problems.  *

## 2022-05-05 NOTE — PROGRESS NOTES
Dr Jeevan Hernandez notified of lunch time blood sugar 426, see new orders to increase nightly lantus dose and increase sliding scale from low to high.

## 2022-05-05 NOTE — CONSULTS
New referral to the [address] [place] [address] for heart failure services from [facility]. Met with Greg Lima to schedule appointment. Agreeable to meet with me. Will see [facility] 5/12/22. Greg Lima agrees to schedule for 5/23/22 in the outpatient center. She added both appointments to her phone. I gave [address] [place] [address] pamphlet with appointment card as well. I look forward to meeting with her 5/23/22 to provide added support and assist in her transition of care. She denies any concerns or questions at this time.      Payton Angela, PharmD  [address] [facility]  Heart Failure Service  [phone]

## 2022-05-05 NOTE — PLAN OF CARE
Problem: Discharge Planning  Goal: Discharge to home or other facility with appropriate resources  5/5/2022 1050 by Song Bustos RN  Outcome: Progressing     Problem: Pain  Goal: Verbalizes/displays adequate comfort level or baseline comfort level  5/5/2022 1050 by Song Bustos RN  Outcome: Progressing     Problem: Safety - Adult  Goal: Free from fall injury  5/5/2022 1050 by Song Bustos RN  Outcome: Progressing  Flowsheets (Taken 5/4/2022 2333 by Ron Gonzalez RN)  Free From Fall Injury: Instruct family/caregiver on patient safety     Problem: Chronic Conditions and Co-morbidities  Goal: Patient's chronic conditions and co-morbidity symptoms are monitored and maintained or improved  5/5/2022 1050 by Song Bustos RN  Outcome: Progressing     Problem: ABCDS Injury Assessment  Goal: Absence of physical injury  5/5/2022 1050 by Song Bustos RN  Outcome: Progressing     Problem: Cardiovascular - Adult  Goal: Maintains optimal cardiac output and hemodynamic stability  5/5/2022 1050 by Song Bustos RN  Outcome: Progressing     Problem: Musculoskeletal - Adult  Goal: Maintain proper alignment of affected body part  5/5/2022 1050 by Song Bustos RN  Outcome: Progressing     Problem: Respiratory - Adult  Goal: Achieves optimal ventilation and oxygenation  5/5/2022 1050 by Song Bustos RN  Outcome: Progressing

## 2022-05-05 NOTE — PLAN OF CARE
Problem: Discharge Planning  Goal: Discharge to home or other facility with appropriate resources  Outcome: Progressing     Problem: Pain  Goal: Verbalizes/displays adequate comfort level or baseline comfort level  Outcome: Progressing  Pain/discomfort being managed with PRN analgesics per MD orders. Pt able to express presence and absence of pain and rate pain appropriately using numerical scale. Problem: Safety - Adult  Goal: Free from fall injury  Outcome: Progressing   Patient uses call light appropriately to express needs. Bed to lowest position with door open and call light in reach. All fall precautions implemented at this time. Siderails up x2. Non skid footwear in place. Patient has had no falls this shift. Will continue to monitor.      Problem: Chronic Conditions and Co-morbidities  Goal: Patient's chronic conditions and co-morbidity symptoms are monitored and maintained or improved  Outcome: Progressing     Problem: ABCDS Injury Assessment  Goal: Absence of physical injury  Outcome: Progressing     Problem: Cardiovascular - Adult  Goal: Maintains optimal cardiac output and hemodynamic stability  Outcome: Progressing  Goal: Absence of cardiac dysrhythmias or at baseline  Outcome: Progressing     Problem: Musculoskeletal - Adult  Goal: Return mobility to safest level of function  Outcome: Progressing  Goal: Maintain proper alignment of affected body part  Outcome: Progressing  Goal: Return ADL status to a safe level of function  Outcome: Progressing     Problem: Respiratory - Adult  Goal: Achieves optimal ventilation and oxygenation  Outcome: Progressing

## 2022-05-05 NOTE — PROGRESS NOTES
Hospitalist Progress Note      PCP: Jabari Kearns MD    Date of Admission: 4/26/2022    Chief Complaint: shortness of breath and lower limb edema. Hospital Course:   61 y. o. year-old female with a history of hypertension, hyperlipidemia, type II diabetes mellitus and CHF.  She presents to the emergency room for evaluation following a 4-day history of worsening shortness of breath, a nonproductive cough and increasing lower extremity edema. Admitted with diastolic heart failure exacerbation as well as CHLOE on CKD. 5/2 Cr increased to 2.8 with diuresis, nephrology consulted. 5/4 Cr increased to 3.2    Subjective:   Pt seen and examined. Has no complaints. Breathing stable. RHC results noted.    Medications:  Reviewed    Infusion Medications    sodium chloride      sodium chloride      dextrose      sodium chloride       Scheduled Medications    insulin glargine  15 Units SubCUTAneous Nightly    insulin lispro  5 Units SubCUTAneous TID WC    insulin lispro  0-18 Units SubCUTAneous TID WC    insulin lispro  0-9 Units SubCUTAneous Nightly    sodium chloride flush  5-40 mL IntraVENous 2 times per day    [Held by provider] furosemide  60 mg Oral BID    sodium chloride flush  5-40 mL IntraVENous 2 times per day    hydrALAZINE  25 mg Oral 2 times per day    amLODIPine  5 mg Oral Daily    isosorbide mononitrate  30 mg Oral Daily    metoprolol succinate  75 mg Oral BID    pregabalin  150 mg Oral BID    atorvastatin  10 mg Oral Daily    aspirin  81 mg Oral Daily    sodium chloride flush  10 mL IntraVENous 2 times per day    heparin (porcine)  5,000 Units SubCUTAneous 3 times per day     PRN Meds: sodium chloride flush, sodium chloride, sodium chloride flush, sodium chloride, albuterol sulfate HFA, glucose, dextrose, glucagon (rDNA), dextrose, sodium chloride flush, sodium chloride, ondansetron, polyethylene glycol, acetaminophen **OR** acetaminophen, benzocaine-menthol, hydrALAZINE      Intake/Output Summary (Last 24 hours) at 5/5/2022 1142  Last data filed at 5/5/2022 0942  Gross per 24 hour   Intake 1676 ml   Output 1800 ml   Net -124 ml       Physical Exam Performed:    /61   Pulse 57   Temp 98.6 °F (37 °C) (Oral)   Resp 18   Ht 5' (1.524 m)   Wt 177 lb 14.6 oz (80.7 kg)   SpO2 96%   BMI 34.75 kg/m²     General appearance: No apparent distress, appears stated age and cooperative. On oxygen via nasal cannula  HEENT: Pupils equal, round, and reactive to light. Conjunctivae/corneas clear. Neck: Supple, with full range of motion. No jugular venous distention. Trachea midline. Respiratory: Lungs clear to auscultation. cardiovascular: Regular rate and rhythm with normal S1/S2 without murmurs, rubs or gallops. Abdomen: Soft, non-tender, non-distended with normal bowel sounds. Musculoskeletal: Bilateral lower limb edema   Skin: Skin color, texture, turgor normal.  No rashes or lesions. Neurologic:  Neurovascularly intact without any focal sensory/motor deficits. Cranial nerves: II-XII intact, grossly non-focal.  Psychiatric: Alert and oriented, thought content appropriate, normal insight  Capillary Refill: Brisk,3 seconds, normal   Peripheral Pulses: +2 palpable, equal bilaterally       Labs:   Recent Labs     05/04/22  0550   WBC 6.7   HGB 10.2*   HCT 30.8*        Recent Labs     05/03/22  0620 05/03/22  0620 05/04/22  0550 05/05/22  0614     --  137  136 136   K 4.1   < > 4.5  4.5 4.2   CL 97*  --  96*  95* 96*   CO2 31  --  27  29 25   BUN 70*  --  75*  77* 88*   CREATININE 2.5*  --  3.2*  3.1* 3.3*   CALCIUM 8.8  --  8.7  8.7 8.5    < > = values in this interval not displayed. No results for input(s): AST, ALT, BILIDIR, BILITOT, ALKPHOS in the last 72 hours. No results for input(s): INR in the last 72 hours. No results for input(s): Jimbo Favre in the last 72 hours.     Urinalysis:      Lab Results   Component Value Date    NITRU Negative 05/02/2022    WBCUA 23 05/02/2022    BACTERIA None Seen 05/02/2022    RBCUA 2 05/02/2022    BLOODU Negative 05/02/2022    SPECGRAV 1.012 05/02/2022    GLUCOSEU Negative 05/02/2022       Radiology:  XR CHEST (2 VW)   Final Result   Bilateral parahilar infiltrates could represent edema or atypical pneumonia                 Assessment/Plan:    Active Hospital Problems    Diagnosis     Acute respiratory failure with hypoxia (HCC) [J96.01]      Priority: Medium    Influenza A [J10.1]      Priority: Medium    Elevated troponin [R77.8]      Priority: Medium    Acute pulmonary edema (HCC) [J81.0]      Priority: Medium    Acute kidney injury superimposed on CKD ([facility] [address] [N17.9, N18.9]      Priority: Medium    Hypertensive urgency [I16.0]      Priority: Medium    Hyperlipidemia [E78.5]      Priority: Medium    Morbid obesity due to excess calories ([facility] [address] [E66.01]      Priority: Medium    Acute congestive heart failure (HCC) [I50.9]      Priority: Medium    Acute on chronic diastolic (congestive) heart failure (HCC) [I50.33]      Priority: Medium    DMII (diabetes mellitus, type 2) ([facility] [address] [E11.9]     Primary hypertension [I10]      Acute hypoxic respiratory failure. Acute on chronic diastolic heart failure exacerbation. Patient presented to emergency with worsening shortness of breath, echo 55 to 60% with grade 2 diastolic dysfunction, followed by cardiology. RHC showed low pressures, holding Lasix for now and recommend outpatient cardiac MRI    Creatinine increased to 2.3-->2.8--> 2.5-->3.2-->3. 3. Plan.    -Hold p.o. Lasix 60 mg twice daily  -Continue monitor intake and output.  -Weight daily. -Appreciate cardiology input.   -Resume home medications. Acute renal failure on CKD . Patient's baseline is around 1.4-1.6. Creatinine slightly above baseline, 2-->2.3--> 2.8-->2.5-->3.2-->3.3 today. Continue to hold ACE inhibitors and Lasix today  Appreciate nephrology input    Influenza A.   Tested positive at private pharmacy, negative in our facility. Infectious disease involved, no need for Tamiflu. Diabetes mellitus. Blood sugar well maintained. Adjust Lantus and Humalog    hypertension. Resume home medications. Hyperlipidemia. Continue on statin    DVT Prophylaxis: heparin  Diet: ADULT DIET; Regular; Low Fat/Low Chol/High Fiber/2 gm Na  Code Status: DNR-CCA    PT/OT Eval Status: ambulatory. ,     Dispo - pending clinical improvement       Dean Hines MD

## 2022-05-06 VITALS
WEIGHT: 178.57 LBS | HEART RATE: 56 BPM | DIASTOLIC BLOOD PRESSURE: 62 MMHG | TEMPERATURE: 98.3 F | SYSTOLIC BLOOD PRESSURE: 112 MMHG | OXYGEN SATURATION: 94 % | RESPIRATION RATE: 20 BRPM | HEIGHT: 60 IN | BODY MASS INDEX: 35.06 KG/M2

## 2022-05-06 LAB
ANION GAP SERPL CALCULATED.3IONS-SCNC: 15 MMOL/L (ref 3–16)
BUN BLDV-MCNC: 95 MG/DL (ref 7–20)
CALCIUM SERPL-MCNC: 8.5 MG/DL (ref 8.3–10.6)
CHLORIDE BLD-SCNC: 96 MMOL/L (ref 99–110)
CO2: 24 MMOL/L (ref 21–32)
CREAT SERPL-MCNC: 3.6 MG/DL (ref 0.6–1.2)
GFR AFRICAN AMERICAN: 15
GFR NON-AFRICAN AMERICAN: 13
GLUCOSE BLD-MCNC: 188 MG/DL (ref 70–99)
GLUCOSE BLD-MCNC: 195 MG/DL (ref 70–99)
GLUCOSE BLD-MCNC: 231 MG/DL (ref 70–99)
PERFORMED ON: ABNORMAL
PERFORMED ON: ABNORMAL
POTASSIUM REFLEX MAGNESIUM: 4.7 MMOL/L (ref 3.5–5.1)
SODIUM BLD-SCNC: 135 MMOL/L (ref 136–145)

## 2022-05-06 PROCEDURE — 6360000002 HC RX W HCPCS: Performed by: INTERNAL MEDICINE

## 2022-05-06 PROCEDURE — 6370000000 HC RX 637 (ALT 250 FOR IP): Performed by: NURSE PRACTITIONER

## 2022-05-06 PROCEDURE — 2580000003 HC RX 258: Performed by: INTERNAL MEDICINE

## 2022-05-06 PROCEDURE — 6370000000 HC RX 637 (ALT 250 FOR IP): Performed by: INTERNAL MEDICINE

## 2022-05-06 PROCEDURE — 36415 COLL VENOUS BLD VENIPUNCTURE: CPT

## 2022-05-06 PROCEDURE — 80048 BASIC METABOLIC PNL TOTAL CA: CPT

## 2022-05-06 RX ORDER — ALBUTEROL SULFATE 90 UG/1
2 AEROSOL, METERED RESPIRATORY (INHALATION) EVERY 6 HOURS PRN
Qty: 18 G | Refills: 3 | Status: SHIPPED | OUTPATIENT
Start: 2022-05-06 | End: 2022-08-02 | Stop reason: DRUGHIGH

## 2022-05-06 RX ORDER — ISOSORBIDE MONONITRATE 30 MG/1
30 TABLET, EXTENDED RELEASE ORAL DAILY
Qty: 30 TABLET | Refills: 3 | Status: SHIPPED | OUTPATIENT
Start: 2022-05-07 | End: 2022-06-16 | Stop reason: SDUPTHER

## 2022-05-06 RX ORDER — METOPROLOL SUCCINATE 25 MG/1
75 TABLET, EXTENDED RELEASE ORAL 2 TIMES DAILY
Qty: 30 TABLET | Refills: 3 | Status: ON HOLD | OUTPATIENT
Start: 2022-05-06 | End: 2022-05-20 | Stop reason: HOSPADM

## 2022-05-06 RX ORDER — HYDRALAZINE HYDROCHLORIDE 25 MG/1
25 TABLET, FILM COATED ORAL EVERY 12 HOURS SCHEDULED
Qty: 90 TABLET | Refills: 3 | Status: ON HOLD | OUTPATIENT
Start: 2022-05-06 | End: 2022-05-20 | Stop reason: HOSPADM

## 2022-05-06 RX ADMIN — SODIUM CHLORIDE, PRESERVATIVE FREE 10 ML: 5 INJECTION INTRAVENOUS at 08:21

## 2022-05-06 RX ADMIN — AMLODIPINE BESYLATE 5 MG: 5 TABLET ORAL at 08:17

## 2022-05-06 RX ADMIN — ISOSORBIDE MONONITRATE 30 MG: 30 TABLET, EXTENDED RELEASE ORAL at 08:17

## 2022-05-06 RX ADMIN — HEPARIN SODIUM 5000 UNITS: 5000 INJECTION INTRAVENOUS; SUBCUTANEOUS at 06:43

## 2022-05-06 RX ADMIN — ASPIRIN 81 MG: 81 TABLET, COATED ORAL at 08:17

## 2022-05-06 RX ADMIN — HYDRALAZINE HYDROCHLORIDE 25 MG: 50 TABLET, FILM COATED ORAL at 08:17

## 2022-05-06 RX ADMIN — INSULIN LISPRO 5 UNITS: 100 INJECTION, SOLUTION INTRAVENOUS; SUBCUTANEOUS at 11:58

## 2022-05-06 RX ADMIN — PREGABALIN 150 MG: 75 CAPSULE ORAL at 08:17

## 2022-05-06 RX ADMIN — INSULIN LISPRO 5 UNITS: 100 INJECTION, SOLUTION INTRAVENOUS; SUBCUTANEOUS at 08:21

## 2022-05-06 RX ADMIN — INSULIN LISPRO 6 UNITS: 100 INJECTION, SOLUTION INTRAVENOUS; SUBCUTANEOUS at 11:59

## 2022-05-06 RX ADMIN — INSULIN LISPRO 3 UNITS: 100 INJECTION, SOLUTION INTRAVENOUS; SUBCUTANEOUS at 08:21

## 2022-05-06 RX ADMIN — ATORVASTATIN CALCIUM 10 MG: 10 TABLET, FILM COATED ORAL at 08:17

## 2022-05-06 RX ADMIN — METOPROLOL SUCCINATE 75 MG: 50 TABLET, EXTENDED RELEASE ORAL at 08:17

## 2022-05-06 NOTE — PROGRESS NOTES
Pharmacy Heart Failure Medication Reconciliation Note    Pt discharged from [facility] today. Chief Complaint   Patient presents with    Cough     cough, sob, chest pain. onset 4 ays ago. denies fever. nonproductive cough. BLE edema       Juni Owens has a diagnosis of acute on chronic diastolic heart failure (last EF = 55-60 % on 04/27/22). Pertinent Labs:  BMP:   Lab Results   Component Value Date     05/06/2022    K 4.7 05/06/2022    CL 96 05/06/2022    CO2 24 05/06/2022    BUN 95 05/06/2022    CREATININE 3.6 05/06/2022     BNP:   Lab Results   Component Value Date    PROBNP 3,434 04/29/2022    PROBNP 4,266 04/26/2022       Patient taking an ACEI / ARB / Entresto for EF </= 40%: N/A  Patient taking a BETA BLOCKER (Coreg, Toprol XL or bisoprolol) for EF </= 40%: Yes - Toprol XL 75 mg po BID  Patient taking a LOOP DIURETIC: No - diuretics on hold at discharge for CHLOE  Patient taking a ALDOSTERONE RECEPTOR ANTAGONIST for EF </= 35%: N/A  Patient taking an SGLT2I for EF </= 40%: No - to be addressed as outpatient    Patient has a diagnosis of Atrial Fibrillation: No    Patient has a diagnosis of type 2 diabetes: Yes - Patient is to continue Lantus 25 units SQ QHS with Humalog Sliding Scale as prior to admission. SGLT2 to be addressed as outpatient. Corrections to discharge medications include:  None    Discharge Medications:         Medication List        START taking these medications      benzocaine-menthol 15-3.6 MG lozenge  Commonly known as: CEPACOL SORE THROAT  Take 1 lozenge by mouth every 2 hours as needed for Sore Throat or Pain     hydrALAZINE 25 MG tablet  Commonly known as: APRESOLINE  Take 1 tablet by mouth every 12 hours     isosorbide mononitrate 30 MG extended release tablet  Commonly known as: IMDUR  Take 1 tablet by mouth daily  Start taking on:  May 7, 2022            CHANGE how you take these medications      albuterol sulfate  (90 Base) MCG/ACT inhaler  Commonly known as: Proventil HFA  Inhale 2 puffs into the lungs every 6 hours as needed for Wheezing (Cough)  What changed:   when to take this  reasons to take this  additional instructions     amLODIPine 5 MG tablet  Commonly known as: NORVASC  Take 1 tablet by mouth daily  What changed: when to take this     insulin lispro 100 UNIT/ML injection vial  Commonly known as: HUMALOG  Inject 0-12 Units into the skin 3 times daily (with meals). What changed:   how much to take  additional instructions     metoprolol succinate 25 MG extended release tablet  Commonly known as: TOPROL XL  Take 3 tablets by mouth in the morning and at bedtime  What changed:   how much to take  when to take this            CONTINUE taking these medications      aspirin 81 MG EC tablet     atorvastatin 10 MG tablet  Commonly known as: LIPITOR  Take 1 tablet by mouth daily     insulin glargine 100 UNIT/ML injection vial  Commonly known as: LANTUS  Inject 25 Units into the skin nightly.      L-METHYLFOLATE-B6-B12 PO     potassium chloride 10 MEQ extended release capsule  Commonly known as: MICRO-K     pregabalin 150 MG capsule  Commonly known as: LYRICA     vitamin D3 125 MCG (5000 UT) Caps            STOP taking these medications      hydroCHLOROthiazide 25 MG tablet  Commonly known as: HYDRODIURIL     olmesartan 40 MG tablet  Commonly known as: BENICAR               Where to Get Your Medications        These medications were sent to [address] [place] [address] [facility] [phone]  [address] [place] [address] [id number] [place] [address] Road      Phone: [phone]   albuterol sulfate  (90 Base) MCG/ACT inhaler  benzocaine-menthol 15-3.6 MG lozenge  hydrALAZINE 25 MG tablet  isosorbide mononitrate 30 MG extended release tablet  metoprolol succinate 25 MG extended release tablet         Maxx Perez, [address] [facility]  Heart Failure Discharge Medication Reconciliation Program  [phone]

## 2022-05-06 NOTE — CARE COORDINATION
DISCHARGE SUMMARY     DATE OF DISCHARGE: 5/6/22    DISCHARGE DESTINATION: Home    HOME CARE: No    HEMODIALYSIS: No    TRANSPORTATION: Private Car    NEW DME ORDERED: no    COMMENTS: Patient discharged to home with Mother. Electronically signed by Yajaira Kinney RN on 5/6/2022 at 3:24 PM

## 2022-05-06 NOTE — PROGRESS NOTES
Admit Date: 4/26/2022    REASON FOR ADMISSION:   The patient with congestive heart failure. REASON FOR follow up:    patient with acute-on-chronic kidney disease. Interval history  Feels well  Has some  edema  Creat 2.8-->2.5-->3.1-->3.3-->3.6  Urine 1400-->3000-->2325  /66  Wt 176-->177-->178    Rt heart cath   Significantly elevated right atrial pressure. Mild to moderate pulmonary hypertension. Normal pulmonary capillary wedge pressure at 8 mmHg. Low normal cardiac output and indices.     As per cardiology  consider getting a cardiac MRI at a later stage to rule out  any evidence of infiltrative heart disease. PLAN:   Check bladder scan   Repeat UA    hold Lasix  Can discharge    closely fu as out pt   Labs on Monday script given   Discussed with Dr [address]     CHLOE/CKD 3b.   due to congestive heart failure  diastolic dysfunction, was given diuretics with her weight down 172 from 184. high proBNP 4268. Currently off of diuretics     Admission creatinine 2   went up to 2.8. Creatinine 1.6 on 03/20/2022   1.42 in 2021   1.5 in 2020   1.4 in 2019   0.9 in 2018. Proteinuria 2.2 G  SG 1.012,protein 100,RBC 2, WBC 23   Urine in 2019 has 100 mg protein, 2 rbc. No renal imaging. Echo grade 2 diastolic dysfunction with high filling   pressure. CKD, most likely underlying diabetic nephropathy, has protein in the urine even in 2019. No retinopathy at least in 2021 exam.    Does have neuropathy. Diabetes at least 2014 likely longer    A1c was 12.4 in 2014  Positive neuropathy as per the patient. No retinopathy. discusse needs good control of BP/diabetes  Low salt diet   Has stage 4 CKD  Unfortunately will eventually need renal replacement     Diabetes   at least since 2014   A1c  9.1   was 12.4 in 2014  No retinopathy   Does complain of some neuropathy. Hypertension. Blood pressure 153/74. Add amlodipine.    We will get her off hydralazine which can cause lupus or even ANCA vasculitis. HFpEF   normal ejection fraction   grade 2 diastolic dysfunction, increased filling  pressure, left atrium mildly enlarged. Pulmonary artery pressure was 56. Anemia   B12 >2000   folate. 15    Abnormal LFTs   with ALT of 77   could be due to congestive heart failure  in the past.    Hepatitis C in 2019 was negative. [facility]  HISTORY OF PRESENTING COMPLAINT:  I was asked by Dr. Todd Carlson to render  opinion on this 81-year-old single  female, no smoking,  occasional alcohol, has two children in good health, no family history  of kidney disease, who presents with increasing lower extremity edema  and shortness of breath and was found to be in congestive heart failure  on clinical exam, imaging studies and lab work for which she was given  diuretics and she has worsening renal function.     REVIEW OF SYSTEMS:  GENERAL:  No fever or chills. CARDIAC:  No chest pain. PULMONARY:  Positive shortness of breath. GENITOURINARY:  No dysuria or hematuria. VASCULAR:  No claudication. ENDOCRINE:  Diabetes, longstanding, uncontrolled. GASTROINTESTINAL:  No nausea, vomiting. Review of the rest of the systems is negative.       Objective:     Patient Vitals for the past 8 hrs:   BP Temp Temp src Pulse Resp SpO2 Weight   05/06/22 0952 124/66 98.3 °F (36.8 °C) Oral 68 20 98 % --   05/06/22 0817 (!) 116/59 -- -- 61 -- -- --   05/06/22 0526 -- -- -- -- -- -- 178 lb 9.2 oz (81 kg)   05/06/22 0404 118/63 98.3 °F (36.8 °C) Oral 52 16 92 % --       I/O last 3 completed shifts: In: 6421 [P.O.:1796]  Out: 2325 [Urine:2325]    GENERAL:  On examination, awake, alert, no distress. HEENT:  Head normocephalic, atraumatic. Conjunctivae:  No icterus. CARDIAC:  She has normal heart sounds. No rub, murmur, or gallop. NECK:  JVD was not raised. Carotids are normal.  Thyroid was not  enlarged. CHEST:  Clear to auscultation. ABDOMEN:  Soft, nontender. Liver, spleen, kidneys, bladder not  palpable.   Bowel sounds present. EXTREMITIES:  Bilateral lower extremities, she still has 1+ edema  bilaterally. NEUROLOGICAL:  Awake and alert. No focal neurological signs.     .l  Lab Results   Component Value Date    CREATININE 3.6 (H) 05/06/2022    BUN 95 (HH) 05/06/2022     (L) 05/06/2022    K 4.7 05/06/2022    CL 96 (L) 05/06/2022    CO2 24 05/06/2022     Lab Results   Component Value Date    WBC 6.7 05/04/2022    HGB 10.2 (L) 05/04/2022    HCT 30.8 (L) 05/04/2022    MCV 90.2 05/04/2022     05/04/2022            AGLUCOSE)Magnesium:    Lab Results   Component Value Date    MG 1.50 04/29/2022     Phosphorus:  No results found for: PHOS    Uric Acid:  No components found for: URIC    Principal Problem:    Acute on chronic diastolic (congestive) heart failure (HCC)  Active Problems:    Acute respiratory failure with hypoxia (HCC)    Influenza A    Elevated troponin    Acute pulmonary edema (HCC)    Acute kidney injury superimposed on CKD ([facility] [address]    Hypertensive urgency    Hyperlipidemia    Morbid obesity due to excess calories (HCC)    Acute congestive heart failure (HCC)    DMII (diabetes mellitus, type 2) ([facility] [address]    Primary hypertension  Resolved Problems:    * No resolved hospital problems.  *

## 2022-05-06 NOTE — DISCHARGE SUMMARY
Hospital Medicine Discharge Summary    Patient ID: Rogelio English      Patient's PCP: Amber Abraham MD    Admit Date: 4/26/2022     Discharge Date:   5/6/2022    Admitting Physician: Paradise Herrmann MD     Discharge Physician: Delvis Medrano MD     Discharge Diagnoses: Active Hospital Problems    Diagnosis Date Noted    Acute respiratory failure with hypoxia ([address] [J96.01] 04/27/2022     Priority: Medium    Influenza A [J10.1] 04/27/2022     Priority: Medium    Elevated troponin [R77.8] 04/27/2022     Priority: Medium    Acute pulmonary edema ([address] [J81.0] 04/27/2022     Priority: Medium    Acute kidney injury superimposed on CKD ([address] [N17.9, N18.9] 04/27/2022     Priority: Medium    Hypertensive urgency [I16.0] 04/27/2022     Priority: Medium    Hyperlipidemia [E78.5] 04/27/2022     Priority: Medium    Morbid obesity due to excess calories ([address] [E66.01] 04/27/2022     Priority: Medium    Acute congestive heart failure ([address] [I50.9]      Priority: Medium    Acute on chronic diastolic (congestive) heart failure ([address] [I50.33] 04/26/2022     Priority: Medium    DMII (diabetes mellitus, type 2) ([address] [E11.9] 01/17/2014    Primary hypertension [I10] 01/17/2014       The patient was seen and examined on day of discharge and this discharge summary is in conjunction with any daily progress note from day of discharge. Hospital Course: 61 y. o. year-old female with a history of hypertension, hyperlipidemia, type II diabetes mellitus and CHF.  She presents to the emergency room for evaluation following a 4-day history of worsening shortness of breath, a nonproductive cough and increasing lower extremity edema.      Admitted with diastolic heart failure exacerbation as well as CHLOE on CKD. 5/2 Cr increased to 2.8 with diuresis, nephrology consulted.      5/4 Cr increased to 3.2      Acute hypoxic respiratory failure. Acute on chronic diastolic heart failure exacerbation.   Patient presented to emergency with worsening shortness of breath, echo 55 to 60% with grade 2 diastolic dysfunction, followed by cardiology. RHC showed low pressures, holding Lasix for now and recommend outpatient cardiac MRI     Creatinine increased to 2.3-->2.8--> 2.5-->3.2-->3. 3. Plan.    -Hold p.o. Lasix 60 mg twice daily  -Continue monitor intake and output.  -Weight daily. -Appreciate cardiology input.   -Resume home medications.   -discussed with nephrology; they will recheck labs on Monday and follow-up, will continue to hold Lasix and ARB at discharge     Acute renal failure on CKD . Patient's baseline is around 1.4-1.6. Creatinine slightly above baseline, 2-->2.3--> 2.8-->2.5-->3.2-->3.3 today. Continue to hold ACE inhibitors and Lasix today  Appreciate nephrology input; recheck labs on Monday and follow-up     Influenza A. Tested positive at private pharmacy, negative in our facility. Infectious disease involved, no need for Tamiflu.     Diabetes mellitus. Blood sugar well maintained. Adjusted Lantus and Humalog     hypertension. Resume home medications.     Hyperlipidemia. Continue on statin      Exam:     /66   Pulse 68   Temp 98.3 °F (36.8 °C) (Oral)   Resp 20   Ht 5' (1.524 m)   Wt 178 lb 9.2 oz (81 kg)   SpO2 98%   BMI 34.88 kg/m²       General appearance:  No apparent distress, appears stated age and cooperative. HEENT:  Normal cephalic, atraumatic without obvious deformity. Pupils equal, round, and reactive to light. Extra ocular muscles intact. Conjunctivae/corneas clear. Neck: Supple, with full range of motion. No jugular venous distention. Trachea midline. Respiratory:  Normal respiratory effort. Clear to auscultation, bilaterally without Rales/Wheezes/Rhonchi. Cardiovascular:  Regular rate and rhythm with normal S1/S2 without murmurs, rubs or gallops. Abdomen: Soft, non-tender, non-distended with normal bowel sounds. Musculoskeletal:  No clubbing, cyanosis or edema bilaterally. Full range of motion without deformity. Skin: Skin color, texture, turgor normal.  No rashes or lesions. Neurologic:  Neurovascularly intact without any focal sensory/motor deficits. Cranial nerves: II-XII intact, grossly non-focal.  Psychiatric:  Alert and oriented, thought content appropriate, normal insight  Capillary Refill: Brisk,< 3 seconds   Peripheral Pulses: +2 palpable, equal bilaterally       Labs:  For convenience and continuity at follow-up the following most recent labs are provided:      CBC:    Lab Results   Component Value Date    WBC 6.7 05/04/2022    HGB 10.2 05/04/2022    HCT 30.8 05/04/2022     05/04/2022       Renal:    Lab Results   Component Value Date     05/06/2022    K 4.7 05/06/2022    CL 96 05/06/2022    CO2 24 05/06/2022    BUN 95 05/06/2022    CREATININE 3.6 05/06/2022    CALCIUM 8.5 05/06/2022         Significant Diagnostic Studies    Radiology:   XR CHEST (2 VW)   Final Result   Bilateral parahilar infiltrates could represent edema or atypical pneumonia                Consults:     IP CONSULT TO INFECTIOUS DISEASES  IP CONSULT TO CARDIOLOGY  IP CONSULT TO HEART FAILURE NURSE/COORDINATOR  IP CONSULT TO DIETITIAN  IP CONSULT TO DIABETES EDUCATOR  IP CONSULT TO NEPHROLOGY    Disposition:  home     Condition at Discharge: Stable    Discharge Instructions/Follow-up:  Recheck BMP on Monday, follow-up with nephrology and cardiology, PCP    Code Status:  DNR-CCA     Activity: activity as tolerated    Diet: diabetic diet      Discharge Medications:     Current Discharge Medication List           Details   isosorbide mononitrate (IMDUR) 30 MG extended release tablet Take 1 tablet by mouth daily  Qty: 30 tablet, Refills: 3      hydrALAZINE (APRESOLINE) 25 MG tablet Take 1 tablet by mouth every 12 hours  Qty: 90 tablet, Refills: 3      benzocaine-menthol (CEPACOL SORE THROAT) 15-3.6 MG lozenge Take 1 lozenge by mouth every 2 hours as needed for Sore Throat or Pain  Qty: 18 lozenge, Refills: 0              Details   albuterol sulfate HFA (PROVENTIL HFA) 108 (90 Base) MCG/ACT inhaler Inhale 2 puffs into the lungs every 6 hours as needed for Wheezing (Cough)  Qty: 18 g, Refills: 3      metoprolol succinate (TOPROL XL) 25 MG extended release tablet Take 3 tablets by mouth in the morning and at bedtime  Qty: 30 tablet, Refills: 3              Details   aspirin 81 MG EC tablet Take 81 mg by mouth daily      potassium chloride (MICRO-K) 10 MEQ extended release capsule Take 10 mEq by mouth daily      Cholecalciferol (VITAMIN D3) 125 MCG (5000 UT) CAPS Take 2 capsules by mouth once a week      atorvastatin (LIPITOR) 10 MG tablet Take 1 tablet by mouth daily  Qty: 30 tablet, Refills: 3      amLODIPine (NORVASC) 5 MG tablet Take 1 tablet by mouth daily  Qty: 30 tablet, Refills: 3      pregabalin (LYRICA) 150 MG capsule Take 150 mg by mouth every evening. L-METHYLFOLATE-B6-B12 PO Take 1 tablet by mouth daily      insulin glargine (LANTUS) 100 UNIT/ML injection Inject 25 Units into the skin nightly. Qty: 1 vial, Refills: 1      insulin lispro (HUMALOG) 100 UNIT/ML injection Inject 0-12 Units into the skin 3 times daily (with meals). Qty: 1 Pen, Refills: 1             Time Spent on discharge is more than 30 minutes in the examination, evaluation, counseling and review of medications and discharge plan. Signed:    Femi Villalobos MD   5/6/2022      Thank you Cheri Jaime MD for the opportunity to be involved in this patient's care. If you have any questions or concerns please feel free to contact me at [phone].

## 2022-05-06 NOTE — PROGRESS NOTES
Pt with discharge orders. AVS reviewed with patient, all questions and concerns answered and acknowledged. AVS signed and copy sent with pt. PIV removed w/o complication. Pt taken down to entrance and taken home by family.

## 2022-05-06 NOTE — PROGRESS NOTES
DC order noted. Pt was seen in HF RN consult, see note. She has received > 60 mins of HF education. HF dc instructions are on AVS. Pt is going home at [place]. She and her mother reside together. Pharm notified to do a dc med rec please (new diagnosis). DC wt is 178 lbs.     Pt has follow up appts in place:    5/12 at 2:00 with Saeed Parsons, Cardiology NP  5/23 appt scheduled with our [facility]

## 2022-05-07 ENCOUNTER — APPOINTMENT (OUTPATIENT)
Dept: GENERAL RADIOLOGY | Age: 67
DRG: 308 | End: 2022-05-07
Payer: COMMERCIAL

## 2022-05-07 ENCOUNTER — HOSPITAL ENCOUNTER (INPATIENT)
Age: 67
LOS: 13 days | Discharge: NURSING, REHAB OR LONG-TERM CARE | DRG: 308 | End: 2022-05-20
Attending: EMERGENCY MEDICINE | Admitting: FAMILY MEDICINE
Payer: COMMERCIAL

## 2022-05-07 DIAGNOSIS — R55 SYNCOPE AND COLLAPSE: Primary | ICD-10-CM

## 2022-05-07 DIAGNOSIS — E11.21 DIABETIC NEPHROPATHY WITH PROTEINURIA (HCC): ICD-10-CM

## 2022-05-07 DIAGNOSIS — R00.1 SYMPTOMATIC BRADYCARDIA: ICD-10-CM

## 2022-05-07 LAB
ANION GAP SERPL CALCULATED.3IONS-SCNC: 13 MEQ/L (ref 8–16)
BASOPHILS # BLD: 0.8 %
BASOPHILS ABSOLUTE: 0.1 THOU/MM3 (ref 0–0.1)
BUN BLDV-MCNC: 116 MG/DL (ref 7–22)
CALCIUM SERPL-MCNC: 8.5 MG/DL (ref 8.5–10.5)
CHLORIDE BLD-SCNC: 94 MEQ/L (ref 98–111)
CHP ED QC CHECK: YES
CO2: 25 MEQ/L (ref 23–33)
CREAT SERPL-MCNC: 3.8 MG/DL (ref 0.4–1.2)
EKG ATRIAL RATE: 49 BPM
EKG P AXIS: -6 DEGREES
EKG P-R INTERVAL: 154 MS
EKG Q-T INTERVAL: 518 MS
EKG QRS DURATION: 86 MS
EKG QTC CALCULATION (BAZETT): 467 MS
EKG R AXIS: 36 DEGREES
EKG T AXIS: 30 DEGREES
EKG VENTRICULAR RATE: 49 BPM
EOSINOPHIL # BLD: 1 %
EOSINOPHILS ABSOLUTE: 0.1 THOU/MM3 (ref 0–0.4)
ERYTHROCYTE [DISTWIDTH] IN BLOOD BY AUTOMATED COUNT: 14.8 % (ref 11.5–14.5)
ERYTHROCYTE [DISTWIDTH] IN BLOOD BY AUTOMATED COUNT: 50.6 FL (ref 35–45)
GFR SERPL CREATININE-BSD FRML MDRD: 14 ML/MIN/1.73M2
GLUCOSE BLD-MCNC: 108 MG/DL
GLUCOSE BLD-MCNC: 108 MG/DL (ref 70–108)
GLUCOSE BLD-MCNC: 108 MG/DL (ref 70–108)
HCT VFR BLD CALC: 30.7 % (ref 37–47)
HEMOGLOBIN: 9.9 GM/DL (ref 12–16)
IMMATURE GRANS (ABS): 0.02 THOU/MM3 (ref 0–0.07)
IMMATURE GRANULOCYTES: 0.3 %
LYMPHOCYTES # BLD: 29.8 %
LYMPHOCYTES ABSOLUTE: 2.2 THOU/MM3 (ref 1–4.8)
MCH RBC QN AUTO: 29.9 PG (ref 26–33)
MCHC RBC AUTO-ENTMCNC: 32.2 GM/DL (ref 32.2–35.5)
MCV RBC AUTO: 92.7 FL (ref 81–99)
MONOCYTES # BLD: 9.4 %
MONOCYTES ABSOLUTE: 0.7 THOU/MM3 (ref 0.4–1.3)
NUCLEATED RED BLOOD CELLS: 0 /100 WBC
OSMOLALITY CALCULATION: 301.9 MOSMOL/KG (ref 275–300)
PLATELET # BLD: 300 THOU/MM3 (ref 130–400)
PMV BLD AUTO: 11.2 FL (ref 9.4–12.4)
POTASSIUM REFLEX MAGNESIUM: 4.6 MEQ/L (ref 3.5–5.2)
RBC # BLD: 3.31 MILL/MM3 (ref 4.2–5.4)
SEG NEUTROPHILS: 58.7 %
SEGMENTED NEUTROPHILS ABSOLUTE COUNT: 4.3 THOU/MM3 (ref 1.8–7.7)
SODIUM BLD-SCNC: 132 MEQ/L (ref 135–145)
TROPONIN T: 0.06 NG/ML
WBC # BLD: 7.3 THOU/MM3 (ref 4.8–10.8)

## 2022-05-07 PROCEDURE — 85025 COMPLETE CBC W/AUTO DIFF WBC: CPT

## 2022-05-07 PROCEDURE — 84484 ASSAY OF TROPONIN QUANT: CPT

## 2022-05-07 PROCEDURE — 2140000000 HC CCU INTERMEDIATE R&B

## 2022-05-07 PROCEDURE — 93010 ELECTROCARDIOGRAM REPORT: CPT | Performed by: INTERNAL MEDICINE

## 2022-05-07 PROCEDURE — 99222 1ST HOSP IP/OBS MODERATE 55: CPT | Performed by: PHYSICIAN ASSISTANT

## 2022-05-07 PROCEDURE — 99285 EMERGENCY DEPT VISIT HI MDM: CPT

## 2022-05-07 PROCEDURE — 71045 X-RAY EXAM CHEST 1 VIEW: CPT

## 2022-05-07 PROCEDURE — 93005 ELECTROCARDIOGRAM TRACING: CPT | Performed by: EMERGENCY MEDICINE

## 2022-05-07 PROCEDURE — 80048 BASIC METABOLIC PNL TOTAL CA: CPT

## 2022-05-07 PROCEDURE — 82948 REAGENT STRIP/BLOOD GLUCOSE: CPT

## 2022-05-07 RX ORDER — ISOSORBIDE MONONITRATE 30 MG/1
30 TABLET, EXTENDED RELEASE ORAL DAILY
Status: DISCONTINUED | OUTPATIENT
Start: 2022-05-08 | End: 2022-05-20 | Stop reason: HOSPADM

## 2022-05-07 RX ORDER — ALBUTEROL SULFATE 90 UG/1
2 AEROSOL, METERED RESPIRATORY (INHALATION) EVERY 6 HOURS PRN
Status: DISCONTINUED | OUTPATIENT
Start: 2022-05-07 | End: 2022-05-20 | Stop reason: HOSPADM

## 2022-05-07 RX ORDER — DEXTROSE MONOHYDRATE 50 MG/ML
100 INJECTION, SOLUTION INTRAVENOUS PRN
Status: DISCONTINUED | OUTPATIENT
Start: 2022-05-07 | End: 2022-05-20 | Stop reason: HOSPADM

## 2022-05-07 RX ORDER — ASPIRIN 81 MG/1
81 TABLET ORAL DAILY
Status: DISCONTINUED | OUTPATIENT
Start: 2022-05-08 | End: 2022-05-20 | Stop reason: HOSPADM

## 2022-05-07 RX ORDER — AMLODIPINE BESYLATE 5 MG/1
5 TABLET ORAL EVERY EVENING
Status: DISCONTINUED | OUTPATIENT
Start: 2022-05-08 | End: 2022-05-20 | Stop reason: HOSPADM

## 2022-05-07 RX ORDER — SODIUM CHLORIDE 0.9 % (FLUSH) 0.9 %
5-40 SYRINGE (ML) INJECTION EVERY 12 HOURS SCHEDULED
Status: DISCONTINUED | OUTPATIENT
Start: 2022-05-07 | End: 2022-05-20 | Stop reason: HOSPADM

## 2022-05-07 RX ORDER — PREGABALIN 75 MG/1
150 CAPSULE ORAL EVERY EVENING
Status: DISCONTINUED | OUTPATIENT
Start: 2022-05-08 | End: 2022-05-09

## 2022-05-07 RX ORDER — POLYETHYLENE GLYCOL 3350 17 G/17G
17 POWDER, FOR SOLUTION ORAL DAILY PRN
Status: DISCONTINUED | OUTPATIENT
Start: 2022-05-07 | End: 2022-05-20 | Stop reason: HOSPADM

## 2022-05-07 RX ORDER — DEXTROSE MONOHYDRATE 25 G/50ML
12.5 INJECTION, SOLUTION INTRAVENOUS PRN
Status: DISCONTINUED | OUTPATIENT
Start: 2022-05-07 | End: 2022-05-07 | Stop reason: CLARIF

## 2022-05-07 RX ORDER — ACETAMINOPHEN 650 MG/1
650 SUPPOSITORY RECTAL EVERY 6 HOURS PRN
Status: DISCONTINUED | OUTPATIENT
Start: 2022-05-07 | End: 2022-05-20 | Stop reason: HOSPADM

## 2022-05-07 RX ORDER — INSULIN LISPRO 100 [IU]/ML
18 INJECTION, SOLUTION INTRAVENOUS; SUBCUTANEOUS
Status: DISCONTINUED | OUTPATIENT
Start: 2022-05-08 | End: 2022-05-10

## 2022-05-07 RX ORDER — INSULIN LISPRO 100 [IU]/ML
0-3 INJECTION, SOLUTION INTRAVENOUS; SUBCUTANEOUS NIGHTLY
Status: DISCONTINUED | OUTPATIENT
Start: 2022-05-08 | End: 2022-05-20 | Stop reason: HOSPADM

## 2022-05-07 RX ORDER — INSULIN GLARGINE 100 [IU]/ML
25 INJECTION, SOLUTION SUBCUTANEOUS NIGHTLY
Status: DISCONTINUED | OUTPATIENT
Start: 2022-05-08 | End: 2022-05-12

## 2022-05-07 RX ORDER — ATORVASTATIN CALCIUM 10 MG/1
10 TABLET, FILM COATED ORAL DAILY
Status: DISCONTINUED | OUTPATIENT
Start: 2022-05-08 | End: 2022-05-09

## 2022-05-07 RX ORDER — ONDANSETRON 4 MG/1
4 TABLET, ORALLY DISINTEGRATING ORAL EVERY 8 HOURS PRN
Status: DISCONTINUED | OUTPATIENT
Start: 2022-05-07 | End: 2022-05-20 | Stop reason: HOSPADM

## 2022-05-07 RX ORDER — HYDRALAZINE HYDROCHLORIDE 25 MG/1
25 TABLET, FILM COATED ORAL EVERY 12 HOURS SCHEDULED
Status: DISCONTINUED | OUTPATIENT
Start: 2022-05-08 | End: 2022-05-11

## 2022-05-07 RX ORDER — ONDANSETRON 2 MG/ML
4 INJECTION INTRAMUSCULAR; INTRAVENOUS EVERY 6 HOURS PRN
Status: DISCONTINUED | OUTPATIENT
Start: 2022-05-07 | End: 2022-05-20 | Stop reason: HOSPADM

## 2022-05-07 RX ORDER — HEPARIN SODIUM 5000 [USP'U]/ML
5000 INJECTION, SOLUTION INTRAVENOUS; SUBCUTANEOUS EVERY 8 HOURS
Status: DISCONTINUED | OUTPATIENT
Start: 2022-05-08 | End: 2022-05-20 | Stop reason: HOSPADM

## 2022-05-07 RX ORDER — SODIUM CHLORIDE 0.9 % (FLUSH) 0.9 %
5-40 SYRINGE (ML) INJECTION PRN
Status: DISCONTINUED | OUTPATIENT
Start: 2022-05-07 | End: 2022-05-20 | Stop reason: HOSPADM

## 2022-05-07 RX ORDER — INSULIN LISPRO 100 [IU]/ML
36 INJECTION, SOLUTION INTRAVENOUS; SUBCUTANEOUS
Status: DISCONTINUED | OUTPATIENT
Start: 2022-05-08 | End: 2022-05-10

## 2022-05-07 RX ORDER — NICOTINE POLACRILEX 4 MG
15 LOZENGE BUCCAL PRN
Status: DISCONTINUED | OUTPATIENT
Start: 2022-05-07 | End: 2022-05-07 | Stop reason: CLARIF

## 2022-05-07 RX ORDER — INSULIN LISPRO 100 [IU]/ML
0-6 INJECTION, SOLUTION INTRAVENOUS; SUBCUTANEOUS
Status: DISCONTINUED | OUTPATIENT
Start: 2022-05-08 | End: 2022-05-20 | Stop reason: HOSPADM

## 2022-05-07 RX ORDER — ACETAMINOPHEN 325 MG/1
650 TABLET ORAL EVERY 6 HOURS PRN
Status: DISCONTINUED | OUTPATIENT
Start: 2022-05-07 | End: 2022-05-20 | Stop reason: HOSPADM

## 2022-05-07 RX ORDER — SODIUM CHLORIDE 9 MG/ML
INJECTION, SOLUTION INTRAVENOUS PRN
Status: DISCONTINUED | OUTPATIENT
Start: 2022-05-07 | End: 2022-05-20 | Stop reason: HOSPADM

## 2022-05-07 ASSESSMENT — PAIN SCALES - GENERAL
PAINLEVEL_OUTOF10: 0
PAINLEVEL_OUTOF10: 0

## 2022-05-08 ENCOUNTER — APPOINTMENT (OUTPATIENT)
Dept: CT IMAGING | Age: 67
DRG: 308 | End: 2022-05-08
Payer: COMMERCIAL

## 2022-05-08 LAB
ANION GAP SERPL CALCULATED.3IONS-SCNC: 14 MEQ/L (ref 8–16)
BASOPHILS # BLD: 0.5 %
BASOPHILS ABSOLUTE: 0 THOU/MM3 (ref 0–0.1)
BUN BLDV-MCNC: 121 MG/DL (ref 7–22)
CALCIUM SERPL-MCNC: 8.7 MG/DL (ref 8.5–10.5)
CHLORIDE BLD-SCNC: 95 MEQ/L (ref 98–111)
CO2: 25 MEQ/L (ref 23–33)
CREAT SERPL-MCNC: 3.8 MG/DL (ref 0.4–1.2)
EOSINOPHIL # BLD: 1.2 %
EOSINOPHILS ABSOLUTE: 0.1 THOU/MM3 (ref 0–0.4)
ERYTHROCYTE [DISTWIDTH] IN BLOOD BY AUTOMATED COUNT: 14.7 % (ref 11.5–14.5)
ERYTHROCYTE [DISTWIDTH] IN BLOOD BY AUTOMATED COUNT: 51.3 FL (ref 35–45)
GFR SERPL CREATININE-BSD FRML MDRD: 14 ML/MIN/1.73M2
GLUCOSE BLD-MCNC: 107 MG/DL (ref 70–108)
GLUCOSE BLD-MCNC: 147 MG/DL (ref 70–108)
GLUCOSE BLD-MCNC: 159 MG/DL (ref 70–108)
GLUCOSE BLD-MCNC: 212 MG/DL (ref 70–108)
GLUCOSE BLD-MCNC: 235 MG/DL (ref 70–108)
GLUCOSE BLD-MCNC: 81 MG/DL (ref 70–108)
HCT VFR BLD CALC: 31.9 % (ref 37–47)
HEMOGLOBIN: 9.9 GM/DL (ref 12–16)
IMMATURE GRANS (ABS): 0.01 THOU/MM3 (ref 0–0.07)
IMMATURE GRANULOCYTES: 0.1 %
LYMPHOCYTES # BLD: 38.5 %
LYMPHOCYTES ABSOLUTE: 2.9 THOU/MM3 (ref 1–4.8)
MCH RBC QN AUTO: 29.5 PG (ref 26–33)
MCHC RBC AUTO-ENTMCNC: 31 GM/DL (ref 32.2–35.5)
MCV RBC AUTO: 94.9 FL (ref 81–99)
MONOCYTES # BLD: 8.8 %
MONOCYTES ABSOLUTE: 0.7 THOU/MM3 (ref 0.4–1.3)
NUCLEATED RED BLOOD CELLS: 0 /100 WBC
OSMOLALITY CALCULATION: 313.2 MOSMOL/KG (ref 275–300)
PLATELET # BLD: 282 THOU/MM3 (ref 130–400)
PMV BLD AUTO: 11.6 FL (ref 9.4–12.4)
POC CREATININE WHOLE BLOOD: 4.8 MG/DL (ref 0.5–1.2)
POTASSIUM REFLEX MAGNESIUM: 4.4 MEQ/L (ref 3.5–5.2)
RBC # BLD: 3.36 MILL/MM3 (ref 4.2–5.4)
SEG NEUTROPHILS: 50.9 %
SEGMENTED NEUTROPHILS ABSOLUTE COUNT: 3.9 THOU/MM3 (ref 1.8–7.7)
SODIUM BLD-SCNC: 134 MEQ/L (ref 135–145)
WBC # BLD: 7.6 THOU/MM3 (ref 4.8–10.8)

## 2022-05-08 PROCEDURE — 93005 ELECTROCARDIOGRAM TRACING: CPT | Performed by: NURSE PRACTITIONER

## 2022-05-08 PROCEDURE — 2580000003 HC RX 258: Performed by: INTERNAL MEDICINE

## 2022-05-08 PROCEDURE — 82565 ASSAY OF CREATININE: CPT

## 2022-05-08 PROCEDURE — 6360000002 HC RX W HCPCS: Performed by: PHYSICIAN ASSISTANT

## 2022-05-08 PROCEDURE — 82948 REAGENT STRIP/BLOOD GLUCOSE: CPT

## 2022-05-08 PROCEDURE — 6370000000 HC RX 637 (ALT 250 FOR IP): Performed by: PHYSICIAN ASSISTANT

## 2022-05-08 PROCEDURE — 99233 SBSQ HOSP IP/OBS HIGH 50: CPT | Performed by: INTERNAL MEDICINE

## 2022-05-08 PROCEDURE — 85025 COMPLETE CBC W/AUTO DIFF WBC: CPT

## 2022-05-08 PROCEDURE — 99223 1ST HOSP IP/OBS HIGH 75: CPT | Performed by: INTERNAL MEDICINE

## 2022-05-08 PROCEDURE — 80048 BASIC METABOLIC PNL TOTAL CA: CPT

## 2022-05-08 PROCEDURE — 6370000000 HC RX 637 (ALT 250 FOR IP): Performed by: NURSE PRACTITIONER

## 2022-05-08 PROCEDURE — 36415 COLL VENOUS BLD VENIPUNCTURE: CPT

## 2022-05-08 PROCEDURE — 2060000000 HC ICU INTERMEDIATE R&B

## 2022-05-08 PROCEDURE — 94760 N-INVAS EAR/PLS OXIMETRY 1: CPT

## 2022-05-08 PROCEDURE — 70450 CT HEAD/BRAIN W/O DYE: CPT

## 2022-05-08 PROCEDURE — 2580000003 HC RX 258: Performed by: PHYSICIAN ASSISTANT

## 2022-05-08 PROCEDURE — 82043 UR ALBUMIN QUANTITATIVE: CPT

## 2022-05-08 RX ORDER — CLOPIDOGREL BISULFATE 75 MG/1
75 TABLET ORAL DAILY
Status: DISCONTINUED | OUTPATIENT
Start: 2022-05-08 | End: 2022-05-20 | Stop reason: HOSPADM

## 2022-05-08 RX ORDER — SODIUM CHLORIDE 9 MG/ML
INJECTION, SOLUTION INTRAVENOUS CONTINUOUS
Status: DISCONTINUED | OUTPATIENT
Start: 2022-05-08 | End: 2022-05-12

## 2022-05-08 RX ADMIN — INSULIN LISPRO 18 UNITS: 100 INJECTION, SOLUTION INTRAVENOUS; SUBCUTANEOUS at 12:14

## 2022-05-08 RX ADMIN — PREGABALIN 150 MG: 75 CAPSULE ORAL at 20:47

## 2022-05-08 RX ADMIN — HYDRALAZINE HYDROCHLORIDE 25 MG: 25 TABLET, FILM COATED ORAL at 12:14

## 2022-05-08 RX ADMIN — CLOPIDOGREL BISULFATE 75 MG: 75 TABLET ORAL at 22:44

## 2022-05-08 RX ADMIN — SODIUM CHLORIDE: 9 INJECTION, SOLUTION INTRAVENOUS at 22:30

## 2022-05-08 RX ADMIN — SODIUM CHLORIDE, PRESERVATIVE FREE 10 ML: 5 INJECTION INTRAVENOUS at 09:16

## 2022-05-08 RX ADMIN — INSULIN LISPRO 1 UNITS: 100 INJECTION, SOLUTION INTRAVENOUS; SUBCUTANEOUS at 12:14

## 2022-05-08 RX ADMIN — HYDRALAZINE HYDROCHLORIDE 25 MG: 25 TABLET, FILM COATED ORAL at 00:56

## 2022-05-08 RX ADMIN — SODIUM CHLORIDE, PRESERVATIVE FREE 10 ML: 5 INJECTION INTRAVENOUS at 20:49

## 2022-05-08 RX ADMIN — ISOSORBIDE MONONITRATE 30 MG: 30 TABLET, EXTENDED RELEASE ORAL at 09:09

## 2022-05-08 RX ADMIN — INSULIN LISPRO 18 UNITS: 100 INJECTION, SOLUTION INTRAVENOUS; SUBCUTANEOUS at 09:09

## 2022-05-08 RX ADMIN — ATORVASTATIN CALCIUM 10 MG: 10 TABLET, FILM COATED ORAL at 09:09

## 2022-05-08 RX ADMIN — HEPARIN SODIUM 5000 UNITS: 5000 INJECTION INTRAVENOUS; SUBCUTANEOUS at 09:15

## 2022-05-08 RX ADMIN — INSULIN LISPRO 1 UNITS: 100 INJECTION, SOLUTION INTRAVENOUS; SUBCUTANEOUS at 00:56

## 2022-05-08 RX ADMIN — HEPARIN SODIUM 5000 UNITS: 5000 INJECTION INTRAVENOUS; SUBCUTANEOUS at 17:34

## 2022-05-08 RX ADMIN — AMLODIPINE BESYLATE 5 MG: 5 TABLET ORAL at 17:34

## 2022-05-08 RX ADMIN — SODIUM CHLORIDE, PRESERVATIVE FREE 10 ML: 5 INJECTION INTRAVENOUS at 00:56

## 2022-05-08 RX ADMIN — INSULIN LISPRO 1 UNITS: 100 INJECTION, SOLUTION INTRAVENOUS; SUBCUTANEOUS at 09:11

## 2022-05-08 RX ADMIN — SODIUM CHLORIDE: 9 INJECTION, SOLUTION INTRAVENOUS at 20:56

## 2022-05-08 RX ADMIN — ASPIRIN 81 MG: 81 TABLET, COATED ORAL at 09:08

## 2022-05-08 RX ADMIN — PREGABALIN 150 MG: 75 CAPSULE ORAL at 00:55

## 2022-05-08 ASSESSMENT — ENCOUNTER SYMPTOMS
SORE THROAT: 0
VOMITING: 0
GASTROINTESTINAL NEGATIVE: 1
DIARRHEA: 0
BACK PAIN: 0
COUGH: 0
ABDOMINAL PAIN: 0
RESPIRATORY NEGATIVE: 1
SHORTNESS OF BREATH: 0
NAUSEA: 0
EYES NEGATIVE: 1
EYE PAIN: 0
ALLERGIC/IMMUNOLOGIC NEGATIVE: 1

## 2022-05-08 ASSESSMENT — PAIN SCALES - GENERAL
PAINLEVEL_OUTOF10: 0

## 2022-05-08 NOTE — ED NOTES
ED to inpatient nurses report    Chief Complaint   Patient presents with    Loss of Consciousness      Present to ED from home  LOC: alert and orientated to name, place, date  Vital signs   Vitals:    05/07/22 1923 05/07/22 1942 05/07/22 1950 05/07/22 2127   BP: (!) 140/61  128/65 138/84   Pulse: (!) 48 (!) 47 (!) 48 (!) 47   Resp: 18  18 18   Temp: 97.8 °F (36.6 °C)      TempSrc: Oral      SpO2: 100%  97% 96%   Weight: 165 lb (74.8 kg)      Height: 5' (1.524 m)         Oxygen Baseline RA    Current needs required RA pap/Cpap No  LDAs:   Peripheral IV 05/07/22 Left Antecubital (Active)   Site Assessment Clean, dry & intact 05/07/22 1934   Line Status Flushed 05/07/22 1934     Mobility: Independent  Pending ED orders: none  Present condition: Restful on cart watching TV w/daughter at bedside. Cold pack meal provided, denies current needs a this time.      Electronically signed by Kedric Nageotte, RN on 5/7/2022 at 10:16 PM       Eric Bautista RN  05/07/22 1434

## 2022-05-08 NOTE — CONSULTS
[facility]          [facility]        [address]        [place] [facility], [place]        [phone]           Inpatient Initial consult note         5/8/2022 7:11 PM    Patient Name:   Gissell Foy:    1955  Primary Care Physician:  Janyce Peabody, MD     History Obtained From:  patient     Consultation requested by : Derik Sanchez MD    requested for  : Evaluation of  worsening renal function     History of presentingillrajwinder Bell is a 61 y.o.   female with Past Medical History as stated below presented with a chief complaint of Loss of Consciousness   on 5/7/2022 . Patient presented with chief complaints of syncope which happened the day prior to her admission, patient says she has been standing and started ambulating and suddenly has associated loss of consciousness and subsequently fell, moderate severity did not injure herself no specific modifying factors. No associated symptoms of fever chills nausea vomiting abdominal pain    Upon arrival to the ER patient was found to have a BUN of 116 and a creatinine of 3.8, which has slightly worsened today and nephrology has been consulted for further evaluation and management    Patient was recently in the hospital in [place] where she was treated for influenza and congestive heart failure and she presented with a creatinine of 2 on 4/26 and this has consistently rising to 3.3 on 5/5/2022. It was thought due to congestive heart failure.   Patient denies any use of nonsteroidals recently denies any recent     Past History      Past Medical History:   Diagnosis Date    Ankle fracture, left 1/17/2014    CHF (congestive heart failure) (HCC)     Diabetes mellitus ([facility] [address]     HTN (hypertension), benign 1/17/2014    Hypertension      Past Surgical History:   Procedure Laterality Date    FRACTURE SURGERY Left 01/17/2014    orif tibula/fibula fixation    HYSTERECTOMY       Social History Socioeconomic History    Marital status:      Spouse name: Not on file    Number of children: Not on file    Years of education: Not on file    Highest education level: Not on file   Occupational History    Not on file   Tobacco Use    Smoking status: Light Tobacco Smoker    Smokeless tobacco: Never Used   Substance and Sexual Activity    Alcohol use: No    Drug use: No    Sexual activity: Not on file   Other Topics Concern    Not on file   Social History Narrative    Not on file     Social Determinants of Health     Financial Resource Strain:     Difficulty of Paying Living Expenses: Not on file   Food Insecurity:     Worried About Running Out of Food in the Last Year: Not on file    Linette of Food in the Last Year: Not on file   Transportation Needs:     Lack of Transportation (Medical): Not on file    Lack of Transportation (Non-Medical): Not on file   Physical Activity:     Days of Exercise per Week: Not on file    Minutes of Exercise per Session: Not on file   Stress:     Feeling of Stress : Not on file   Social Connections:     Frequency of Communication with Friends and Family: Not on file    Frequency of Social Gatherings with Friends and Family: Not on file    Attends [employer] Services: Not on file    Active Member of [address] [place] [address] [employer] or Organizations: Not on file    Attends Club or Organization Meetings: Not on file    Marital Status: Not on file   Intimate Partner Violence:     Fear of Current or Ex-Partner: Not on file    Emotionally Abused: Not on file    Physically Abused: Not on file    Sexually Abused: Not on file   Housing Stability:     Unable to Pay for Housing in the Last Year: Not on file    Number of [place] in the Last Year: Not on file    Unstable Housing in the Last Year: Not on file     History reviewed. No pertinent family history. Medications & Allergies      Prior to Admission medications    Medication Sig Start Date End Date Taking?  Authorizing Provider   isosorbide mononitrate (IMDUR) 30 MG extended release tablet Take 1 tablet by mouth daily 5/7/22   Kermit Dubin, MD   albuterol sulfate HFA (PROVENTIL HFA) 108 (90 Base) MCG/ACT inhaler Inhale 2 puffs into the lungs every 6 hours as needed for Wheezing (Cough) 5/6/22   Kermit Dubin, MD   hydrALAZINE (APRESOLINE) 25 MG tablet Take 1 tablet by mouth every 12 hours 5/6/22   Kermit Dubin, MD   metoprolol succinate (TOPROL XL) 25 MG extended release tablet Take 3 tablets by mouth in the morning and at bedtime 5/6/22   Kermit Dubin, MD   benzocaine-menthol (CEPACOL SORE THROAT) 15-3.6 MG lozenge Take 1 lozenge by mouth every 2 hours as needed for Sore Throat or Pain 5/6/22   Kermit Dubin, MD   aspirin 81 MG EC tablet Take 81 mg by mouth daily    Historical Provider, MD   potassium chloride (MICRO-K) 10 MEQ extended release capsule Take 10 mEq by mouth daily    Historical Provider, MD   Cholecalciferol (VITAMIN D3) 125 MCG (5000 UT) CAPS Take 2 capsules by mouth once a week    Historical Provider, MD   atorvastatin (LIPITOR) 10 MG tablet Take 1 tablet by mouth daily 10/17/18   Megan Jj MD   amLODIPine (NORVASC) 5 MG tablet Take 1 tablet by mouth daily  Patient taking differently: Take 5 mg by mouth every evening  10/17/18   Megan Jj MD   pregabalin (LYRICA) 150 MG capsule Take 150 mg by mouth every evening. Historical Provider, MD   L-METHYLFOLATE-B6-B12 PO Take 1 tablet by mouth daily    Historical Provider, MD   insulin glargine (LANTUS) 100 UNIT/ML injection Inject 25 Units into the skin nightly. 1/22/14   Dimitris Stokes MD   insulin lispro (HUMALOG) 100 UNIT/ML injection Inject 0-12 Units into the skin 3 times daily (with meals). Patient taking differently: Inject 18-36 Units into the skin 3 times daily (with meals) 18 units with Breakfast and Lunch and 36 units with Dinner 1/20/14   Mj Burk     Allergies: Patient has no known allergies.   IP meds : Scheduled Meds:   amLODIPine  5 mg Oral QPM    aspirin  81 mg Oral Daily    atorvastatin  10 mg Oral Daily    hydrALAZINE  25 mg Oral 2 times per day    insulin glargine  25 Units SubCUTAneous Nightly    insulin lispro  18 Units SubCUTAneous QAM AC    insulin lispro  18 Units SubCUTAneous Daily before lunch    insulin lispro  36 Units SubCUTAneous Dinner    insulin lispro  0-6 Units SubCUTAneous TID WC    insulin lispro  0-3 Units SubCUTAneous Nightly    isosorbide mononitrate  30 mg Oral Daily    pregabalin  150 mg Oral QPM    sodium chloride flush  5-40 mL IntraVENous 2 times per day    heparin (porcine)  5,000 Units SubCUTAneous q8h     Continuous Infusions:   dextrose      sodium chloride       Review of Systems Physical Exam   Review of Systems   Constitutional: Negative for chills, fatigue and fever. HENT: Negative for ear pain and sore throat. Eyes: Negative. Negative for pain. Respiratory: Negative for cough and shortness of breath. Cardiovascular: Negative for chest pain and leg swelling. Gastrointestinal: Negative for abdominal pain, diarrhea, nausea and vomiting. Genitourinary: Negative for dysuria, frequency and hematuria. Musculoskeletal: Negative for back pain and neck pain. Skin: Negative for rash. Neurological: Positive for syncope. Negative for dizziness and headaches. Psychiatric/Behavioral: Negative for agitation and confusion. Physical Exam  Vitals reviewed. Constitutional:       General: She is not in acute distress. Appearance: She is obese. She is not diaphoretic. HENT:      Head: Normocephalic and atraumatic. Right Ear: External ear normal.      Left Ear: External ear normal.      Nose: Nose normal.      Mouth/Throat:      Mouth: Mucous membranes are moist.   Eyes:      General: No scleral icterus. Right eye: No discharge. Left eye: No discharge. Conjunctiva/sclera: Conjunctivae normal.   Neck:      Thyroid: No thyromegaly. Vascular: No JVD. Cardiovascular:      Rate and Rhythm: Normal rate and regular rhythm. Heart sounds: Normal heart sounds. No murmur heard. Pulmonary:      Effort: Pulmonary effort is normal. No respiratory distress. Breath sounds: Normal breath sounds. No stridor. No wheezing. Chest:      Chest wall: No tenderness. Abdominal:      General: Bowel sounds are normal. There is distension. Palpations: Abdomen is soft. Tenderness: There is no abdominal tenderness. Musculoskeletal:         General: No swelling or tenderness. Cervical back: Normal range of motion and neck supple. Right lower leg: Edema present. Left lower leg: Edema present. Comments: Trace edema   Skin:     General: Skin is warm and dry. Findings: No erythema or rash. Neurological:      General: No focal deficit present. Mental Status: She is alert and oriented to person, place, and time. Psychiatric:         Mood and Affect: Mood normal.         Behavior: Behavior normal.           Vitals:    05/08/22 1145   BP: 136/60   Pulse: 60   Resp: 16   Temp: 97.2 °F (36.2 °C)   SpO2: 100%     Labs, Radiology and Tests       Recent Labs     05/07/22 1945 05/08/22  0326   WBC 7.3 7.6   RBC 3.31* 3.36*   HGB 9.9* 9.9*   HCT 30.7* 31.9*   MCV 92.7 94.9   MCH 29.9 29.5   MCHC 32.2 31.0*    282     Recent Labs     05/06/22  0638 05/07/22 1945 05/08/22  0326   * 132* 134*   K 4.7 4.6 4.4   CL 96* 94* 95*   CO2 24 25 25   BUN 95* 116* 121*   CREATININE 3.6* 3.8* 3.8*   CALCIUM 8.5 8.5 8.7       Radiology : We will order renal ultrasound scan as I could not see any renal ultrasound scan ordered recently    Other : Old lab data have been reviewed and noted patient's creatinine was baseline around 2.0 earlier in April and has consistently gotten worse.   She was also recently in the hospital from 4/26 to 5/6 at which point her creatinine has gradually rising to 3.6 and apparently this was due to her congestive heart failure    Echocardiogram-4/22 shows ejection fraction 55 to 13% systolic pulmonary arterial pressure is 56 moderate pulmonary hypertension    Assessment    1 Renal -acute kidney injury with progressive worsening of renal function and significantly elevated BUN  ? Exact etiology not clear she has been getting diuretics in the outside hospital in [place]  ? However does not look overtly congested we will send urine electrolytes, urinalysis and also will obtain a baseline renal ultrasound scan as it was never done before  ? Continue IV hydration monitor renal    2 Electrolytes -mild hyponatremia  3 Essential hypertension  4 Hx of diabetes mellitus with diabetic nephropathy  5 Recent treatment with congestive heart failure and influenza  6 Syncope may be due to bradycardia being seen by cardiology  7 Meds reviewed and discussed with patient      **This report has been created using voice recognition software. It maycontain minor  errors which are inherent in voice recognition technology. **    Yasmin Belcher MD,M.D  [facility].

## 2022-05-08 NOTE — PROGRESS NOTES
Hospitalist Progress Note    Patient:  Savannah Ny    Unit/Bed:3B-35/035-A  YOB: 1955  MRN: [id number]   Acct: [de-identified]   PCP: Wesley Gallegos MD  Date of Admission: 5/7/2022    Assessment/Plan:  1. Symptomatic Bradycardia: S/p syncopal episode with collapse and LOC lasting seconds. Likely iatrogenic 2/2 Toprol XL 75 mg BID which has been held. Pule ~40s on admission. Blood pressures normal. Labs normal. Pulse improving to ~60s s/p BB washout.    - Cardiology consulted    - Toprol XL held, 24-hr washout    - Orthostatics qShift until negative    - IV Albumin q6h prn    2. Acute Kidney Injury in CKD: Creatinine 3.8 and still up-trending. Unsure of etiology at this time. No recent contrast received. Baseline ~1.8 one week prior. BUN:Cr points to prerenal etiology.    - Nephrology consult placed    - Appreciate nephrotoxins    3. NIDDM2: A1c 9.1. Continue Lantus 25u nightly, Humalog TID. POCT glucose checks AC/HS  4. Hyperlipidemia: Continue home statin   5. Hypertension: Well controlled on home norvasc & hydralazine 25 mg BID. Home Toprol is held 2/2 #1.    6. Chronic HFpEF 55%: Stable and well controlled at this time. Expected discharge date: 05/09/22    Disposition:    [x] Home       [] TCU       [] Rehab       [] Psych       [] SNF       [] [place]       [] Other-    Chief Complaint: Syncope with collapse, lightheadedness     Hospital Course: Per admitting H&P note \"Patient presents to the ED following a syncopal episode. The patient was just discharged one day prior to admission following a prolonged stay due to influenza A. The patient developed CHLOE while hospitalized. Today the patient had risen from sitting to standing and began amublating. The patient loss consciousness and subsequently fell. The patient was found to have a resting heart rate 40-48. Patient admitted for telemetry and cardiology consult. \"      Subjective (past 24 hours): Patient laying in bed comfortably this morning with no complaints or concerns. Undergoing beta blocker washout from her Toprol with HR improving already to 60s. Cardiology following. Nephrology consulted for worsening CHLOE of unknown etiology. Otherwise well. Denies CP, SOB, n/v/d.        ROS (12 point review of systems completed. Pertinent positives noted. Otherwise ROS is negative). Medications:  Reviewed    Infusion Medications    dextrose      sodium chloride       Scheduled Medications    amLODIPine  5 mg Oral QPM    aspirin  81 mg Oral Daily    atorvastatin  10 mg Oral Daily    hydrALAZINE  25 mg Oral 2 times per day    insulin glargine  25 Units SubCUTAneous Nightly    insulin lispro  18 Units SubCUTAneous QAM AC    insulin lispro  18 Units SubCUTAneous Daily before lunch    insulin lispro  36 Units SubCUTAneous Dinner    insulin lispro  0-6 Units SubCUTAneous TID WC    insulin lispro  0-3 Units SubCUTAneous Nightly    isosorbide mononitrate  30 mg Oral Daily    pregabalin  150 mg Oral QPM    sodium chloride flush  5-40 mL IntraVENous 2 times per day    heparin (porcine)  5,000 Units SubCUTAneous q8h     PRN Meds: albuterol sulfate HFA, glucagon (rDNA), dextrose, sodium chloride flush, sodium chloride, ondansetron **OR** ondansetron, polyethylene glycol, acetaminophen **OR** acetaminophen, glucose, dextrose bolus **OR** dextrose bolus    No intake or output data in the 24 hours ending 05/08/22 1630    Diet:  ADULT DIET; Regular; 4 carb choices (60 gm/meal); Low Fat/Low Chol/High Fiber/KALEN; Low Sodium (2 gm); Low Potassium (Less than 3000 mg/day); Low Phosphorus (Less than 1000 mg); Less than 60 gm    Exam:  /60   Pulse 60   Temp 97.2 °F (36.2 °C) (Oral)   Resp 16   Ht 5' (1.524 m)   Wt 179 lb 3.2 oz (81.3 kg)   SpO2 100%   BMI 35.00 kg/m²     General appearance: No apparent distress, appears stated age and cooperative. HEENT: Pupils equal, round, and reactive to light.  Conjunctivae/corneas [x] SQ Heparin                                 [] Encourage ambulation           [] Already on Anticoagulation     Code Status: DNR-CCA     PT/OT Eval Status: N/A    Tele:   [x] yes             [] no    Electronically signed by Hermelinda Hodgson DO on 5/8/2022 at 4:30 PM

## 2022-05-08 NOTE — PLAN OF CARE
Problem: Discharge Planning  Goal: Discharge to home or other facility with appropriate resources  Outcome: Progressing  Flowsheets (Taken 5/7/2022 2335)  Discharge to home or other facility with appropriate resources:   Identify barriers to discharge with patient and caregiver   Arrange for needed discharge resources and transportation as appropriate   Identify discharge learning needs (meds, wound care, etc)   Refer to discharge planning if patient needs post-hospital services based on physician order or complex needs related to functional status, cognitive ability or social support system  Note: Discharge planning started      Problem: Pain  Goal: Verbalizes/displays adequate comfort level or baseline comfort level  Outcome: Progressing  Flowsheets (Taken 5/8/2022 0425)  Verbalizes/displays adequate comfort level or baseline comfort level:   Encourage patient to monitor pain and request assistance   Assess pain using appropriate pain scale   Administer analgesics based on type and severity of pain and evaluate response   Implement non-pharmacological measures as appropriate and evaluate response  Note: Pt currently denies any pain     Problem: Safety - Adult  Goal: Free from fall injury  Outcome: Progressing  Flowsheets (Taken 5/8/2022 0425)  Free From Fall Injury:   Instruct family/caregiver on patient safety   Based on caregiver fall risk screen, instruct family/caregiver to ask for assistance with transferring infant if caregiver noted to have fall risk factors  Note: Pt free from falls, safety maintained      Problem: Cardiovascular - Adult  Goal: Maintains optimal cardiac output and hemodynamic stability  Outcome: Progressing  Flowsheets (Taken 5/7/2022 2335)  Maintains optimal cardiac output and hemodynamic stability:   Monitor blood pressure and heart rate   Monitor urine output and notify Licensed Independent Practitioner for values outside of normal range   Assess for signs of decreased cardiac output  Note: Pt maintains adequate cardiac output  Goal: Absence of cardiac dysrhythmias or at baseline  Outcome: Progressing  Flowsheets  Taken 5/8/2022 0425  Absence of cardiac dysrhythmias or at baseline:   Monitor cardiac rate and rhythm   Assess for signs of decreased cardiac output  Taken 5/7/2022 2335  Absence of cardiac dysrhythmias or at baseline:   Monitor cardiac rate and rhythm   Assess for signs of decreased cardiac output  Note: Pt remains sinus franki, cont to monitor      Problem: ABCDS Injury Assessment  Goal: Absence of physical injury  Outcome: Progressing  Flowsheets (Taken 5/8/2022 0425)  Absence of Physical Injury: Implement safety measures based on patient assessment  Note: Pt free from injury, safety maintained

## 2022-05-08 NOTE — CONSULTS
[facility]'s  Consult    Patient's Name/Date of Birth: Gordo Aguirre / 1955 (26 y.o.)    Date: May 8, 2022     Referring Provider: Michael Swenson MD    CHIEF COMPLAINT:  Bradycardia      HPI: This is a pleasant 61 y.o. female pmhx DM II, HTN, HLD who was recently admitted with diastolic CHF, CHLOE on CKD, and influenza A infection and was discharged on 5/6/22, returned to the ED following a syncopal episode. She was went to stand up and had LOC for a few seconds-minute. Had recollection fairly quick. HR was 40-48. She was at [facility] and had a RHC showing PA 43/12, WP 8, RA 15, CI 2.3. Prior HR were in the 80s. Patient was on Metoprolol 75 mg XL BID. This has been held. No other AVB use.  4/2022 shows EF 55-60%, concentric LVH, mild MR, grade 2 DD. Cr has risen to 3.8, , Hgb 9.9. No recent IV contrast.  She is DNR-CCA. No sig a/t/d. Echo: Results for orders placed during the hospital encounter of 04/26/22    ECHO Complete 2D W Doppler W Color    Narrative  Transthoracic Echocardiography Report (TTE)    Demographics    Patient Name       Roderick Tang    Date of Study      04/27/2022         Gender              Female    Patient Number     [id number]         Date of Birth       1955    Visit Number       [id number]          Age                 61 year(s)    Accession Number   [id number]         Room Number         5916    Corporate ID       [id number]            CONNOR Fountain    Ordering Physician Raymond Turner MD        Physician           Veronica Somers MD    Procedure    Type of Study    TTE procedure:ECHOCARDIOGRAM COMPLETE 2D W DOPPLER W COLOR. Procedure Date  Date: 04/27/2022 Start: 02:03 PM    Study Location: [facility] - Echo Lab  Technical Quality: Adequate visualization    Indications:Congestive heart failure.     Additional Indications:Flu A, respiratory failure. Patient Status: Routine    Height: 60 inches Weight: 183 pounds BSA: 1.8 m2 BMI: 35.74 kg/m2    BP: 147/66 mmHg    Conclusions    Summary  Global left ventricular function is normal with ejection fraction estimated  from 55 % to 60 %. Moderate concentric left ventricular hypertrophy. Grade II diastolic dysfunction with elevated LV filling pressures. Mild mitral regurgitation is present. The left atrium is mildly dilated. The right ventricle is normal in size and function. Systolic pulmonary artery pressure (SPAP) is estimated at 56 mmHg consistent  with moderate pulmonary hypertension (RA pressure 3 mmHg). Mild tricuspid  regurgitation. Signature    ------------------------------------------------------------------  Electronically signed by Rocael Cervantes MD (Interpreting  physician) on 04/27/2022 at 03:42 PM  ------------------------------------------------------------------    Findings    Left Ventricle  Global left ventricular function is normal with ejection fraction estimated  from 55 % to 60 %. Left ventricular size is normal.  Moderate concentric left ventricular hypertrophy. Grade II diastolic dysfunction with elevated LV filling pressures. Mitral Valve  Mitral valve is structurally normal.  Mild mitral regurgitation is present. No evidence of mitral stenosis. Left Atrium  The left atrium is mildly dilated. Aortic Valve  The aortic valve appears bicuspid . No evidence of aortic valve regurgitation or stenosis. Aorta  The aortic root is normal in size. Right Ventricle  The right ventricle is normal in size and function. Tricuspid Valve  Systolic pulmonary artery pressure (SPAP) is estimated at 56 mmHg consistent  with moderate pulmonary hypertension (RA pressure 3 mmHg). Mild tricuspid  regurgitation. Right Atrium  The right atrial size is normal.    Pulmonic Valve  The pulmonic valve is not well visualized.   There is no evidence of pulmonic valve regurgitation or stenosis. Pericardial Effusion  There is a trivial circumferential pericardial effusion noted. Pleural Effusion  There is a moderate pleural effusion visualized. Miscellaneous  IVC size is normal (<2.1 cm) and collapses > 50% with respiration consistent  with normal RA pressure (3 mmHg).     M-Mode/2D Measurements (cm)    LV Diastolic Dimension: 2.24 cm LV Systolic Dimension: 1.47 cm  LV Septum Diastolic: 5.52 cm  LV PW Diastolic: 7.27 cm        AO Root Dimension: 2.8 cm    LA Area: 23.8 cm2  LVOT: 1.7 cm                    LA volume/Index: 65.3 ml /36 ml/m2    Doppler Measurements    AV Peak Velocity: 140 cm/s     MV Peak E-Wave: 133 cm/s  AV Peak Gradient: 7.84 mmHg    MV Peak A-Wave: 58.2 cm/s  LVOT Peak Velocity: 101 cm/s   MV E/A Ratio: 2.29  MV P1/2t: 60 msec  TR Velocity:363 cm/s  TR Gradient:52.71 mmHg    E' Septal Velocity: 5.26 cm/s  MV Deceleration Time: 207 msec  E' Lateral Velocity: 4.64 cm/s MV Area (PHT): 3.67 cm2  PV Peak Velocity: 100 cm/s  PV Peak Gradient: 4 mmHg       A' Septal Velocity: 2.8 cm/s  A' Lateral Velocity: 4.56 cm/s    Aortic Valve    Peak Velocity: 140 cm/s  Peak Gradient: 7.84 mmHg    Aorta    Aortic Root: 2.8 cm  Ascending Aorta: 3.2 cm  LVOT Diameter: 1.7 cm       All labs, EKG's, diagnostic testing and images as well as cardiac cath, stress testing were reviewed during this encounter    Past Medical History:   Diagnosis Date    Ankle fracture, left 1/17/2014    CHF (congestive heart failure) (HCC)     Diabetes mellitus ([facility] [address]     HTN (hypertension), benign 1/17/2014    Hypertension      Past Surgical History:   Procedure Laterality Date    FRACTURE SURGERY Left 01/17/2014    orif tibula/fibula fixation    HYSTERECTOMY       Current Facility-Administered Medications   Medication Dose Route Frequency Provider Last Rate Last Admin    albuterol sulfate  (90 Base) MCG/ACT inhaler 2 puff  2 puff Inhalation Q6H PRN Steffanie Reyez PA-C        amLODIPine (NORVASC) tablet 5 mg  5 mg Oral QPM Steffanie R Sally Omalley, PA-C        aspirin EC tablet 81 mg  81 mg Oral Daily [employer], PA-C   81 mg at 05/08/22 0908    atorvastatin (LIPITOR) tablet 10 mg  10 mg Oral Daily [employer], PA-C   10 mg at 05/08/22 4420    hydrALAZINE (APRESOLINE) tablet 25 mg  25 mg Oral 2 times per day [employer], PA-C   25 mg at 05/08/22 0056    insulin glargine (LANTUS) injection vial 25 Units  25 Units SubCUTAneous Nightly [employer], PA-C        insulin lispro (HUMALOG) injection vial 18 Units  18 Units SubCUTAneous QAM AC [employer], PA-C   18 Units at 05/08/22 0909    insulin lispro (HUMALOG) injection vial 18 Units  18 Units SubCUTAneous Daily before lunch [employer], PA-C        insulin lispro (HUMALOG) injection vial 36 Units  36 Units SubCUTAneous Dinner [employer], PA-C        glucagon (rDNA) injection 1 mg  1 mg IntraMUSCular PRN [employer], PA-C        dextrose 5 % solution  100 mL/hr IntraVENous PRN [employer], PA-C        insulin lispro (HUMALOG) injection vial 0-6 Units  0-6 Units SubCUTAneous TID WC [employer], PA-C   1 Units at 05/08/22 0911    insulin lispro (HUMALOG) injection vial 0-3 Units  0-3 Units SubCUTAneous Nightly [employer], PA-C   1 Units at 05/08/22 0056    isosorbide mononitrate (IMDUR) extended release tablet 30 mg  30 mg Oral Daily [employer], PA-C   30 mg at 05/08/22 6772    pregabalin (LYRICA) capsule 150 mg  150 mg Oral QPM [employer], PA-C   150 mg at 05/08/22 0055    sodium chloride flush 0.9 % injection 5-40 mL  5-40 mL IntraVENous 2 times per day [employer], PA-C   10 mL at 05/08/22 0056    sodium chloride flush 0.9 % injection 5-40 mL  5-40 mL IntraVENous PRN [employer], PA-C        0.9 % sodium chloride infusion   IntraVENous PRN [employer], PA-C        heparin (porcine) injection 5,000 Units  5,000 Units SubCUTAneous q8h [employer], PA-C        ondansetron (ZOFRAN-ODT) disintegrating tablet 4 mg  4 mg Oral Q8H PRN Hardy Dose, PA-C        Or    ondansetron (ZOFRAN) injection 4 mg  4 mg IntraVENous Q6H PRN Steffanie R Katiejill Gan, PA-C        polyethylene glycol (GLYCOLAX) packet 17 g  17 g Oral Daily PRN Hardy Dose, PA-C        acetaminophen (TYLENOL) tablet 650 mg  650 mg Oral Q6H PRN Hardy Dose, PA-C        Or    acetaminophen (TYLENOL) suppository 650 mg  650 mg Rectal Q6H PRN Hardy Dose, PA-C        glucose chewable tablet 16 g  4 tablet Oral PRN Hardy Dose, PA-C        dextrose bolus 10% 125 mL  125 mL IntraVENous PRN Steffanie R Brown, PA-C        Or    dextrose bolus 10% 250 mL  250 mL IntraVENous PRN Hardy Dose, PA-C         Prior to Admission medications    Medication Sig Start Date End Date Taking?  Authorizing Provider   isosorbide mononitrate (IMDUR) 30 MG extended release tablet Take 1 tablet by mouth daily 5/7/22   Jennifer Stevens MD   albuterol sulfate HFA (PROVENTIL HFA) 108 (90 Base) MCG/ACT inhaler Inhale 2 puffs into the lungs every 6 hours as needed for Wheezing (Cough) 5/6/22   Jennifer Stevens MD   hydrALAZINE (APRESOLINE) 25 MG tablet Take 1 tablet by mouth every 12 hours 5/6/22   Jennifer Stevens MD   metoprolol succinate (TOPROL XL) 25 MG extended release tablet Take 3 tablets by mouth in the morning and at bedtime 5/6/22   Jennifer Stevens MD   benzocaine-menthol (CEPACOL SORE THROAT) 15-3.6 MG lozenge Take 1 lozenge by mouth every 2 hours as needed for Sore Throat or Pain 5/6/22   Jennifer Stevens MD   aspirin 81 MG EC tablet Take 81 mg by mouth daily    Historical Provider, MD   potassium chloride (MICRO-K) 10 MEQ extended release capsule Take 10 mEq by mouth daily    Historical Provider, MD   Cholecalciferol (VITAMIN D3) 125 MCG (5000 UT) CAPS Take 2 capsules by mouth once a week    Historical Provider, MD   atorvastatin (LIPITOR) 10 MG tablet Take 1 tablet by mouth daily 10/17/18   Monroe Nicole MD   amLODIPine (NORVASC) 5 MG tablet Take 1 tablet by mouth daily  Patient taking differently: Take 5 mg by mouth every evening  10/17/18   Demi Martino MD   pregabalin (LYRICA) 150 MG capsule Take 150 mg by mouth every evening. Historical Provider, MD   L-METHYLFOLATE-B6-B12 PO Take 1 tablet by mouth daily    Historical Provider, MD   insulin glargine (LANTUS) 100 UNIT/ML injection Inject 25 Units into the skin nightly. 1/22/14   Kika Alex MD   insulin lispro (HUMALOG) 100 UNIT/ML injection Inject 0-12 Units into the skin 3 times daily (with meals). Patient taking differently: Inject 18-36 Units into the skin 3 times daily (with meals) 18 units with Breakfast and Lunch and 36 units with Dinner 1/20/14   Luiz Pearl   Scheduled Meds:   amLODIPine  5 mg Oral QPM    aspirin  81 mg Oral Daily    atorvastatin  10 mg Oral Daily    hydrALAZINE  25 mg Oral 2 times per day    insulin glargine  25 Units SubCUTAneous Nightly    insulin lispro  18 Units SubCUTAneous QAM AC    insulin lispro  18 Units SubCUTAneous Daily before lunch    insulin lispro  36 Units SubCUTAneous Dinner    insulin lispro  0-6 Units SubCUTAneous TID WC    insulin lispro  0-3 Units SubCUTAneous Nightly    isosorbide mononitrate  30 mg Oral Daily    pregabalin  150 mg Oral QPM    sodium chloride flush  5-40 mL IntraVENous 2 times per day    heparin (porcine)  5,000 Units SubCUTAneous q8h     Continuous Infusions:   dextrose      sodium chloride       PRN Meds:.albuterol sulfate HFA, glucagon (rDNA), dextrose, sodium chloride flush, sodium chloride, ondansetron **OR** ondansetron, polyethylene glycol, acetaminophen **OR** acetaminophen, glucose, dextrose bolus **OR** dextrose bolus    No Known Allergies  History reviewed. No pertinent family history.   Social History     Socioeconomic History    Marital status:      Spouse name: Not on file    Number of children: Not on file    Years of education: Not on file    Highest education level: Not on file   Occupational History    Not on file   Tobacco Use    Smoking status: Light Tobacco Smoker    Smokeless tobacco: Never Used   Substance and Sexual Activity    Alcohol use: No    Drug use: No    Sexual activity: Not on file   Other Topics Concern    Not on file   Social History Narrative    Not on file     Social Determinants of Health     Financial Resource Strain:     Difficulty of Paying Living Expenses: Not on file   Food Insecurity:     Worried About Running Out of Food in the Last Year: Not on file    Linette of Food in the Last Year: Not on file   Transportation Needs:     Lack of Transportation (Medical): Not on file    Lack of Transportation (Non-Medical): Not on file   Physical Activity:     Days of Exercise per Week: Not on file    Minutes of Exercise per Session: Not on file   Stress:     Feeling of Stress : Not on file   Social Connections:     Frequency of Communication with Friends and Family: Not on file    Frequency of Social Gatherings with Friends and Family: Not on file    Attends Oriental orthodox Services: Not on file    Active Member of [address] [place] [address] or Organizations: Not on file    Attends Club or Organization Meetings: Not on file    Marital Status: Not on file   Intimate Partner Violence:     Fear of Current or Ex-Partner: Not on file    Emotionally Abused: Not on file    Physically Abused: Not on file    Sexually Abused: Not on file   Housing Stability:     Unable to Pay for Housing in the Last Year: Not on file    Number of [place] in the Last Year: Not on file    Unstable Housing in the Last Year: Not on file     ROS:   Constitutional: Denies any recent wt change. Eyes:  Denies any blurring or double vision, no glaucoma  Ears/Nose/Mouth/Throat:  Denies any chronic sinus/rhinitis, bleeding gums  Cardiovascular:  As described above.     Respiratory:  Denies any frequent cough, wheezing or coughing up blood  Genitourinary:  Denies difficulty with urination and kidney stones  Gastrointestinal:  Denies any chronic problems with abdominal pain, nausea, vomiting or diarrhea  Musculoskeletal:  Denies any joint pain, back pain, or difficulty walking  Integumentary:  Denies any rash  Neurological:  No numbness or tingling  Endocrine:  Denies any polydipsia. Hematologic/Lymphatic:  Denies any hemorrhage or lymphatic drainage problems. Labs:  CBC:   Recent Labs     05/07/22 1945 05/08/22  0326   WBC 7.3 7.6   HGB 9.9* 9.9*   HCT 30.7* 31.9*   MCV 92.7 94.9    282     BMP:   Recent Labs     05/06/22  0638 05/07/22 1945 05/08/22  0326   * 132* 134*   K 4.7 4.6 4.4   CL 96* 94* 95*   CO2 24 25 25   BUN 95* 116* 121*   CREATININE 3.6* 3.8* 3.8*     Accucheck Glucoses:   Recent Labs     05/06/22  0750 05/06/22  1126 05/07/22 1949 05/08/22  0052 05/08/22  0758   POCGLU 188* 231* 108 235* 159*     Cardiac Enzymes: No results for input(s): CKTOTAL, CKMB, CKMBINDEX, TROPONINI in the last 72 hours. PT/INR: No results for input(s): PROTIME, INR in the last 72 hours. APTT: No results for input(s): APTT in the last 72 hours.   Liver Profile:  Lab Results   Component Value Date    AST 47 04/26/2022    ALT 77 04/26/2022    BILITOT <0.2 04/26/2022    ALKPHOS 159 04/26/2022     Lab Results   Component Value Date    CHOL 149 10/17/2018    HDL 42 10/17/2018    TRIG 86 10/17/2018     TSH: No results found for: TSH  UA:   Lab Results   Component Value Date    COLORU Yellow 05/02/2022    PHUR 7.5 05/02/2022    WBCUA 23 05/02/2022    RBCUA 2 05/02/2022    BACTERIA None Seen 05/02/2022    CLARITYU Clear 05/02/2022    SPECGRAV 1.012 05/02/2022    LEUKOCYTESUR TRACE 05/02/2022    UROBILINOGEN 0.2 05/02/2022    BILIRUBINUR Negative 05/02/2022    BLOODU Negative 05/02/2022    GLUCOSEU Negative 05/02/2022         Physical Exam:  Vitals:    05/08/22 0900   BP: (!) 156/68   Pulse: (!) 47   Resp: 18   Temp: 98 °F (36.7 °C)   SpO2: 98%    No intake or output data in the 24 hours ending 05/08/22 0928   General:  No acute distress  Neck: Supple, no JVD, no carotid bruits  Heart: Regular rhythm normal S1 and S2, no rubs, murmurs or gallops  Lungs: clear to ascultation no rales, wheezes, or rhonchi  Abdomen: positive bowel sounds, soft, non-tender, non-distended, no bruits, no masses  Extremities:no clubbing, cyanosis or edema  Neurologic: alert and oriented x 3, cranial nerves 2-12 grossly intact, motor and sensory intact, moving all extremities  Skin: No rashes  Psych: AO x 3, no depression/amie, no pressured speech, normal affect  Lymph: No obvious LAD      Assessment:  Symptomatic bradycardia - likely BB related  CHLOE - worsening  Hx of Influenza A  DM II  Chronic diastolic CHF, NYHA II  HTN      Plan:  1. Syncope is likely related to BB, will need to hold meds for washout  2. Observed on telemetry  3. Syncope may also be related to orthostatics, please check BID and if negative then none further  4. IV Albumin 25% q 6 hours prn  5. Nephrology following - may not be improving - Nephrology consultation  6. If orthostatic positive, avoid nitrates  7. Continue ASA  8. Further recommendations based on results and clinical course      Thank you for allowing us to participate in the care of this patient. Please do not hesitate to call us with questions.     Electronically signed by Naldo Lopez MD on 5/8/2022 at 9:28 AM    Interventional Cardiology - [facility]

## 2022-05-08 NOTE — ED PROVIDER NOTES
[address] [facility]       Chief Complaint   Patient presents with    Loss of Consciousness       Nurses Notes reviewed and I agree except as noted in the HPI. HISTORY OF PRESENT ILLNESS    Marisa Ramirez is a 61 y.o. female who presents with complaint of a syncopal episode, patient said that she got up from a sitting position walked a few steps and proceeded to lose consciousness. Patient states that she thinks that the loss of consciousness secondary to poor oral intake. However, patient noted to be bradycardic by EMS during transport to the ED. Patient has no chest pain, she has no neurodeficits. Patient is on metoprolol, denies any recent change in dosing. Of note, patient was discharged from the hospital less than 24 hours ago for unrelated medical complaints. Onset: Acute  Duration: Prior to arrival  Timing: Resolved  Location of Pain:   Intesity/severity: Single syncopal episode  Modifying Factors:   Relieved by;  Previous Episodes; Tx Before arrival: None  REVIEW OF SYSTEMS      Review of Systems   Constitutional: Negative for fever, chills, diaphoresis and fatigue. HENT: Negative for congestion, drooling, facial swelling and sore throat. Eyes: Negative for photophobia, pain and discharge. Respiratory: Negative for cough, shortness of breath, wheezing and stridor. Cardiovascular: Negative for chest pain, palpitations and leg swelling. Positive for syncope. Gastrointestinal: Negative for abdominal pain, blood in stool and abdominal distention. Genitourinary: Negative for dysuria, urgency, hematuria and difficulty urinating. Musculoskeletal: Negative for gait problem, neck pain and neck stiffness. Skin; No rash, No itching  Neurological: Negative for seizures, weakness and numbness. Hematological: Negative for adenopathy. Does not bruise/bleed easily. Psychiatric/Behavioral: Negative for hallucinations, confusion and agitation.      PAST MEDICAL HISTORY has a past medical history of Ankle fracture, left, CHF (congestive heart failure) ([facility] Diabetes mellitus ([facility] HTN (hypertension), benign, and Hypertension. SURGICAL HISTORY      has a past surgical history that includes Hysterectomy and fracture surgery (Left, 01/17/2014). CURRENT MEDICATIONS       Current Discharge Medication List      CONTINUE these medications which have NOT CHANGED    Details   isosorbide mononitrate (IMDUR) 30 MG extended release tablet Take 1 tablet by mouth daily  Qty: 30 tablet, Refills: 3      albuterol sulfate HFA (PROVENTIL HFA) 108 (90 Base) MCG/ACT inhaler Inhale 2 puffs into the lungs every 6 hours as needed for Wheezing (Cough)  Qty: 18 g, Refills: 3      hydrALAZINE (APRESOLINE) 25 MG tablet Take 1 tablet by mouth every 12 hours  Qty: 90 tablet, Refills: 3      metoprolol succinate (TOPROL XL) 25 MG extended release tablet Take 3 tablets by mouth in the morning and at bedtime  Qty: 30 tablet, Refills: 3      benzocaine-menthol (CEPACOL SORE THROAT) 15-3.6 MG lozenge Take 1 lozenge by mouth every 2 hours as needed for Sore Throat or Pain  Qty: 18 lozenge, Refills: 0      aspirin 81 MG EC tablet Take 81 mg by mouth daily      potassium chloride (MICRO-K) 10 MEQ extended release capsule Take 10 mEq by mouth daily      Cholecalciferol (VITAMIN D3) 125 MCG (5000 UT) CAPS Take 2 capsules by mouth once a week      atorvastatin (LIPITOR) 10 MG tablet Take 1 tablet by mouth daily  Qty: 30 tablet, Refills: 3      amLODIPine (NORVASC) 5 MG tablet Take 1 tablet by mouth daily  Qty: 30 tablet, Refills: 3      pregabalin (LYRICA) 150 MG capsule Take 150 mg by mouth every evening. L-METHYLFOLATE-B6-B12 PO Take 1 tablet by mouth daily      insulin glargine (LANTUS) 100 UNIT/ML injection Inject 25 Units into the skin nightly. Qty: 1 vial, Refills: 1      insulin lispro (HUMALOG) 100 UNIT/ML injection Inject 0-12 Units into the skin 3 times daily (with meals).   Qty: 1 Pen, Refills: 1 ALLERGIES     has No Known Allergies. FAMILY HISTORY     has no family status information on file. family history is not on file. SOCIAL HISTORY      reports that she has been smoking. She has never used smokeless tobacco. She reports that she does not drink alcohol and does not use drugs. PHYSICAL EXAM     INITIAL VITALS:  height is 5' (1.524 m) and weight is 179 lb 3.2 oz (81.3 kg). Her oral temperature is 97.2 °F (36.2 °C). Her blood pressure is 136/60 and her pulse is 60. Her respiration is 16 and oxygen saturation is 100%. Physical Exam   Constitutional:  well-developed and well-nourished. HENT: Head: Normocephalic, atraumatic, Bilateral external ears normal, Oropharynx mosit, No oral exudates, Nose normal.   Eyes: PERRL, EOMI, Conjunctiva normal, No discharge. No scleral icterus  Neck: Normal range of motion, No tenderness, Supple  Cardiovascular: Normal rate, regular rhythm, S1 normal and S2 normal.  Exam reveals no gallop. Pulmonary/Chest: Effort normal and breath sounds normal. No accessory muscle usage or stridor. No respiratory distress. no wheezes. has no rales. exhibits no tenderness. Abdominal: Soft. Bowel sounds are normal.  exhibits no distension. There is no tenderness. There is no rebound and no guarding. Extremities: No edema, no tenderness, no cyanosis, no clubbing. Musculoskeletal: Good range of motion in major joints is observed. No major deformities noted. Neurological: Alert and oriented ×3, normal motor function, normal sensory function, no focal deficits. GCS 15. Skin: Skin is warm, dry and intact. No rash noted. No erythema.    Psychiatric: Affect normal, judgment normal, mood normal.  DIFFERENTIAL DIAGNOSIS:       DIAGNOSTIC RESULTS     EKG: All EKG's are interpreted by the Emergency Department Physician who either signs or Co-signs this chart in the absence of a cardiologist.      RADIOLOGY: non-plain film images(s) such as CT, Ultrasound and MRI are read by the radiologist.  Plain radiographic images are visualized and preliminarily interpreted by the emergency physician unless otherwise stated below.       LABS:   Labs Reviewed   CBC WITH AUTO DIFFERENTIAL - Abnormal; Notable for the following components:       Result Value    RBC 3.31 (*)     Hemoglobin 9.9 (*)     Hematocrit 30.7 (*)     RDW-CV 14.8 (*)     RDW-SD 50.6 (*)     All other components within normal limits   BASIC METABOLIC PANEL W/ REFLEX TO MG FOR LOW K - Abnormal; Notable for the following components:    Sodium 132 (*)     Chloride 94 (*)      (*)     CREATININE 3.8 (*)     All other components within normal limits   TROPONIN - Abnormal; Notable for the following components:    Troponin T 0.057 (*)     All other components within normal limits   GLOMERULAR FILTRATION RATE, ESTIMATED - Abnormal; Notable for the following components:    Est, Glom Filt Rate 14 (*)     All other components within normal limits   OSMOLALITY - Abnormal; Notable for the following components:    Osmolality Calc 301.9 (*)     All other components within normal limits   BASIC METABOLIC PANEL W/ REFLEX TO MG FOR LOW K - Abnormal; Notable for the following components:    Sodium 134 (*)     Chloride 95 (*)     Glucose 212 (*)      (*)     CREATININE 3.8 (*)     All other components within normal limits   CBC WITH AUTO DIFFERENTIAL - Abnormal; Notable for the following components:    RBC 3.36 (*)     Hemoglobin 9.9 (*)     Hematocrit 31.9 (*)     MCHC 31.0 (*)     RDW-CV 14.7 (*)     RDW-SD 51.3 (*)     All other components within normal limits   GLOMERULAR FILTRATION RATE, ESTIMATED - Abnormal; Notable for the following components:    Est, Glom Filt Rate 14 (*)     All other components within normal limits   OSMOLALITY - Abnormal; Notable for the following components:    Osmolality Calc 313.2 (*)     All other components within normal limits   POCT GLUCOSE - Abnormal; Notable for the following components:    POC Glucose 235 (*)     All other components within normal limits   POCT GLUCOSE - Abnormal; Notable for the following components:    POC Glucose 159 (*)     All other components within normal limits   POCT GLUCOSE - Abnormal; Notable for the following components:    POC Glucose 147 (*)     All other components within normal limits   POCT GLUCOSE - Normal   ANION GAP   ANION GAP   POCT GLUCOSE   POCT GLUCOSE       EMERGENCY DEPARTMENT COURSE:   Vitals:    Vitals:    05/07/22 2335 05/08/22 0332 05/08/22 0900 05/08/22 1145   BP: (!) 154/66 129/63 (!) 156/68 136/60   Pulse: (!) 47 (!) 47 (!) 47 60   Resp: 18 17 18 16   Temp: 97.7 °F (36.5 °C) 97.2 °F (36.2 °C) 98 °F (36.7 °C) 97.2 °F (36.2 °C)   TempSrc: Oral Oral Oral Oral   SpO2: 100% 100% 98% 100%   Weight: 179 lb 3.2 oz (81.3 kg)      Height: 5' (1.524 m)        Positive for syncopal episode, bradycardic in the ED. Patient's blood pressure is stable, no chest pain, no shortness of breath, no loss of consciousness while resting in bed. Low suspicion for MI as a cause for the chest pain, most likely secondary to her beta-blockers. Patient will be admitted to the hospitalist for further stabilization. CRITICAL CARE:       CONSULTS:  None    PROCEDURES:  None    FINAL IMPRESSION      1. Syncope and collapse    2. Symptomatic bradycardia          DISPOSITION/PLAN   Admitted    PATIENT REFERRED TO:  No follow-up provider specified.     DISCHARGE MEDICATIONS:  Current Discharge Medication List          (Please note that portions of this note were completed with a voice recognition program.  Efforts were made to edit the dictations but occasionally words are mis-transcribed.)    Fabby Mtz, DO Fabby Mtz DO  05/08/22 4570

## 2022-05-08 NOTE — ED NOTES
Pt Ok to eat. Will provide cold pack meal. Daughter at bedside. waiting IP orders.       Juni Jasmine RN  05/07/22 [id number]

## 2022-05-08 NOTE — H&P
Hospitalist - History & Physical      Patient: Earma Schlatter    Unit/Bed:3B-35/035-A  YOB: 1955  MRN: [id number]   Acct: [de-identified]   PCP: Sallie Valentine MD      Assessment and Plan:        1. Symptomatic bradycardia:   a. Hold Toprol  b. Telemetry  c. Cardiology consult  2. CHLOE:   a. Nephrology consult  b. Worse over last one week - not improved since discharge  3. DM II  a. Continue home medications  b. ADA diet   c. Hypoglycemic treatment orders  4. Chronic heart failure:   a. Daily weights  b. EF 55-60% ECHO 4/2022      CC:  Syncope    HPI: Patient presents to the ED following a syncopal episode. The patient was just discharged one day prior to admission following a prolonged stay due to influenza A. The patient developed CHLOE while hospitalized. Today the patient had risen from sitting to standing and began amublating. The patient loss consciousness and subsequently fell. The patient was found to have a resting heart rate 40-48. Patient admitted for telemetry and cardiology consult. ROS: Review of Systems   Constitutional: Positive for activity change. HENT: Negative. Eyes: Negative. Respiratory: Negative. Cardiovascular: Negative. Gastrointestinal: Negative. Genitourinary: Negative. Musculoskeletal: Negative. Allergic/Immunologic: Negative. Neurological: Positive for syncope. Hematological: Negative. Psychiatric/Behavioral: Negative.           PMH:    Past Medical History:   Diagnosis Date    Ankle fracture, left 1/17/2014    CHF (congestive heart failure) (HCC)     Diabetes mellitus ([facility]     HTN (hypertension), benign 1/17/2014    Hypertension      SHX:    Social History     Socioeconomic History    Marital status:      Spouse name: Not on file    Number of children: Not on file    Years of education: Not on file    Highest education level: Not on file   Occupational History    Not on file   Tobacco Use    Smoking status: Light Tobacco Smoker    Smokeless tobacco: Never Used   Substance and Sexual Activity    Alcohol use: No    Drug use: No    Sexual activity: Not on file   Other Topics Concern    Not on file   Social History Narrative    Not on file     Social Determinants of Health     Financial Resource Strain:     Difficulty of Paying Living Expenses: Not on file   Food Insecurity:     Worried About Running Out of Food in the Last Year: Not on file    Linette of Food in the Last Year: Not on file   Transportation Needs:     Lack of Transportation (Medical): Not on file    Lack of Transportation (Non-Medical): Not on file   Physical Activity:     Days of Exercise per Week: Not on file    Minutes of Exercise per Session: Not on file   Stress:     Feeling of Stress : Not on file   Social Connections:     Frequency of Communication with Friends and Family: Not on file    Frequency of Social Gatherings with Friends and Family: Not on file    Attends Jewish Services: Not on file    Active Member of [address] [place] [address] or Organizations: Not on file    Attends Club or Organization Meetings: Not on file    Marital Status: Not on file   Intimate Partner Violence:     Fear of Current or Ex-Partner: Not on file    Emotionally Abused: Not on file    Physically Abused: Not on file    Sexually Abused: Not on file   Housing Stability:     Unable to Pay for Housing in the Last Year: Not on file    Number of [place] in the Last Year: Not on file    Unstable Housing in the Last Year: Not on file     FHX: History reviewed. No pertinent family history.   Allergies: No Known Allergies  Medications:            Labs:   Recent Results (from the past 24 hour(s))   EKG Syncope    Collection Time: 05/07/22  7:24 PM   Result Value Ref Range    Ventricular Rate 49 BPM    Atrial Rate 49 BPM    P-R Interval 154 ms    QRS Duration 86 ms    Q-T Interval 518 ms    QTc Calculation (Bazett) 467 ms    P Axis -6 degrees    R Axis 36 degrees    T Range    Glucose 108 mg/dL    QC OK?  yes    POCT Glucose    Collection Time: 05/08/22 12:52 AM   Result Value Ref Range    POC Glucose 235 (H) 70 - 108 mg/dl   Basic Metabolic Panel w/ Reflex to MG    Collection Time: 05/08/22  3:26 AM   Result Value Ref Range    Sodium 134 (L) 135 - 145 meq/L    Potassium reflex Magnesium 4.4 3.5 - 5.2 meq/L    Chloride 95 (L) 98 - 111 meq/L    CO2 25 23 - 33 meq/L    Glucose 212 (H) 70 - 108 mg/dL     (H) 7 - 22 mg/dL    CREATININE 3.8 (HH) 0.4 - 1.2 mg/dL    Calcium 8.7 8.5 - 10.5 mg/dL   CBC with Auto Differential    Collection Time: 05/08/22  3:26 AM   Result Value Ref Range    WBC 7.6 4.8 - 10.8 thou/mm3    RBC 3.36 (L) 4.20 - 5.40 mill/mm3    Hemoglobin 9.9 (L) 12.0 - 16.0 gm/dl    Hematocrit 31.9 (L) 37.0 - 47.0 %    MCV 94.9 81.0 - 99.0 fL    MCH 29.5 26.0 - 33.0 pg    MCHC 31.0 (L) 32.2 - 35.5 gm/dl    RDW-CV 14.7 (H) 11.5 - 14.5 %    RDW-SD 51.3 (H) 35.0 - 45.0 fL    Platelets 885 191 - 716 thou/mm3    MPV 11.6 9.4 - 12.4 fL    Seg Neutrophils 50.9 %    Lymphocytes 38.5 %    Monocytes 8.8 %    Eosinophils 1.2 %    Basophils 0.5 %    Immature Granulocytes 0.1 %    Segs Absolute 3.9 1.8 - 7.7 thou/mm3    Lymphocytes Absolute 2.9 1.0 - 4.8 thou/mm3    Monocytes Absolute 0.7 0.4 - 1.3 thou/mm3    Eosinophils Absolute 0.1 0.0 - 0.4 thou/mm3    Basophils Absolute 0.0 0.0 - 0.1 thou/mm3    Immature Grans (Abs) 0.01 0.00 - 0.07 thou/mm3    nRBC 0 /100 wbc   Anion Gap    Collection Time: 05/08/22  3:26 AM   Result Value Ref Range    Anion Gap 14.0 8.0 - 16.0 meq/L   Glomerular Filtration Rate, Estimated    Collection Time: 05/08/22  3:26 AM   Result Value Ref Range    Est, Glom Filt Rate 14 (A) ml/min/1.73m2   Osmolality    Collection Time: 05/08/22  3:26 AM   Result Value Ref Range    Osmolality Calc 313.2 (H) 275.0 - 300.0 mOsmol/kg         Vital Signs: T: 97.8F P: 47 RR: 18 B/P: 123/95: FiO2: RA: O2 Sat:100%: I/O: No intake or output data in the 24 hours ending 05/08/22 0601      General:   No acute distress  HEENT:  normocephalic and atraumatic. No scleral icterus. PEARLA, mucous membranes moist  Neck: supple. Trachea midline. No JVD. Full ROM, no meningismus. Lungs: clear to auscultation. No retractions, no accessory muscle use. Cardiac: regular but bradycardic, no murmur, 2+ pulses  Abdomen: soft. Nontender. Bowel sounds active  Extremities:  No clubbing, cyanosis x 4, no edema    Vasculature: capillary refill < 3 seconds. Skin:  warm and dry. no visible rashes  Psych:  Alert and oriented x3. Affect appropriate  Lymph:  No supraclavicular adenopathy. Neurologic:  CN II-XII grossly intact. No focal deficit. Data: (All radiographs, tracings, PFTs, and imaging are personally viewed and interpreted unless otherwise noted).     Previous admission data reviewed   EKG: rhythm: sinus bradycardia, rate=49, rate=49 bpm, fj=218 ms, qrs=86 ms, vk=793 ms, axis=-6 degrees        Electronically signed by  Hatillo PetroleumADELA

## 2022-05-08 NOTE — ED TRIAGE NOTES
Pt to rm 01 per EMS states she was discharged from a [address] facility in [place] yesterday dx w/CHF. Daughter reports pt had gotten out of a car took a couple steps then it looked like her legs gave out b/c she started to crumple to the ground family was able to assist to ground. States pt was confused and hard to arouse for a few minutes. On arrival pt appears in no acute distress, noted bradycardic on monitor @ 47bpm. Pt denies CP/SOB, no recent n/v/d reported. States she just needs to eat something. BS checked WNL per EMS.

## 2022-05-09 ENCOUNTER — FOLLOWUP TELEPHONE ENCOUNTER (OUTPATIENT)
Dept: INPATIENT UNIT | Age: 67
End: 2022-05-09

## 2022-05-09 ENCOUNTER — APPOINTMENT (OUTPATIENT)
Dept: ULTRASOUND IMAGING | Age: 67
DRG: 308 | End: 2022-05-09
Payer: COMMERCIAL

## 2022-05-09 ENCOUNTER — APPOINTMENT (OUTPATIENT)
Dept: MRI IMAGING | Age: 67
DRG: 308 | End: 2022-05-09
Payer: COMMERCIAL

## 2022-05-09 LAB
ALBUMIN SERPL-MCNC: 2.9 G/DL (ref 3.5–5.1)
ALP BLD-CCNC: 200 U/L (ref 38–126)
ALT SERPL-CCNC: 74 U/L (ref 11–66)
ANION GAP SERPL CALCULATED.3IONS-SCNC: 17 MEQ/L (ref 8–16)
ANION GAP SERPL CALCULATED.3IONS-SCNC: 17 MEQ/L (ref 8–16)
AST SERPL-CCNC: 29 U/L (ref 5–40)
AVERAGE GLUCOSE: 204 MG/DL (ref 70–126)
BACTERIA: ABNORMAL
BILIRUB SERPL-MCNC: 0.4 MG/DL (ref 0.3–1.2)
BILIRUBIN URINE: NEGATIVE
BLOOD, URINE: NEGATIVE
BUN BLDV-MCNC: 126 MG/DL (ref 7–22)
BUN BLDV-MCNC: 128 MG/DL (ref 7–22)
CALCIUM IONIZED: 0.96 MMOL/L (ref 1.12–1.32)
CALCIUM IONIZED: 1.18 MMOL/L (ref 1.12–1.32)
CALCIUM SERPL-MCNC: 8.2 MG/DL (ref 8.5–10.5)
CALCIUM SERPL-MCNC: 8.3 MG/DL (ref 8.5–10.5)
CASTS: ABNORMAL /LPF
CASTS: ABNORMAL /LPF
CHARACTER, URINE: CLEAR
CHLORIDE BLD-SCNC: 97 MEQ/L (ref 98–111)
CHLORIDE BLD-SCNC: 99 MEQ/L (ref 98–111)
CHLORIDE, URINE: < 20 MEQ/L
CHOLESTEROL, TOTAL: 143 MG/DL (ref 100–199)
CO2: 20 MEQ/L (ref 23–33)
CO2: 22 MEQ/L (ref 23–33)
COLOR: YELLOW
CREAT SERPL-MCNC: 3.8 MG/DL (ref 0.4–1.2)
CREAT SERPL-MCNC: 4 MG/DL (ref 0.4–1.2)
CREATININE URINE: 145.2 MG/DL
CREATININE, URINE: 145.2 MG/DL
CRYSTALS: ABNORMAL
EKG ATRIAL RATE: 50 BPM
EKG P AXIS: -8 DEGREES
EKG P-R INTERVAL: 120 MS
EKG Q-T INTERVAL: 506 MS
EKG QRS DURATION: 84 MS
EKG QTC CALCULATION (BAZETT): 461 MS
EKG R AXIS: 54 DEGREES
EKG T AXIS: -6 DEGREES
EKG VENTRICULAR RATE: 50 BPM
EOSINOPHIL SMEAR, URINE: NORMAL
EPITHELIAL CELLS, UA: ABNORMAL /HPF
ERYTHROCYTE [DISTWIDTH] IN BLOOD BY AUTOMATED COUNT: 15.2 % (ref 11.5–14.5)
ERYTHROCYTE [DISTWIDTH] IN BLOOD BY AUTOMATED COUNT: 53.9 FL (ref 35–45)
GFR SERPL CREATININE-BSD FRML MDRD: 14 ML/MIN/1.73M2
GFR SERPL CREATININE-BSD FRML MDRD: 14 ML/MIN/1.73M2
GLUCOSE BLD-MCNC: 100 MG/DL (ref 70–108)
GLUCOSE BLD-MCNC: 103 MG/DL (ref 70–108)
GLUCOSE BLD-MCNC: 111 MG/DL (ref 70–108)
GLUCOSE BLD-MCNC: 119 MG/DL (ref 70–108)
GLUCOSE BLD-MCNC: 127 MG/DL (ref 70–108)
GLUCOSE, URINE: NEGATIVE MG/DL
HBA1C MFR BLD: 8.8 % (ref 4.4–6.4)
HCT VFR BLD CALC: 31.1 % (ref 37–47)
HDLC SERPL-MCNC: 50 MG/DL
HEMOGLOBIN: 9.6 GM/DL (ref 12–16)
HEPATITIS C ANTIBODY: NEGATIVE
KETONES, URINE: NEGATIVE
LDL CHOLESTEROL CALCULATED: 79 MG/DL
LEUKOCYTE ESTERASE, URINE: NEGATIVE
MAGNESIUM: 3.2 MG/DL (ref 1.6–2.4)
MCH RBC QN AUTO: 30 PG (ref 26–33)
MCHC RBC AUTO-ENTMCNC: 30.9 GM/DL (ref 32.2–35.5)
MCV RBC AUTO: 97.2 FL (ref 81–99)
MICROALBUMIN UR-MCNC: 66.21 MG/DL
MICROALBUMIN/CREAT UR-RTO: 456 MG/G (ref 0–30)
MISCELLANEOUS LAB TEST RESULT: ABNORMAL
NITRITE, URINE: NEGATIVE
PH UA: 5 (ref 5–9)
PHOSPHORUS: 7.1 MG/DL (ref 2.4–4.7)
PLATELET # BLD: 273 THOU/MM3 (ref 130–400)
PMV BLD AUTO: 10.9 FL (ref 9.4–12.4)
POTASSIUM SERPL-SCNC: 4.5 MEQ/L (ref 3.5–5.2)
POTASSIUM SERPL-SCNC: 4.6 MEQ/L (ref 3.5–5.2)
POTASSIUM, URINE: 40.8 MEQ/L
PROT/CREAT RATIO, UR: 0.73
PROTEIN UA: 100 MG/DL
PROTEIN, URINE: 105.8 MG/DL
RBC # BLD: 3.2 MILL/MM3 (ref 4.2–5.4)
RBC URINE: ABNORMAL /HPF
RENAL EPITHELIAL, UA: ABNORMAL
SODIUM BLD-SCNC: 136 MEQ/L (ref 135–145)
SODIUM BLD-SCNC: 136 MEQ/L (ref 135–145)
SODIUM URINE: 31 MEQ/L
SPECIFIC GRAVITY UA: 1.02 (ref 1–1.03)
TOTAL PROTEIN: 6.6 G/DL (ref 6.1–8)
TRIGL SERPL-MCNC: 69 MG/DL (ref 0–199)
UROBILINOGEN, URINE: 0.2 EU/DL (ref 0–1)
WBC # BLD: 7.6 THOU/MM3 (ref 4.8–10.8)
WBC UA: ABNORMAL /HPF
YEAST: ABNORMAL

## 2022-05-09 PROCEDURE — 70547 MR ANGIOGRAPHY NECK W/O DYE: CPT

## 2022-05-09 PROCEDURE — 2060000000 HC ICU INTERMEDIATE R&B

## 2022-05-09 PROCEDURE — 86255 FLUORESCENT ANTIBODY SCREEN: CPT

## 2022-05-09 PROCEDURE — 36415 COLL VENOUS BLD VENIPUNCTURE: CPT

## 2022-05-09 PROCEDURE — 99233 SBSQ HOSP IP/OBS HIGH 50: CPT | Performed by: INTERNAL MEDICINE

## 2022-05-09 PROCEDURE — 89190 NASAL SMEAR FOR EOSINOPHILS: CPT

## 2022-05-09 PROCEDURE — 76770 US EXAM ABDO BACK WALL COMP: CPT

## 2022-05-09 PROCEDURE — 82330 ASSAY OF CALCIUM: CPT

## 2022-05-09 PROCEDURE — 84100 ASSAY OF PHOSPHORUS: CPT

## 2022-05-09 PROCEDURE — 83735 ASSAY OF MAGNESIUM: CPT

## 2022-05-09 PROCEDURE — 84156 ASSAY OF PROTEIN URINE: CPT

## 2022-05-09 PROCEDURE — 6370000000 HC RX 637 (ALT 250 FOR IP): Performed by: PHYSICIAN ASSISTANT

## 2022-05-09 PROCEDURE — 82436 ASSAY OF URINE CHLORIDE: CPT

## 2022-05-09 PROCEDURE — 51798 US URINE CAPACITY MEASURE: CPT

## 2022-05-09 PROCEDURE — 83516 IMMUNOASSAY NONANTIBODY: CPT

## 2022-05-09 PROCEDURE — 6370000000 HC RX 637 (ALT 250 FOR IP): Performed by: HOSPITALIST

## 2022-05-09 PROCEDURE — 6360000002 HC RX W HCPCS: Performed by: PHYSICIAN ASSISTANT

## 2022-05-09 PROCEDURE — 81001 URINALYSIS AUTO W/SCOPE: CPT

## 2022-05-09 PROCEDURE — 80061 LIPID PANEL: CPT

## 2022-05-09 PROCEDURE — 84300 ASSAY OF URINE SODIUM: CPT

## 2022-05-09 PROCEDURE — 99255 IP/OBS CONSLTJ NEW/EST HI 80: CPT | Performed by: SOCIAL WORKER

## 2022-05-09 PROCEDURE — 86160 COMPLEMENT ANTIGEN: CPT

## 2022-05-09 PROCEDURE — 82948 REAGENT STRIP/BLOOD GLUCOSE: CPT

## 2022-05-09 PROCEDURE — 80053 COMPREHEN METABOLIC PANEL: CPT

## 2022-05-09 PROCEDURE — 86803 HEPATITIS C AB TEST: CPT

## 2022-05-09 PROCEDURE — 84155 ASSAY OF PROTEIN SERUM: CPT

## 2022-05-09 PROCEDURE — 84133 ASSAY OF URINE POTASSIUM: CPT

## 2022-05-09 PROCEDURE — 93010 ELECTROCARDIOGRAM REPORT: CPT | Performed by: INTERNAL MEDICINE

## 2022-05-09 PROCEDURE — 70551 MRI BRAIN STEM W/O DYE: CPT

## 2022-05-09 PROCEDURE — 70544 MR ANGIOGRAPHY HEAD W/O DYE: CPT

## 2022-05-09 PROCEDURE — 84165 PROTEIN E-PHORESIS SERUM: CPT

## 2022-05-09 PROCEDURE — 83883 ASSAY NEPHELOMETRY NOT SPEC: CPT

## 2022-05-09 PROCEDURE — 86038 ANTINUCLEAR ANTIBODIES: CPT

## 2022-05-09 PROCEDURE — 83036 HEMOGLOBIN GLYCOSYLATED A1C: CPT

## 2022-05-09 PROCEDURE — 86334 IMMUNOFIX E-PHORESIS SERUM: CPT

## 2022-05-09 PROCEDURE — 85027 COMPLETE CBC AUTOMATED: CPT

## 2022-05-09 PROCEDURE — 6360000002 HC RX W HCPCS: Performed by: STUDENT IN AN ORGANIZED HEALTH CARE EDUCATION/TRAINING PROGRAM

## 2022-05-09 PROCEDURE — 82570 ASSAY OF URINE CREATININE: CPT

## 2022-05-09 RX ORDER — ATORVASTATIN CALCIUM 40 MG/1
40 TABLET, FILM COATED ORAL DAILY
Status: DISCONTINUED | OUTPATIENT
Start: 2022-05-09 | End: 2022-05-20 | Stop reason: HOSPADM

## 2022-05-09 RX ORDER — POLYVINYL ALCOHOL 14 MG/ML
1 SOLUTION/ DROPS OPHTHALMIC EVERY 4 HOURS PRN
Status: DISCONTINUED | OUTPATIENT
Start: 2022-05-09 | End: 2022-05-20 | Stop reason: HOSPADM

## 2022-05-09 RX ORDER — CALCIUM GLUCONATE 20 MG/ML
2000 INJECTION, SOLUTION INTRAVENOUS ONCE
Status: COMPLETED | OUTPATIENT
Start: 2022-05-09 | End: 2022-05-09

## 2022-05-09 RX ORDER — PREGABALIN 25 MG/1
25 CAPSULE ORAL EVERY EVENING
Status: DISCONTINUED | OUTPATIENT
Start: 2022-05-09 | End: 2022-05-18

## 2022-05-09 RX ADMIN — POLYVINYL ALCOHOL 1 DROP: 14 SOLUTION/ DROPS OPHTHALMIC at 23:10

## 2022-05-09 RX ADMIN — HYDRALAZINE HYDROCHLORIDE 25 MG: 25 TABLET, FILM COATED ORAL at 00:44

## 2022-05-09 RX ADMIN — CALCIUM GLUCONATE 2000 MG: 20 INJECTION, SOLUTION INTRAVENOUS at 10:37

## 2022-05-09 RX ADMIN — HEPARIN SODIUM 5000 UNITS: 5000 INJECTION INTRAVENOUS; SUBCUTANEOUS at 17:51

## 2022-05-09 RX ADMIN — HEPARIN SODIUM 5000 UNITS: 5000 INJECTION INTRAVENOUS; SUBCUTANEOUS at 00:44

## 2022-05-09 RX ADMIN — HEPARIN SODIUM 5000 UNITS: 5000 INJECTION INTRAVENOUS; SUBCUTANEOUS at 10:38

## 2022-05-09 ASSESSMENT — ENCOUNTER SYMPTOMS
NAUSEA: 0
PHOTOPHOBIA: 0
SHORTNESS OF BREATH: 0
COUGH: 0
COLOR CHANGE: 0
TROUBLE SWALLOWING: 0
DIARRHEA: 0
VOMITING: 0

## 2022-05-09 ASSESSMENT — PAIN SCALES - GENERAL
PAINLEVEL_OUTOF10: 0

## 2022-05-09 NOTE — PROGRESS NOTES
Pharmacy Renal Adjustment    Alexander Schneider is a 61 y.o. female. Pharmacy renally adjust the following medications per P&T approved policy: Lyrica    Height:   Ht Readings from Last 1 Encounters:   05/07/22 5' (1.524 m)     Weight:  Wt Readings from Last 1 Encounters:   05/07/22 179 lb 3.2 oz (81.3 kg)     Recent Labs     05/09/22  0139 05/09/22  0400   * 126*   CREATININE 3.8* 4.0*     Estimated Creatinine Clearance: 13 mL/min (A) (based on SCr of 4 mg/dL [facility])). Calculated CrCl:    Assessment:  CHLOE on CKD    Plan:   Decrease Lyrica from 150mg nightly to 25mg nightly.

## 2022-05-09 NOTE — SIGNIFICANT EVENT
Critical Care Stroke Alert Note:     Jonatan Isaac is a 60 y/o female chronic distaolic HFpEF (EF 05-62%), DM type II, HTN, HLD, ORIF of left tibia/fibula in 2014, and hysterectomy. Patient is admitted under the hospitalist service for a syncopal episode. She was found to have symptomatic bradycardia, attributed as iatrogenic in etiology secondary to beta-blocker use. She was also found to have CHLOE. Nephrology has been following. I responded promptly to stroke alert at approximately 2135 on 5/8/22, for reports of new-onset right partial facial droop and moderate dysarthria. Last known well as 2100. I evaluated the patient and confirmed these findings. Of note, patient was alert and oriented, had no unilateral weakness, followed commands, had normal extraocular movements, no visual loss, no sensory loss, and no aphasia. NIHSS score was 3. Heart tones were RRR, S1/S2. Lung ausculation was clear and had regular respiration rate and pattern. Patient denied any chest pain, dizziness, dyspnea, lightheadedness, visual disturbance, hearing impairment, sensation disturbance, weakness, or other complaints. She stated \"I feel fine. \"    POCT glucose was 107. Vitals included: HR 50 (sinus franki), /56, SpO2 100% on room air, and resp rate 18. Labs were reviewed. ECG was obtained, showing sinus bradycardia without acute ischemic changes. Patient underwent STAT CT head wo contrast, showing no acute disease, including no hemorrhage. I called Dr. Núñez Loud with neuro-interventional team and discussed the case. Stroke team recommendations included: no TPA to be given due to suspicion for presence of stroke on admission. There were reports of slightly garbled speech since admission, as well as syncopal event. Patient's family also reported to me that the patient had a brief earlier episode of difficulty grasping things.  Plan will be to add plavix 75 mg daily starting now (no load), continue aspirin 81 mg daily, and obtain MRI brain tomorrow. No CTA's due to CHLOE and low suspicion for LVO (NIHSS only 3 and VAN score was negative). Continue telemetry. Continue statin. Increase dose of Lipitor to 40 mg. Lipid panel in the morning to guide dose titration further. Continue 0.9% NS at 75 mL/h per nephrology. Obtain labs including: BMP, CBC, Mg, Phos, ICa. Neuro checks and NIHSS monitoring per protocol. SLP consult. Consider PT/OT as indicated. Patient and family updated and are in agreement with this plan. No questions or concerns at this time. Nephrology following. Cardiology following. Will place consult to neurology for follow-up. Hospitalist team primary; I updated Dr. Larry Patricia on tonight's events and stroke team's recommendations at this time.       Electronically signed by SARA Fritz CNP on 5/9/2022 at 12:58 AM

## 2022-05-09 NOTE — CARE COORDINATION
5/9/22, 9:04 AM EDT  DISCHARGE PLANNING EVALUATION:    Zbigniew Schreiber       Admitted: 100 REINIER Horner day: 2   Location: -15/015-A Reason for admit: Symptomatic bradycardia [R00.1]   PMH:  has a past medical history of Ankle fracture, left, CHF (congestive heart failure) ([address] Diabetes mellitus ([address] HTN (hypertension), benign, and Hypertension. Procedure:   US Renal Complete  Impression:      1. No renal calculi or hydronephrosis. 2. Increased renal cortical echogenicity in the right kidney. The left   kidney is not optimally visualized. Increased renal cortical echogenicity   can be seen in medical renal disease. This can be correlated with renal   function tests. 3. Urinary bladder has a volume of 363 mL. CT Head WO Contrast Impression:       1. No acute disease in the brain. 2. Incidental findings are detailed above. CXR Impression:   Bibasilar hazy opacities, may reflect atelectasis or infection. Mild cardiomegaly. Equivocal left pleural effusion. Barriers to Discharge:  From ED, creatinine 4.0, Speech, diabetes management, telemetry, Cardiology consult, Nephrology, Neurology consults, IV fluids, Asa, Lipitor,Plavix, SQ Heparin. PCP: Lucien Dixon MD  Readmission Risk Score: 22.3 ( )%  Patient's Healthcare Decision Maker: Legal Next of Kin    Patient Goals/Plan/Treatment Preferences: Met with Li Marte. She currently lives at home alone. She is independent. She uses a cane and drives. She has support from her daughter. Plan is to return home at discharge. She denies need for DME and declines HH. Will follow for potential needs. PCP and insurance verified. Transportation/Food Security/Housekeeping Addressed:  No issues identified.

## 2022-05-09 NOTE — FLOWSHEET NOTE
Pt was originally in 3B-35;  was called to room due to a Rapid Response alert that was converted to a Code Stroke. Personnel moved quickly, thus Armando Hancock was unavailable as they wheeled her to CT scan within a couple minutes of arrival; her daughter remained in the room.  provided active listening, brief conversation, nurtured hope and prayer for her daughter. They recently lost another family member just a few short weeks ago, and the family is dealing with that as well as this, 'it's a lot' as she said. She expressed gratitude for time spent and prayer. 05/08/22 2228   Encounter Summary   Encounter Overview/Reason  Crisis   Service Provided For: Family; Patient not available   Referral/Consult From: Nursing Supervisor/Manager   Support System Children   Last Encounter  05/08/22   Complexity of Encounter Low   Begin Time 2135   End Time  2144   Total Time Calculated 9 min   Crisis   Type Code Stroke   Assessment/Intervention/Outcome   Assessment Tearful;Stress overload   Intervention Active listening;Prayer (assurance of)/[place];Nurtured Hope   Outcome Receptive; Expressed Gratitude;Coping

## 2022-05-09 NOTE — PROGRESS NOTES
Bladder scan complete and shows 600 ml. Pt states she feels like she may be able to urinate. Pt placed on bedpan.

## 2022-05-09 NOTE — PROGRESS NOTES
Patient admitted to  Room 15 from [phone] and via cart/stretcher  No complaints upon arrival to the room. IV site free of s/s of infection or infiltration. Vital signs obtained. Assessment and data collection initiated. Oriented to room. Policies and procedures for  explained All questions answered with no further questions at this time. Fall prevention and safety brochure discussed with patient. 2 person skin check completed with Kaya GARCIA    Patient declines PCP notification. Patient declines family notification.

## 2022-05-09 NOTE — PROGRESS NOTES
18 units with Breakfast and Lunch and 36 units with Dinner)    Current Medications:     Scheduled Meds:    atorvastatin  40 mg Oral Daily    calcium replacement protocol   Other RX Placeholder    calcium gluconate  2,000 mg IntraVENous Once    clopidogrel  75 mg Oral Daily    amLODIPine  5 mg Oral QPM    aspirin  81 mg Oral Daily    hydrALAZINE  25 mg Oral 2 times per day    insulin glargine  25 Units SubCUTAneous Nightly    insulin lispro  18 Units SubCUTAneous QAM AC    insulin lispro  18 Units SubCUTAneous Daily before lunch    insulin lispro  36 Units SubCUTAneous Dinner    insulin lispro  0-6 Units SubCUTAneous TID WC    insulin lispro  0-3 Units SubCUTAneous Nightly    isosorbide mononitrate  30 mg Oral Daily    pregabalin  150 mg Oral QPM    sodium chloride flush  5-40 mL IntraVENous 2 times per day    heparin (porcine)  5,000 Units SubCUTAneous q8h     Continuous Infusions:    sodium chloride 75 mL/hr at 22 0514    dextrose      sodium chloride       PRN Meds:  albuterol sulfate HFA, glucagon (rDNA), dextrose, sodium chloride flush, sodium chloride, ondansetron **OR** ondansetron, polyethylene glycol, acetaminophen **OR** acetaminophen, glucose, dextrose bolus **OR** dextrose bolus    Input/Output:       I/O last 3 completed shifts:   In: 525.1 [I.V.:525.1]  Out: - .      Patient Vitals for the past 96 hrs (Last 3 readings):   Weight   22 2335 179 lb 3.2 oz (81.3 kg)   22 1923 165 lb (74.8 kg)       Vital Signs:   Temperature:  Temp: 100 °F (37.8 °C)  TMax:   Temp (24hrs), Av °F (36.7 °C), Min:97.2 °F (36.2 °C), Max:100 °F (37.8 °C)    Respirations:  Resp: 18  Pulse:   Pulse: 50  BP:    BP: (!) 149/54  BP Range: Systolic (70YDU), DFW:690 , Min:127 , HHN:517       Diastolic (55QLT), LAWSON:24, Min:54, Max:65      Physical Examination:     General:  Awake, alert, no acute distress  HEENT: NC/AT/ MMM  Chest:              Clear B/L no W/R/R  Cardiac:  Bradycardic, normal S1, S2, No m/r/g  Abdomen: Soft, non-tender, with normo-active bowel sounds  Neuro:  R-sided facial droop with 3+ strength in RLE. Unclear whether this is chronic  SKIN:  No rashes, good skin turgor.   Extremities:  2+ pitting edema over bilateral lower extremities, no cyanosis    Labs:       Recent Labs     05/07/22  1945 05/08/22 0326 05/09/22  0139   WBC 7.3 7.6 7.6   RBC 3.31* 3.36* 3.20*   HGB 9.9* 9.9* 9.6*   HCT 30.7* 31.9* 31.1*   MCV 92.7 94.9 97.2   MCH 29.9 29.5 30.0   MCHC 32.2 31.0* 30.9*    282 273   MPV 11.2 11.6 10.9      BMP:   Recent Labs     05/08/22 0326 05/09/22  0139 05/09/22  0400   * 136 136   K 4.4 4.5 4.6   CL 95* 99 97*   CO2 25 20* 22*   * 128* 126*   CREATININE 3.8* 3.8* 4.0*   GLUCOSE 212* 103 100   CALCIUM 8.7 8.3* 8.2*      Phosphorus:     Recent Labs     05/09/22  0139   PHOS 7.1*     Magnesium:    Recent Labs     05/09/22  0139   MG 3.2*     Albumin:    Recent Labs     05/09/22  0400   LABALBU 2.9*     BNP:    No results found for: BNP  DARIANA:    No results found for: DARIANA  SPEP:  Lab Results   Component Value Date    PROT 6.6 05/09/2022     UPEP:   No results found for: LABPE  C3:   No results found for: C3  C4:   No results found for: C4  MPO ANCA:   No results found for: MPO  PR3 ANCA:   No results found for: PR3  Anti-GBM:   No results found for: GBMABIGG  Hep BsAg:       No results found for: HEPBSAG  Hep C AB:        No results found for: HEPCAB    Urinalysis/Chemistries:      Lab Results   Component Value Date    NITRU NEGATIVE 05/09/2022    COLORU YELLOW 05/09/2022    PHUR 5.0 05/09/2022    LABCAST NONE SEEN 05/09/2022    LABCAST NONE SEEN 05/09/2022    WBCUA 0-2 05/09/2022    RBCUA 0-2 05/09/2022    YEAST NONE SEEN 05/09/2022    BACTERIA NONE SEEN 05/09/2022    CLARITYU Clear 05/02/2022    SPECGRAV 1.016 05/09/2022    LEUKOCYTESUR NEGATIVE 05/09/2022    UROBILINOGEN 0.2 05/09/2022    BILIRUBINUR NEGATIVE 05/09/2022    BLOODU NEGATIVE 05/09/2022    GLUCOSEU Negative 05/02/2022    KETUA NEGATIVE 05/09/2022     Urine Sodium:   No results found for: ETELVINA  Urine Potassium:  No results found for: KUR  Urine Chloride:  No results found for: CLUR  Urine Osmolarity: No results found for: OSMOU  Urine Protein:   No components found for: TOTALPROTEIN, URINE   Urine Creatinine:     Lab Results   Component Value Date    LABCREA 68.2 05/03/2022     Urine Eosinophils:  No components found for: UEOS    Impression and Plan:  1. Pre-renal vs Intrinsic CHLOE Stage 2 on CKD IIIB eGFR 34 mL/min/1.73m2. Baseline sCr 1.8-2.0 mg/dL 04/26/22-04/30/22 with steady worsening since. Multifactorial - pre-renal given BUN:sCr >20 and h/o symptomatic bradycardia but also has proteinuria w/o hypertriglyceridemia (H/o uncontrolled IDDMT2 A1c 8.8% 05/09/22 may explain). Renal US w/o arterial stenosis. · Continue IV hydration  · Strict I&Os  · Daily Weights  · Avoid Nephrotoxic Substances - No contrast w/ MRI - risk of NSF  · Renally dose medications: pregabalin  · UA w/ microscopy  · Urine electrolytes  · Repeat urine protein:creatinine ratio and mAlb:sCr ratio  · Urine eosinophils  2. Hyponatremia, resolved  3. Hypocalcemia  4. Syncope 2/2 Bradycardia  5. IDDMT2 A1c 8.8%  6. Essential HTN  7. G2 Diastolic Heart Failure    Please don't hesitate to call with any questions.   Electronically signed by Jonelle Ascencio MD on 5/9/2022 at 11:35 AM

## 2022-05-09 NOTE — SIGNIFICANT EVENT
Pt asleep in room. Pt able to be lethargic but able to be aroused. Pt seemed a little confused however was able to answer all questions and follow all commands appropriately. Pt had some slightly slurred speech which was improving as she began waking up. Pt was able to take her meds, BS was 107, held her insulin. 2100: Pt daughter asked to speak with RN. She was concerned about her speech and was concerned about her having a stroke. RN went to reassess pt, pt did have a right facial droop and slurred speech was worse then early. Pt still able to follow all commands and was still A&O x4 with equal strength bilaterally. A code stroke was called wand pt was taken to CT and then transferred to 4A. Daughter updated and report called to RN on 4A.

## 2022-05-09 NOTE — PLAN OF CARE
Problem: Discharge Planning  Goal: Discharge to home or other facility with appropriate resources  Outcome: Progressing  Flowsheets (Taken 5/8/2022 0900 by Gopi Mari RN)  Discharge to home or other facility with appropriate resources:   Identify barriers to discharge with patient and caregiver   Arrange for needed discharge resources and transportation as appropriate   Identify discharge learning needs (meds, wound care, etc)   Arrange for interpreters to assist at discharge as needed   Refer to discharge planning if patient needs post-hospital services based on physician order or complex needs related to functional status, cognitive ability or social support system  Note: Pt from home in [place] with family. Plans to return there once medically stable. Problem: Pain  Goal: Verbalizes/displays adequate comfort level or baseline comfort level  Outcome: Progressing  Flowsheets (Taken 5/8/2022 0900 by Gopi Mari RN)  Verbalizes/displays adequate comfort level or baseline comfort level:   Encourage patient to monitor pain and request assistance   Assess pain using appropriate pain scale   Administer analgesics based on type and severity of pain and evaluate response   Implement non-pharmacological measures as appropriate and evaluate response   Notify Licensed Independent Practitioner if interventions unsuccessful or patient reports new pain   Consider cultural and social influences on pain and pain management  Note: Pt denies pain with each assessment this shift. Problem: Safety - Adult  Goal: Free from fall injury  Outcome: Progressing  Flowsheets (Taken 5/8/2022 0425 by Lg Robles RN)  Free From Fall Injury:   Instruct family/caregiver on patient safety   Based on caregiver fall risk screen, instruct family/caregiver to ask for assistance with transferring infant if caregiver noted to have fall risk factors  Note: Patient remained free from falls this shift.  Bed is in low position with alarm on and siderails up x2. Education given on use of call light and patient voiced understanding. Patient uses call light appropriately. Call light and beside table within reach. Arm band and falling star in place. Problem: Cardiovascular - Adult  Goal: Maintains optimal cardiac output and hemodynamic stability  Outcome: Progressing  Flowsheets (Taken 5/8/2022 0900 by Ottoniel Vigil RN)  Maintains optimal cardiac output and hemodynamic stability:   Monitor blood pressure and heart rate   Monitor urine output and notify Licensed Independent Practitioner for values outside of normal range   Assess for signs of decreased cardiac output   Administer fluid and/or volume expanders as ordered   Administer vasoactive medications as ordered  Note: BP (!) 149/54   Pulse 50   Temp 100 °F (37.8 °C) (Oral)   Resp 18   Ht 5' (1.524 m)   Wt 179 lb 3.2 oz (81.3 kg)   SpO2 100%   BMI 35.00 kg/m²     Goal: Absence of cardiac dysrhythmias or at baseline  Outcome: Progressing  Flowsheets (Taken 5/9/2022 1440)  Absence of cardiac dysrhythmias or at baseline:   Monitor cardiac rate and rhythm   Assess for signs of decreased cardiac output  Note: Pt remains Sinus Acey Finical this shift. Problem: Chronic Conditions and Co-morbidities  Goal: Patient's chronic conditions and co-morbidity symptoms are monitored and maintained or improved  Outcome: Progressing     Problem: Neurosensory - Adult  Goal: Achieves stable or improved neurological status  Outcome: Progressing  Note: Pt a&o x4. Pt continues to have garbled speech. Some weakness noted in right arm and at times in right leg. Facial droop noted.    Goal: Achieves maximal functionality and self care  Outcome: Progressing  Flowsheets (Taken 5/9/2022 1440)  Achieves maximal functionality and self care:   Encourage and assist patient to increase activity and self care with guidance from physical therapy/occupational therapy   Monitor swallowing and airway patency with patient fatigue and changes in neurological status  Note: Pt working with therapies to achieve maximal function. Problem: Musculoskeletal - Adult  Goal: Return mobility to safest level of function  Outcome: Progressing  Flowsheets (Taken 5/8/2022 2253 by Adan Rasheed RN)  Return Mobility to Safest Level of Function:   Assess patient stability and activity tolerance for standing, transferring and ambulating with or without assistive devices   Assist with transfers and ambulation using safe patient handling equipment as needed  Note: Pt independent with ambulation prior to admission. Pt remains in bed this shift d/t lethargy and weakness. Pt working with therapies to return to safest level of function. Goal: Maintain proper alignment of affected body part  Outcome: Progressing  Goal: Return ADL status to a safe level of function  Outcome: Progressing     Problem: Skin/Tissue Integrity  Goal: Absence of new skin breakdown  Description: 1. Monitor for areas of redness and/or skin breakdown  2. Assess vascular access sites hourly  3. Every 4-6 hours minimum:  Change oxygen saturation probe site  4. Every 4-6 hours:  If on nasal continuous positive airway pressure, respiratory therapy assess nares and determine need for appliance change or resting period. Outcome: Progressing  Plan of Care discussed with patient and family. They verbalized understanding.

## 2022-05-09 NOTE — PROGRESS NOTES
HF RN went to call pt for a 72 hour hospital heart failure follow up call and I noted pt was re-admitted at [facility] in [place], [place] for syncope. I called the floor pt is admitted on and spoke with charge nurse 'Jennyfer'. Pt's RN is in a room caring for a pt so unable to speak with me. I introduced myself to Jennyfer and told her my role in pt's care. I gave them report on pt's last admission here at [facility]. Pt is admitted there for syncope and CHLOE. I did request they place a Palliative Care consult per the 30 day HF re-admit algorithm. Jennyfer will pass on my request to pt's bedside RN Jabari Beltran.

## 2022-05-09 NOTE — PROGRESS NOTES
[facility] THERAPY MISSED TREATMENT NOTE  [facility]      Date: 2022  Patient Name: Hedy Alvarado        MRN: [id number]    : 1955  (61 y.o.)    REASON FOR MISSED TREATMENT:  Attempted to see patient at [phone] for completion of clinical swallow evaluation and assessment of cognitive linguistic domains. At time of attempt, patient actively in process for transferring [facility] for MRI. Will f/u on 5/10 to complete evaluations as appropriate.        Norita Mortimer M.A., [address] [place] [address]

## 2022-05-09 NOTE — CONSULTS
Neurology Consult Note    Date:5/9/2022       ITLQ:[id number]  Patient Name:Karina Morgan     YOB: 1955     Age:66 y.o. Requesting Physician: Lorin Zavala MD     Reason for Consult:  Evaluate for Stroke       Chief Complaint: Slurred Speech, Facial Droop    Subjective     Ting Falling is a 42-year-old female with past medical history significant for hypertension, diabetes, hyperlipidemia, CHF who presented to [place] [facility] with complaint of syncope. Patient was admitted for symptomatic bradycardia which was thought to be beta-blocker induced. On 5/8/2022 patient was noted by staff to have dysarthria and a new right facial droop which prompted a code stroke. Initial NIH was 3. Patient was taken to imaging for CT head which revealed no ICH however he did demonstrate old stroke which was age-indeterminate. For this reason tPA was not given as if this was a recent stroke he would be very high risk. CTAs were deferred as this patient has a creatinine of 4.0. Today on exam, pt continues to have slurred speech and facial droop and she is lethargic. She has no specific additional complaints. She denies weakness, vision deficit. Review of Systems   Review of Systems   Constitutional: Positive for activity change and fatigue. Negative for fever. HENT: Negative for tinnitus and trouble swallowing. Eyes: Negative for photophobia and visual disturbance. Respiratory: Negative for cough and shortness of breath. Cardiovascular: Negative for chest pain and palpitations. Gastrointestinal: Negative for diarrhea, nausea and vomiting. Genitourinary: Negative for dysuria and flank pain. Musculoskeletal: Negative for neck pain and neck stiffness. Skin: Negative for color change and rash. Neurological: Positive for syncope, facial asymmetry, speech difficulty and weakness. Negative for dizziness, numbness and headaches. Psychiatric/Behavioral: Negative for agitation and confusion. Medications   Scheduled Meds:    atorvastatin  40 mg Oral Daily    calcium replacement protocol   Other RX Placeholder    calcium gluconate  2,000 mg IntraVENous Once    clopidogrel  75 mg Oral Daily    amLODIPine  5 mg Oral QPM    aspirin  81 mg Oral Daily    hydrALAZINE  25 mg Oral 2 times per day    insulin glargine  25 Units SubCUTAneous Nightly    insulin lispro  18 Units SubCUTAneous QAM AC    insulin lispro  18 Units SubCUTAneous Daily before lunch    insulin lispro  36 Units SubCUTAneous Dinner    insulin lispro  0-6 Units SubCUTAneous TID WC    insulin lispro  0-3 Units SubCUTAneous Nightly    isosorbide mononitrate  30 mg Oral Daily    pregabalin  150 mg Oral QPM    sodium chloride flush  5-40 mL IntraVENous 2 times per day    heparin (porcine)  5,000 Units SubCUTAneous q8h     Continuous Infusions:    sodium chloride 75 mL/hr at 22 0514    dextrose      sodium chloride       PRN Meds: albuterol sulfate HFA, glucagon (rDNA), dextrose, sodium chloride flush, sodium chloride, ondansetron **OR** ondansetron, polyethylene glycol, acetaminophen **OR** acetaminophen, glucose, dextrose bolus **OR** dextrose bolus    Past History    Past Medical History:   has a past medical history of Ankle fracture, left, CHF (congestive heart failure) ([facility] [address] Diabetes mellitus ([facility] [address] HTN (hypertension), benign, and Hypertension. Social History:   reports that she has been smoking. She has never used smokeless tobacco. She reports that she does not drink alcohol and does not use drugs. Family History: History reviewed. No pertinent family history. Physical Examination      Vitals:  BP (!) 149/54   Pulse 50   Temp 100 °F (37.8 °C) (Oral)   Resp 18   Ht 5' (1.524 m)   Wt 179 lb 3.2 oz (81.3 kg)   SpO2 100%   BMI 35.00 kg/m²   Temp (24hrs), Av °F (36.7 °C), Min:97.2 °F (36.2 °C), Max:100 °F (37.8 °C)      I/O (24Hr):     Intake/Output Summary (Last 24 hours) at 2022 1132  Last data filed at 5/9/2022 1033  Gross per 24 hour   Intake 525.12 ml   Output 400 ml   Net 125.12 ml       Physical Exam  Vitals reviewed. Constitutional:       Appearance: Normal appearance. Comments: Drowsy   HENT:      Head: Normocephalic and atraumatic. Right Ear: External ear normal.      Left Ear: External ear normal.      Nose: Nose normal.      Mouth/Throat:      Mouth: Mucous membranes are moist.      Pharynx: Oropharynx is clear. Eyes:      Extraocular Movements: Extraocular movements intact and EOM normal.      Pupils: Pupils are equal, round, and reactive to light. Comments: BL exopthalmos   Cardiovascular:      Rate and Rhythm: Normal rate and regular rhythm. Pulmonary:      Effort: Pulmonary effort is normal. No respiratory distress. Breath sounds: Normal breath sounds. Abdominal:      General: There is no distension. Palpations: Abdomen is soft. Tenderness: There is no abdominal tenderness. Musculoskeletal:         General: No deformity or signs of injury. Cervical back: No rigidity or tenderness. Skin:     General: Skin is warm and dry. Neurological:      Mental Status: She is oriented to person, place, and time. Coordination: Finger-Nose-Finger Test and Heel to [place] tyree Test normal.      Deep Tendon Reflexes:      Reflex Scores:       Bicep reflexes are 1+ on the right side and 1+ on the left side. Patellar reflexes are 1+ on the right side and 1+ on the left side. Psychiatric:         Speech: Speech is slurred. Comments: Drowsy       Neurologic Exam     Mental Status   Oriented to person, place, and time. Follows 1 step commands. Speech: slurred   Level of consciousness: drowsy  Knowledge: good. Able to name object. Able to read. Able to repeat. Drowsy but arousable by constant verbal stimuli. Speech is moderate to severely slurred. Cranial Nerves     CN II   Visual fields full to confrontation.      CN III, IV, VI   Pupils are equal, round, and reactive to light. Extraocular motions are normal.   Right pupil: Size: 3 mm. Reactivity: brisk. Left pupil: Size: 3 mm. Reactivity: brisk. CN V   Facial sensation intact. CN VII   Right facial weakness: central  Left facial weakness: none    CN VIII   CN VIII normal.   Hearing: intact    CN IX, X   CN IX normal.   Palate: symmetric    CN XI   CN XI normal.   Right sternocleidomastoid strength: normal  Left sternocleidomastoid strength: normal  Right trapezius strength: normal  Left trapezius strength: normal    CN XII   CN XII normal.   Tongue: not atrophic  Fasciculations: absent  Tongue deviation: none    Motor Exam   Muscle bulk: normal  Overall muscle tone: normal  Strength:  BUE: 5/5  RLE: 4-/5  LLE: 5/5       Sensory Exam   Light touch normal.     Gait, Coordination, and Reflexes     Coordination   Finger to nose coordination: normal  Heel to shin coordination: normal    Tremor   Resting tremor: absent  Intention tremor: absent    Reflexes   Right biceps: 1+  Left biceps: 1+  Right patellar: 1+  Left patellar: 1+         Labs/Imaging/Diagnostics   Labs:  CBC:  Recent Labs     05/07/22  1945 05/08/22  0326 05/09/22  0139   WBC 7.3 7.6 7.6   RBC 3.31* 3.36* 3.20*   HGB 9.9* 9.9* 9.6*   HCT 30.7* 31.9* 31.1*   MCV 92.7 94.9 97.2    282 273     CHEMISTRIES:  Recent Labs     05/08/22  0326 05/09/22  0139 05/09/22  0400   * 136 136   K 4.4 4.5 4.6   CL 95* 99 97*   CO2 25 20* 22*   * 128* 126*   CREATININE 3.8* 3.8* 4.0*   GLUCOSE 212* 103 100   PHOS  --  7.1*  --    MG  --  3.2*  --      PT/INR:No results for input(s): PROTIME, INR in the last 72 hours. APTT:No results for input(s): APTT in the last 72 hours.   LIVER PROFILE:  Recent Labs     05/09/22  0400   AST 29   ALT 74*   BILITOT 0.4   ALKPHOS 200*       Imaging Last 24 Hours:  CT HEAD WO CONTRAST    Result Date: 5/8/2022  ** ADDENDUM #1 ** This report was discussed with Kathy Parikh RN on May 08, 2022 22:15:00 EDT. This document has been electronically signed by: Pako Torres on 05/08/2022 10:15 PM ** ORIGINAL REPORT * CT BRAIN WITHOUT CONTRAST COMPARISON: None. FINDINGS: Exam is motion limited. There is mild parenchymal atrophy. There is no evidence of an acute infarct or intraparenchymal hemorrhage. Patchy areas of low attenuation are noted in the subcortical and periventricular region which are nonspecific but most likely represent chronic small vessel ischemic disease. Small calcified meningioma adjacent to the right frontal lobe measures 7 mm on coronal image 20. A right falcine calcified meningioma is noted medial to the right frontoparietal junction, and measures 1.3 cm in size on axial image 26. There is no significant mass effect, midline shift, or extra-axial blood. The ventricles are normal in size without evidence of hydrocephalus. The bone windows are unremarkable. 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 1. No acute disease in the brain. 2. Incidental findings are detailed above. This document has been electronically signed by: Dawna Bowens M.D. on 05/08/2022 10:08 PM All CTs at this facility use dose modulation techniques and iterative reconstructions, and/or weight-based dosing when appropriate to reduce radiation to a low as reasonably achievable. US RENAL COMPLETE    Result Date: 5/9/2022  RENAL ULTRASOUND COMPARISON: None. FINDINGS: There is increased renal cortical echogenicity in the right kidney. The left kidney is not optimally visualized. Right kidney measures 10.1 cm in length. Left kidney measures 9.8 cm in length. There is no stone or hydronephrosis. Urinary bladder has a volume of 363 mL. 1. No renal calculi or hydronephrosis. 2. Increased renal cortical echogenicity in the right kidney. The left kidney is not optimally visualized. Increased renal cortical echogenicity can be seen in medical renal disease. This can be correlated with renal function tests.  3. Urinary bladder has a volume of 363 mL. This document has been electronically signed by: Zaina Landrum M.D. on 05/09/2022 06:07 AM    XR CHEST PORTABLE    Result Date: 5/7/2022  Chest radiograph frontal view Comparison: None Findings: Enlarged cardiac contour. Hilar silhouettes are normal. Hypoinflation. Bibasilar hazy opacities. Equivocal left pleural effusion, difficult to tell due to overlying chest wall soft tissue/breast. No right pleural effusion. No pneumothorax. No acute fracture. Soft tissue is unremarkable. Impression: Bibasilar hazy opacities, may reflect atelectasis or infection. Mild cardiomegaly. Equivocal left pleural effusion. This document has been electronically signed by: Renee Borja MD on 05/07/2022 08:58 PM        Assessment and Plan:        Hospital Problems           Last Modified POA    * (Principal) Symptomatic bradycardia 5/7/2022 Yes    Acute kidney injury superimposed on CKD ([facility] [address] 5/8/2022 Yes    Syncope and collapse 5/9/2022 Yes    DMII (diabetes mellitus, type 2) ([facility] [address] 5/8/2022 Yes          1. Acute Ischemic Stroke   Imaging  o CT revealed no ICH, old stroke, two incidentally found meningiomas   o CTA of head and neck deferred due to kidney disease. o MRI of brain without contrast ordered. o MRA H&N WO ordered  o 2D echo- EF 55-60%, LVH, dilated L atrium, pulm HTN  o Stat CT head is needed if the patient develops new-onset altered mental status, a severe headache, or new-onset neurologic deficit   Risk factors and medications  o Permissive hypertension (up to 220/120) for 24 hours after stroke onset. Following this period, hypertension management with goal blood pressure of <130/80 unless otherwise specified. o Keep well hydrated. Initiate normal saline at 75 ml/hr as needed. o Asprin 81 mg daily. o Plavix 75 mg daily  o HgbA1C 8.8    o LDL 79, goal of 45-70. Continue Lipitor 40mg daily  o Smoking and alcohol cessation when applicable.   o Provide stroke eduction for individualized risk factors. o DVT prophylaxis with SCDs and SQ heparin (hold heparin/lovenox for 24 hours after IV tPA or IA intervention)   Other recommendations  o Dysphagia screen prior to oral intake  o PT/OT/SLP Consult  o EKG/Telemetry to monitor for atrial fibrillation  o NIHSS every shift. Neuro checks per unit unless otherwise specified. o Head of bed 45 degree. This was discussed with Dr. Omer Presley and he is in agreement with the assessment and plan. Electronically signed by Jolly Leyden, PA-C on 5/9/22 at 11:32 AM EDT    I agree with evaluation and plan. Continue DAPT and risk factors control due to intracranial disease. Patient will need 30 day heart monitor after discharge.      Adia Billy MD

## 2022-05-10 ENCOUNTER — APPOINTMENT (OUTPATIENT)
Dept: GENERAL RADIOLOGY | Age: 67
DRG: 308 | End: 2022-05-10
Payer: COMMERCIAL

## 2022-05-10 LAB
ANION GAP SERPL CALCULATED.3IONS-SCNC: 16 MEQ/L (ref 8–16)
BUN BLDV-MCNC: 102 MG/DL (ref 7–22)
C3 COMPLEMENT: 166 MG/DL (ref 90–180)
CALCIUM SERPL-MCNC: 9.1 MG/DL (ref 8.5–10.5)
CHLORIDE BLD-SCNC: 105 MEQ/L (ref 98–111)
CO2: 22 MEQ/L (ref 23–33)
COMPLEMENT C4: 32 MG/DL (ref 10–40)
CREAT SERPL-MCNC: 2.7 MG/DL (ref 0.4–1.2)
ERYTHROCYTE [DISTWIDTH] IN BLOOD BY AUTOMATED COUNT: 14.9 % (ref 11.5–14.5)
ERYTHROCYTE [DISTWIDTH] IN BLOOD BY AUTOMATED COUNT: 51.6 FL (ref 35–45)
GFR SERPL CREATININE-BSD FRML MDRD: 21 ML/MIN/1.73M2
GLUCOSE BLD-MCNC: 107 MG/DL (ref 70–108)
GLUCOSE BLD-MCNC: 109 MG/DL (ref 70–108)
GLUCOSE BLD-MCNC: 143 MG/DL (ref 70–108)
GLUCOSE BLD-MCNC: 194 MG/DL (ref 70–108)
GLUCOSE BLD-MCNC: 232 MG/DL (ref 70–108)
HCT VFR BLD CALC: 32.3 % (ref 37–47)
HEMOGLOBIN: 10 GM/DL (ref 12–16)
MCH RBC QN AUTO: 29.8 PG (ref 26–33)
MCHC RBC AUTO-ENTMCNC: 31 GM/DL (ref 32.2–35.5)
MCV RBC AUTO: 96.1 FL (ref 81–99)
PLATELET # BLD: 286 THOU/MM3 (ref 130–400)
PMV BLD AUTO: 11.2 FL (ref 9.4–12.4)
POTASSIUM REFLEX MAGNESIUM: 4.6 MEQ/L (ref 3.5–5.2)
RBC # BLD: 3.36 MILL/MM3 (ref 4.2–5.4)
SODIUM BLD-SCNC: 143 MEQ/L (ref 135–145)
WBC # BLD: 6.3 THOU/MM3 (ref 4.8–10.8)

## 2022-05-10 PROCEDURE — 2500000003 HC RX 250 WO HCPCS: Performed by: INTERNAL MEDICINE

## 2022-05-10 PROCEDURE — 97530 THERAPEUTIC ACTIVITIES: CPT

## 2022-05-10 PROCEDURE — 74230 X-RAY XM SWLNG FUNCJ C+: CPT

## 2022-05-10 PROCEDURE — 92523 SPEECH SOUND LANG COMPREHEN: CPT

## 2022-05-10 PROCEDURE — 92610 EVALUATE SWALLOWING FUNCTION: CPT

## 2022-05-10 PROCEDURE — 2580000003 HC RX 258: Performed by: PHYSICIAN ASSISTANT

## 2022-05-10 PROCEDURE — 92526 ORAL FUNCTION THERAPY: CPT

## 2022-05-10 PROCEDURE — 2060000000 HC ICU INTERMEDIATE R&B

## 2022-05-10 PROCEDURE — 99232 SBSQ HOSP IP/OBS MODERATE 35: CPT | Performed by: INTERNAL MEDICINE

## 2022-05-10 PROCEDURE — 6370000000 HC RX 637 (ALT 250 FOR IP): Performed by: NURSE PRACTITIONER

## 2022-05-10 PROCEDURE — 94760 N-INVAS EAR/PLS OXIMETRY 1: CPT

## 2022-05-10 PROCEDURE — 99232 SBSQ HOSP IP/OBS MODERATE 35: CPT | Performed by: SOCIAL WORKER

## 2022-05-10 PROCEDURE — 80048 BASIC METABOLIC PNL TOTAL CA: CPT

## 2022-05-10 PROCEDURE — 99222 1ST HOSP IP/OBS MODERATE 55: CPT | Performed by: PHYSICAL MEDICINE & REHABILITATION

## 2022-05-10 PROCEDURE — 6370000000 HC RX 637 (ALT 250 FOR IP): Performed by: PHYSICIAN ASSISTANT

## 2022-05-10 PROCEDURE — 93307 TTE W/O DOPPLER COMPLETE: CPT

## 2022-05-10 PROCEDURE — 36415 COLL VENOUS BLD VENIPUNCTURE: CPT

## 2022-05-10 PROCEDURE — 97116 GAIT TRAINING THERAPY: CPT

## 2022-05-10 PROCEDURE — 2580000003 HC RX 258: Performed by: INTERNAL MEDICINE

## 2022-05-10 PROCEDURE — 97162 PT EVAL MOD COMPLEX 30 MIN: CPT

## 2022-05-10 PROCEDURE — 6360000002 HC RX W HCPCS: Performed by: PHYSICIAN ASSISTANT

## 2022-05-10 PROCEDURE — 82948 REAGENT STRIP/BLOOD GLUCOSE: CPT

## 2022-05-10 PROCEDURE — 6370000000 HC RX 637 (ALT 250 FOR IP)

## 2022-05-10 PROCEDURE — 92611 MOTION FLUOROSCOPY/SWALLOW: CPT

## 2022-05-10 PROCEDURE — 85027 COMPLETE CBC AUTOMATED: CPT

## 2022-05-10 RX ORDER — INSULIN LISPRO 100 [IU]/ML
10 INJECTION, SOLUTION INTRAVENOUS; SUBCUTANEOUS
Status: DISCONTINUED | OUTPATIENT
Start: 2022-05-11 | End: 2022-05-12

## 2022-05-10 RX ORDER — INSULIN LISPRO 100 [IU]/ML
15 INJECTION, SOLUTION INTRAVENOUS; SUBCUTANEOUS
Status: DISCONTINUED | OUTPATIENT
Start: 2022-05-11 | End: 2022-05-12

## 2022-05-10 RX ADMIN — CLOPIDOGREL BISULFATE 75 MG: 75 TABLET ORAL at 12:30

## 2022-05-10 RX ADMIN — AMLODIPINE BESYLATE 5 MG: 5 TABLET ORAL at 17:49

## 2022-05-10 RX ADMIN — SODIUM CHLORIDE, PRESERVATIVE FREE 10 ML: 5 INJECTION INTRAVENOUS at 20:00

## 2022-05-10 RX ADMIN — INSULIN LISPRO 1 UNITS: 100 INJECTION, SOLUTION INTRAVENOUS; SUBCUTANEOUS at 12:43

## 2022-05-10 RX ADMIN — SODIUM CHLORIDE: 9 INJECTION, SOLUTION INTRAVENOUS at 05:35

## 2022-05-10 RX ADMIN — HEPARIN SODIUM 5000 UNITS: 5000 INJECTION INTRAVENOUS; SUBCUTANEOUS at 23:32

## 2022-05-10 RX ADMIN — HYDRALAZINE HYDROCHLORIDE 25 MG: 25 TABLET, FILM COATED ORAL at 22:00

## 2022-05-10 RX ADMIN — INSULIN GLARGINE 25 UNITS: 100 INJECTION, SOLUTION SUBCUTANEOUS at 21:54

## 2022-05-10 RX ADMIN — PREGABALIN 25 MG: 25 CAPSULE ORAL at 16:49

## 2022-05-10 RX ADMIN — ATORVASTATIN CALCIUM 40 MG: 80 TABLET, FILM COATED ORAL at 12:33

## 2022-05-10 RX ADMIN — INSULIN LISPRO 1 UNITS: 100 INJECTION, SOLUTION INTRAVENOUS; SUBCUTANEOUS at 21:57

## 2022-05-10 RX ADMIN — HEPARIN SODIUM 5000 UNITS: 5000 INJECTION INTRAVENOUS; SUBCUTANEOUS at 12:30

## 2022-05-10 RX ADMIN — HEPARIN SODIUM 5000 UNITS: 5000 INJECTION INTRAVENOUS; SUBCUTANEOUS at 16:49

## 2022-05-10 RX ADMIN — ASPIRIN 81 MG: 81 TABLET, COATED ORAL at 12:30

## 2022-05-10 RX ADMIN — BARIUM SULFATE 10 ML: 400 PASTE ORAL at 10:06

## 2022-05-10 RX ADMIN — BARIUM SULFATE 20 ML: 0.81 POWDER, FOR SUSPENSION ORAL at 10:06

## 2022-05-10 RX ADMIN — INSULIN LISPRO 1 UNITS: 100 INJECTION, SOLUTION INTRAVENOUS; SUBCUTANEOUS at 16:49

## 2022-05-10 RX ADMIN — SODIUM CHLORIDE: 9 INJECTION, SOLUTION INTRAVENOUS at 21:59

## 2022-05-10 RX ADMIN — BARIUM SULFATE 10 ML: 400 SUSPENSION ORAL at 10:06

## 2022-05-10 RX ADMIN — HEPARIN SODIUM 5000 UNITS: 5000 INJECTION INTRAVENOUS; SUBCUTANEOUS at 01:09

## 2022-05-10 ASSESSMENT — PAIN SCALES - GENERAL
PAINLEVEL_OUTOF10: 0

## 2022-05-10 ASSESSMENT — ENCOUNTER SYMPTOMS
SHORTNESS OF BREATH: 0
COUGH: 0
PHOTOPHOBIA: 0
TROUBLE SWALLOWING: 0
COLOR CHANGE: 0
NAUSEA: 0
VOMITING: 0
DIARRHEA: 0

## 2022-05-10 NOTE — PROGRESS NOTES
Hospitalist Progress Note    Patient:  Ephraim Orta    Unit/Bed:4A-15/015-A  YOB: 1955  MRN: [id number]   Acct: [de-identified]   PCP: Maida Oconnell MD  Date of Admission: 5/7/2022    Assessment/Plan:  1. Acute Ischemic Stroke: Code stroke called on 05/08 overnight. Patient with NIHSS 3 from facial droop, RLE weakness, slurred speech. CT scan head revealed chronic small vessel ischemic disease. Two incidental meningiomas. CTA head w/ contrast not obtained 2/2 severity of renal injury. MRI brain revealed few scattered punctate acute infarcts in left corona radiata and right centrum semiovale with chronic microvascular angiopathy. BRA head with focal areas of moderate stenosis in b/l cavernous and clinoid segments of ICAs. tPA not administered as window had passed and there was initial concern for a subacute indeterminate infarct. - Neurology on board    - NIHSS and neuro checks qShift    - Permissive HTN complete, okay to resume home meds      - Continue IVF, ASA, Plavix, sugar control, statin    - SLP w/ barium swallow + moderate dysphagia   - PT/OT following    - 30-day event monitor on DC    - Echo with bubble to evaluate for PFO  2. Moderate Oropharyngeal Dysphagia: New onset s/p recent ischemic stroke(s), SLP eval with MBS on 05/10 revealed moderate oral dysphagia, mild pharyngeal dysphagia without tracheal aspiration events    - Diet minced and moist with mildly thick liquids   3. Symptomatic Bradycardia: Resolving. S/p syncopal episode with collapse and LOC lasting seconds. Likely iatrogenic 2/2 Toprol XL 75 mg which has been held. Pulse ~40s on admission. Blood pressures normal. Labs normal. Pulse improving to ~60s s/p BB washout.    - Cardiology following   - Toprol XL held for washout    - Orthostatics qShift until negative    - IV Albumin q6h prn    4. Acute Kidney Injury in CKD: Creatinine improving. Unsure of etiology at this time. No recent contrast received.  Baseline ~1.8 one week prior. BUN:Cr points to prerenal etiology.     - Nephrology following   - Renal US revealed CKD     - Avoid nephrotoxins     - Continue gentle fluids   5. NIDDM2: A1c 9.1. With diabetic nephropathy. Continue Lantus 25u nightly, Humalog TID. POCT glucose checks AC/HS  6. Hyperlipidemia: Continue home statin   7. Hypertension: Okay to resume home meds  8. Chronic HFpEF 55%: Stable and well controlled at this time. Expected discharge date: TBD    Disposition:    [x] Home       [] TCU       [] Rehab       [] Psych       [] SNF       [] [facility]       [] Other-    Chief Complaint: Syncope with collapse, lightheadedness     Hospital Course: Per admitting H&P note \"Patient presents to the ED following a syncopal episode. The patient was just discharged one day prior to admission following a prolonged stay due to influenza A. The patient developed CHLOE while hospitalized. Today the patient had risen from sitting to standing and began amublating. The patient loss consciousness and subsequently fell. The patient was found to have a resting heart rate 40-48. Patient admitted for telemetry and cardiology consult. \"    05/09/22: Patient unfortunately suffered from ischemic stroke overnight but was deemed not a candidate for tPA as there was some concern for an indeterminate subacute infarct. NIHSS 3 with slurred speech, right facial droop and RLE weakness. MRI brain revealed some scattered acute ischemic infarcts bilaterally. Started on ASA, Plavix, high intensity statin. Neurology followed. Renal function worsened with time. As of now no known etiology. Suspecting a possible cardiac etiology for micro thromboembolic disease    Subjective (past 24 hours): Patient sitting in bed comfortably today with no complaints or concerns at this time. Says she feels fine. Still with facial droop, NIHSS 2, PM&R to evaluate for rehab.  Patient with SLP eval and MBS revealing moderate oropharyngeal dysphagia with recs for diet to be minced and moist with thick liquids. Otherwise well. Denies CP, SOB, n/v/d.        ROS (12 point review of systems completed. Pertinent positives noted. Otherwise ROS is negative). Medications:  Reviewed    Infusion Medications    sodium chloride 75 mL/hr at 05/10/22 0535    dextrose      sodium chloride       Scheduled Medications    atorvastatin  40 mg Oral Daily    calcium replacement protocol   Other RX Placeholder    pregabalin  25 mg Oral QPM    clopidogrel  75 mg Oral Daily    [Held by provider] amLODIPine  5 mg Oral QPM    aspirin  81 mg Oral Daily    [Held by provider] hydrALAZINE  25 mg Oral 2 times per day    insulin glargine  25 Units SubCUTAneous Nightly    insulin lispro  18 Units SubCUTAneous QAM AC    insulin lispro  18 Units SubCUTAneous Daily before lunch    insulin lispro  36 Units SubCUTAneous Dinner    insulin lispro  0-6 Units SubCUTAneous TID WC    insulin lispro  0-3 Units SubCUTAneous Nightly    [Held by provider] isosorbide mononitrate  30 mg Oral Daily    sodium chloride flush  5-40 mL IntraVENous 2 times per day    heparin (porcine)  5,000 Units SubCUTAneous q8h     PRN Meds: polyvinyl alcohol, albuterol sulfate HFA, glucagon (rDNA), dextrose, sodium chloride flush, sodium chloride, ondansetron **OR** ondansetron, polyethylene glycol, acetaminophen **OR** acetaminophen, glucose, dextrose bolus **OR** dextrose bolus      Intake/Output Summary (Last 24 hours) at 5/10/2022 1532  Last data filed at 5/10/2022 1453  Gross per 24 hour   Intake 240 ml   Output 1975 ml   Net -1735 ml       Diet:  ADULT DIET; Dysphagia - Minced and Moist; 4 carb choices (60 gm/meal); Low Fat/Low Chol/High Fiber/KALEN; Low Sodium (2 gm); Low Potassium (Less than 3000 mg/day); Low Phosphorus (Less than 1000 mg);  Less than 60 gm; Mildly Thick (Nectar)    Exam:  BP (!) 167/56   Pulse 65   Temp 98.2 °F (36.8 °C) (Oral)   Resp 20   Ht 5' (1.524 m)   Wt 175 lb 12.8 oz (79.7 kg)   SpO2 98% BMI 34.33 kg/m²     General appearance: No apparent distress, appears stated age and cooperative. Slurred speech   HEENT: Pupils equal, round, and reactive to light. Mild degree of exophthalmos with ride sided facial droop. Conjunctivae/corneas clear. Neck: Supple, with full range of motion. No jugular venous distention. Trachea midline. Respiratory:  Normal respiratory effort. Clear to auscultation, bilaterally without Rales/Wheezes/Rhonchi. Cardiovascular: Regular rate and rhythm with normal S1/S2 without murmurs, rubs or gallops. Abdomen: Soft, non-tender, non-distended with normal bowel sounds. Musculoskeletal: passive and active ROM x 4 extremities. Skin: Skin color, texture, turgor normal.  No rashes or lesions. Neurologic:  Neurovascularly intact without any focal sensory/motor deficits. Cranial nerves: II-XII intact, grossly non-focal.  Psychiatric: Alert and oriented, thought content appropriate, normal insight  Capillary Refill: Brisk,< 3 seconds   Peripheral Pulses: +2 palpable, equal bilaterally     Labs:   Recent Labs     05/08/22  0326 05/09/22  0139 05/10/22  0902   WBC 7.6 7.6 6.3   HGB 9.9* 9.6* 10.0*   HCT 31.9* 31.1* 32.3*    273 286     Recent Labs     05/09/22  0139 05/09/22  0400 05/10/22  0826    136 143   K 4.5 4.6 4.6   CL 99 97* 105   CO2 20* 22* 22*   * 126* 102*   CREATININE 3.8* 4.0* 2.7*   CALCIUM 8.3* 8.2* 9.1   PHOS 7.1*  --   --      Recent Labs     05/09/22  0400   AST 29   ALT 74*   BILITOT 0.4   ALKPHOS 200*     No results for input(s): INR in the last 72 hours. No results for input(s): Jimbo Favre in the last 72 hours.     Microbiology:      Urinalysis:      Lab Results   Component Value Date    NITRU NEGATIVE 05/09/2022    WBCUA 0-2 05/09/2022    BACTERIA NONE SEEN 05/09/2022    RBCUA 0-2 05/09/2022    BLOODU NEGATIVE 05/09/2022    SPECGRAV 1.016 05/09/2022    GLUCOSEU Negative 05/02/2022     Radiology:  FL MODIFIED BARIUM SWALLOW W VIDEO Final Result   1. Laryngeal penetration of pureed, thin and soft barium with aspiration of thin barium. 2. Additional recommendations from the speech therapist will follow. **This report has been created using voice recognition software. It may contain minor errors which are inherent in voice recognition technology. **         Final report electronically signed by Dr. Yuko Hancock on 5/10/2022 11:56 AM      MRI BRAIN WO CONTRAST   Final Result      1. A few scattered punctate acute infarcts in the left corona radiata and right centrum semiovale. 2. Mild severity chronic microvascular angiopathy. Findings were discussed with Lois Jean Baptiste RN via telephone at the time of interpretation. **This report has been created using voice recognition software. It may contain minor errors which are inherent in voice recognition technology. **      Final report electronically signed by Dr. Blane Deras MD on 5/9/2022 4:49 PM      MRA NECK WO CONTRAST   Final Result   Motion degraded exam without visualized areas of flow-limiting stenosis. **This report has been created using voice recognition software. It may contain minor errors which are inherent in voice recognition technology. **      Final report electronically signed by Dr. Blane Deras MD on 5/9/2022 5:00 PM      MRA HEAD WO CONTRAST   Final Result    Focal areas of moderate stenosis in the bilateral cavernous and clinoid segments of the internal carotid arteries. **This report has been created using voice recognition software. It may contain minor errors which are inherent in voice recognition technology. **      Final report electronically signed by Dr. Blane Deras MD on 5/9/2022 4:51 PM      US RENAL COMPLETE   Final Result   1. No renal calculi or hydronephrosis. 2. Increased renal cortical echogenicity in the right kidney. The left    kidney is not optimally visualized. Increased renal cortical echogenicity    can be seen in medical renal disease. This can be correlated with renal    function tests. 3. Urinary bladder has a volume of 363 mL. This document has been electronically signed by: Tiara Ramos M.D. on    05/09/2022 06:07 AM      CT HEAD WO CONTRAST   Final Result   1. No acute disease in the brain. 2. Incidental findings are detailed above. This document has been electronically signed by: Tiara Ramos M.D. on    05/08/2022 10:08 PM      All CTs at this facility use dose modulation techniques and iterative    reconstructions, and/or weight-based dosing   when appropriate to reduce radiation to a low as reasonably achievable. XR CHEST PORTABLE   Final Result   Impression:   Bibasilar hazy opacities, may reflect atelectasis or infection. Mild cardiomegaly. Equivocal left pleural effusion. This document has been electronically signed by:  Nusrat Barrera MD on 05/07/2022    08:58 PM        DVT prophylaxis: [] Lovenox                                 [x] SCDs                                 [x] SQ Heparin                                 [] Encourage ambulation           [] Already on Anticoagulation     Code Status: DNR-CCA     PT/OT Eval Status: Pending     Tele:   [x] yes             [] no    Electronically signed by Amber Lui DO on 5/10/2022 at 3:32 PM

## 2022-05-10 NOTE — PROGRESS NOTES
Hospitalist Progress Note    Patient:  Cal Round    Unit/Bed:4A-15/015-A  YOB: 1955  MRN: [id number]   Acct: [de-identified]   PCP: Sma Flores MD  Date of Admission: 5/7/2022    Assessment/Plan:  1. Acute Ischemic Stroke: Code stroke called on 05/08 overnight. Patient with NIHSS 3 from facial droop, RLE weakness, slurred speech. CT scan head revealed chronic small vessel ischemic disease. Two incidental meningiomas. CTA head w/ contrast not obtained 2/2 severity of renal injury. MRI brain revealed few scattered punctate acute infarcts in left corona radiata and right centrum semiovale with chronic microvascular angiopathy. BRA head with focal areas of moderate stenosis in b/l cavernous and clinoid segments of ICAs. tPA not administered as window had passed and there was initial concern for a subacute indeterminate infarct. - Neurology on board    - NIHSS and neuro checks qShift    - Home BP meds held; allow permissive HTN     - Continue IVF, ASA, start Plavix, sugar control, statin    - SLP evaluation tomorrow    - PT/OT consult    - Tele to monitor for arrhythmias   - 30-day event monitor on DC    - Consider VEE with bubble study in near future for possible PFO  2. Symptomatic Bradycardia: Resolving. S/p syncopal episode with collapse and LOC lasting seconds. Likely iatrogenic 2/2 Toprol XL 75 mg which has been held. Pulse ~40s on admission. Blood pressures normal. Labs normal. Pulse improving to ~60s s/p BB washout.    - Cardiology following   - Toprol XL held for washout    - Orthostatics qShift until negative    - IV Albumin q6h prn    3. Acute Kidney Injury in CKD: Creatinine 3.8 and still up-trending. Unsure of etiology at this time. No recent contrast received. Baseline ~1.8 one week prior.  BUN:Cr points to prerenal etiology.     - Nephrology following; exact etiology unclear    - Renal US revealed CKD    - Urine lytes pending    - Avoid nephrotoxins     - May need CRRT in near future   4. NIDDM2: A1c 9.1. With diabetic nephropathy. Continue Lantus 25u nightly, Humalog TID. POCT glucose checks AC/HS  5. Hyperlipidemia: Continue home statin   6. Hypertension: Well controlled on home norvasc & hydralazine 25 mg BID. Home Toprol is held 2/2 #1.    7. Chronic HFpEF 55%: Stable and well controlled at this time. Expected discharge date: 05/09/22    Disposition:    [x] Home       [] TCU       [] Rehab       [] Psych       [] SNF       [] [place]       [] Other-    Chief Complaint: Syncope with collapse, lightheadedness     Hospital Course: Per admitting H&P note \"Patient presents to the ED following a syncopal episode. The patient was just discharged one day prior to admission following a prolonged stay due to influenza A. The patient developed CHLOE while hospitalized. Today the patient had risen from sitting to standing and began amublating. The patient loss consciousness and subsequently fell. The patient was found to have a resting heart rate 40-48. Patient admitted for telemetry and cardiology consult. \"      Subjective (past 24 hours): Patient unfortunately suffered from ischemic stroke overnight but was deemed not a candidate for tPA as there was some concern for an indeterminate subacute infarct. NIHSS 3 with slurred speech, right facial droop and RLE weakness. MRI brain revealed some scattered acute ischemic infarcts bilaterally. Started on ASA, Plavix, high intensity statin. Neurology following. Renal function still not improving. As of now no known etiology. Suspecting a possible cardiac etiology for micro thromboembolic disease that is clearly affecting brain and could even be affecting kidneys as well. Otherwise well. Denies CP, SOB, n/v/d.        ROS (12 point review of systems completed. Pertinent positives noted. Otherwise ROS is negative).     Medications:  Reviewed    Infusion Medications    sodium chloride 75 mL/hr at 05/09/22 0514    dextrose      sodium chloride       Scheduled Medications    atorvastatin  40 mg Oral Daily    calcium replacement protocol   Other RX Placeholder    pregabalin  25 mg Oral QPM    clopidogrel  75 mg Oral Daily    [Held by provider] amLODIPine  5 mg Oral QPM    aspirin  81 mg Oral Daily    [Held by provider] hydrALAZINE  25 mg Oral 2 times per day    insulin glargine  25 Units SubCUTAneous Nightly    insulin lispro  18 Units SubCUTAneous QAM AC    insulin lispro  18 Units SubCUTAneous Daily before lunch    insulin lispro  36 Units SubCUTAneous Dinner    insulin lispro  0-6 Units SubCUTAneous TID WC    insulin lispro  0-3 Units SubCUTAneous Nightly    [Held by provider] isosorbide mononitrate  30 mg Oral Daily    sodium chloride flush  5-40 mL IntraVENous 2 times per day    heparin (porcine)  5,000 Units SubCUTAneous q8h     PRN Meds: albuterol sulfate HFA, glucagon (rDNA), dextrose, sodium chloride flush, sodium chloride, ondansetron **OR** ondansetron, polyethylene glycol, acetaminophen **OR** acetaminophen, glucose, dextrose bolus **OR** dextrose bolus      Intake/Output Summary (Last 24 hours) at 5/9/2022 2036  Last data filed at 5/9/2022 2003  Gross per 24 hour   Intake 525.12 ml   Output 900 ml   Net -374.88 ml       Diet:  Diet NPO    Exam:  BP (!) 165/54   Pulse 64   Temp 98.5 °F (36.9 °C) (Oral)   Resp 18   Ht 5' (1.524 m)   Wt 179 lb 3.2 oz (81.3 kg)   SpO2 100%   BMI 35.00 kg/m²     General appearance: No apparent distress, appears stated age and cooperative. Slurred speech   HEENT: Pupils equal, round, and reactive to light. Mild degree of exophthalmos with ride sided facial droop. Conjunctivae/corneas clear. Neck: Supple, with full range of motion. No jugular venous distention. Trachea midline. Respiratory:  Normal respiratory effort. Clear to auscultation, bilaterally without Rales/Wheezes/Rhonchi.   Cardiovascular: Regular rate and rhythm with normal S1/S2 without murmurs, rubs or gallops. Abdomen: Soft, non-tender, non-distended with normal bowel sounds. Musculoskeletal: passive and active ROM x 4 extremities. Skin: Skin color, texture, turgor normal.  No rashes or lesions. Neurologic:  Neurovascularly intact without any focal sensory/motor deficits. Cranial nerves: II-XII intact, grossly non-focal.  Psychiatric: Alert and oriented, thought content appropriate, normal insight  Capillary Refill: Brisk,< 3 seconds   Peripheral Pulses: +2 palpable, equal bilaterally     Labs:   Recent Labs     05/07/22 1945 05/08/22 0326 05/09/22 0139   WBC 7.3 7.6 7.6   HGB 9.9* 9.9* 9.6*   HCT 30.7* 31.9* 31.1*    282 273     Recent Labs     05/08/22 0326 05/09/22 0139 05/09/22  0400   * 136 136   K 4.4 4.5 4.6   CL 95* 99 97*   CO2 25 20* 22*   * 128* 126*   CREATININE 3.8* 3.8* 4.0*   CALCIUM 8.7 8.3* 8.2*   PHOS  --  7.1*  --      Recent Labs     05/09/22  0400   AST 29   ALT 74*   BILITOT 0.4   ALKPHOS 200*     No results for input(s): INR in the last 72 hours. No results for input(s): Odette Basques in the last 72 hours. Microbiology:      Urinalysis:      Lab Results   Component Value Date    NITRU NEGATIVE 05/09/2022    WBCUA 0-2 05/09/2022    BACTERIA NONE SEEN 05/09/2022    RBCUA 0-2 05/09/2022    BLOODU NEGATIVE 05/09/2022    SPECGRAV 1.016 05/09/2022    GLUCOSEU Negative 05/02/2022     Radiology:  MRI BRAIN WO CONTRAST   Final Result      1. A few scattered punctate acute infarcts in the left corona radiata and right centrum semiovale. 2. Mild severity chronic microvascular angiopathy. Findings were discussed with Misael Morfin RN via telephone at the time of interpretation. **This report has been created using voice recognition software. It may contain minor errors which are inherent in voice recognition technology. **      Final report electronically signed by Dr. Jose Vanessa MD on 5/9/2022 4:49 PM      MRA NECK WO CONTRAST Final Result   Motion degraded exam without visualized areas of flow-limiting stenosis. **This report has been created using voice recognition software. It may contain minor errors which are inherent in voice recognition technology. **      Final report electronically signed by Dr. Danica Heard MD on 5/9/2022 5:00 PM      MRA HEAD WO CONTRAST   Final Result    Focal areas of moderate stenosis in the bilateral cavernous and clinoid segments of the internal carotid arteries. **This report has been created using voice recognition software. It may contain minor errors which are inherent in voice recognition technology. **      Final report electronically signed by Dr. Danica Heard MD on 5/9/2022 4:51 PM      US RENAL COMPLETE   Final Result   1. No renal calculi or hydronephrosis. 2. Increased renal cortical echogenicity in the right kidney. The left    kidney is not optimally visualized. Increased renal cortical echogenicity    can be seen in medical renal disease. This can be correlated with renal    function tests. 3. Urinary bladder has a volume of 363 mL. This document has been electronically signed by: Royer Cantu M.D. on    05/09/2022 06:07 AM      CT HEAD WO CONTRAST   Final Result   1. No acute disease in the brain. 2. Incidental findings are detailed above. This document has been electronically signed by: Royer Cantu M.D. on    05/08/2022 10:08 PM      All CTs at this facility use dose modulation techniques and iterative    reconstructions, and/or weight-based dosing   when appropriate to reduce radiation to a low as reasonably achievable. XR CHEST PORTABLE   Final Result   Impression:   Bibasilar hazy opacities, may reflect atelectasis or infection. Mild cardiomegaly. Equivocal left pleural effusion. This document has been electronically signed by:  Eduardo Malave MD on 05/07/2022    08:58 PM        DVT prophylaxis: [] Lovenox [x] SCDs                                 [x] SQ Heparin                                 [] Encourage ambulation           [] Already on Anticoagulation     Code Status: DNR-CCA     PT/OT Eval Status: Pending     Tele:   [x] yes             [] no    Electronically signed by Nilesh Bowman DO on 5/9/2022 at 8:36 PM

## 2022-05-10 NOTE — PROGRESS NOTES
[address] THERAPY MISSED TREATMENT NOTE  [facility]      Date: 5/10/2022  Patient Name: Rogelio English        CSN: [id number]   : 1955  (61 y.o.)  Gender: female                REASON FOR MISSED TREATMENT: Attempt x 1, Pt with PT. Attempt x 2, Pt off floor for testing. Will check back as time permits.

## 2022-05-10 NOTE — PROGRESS NOTES
Stroke Folder given. What is Stroke/CVA  Signs and Symptoms of stroke (BEFAST)  Treatments for Stroke  Personal Risk Factors for Stroke discussed  Education--Call 911    Patient/family has been educated on their personal risk factors of:  Hypertension  Previous stroke    Hyperlipidemia  smoking cessation    diabetes      They have been given hand outs on the following medications:(  give handouts/attach to AVS)   asa  Plavix      Treatment for stroke includes:  Risk factor modifications  Following the medication regime prescribed by physician      Educated on BE FAST-Face-Arm-Speech-Time    A stroke is a brain attack. Stroke is a brain injury. It occurs when the brain's blood supply is interrupted. Blood carries oxygen and nutrients to the brain. Without oxygen and nutrients from blood, brain tissue starts to die rapidly. This can happen in less than 10 minutes. A stroke occurs when blood flow to the brain is blocked (called ischemic stroke). This is caused by one of the following:   Sudden decreased blood flow   Damage to a blood vessel supplying blood to the brain can occur suddenly from either:   Injury   A clot that forms and breaks off from another part of the body (such as the heart or neck)   There are certain conditions which predispose people to form blood clots, such as:   Cancer   Pregnancy   Atrial fibrillation   Certain autoimmune diseases   Local blood clot   A build-up of fatty substances ( atherosclerotic plaque ) along the inner lining of the artery causes:   Narrowing of artery   Reduced elasticity   Local inflammation   Blood protein defects leading to increased clotting tendency   Decreased blood flow in the artery   Clot in an artery supplying the brain   Inflammatory conditions in the blood vessels (vasculitis)   A stroke may also occur if a blood vessel breaks and bleeds into or around the brain. This is called hemorrhagic stroke. This condition needs to be monitored closely.  Be sure to keep all appointments. Have exams and blood tests done as directed. Call 911 If Any of the Following Occurs   It is important that you and those around you know the warning signs for stroke. CALL 911 immediately if you have any of the following which may suggest a new stroke:   Sudden weakness or numbness of face, arm, or leg, especially on one side of the body   Sudden confusion   Sudden trouble speaking or understanding   Sudden trouble seeing in one or both eyes   Sudden dizziness, trouble walking, loss of balance, or coordination   Sudden severe headache with no known cause   If you think you have an emergency, CALL 911       To help reduce your risk of stroke, take the following steps:   Eat a well-balanced diet. The DASH diet rich in fruits, vegetables and low-fat dairy foods, and low in saturated fat, total fat, and cholesterolmay help keep your blood pressure in the healthy range. Exercise regularly. Maintain a healthy weight. (Your body mass index should be below 25.)   If you smoke, quit . Drink alcohol in moderation. Moderate is two or fewer drinks per day for men and one or fewer drinks per day for women and older adults. Control your diabetes    All patient/family questions were answered and teach back method was utilized.

## 2022-05-10 NOTE — CARE COORDINATION
5/10/22, 3:18 PM EDT    DISCHARGE PLANNING EVALUATION        Patient would like referral made to rehab facilities in [place]. First choice is [facility] ([phone]) and second choice is [facility] ([phone]). Referral faxed to [facility] in admissions at [facility]. Phone number ([phone]) and fax number ([phone]). Spoke to patient's daughter, Cole Scott on the phone. She had questions about transporting the patient to the facility in [place]. She stated that a doctor told her she would not be permitted to transport the patient to the facility, told her that therapy will evaluate tomorrow, and we will go from there on safety concerns.

## 2022-05-10 NOTE — PROGRESS NOTES
[address] [address]  INPATIENT PHYSICAL THERAPY  EVALUATION  [facility] 4A - 4A-15/015-A    Time In: 3074  Time Out: 6124  Timed Code Treatment Minutes: 26 Minutes  Minutes: 36          Date: 5/10/2022  Patient Name: Damián Gooden,  Gender:  female        MRN: [id number]  : 1955  (61 y.o.)      Referring Practitioner: Trung Viveros PA-C  Diagnosis: symptomatic bradycardia  Additional Pertinent Hx: Per H&p :Patient presents to the ED following a syncopal episode. The patient was just discharged one day prior to admission following a prolonged stay due to influenza A. The patient developed CHLOE while hospitalized. Today the patient had risen from sitting to standing and began amublating. The patient loss consciousness and subsequently fell. The patient was found to have a resting heart rate 40-48. Pt presents with On 2022 patient was noted by staff to have dysarthria and a new right facial droop which prompted a code stroke. Initial NIH was 3. Patient was taken to imaging for CT head which revealed no ICH however he did demonstrate old stroke which was age-indeterminate. For this reason tPA was not given as if this was a recent stroke he would be very high risk. CTAs were deferred as this patient has a creatinine of 4.0. Today on exam, pt continues to have slurred speech and facial droop and she is lethargic per neurology note. MRI of the brain indicating acute infarcts in the left corona radiata and right centrum semiovale     Restrictions/Precautions:  Restrictions/Precautions: Fall Risk,General Precautions  Position Activity Restriction  Other position/activity restrictions: monitor BP    Subjective:  Chart Reviewed: Yes  Patient assessed for rehabilitation services?: Yes  Family / Caregiver Present: No  Subjective: RN approved session, pt agrees for PT eval. Pt states she plans to go home alone with no concerns regarding this.  Pt aware of PT recommending a therapy stay, and receptive to use of RW, continued PT and increased assistance. General:  Overall Orientation Status:  (not formally assessed)  Vision Exceptions: Wears glasses at all times  Vision  Vision: Impaired  Vision Exceptions: Wears glasses at all times  Hearing  Hearing: Exceptions to [facility]  Hearing Exceptions: Hard of hearing/hearing concerns  Hearing: Exceptions to [facility]  Hearing Exceptions: Hard of hearing/hearing concerns       Pain: 0/10: denies     Vitals: Blood Pressure: 197/73  Oxygen: 99%   Heart Rate: 70 bpm   *RN aware of elevated BP, when asked, states she is still within her permissive HTN window and approved further mobility. Attempted to take BP at onset of session, but pt stating need to use restroom urgently, taken once in the chair. Social/Functional History:    Lives With: Alone  Type of Home: House  Home Layout: One level  Home Access: Level entry  Home Equipment: [place], rolling,Cane          Receives Help From: Family        Ambulation Assistance: Independent  Transfer Assistance: Independent          Additional Comments: Pt used cane intermittently when she is feeling off balance. Pt states her kids can assist her if needed, she runs her own errands and cares for her home indep. Pt's children drive for her. OBJECTIVE:  Range of Motion:  Bilateral Lower Extremity: WFL    Strength:  Bilateral Lower Extremity: WFL Hip flexion 4/5, knee extension 4+/5, knee flexion 4+/5, DF 4+/5, PF 4+/5    Balance:  Static Sitting Balance:  Stand By Assistance  Dynamic Sitting Balance: Stand By Assistance  Static Standing Balance: Contact Guard Assistance  Dynamic Standing Balance: Minimal Assistance   Min assist provided with the Tinetti. Pt completed functional tasks in bathroom with assist for stability and safety. Pt able to complete reaching tasks with CGA from AD and cues for safety. Pt stood for ~1 mins. Pt demos zero LOB.      Bed Mobility:  Supine to Sit: Contact Guard Assistance, with head of bed raised, with rail, with increased time for completion  *HOB~ 45degrees    Transfers:  Sit to Stand: Contact Guard Assistance, Minimal Assistance, with increased time for completion, cues for hand placement  Stand to [address] [id number], Minimal Assistance, with increased time for completion, cues for hand placement   *Initial sit to stand transfers completed with cane, all others with RW   *Pt required min assist with transfers to and from lower toilet surface     Ambulation:  Minimal Assistance, with cues for safety, with increased time for completion  Distance: 10'  Surface: Level Tile  Device:Cane  Gait Deviations: Forward Flexed Posture, Slow Candy, Decreased Step Length Bilaterally, Decreased Heel Strike Bilaterally, Mild Path Deviations, Unsteady Gait and Decreased Terminal Knee Extension  *Pt required verbal cues for path finding and to increase attention to objects in her environment, min assist required to maintain stability as pt with noted postural sway and wide KEVYN. Ambulation:  Contact Guard Assistance, with cues for safety, with increased time for completion  Distance: 10'  Surface: Level Tile  Device:Rolling Walker  Gait Deviations: Forward Flexed Posture, Slow Candy, Decreased Step Length Bilaterally, Decreased Heel Strike Bilaterally, Mild Path Deviations, Unsteady Gait and Decreased Terminal Knee Extension  *improved fluidity and stability with use of RW second trial. CGA provided for safety. Noted improvement in postural reactions with RW. Cues to ambulate with RW and assist from staff provided, pt agreeable.      Exercise:  None this session     Functional Outcome Measures: Completed  Balance Score: 10  Gait Score: 6  Tinetti Total Score: 16/28 with high fall risk   AM-PAC Inpatient Mobility without Stair Climbing Raw Score : 15  AM-PAC Inpatient without Stair Climbing T-Scale Score : 43.03    Risk Indicators:  Less than/equal to 18 = high risk  19-23 Moderate risk  Greater than/equal to 24 = low risk    ASSESSMENT:  Activity Tolerance:  Patient tolerance of  treatment: fair. Pt with elevated BP, and required CGA to min assist for safety with mobility with cues. Pt requires continued therapy. Treatment Initiated: Treatment and education initiated within context of evaluation. Evaluation time included review of current medical information, gathering information related to past medical, social and functional history, completion of standardized testing, formal and informal observation of tasks, assessment of data and development of plan of care and goals. Treatment time included skilled education and facilitation of tasks to increase safety and independence with functional mobility for improved independence and quality of life. Assessment: Body Structures, Functions, Activity Limitations Requiring Skilled Therapeutic Intervention: Decreased functional mobility ,Decreased strength,Decreased safe awareness,Decreased balance,Decreased endurance  Assessment: This patient is a 61 y.o. who presents with symptomatic bradycardia. This is a decline from the patient's baseline status of mod I with use of cane and living alone. Pt scored a 16/28 on the Tinetti indicating a high fall risk. The patient is observed to have deficits in strength, balance, activity tolerance, and safety awareness and would benefit from skilled PT services to progress functional mobility, safety awareness, and to decrease overall risk of falls. Pt would benefit from a therapy stay to improve overall safety and mobility. Therapy Prognosis: Good    Requires PT Follow-Up: Yes    Discharge Recommendations:  Discharge Recommendations: IP Rehab     Patient is exhibiting above listed deficits and requiring continued therapy. Patient would benefit from continued therapy on an inpatient rehab unit. Patient is able to tolerate 3 hours of intensive therapy 5-6 days/week.  Without continued therapies pt at risk for functional decline, increased falls, and readmission to hospital.     Patient Education:      . Patient Education  Education Given To: Patient  Education Provided: Role of Trish Lesterors of Care,Transfer Training  Education Method: Demonstration,Verbal  Education Outcome: Continued education needed       Equipment Recommendations:  Equipment Needed: No (pt has a RW)    Plan:  Current Treatment Recommendations: Strengthening,Balance training,Functional mobility training,Transfer training,Endurance training,Neuromuscular re-education,Gait training,Home exercise program,Safety education & training,Patient/Caregiver education & training,Equipment evaluation, education, & procurement,Therapeutic activities  Plan:  (6x N)    Goals:  Patient goals : \"no\"  Short Term Goals  Time Frame for Short term goals: by hospital d/c  Short term goal 1: Pt to demo supine <->sit with S for ease with getting in and out of bed  Short term goal 2: Pt to demo sit <->stand with RW and S for improved ability to transfer from any surface safely  Short term goal 3: Pt to ambulate >=40' with RW and S for improved ability to move in her environment  Short term goal 4: Pt to improve Tinetti score to >=19/28 to progress to the moderate fall risk category  Long Term Goals  Time Frame for Long term goals : NA due to short ELOS    Following session, patient left in safe position with all fall risk precautions in place.

## 2022-05-10 NOTE — CARE COORDINATION
05/10/22 1023   Readmission Assessment   Number of Days since last admission? 1-7 days   Previous Disposition Home with Family   Who is being Interviewed Patient   What was the patient's/caregiver's perception as to why they think they needed to return back to the hospital? Other (Comment)  (\"became weak and my legs gave out\")   Did you visit your Primary Care Physician after you left the hospital, before you returned this time? No  (was scheduled)   Why weren't you able to visit your PCP? Other (Comment)   Did you see a specialist, such as Cardiac, Pulmonary, Orthopedic Physician, etc. after you left the hospital? No   Who advised the patient to return to the hospital? Self-referral   Does the patient report anything that got in the way of taking their medications? No   In our efforts to provide the best possible care to you and others like you, can you think of anything that we could have done to help you after you left the hospital the first time, so that you might not have needed to return so soon?  Other (Comment)

## 2022-05-10 NOTE — PROGRESS NOTES
Follow Up / Progress Note        Patient:   Rogelio English  YOB: 1955  Age:  61 y.o. Room:  [address] [place] [address]  MRN:  [id number]                  Plan/Follow-Up:  Received a consult per protocol as the patient is a readmit. Discussed with primary RN, Kristi Pereyra. Patient is a readmit for a new diagnosis of a stroke. Palliative care will continue to follow along and assist as needs arise. Please do not hesitate to call prn.       Electronically signed by Simon Naqvi RN on 5/10/2022 at 3:07 PM             Palliative Care Office: [phone]

## 2022-05-10 NOTE — PLAN OF CARE
Problem: Discharge Planning  Goal: Discharge to home or other facility with appropriate resources  Outcome: Progressing     Consult received. Please see SW note dated 5/10.

## 2022-05-10 NOTE — PROGRESS NOTES
[address] [address]  SPEECH THERAPY  STR NEUROSCIENCES 4A  Modified Barium Swallow + Dysphagia Therapy    SLP Individual Minutes  Time In: 9225  Time Out: 0959  Minutes: 19  Timed Code Treatment Minutes: 0 Minutes   Modified barium swallow: 11 minutes  Dysphagia therapy: 8 minutes       Date: 5/10/2022  Patient Name: Kim Bell      CSN: [id number]   : 1955  (61 y.o.)  Gender: female   Referring Physician:  Derik Sanchez MD  Diagnosis: Symptomatic bradycardia  Precautions: Fall risk, aspiration precautions  History of Present Illness/Injury: Patient admitted to [facility] with above med dx; please refer to physician H&P for full details. F/u formal instrumentation via MBS warranted to evaluate pharyngeal integrity to r/o dysfunction s/t acute intracranial findings. Impression       1. A few scattered punctate acute infarcts in the left corona radiata and right centrum semiovale. 2. Mild severity chronic microvascular angiopathy. Patient has a past medical history of Ankle fracture, left, CHF (congestive heart failure) ([address] Diabetes mellitus ([address] HTN (hypertension), benign, and Hypertension. Current Diet: NPO    Pain: No pain reported. SUBJECTIVE:  Patient with arrival to fluoro suite awake and alert, seated in the wheelchair placement. Patient's daughter presented at the end of the study to permit provision of education. OBJECTIVE:    Respiratory Status:  Room Air    Behavioral Observation:  Alert, Oriented and Dysarthric    PATIENT WAS EVALUATED USING:  Barium: Thin Liquids, Mildly Thick Liquids, Puree and Soft Solids     ORAL PREPARATION PHASE:  Impaired:  Slow Mastication, Decreased Bolus Control, Decreased Bolus Formation and Loss of Bolus from Lips/Anterior Spillage    ORAL PHASE: Residue in the Lateral Sulcus - Right, Reduced Bolus Control, Reduced  Bolus Formation, Uncoordinated AP Movement and Slow AP Movement     ORAL PHASE OLGA SCORE: (Dysphagia outcome and severity scale)  3 = Moderate Dysphagia - Total assist with strategies - Two or more consistencies restricted - Moderate oral residue clears with cue    PHARYNGEAL PHASE:  Impaired: Decreased Airway Protection, Decreased Epiglottic Inversion, Decreased Hyolaryngeal Elevation and Decreased Hyolaryngeal Anterior Excursion     PHARYNGEAL PHASE OLGA SCORE: (Dysphagia outcome and severity scale)  5 = Mild Dysphagia - may need one consistency restricted - May have one or more of the following: Aspiration with thin - cough to clear, Airway penetration midway to the vocal cords with one or more consistency or to the vocal folds with one consistency, but clears spontaneously - Residue in the pharynx clears spontaneously    EVIDENCE FOR LARYNGEAL PENETRATION AND/OR ASPIRATION:  Laryngeal penetration evident with thin barium  Audible aspiration evident with thin barium   *transient/shallow laryngeal penetration within alternate trials of puree and soft solids     PENETRATION-ASPIRATION SCALE (PAS):   Thin Liquids: 7 = Material enters the airway, passes below the vocal folds, and is not ejected from the trachea despite effort  Mildly Thick Liquids:  1 = Material does not enter the airway  Puree:  2 = Material enters the airway, remains above vocal folds, and is ejected from the airway  Soft Solid:  2 = Material enters the airway, remains above vocal folds, and is ejected from the airway    ESOPHAGEAL PHASE:   No significant findings    ATTEMPTED TECHNIQUES:  Small Bolus Size Effective    Straw Effective *efficacious with small sips   Cup Not Attempted *cup trials not completed d/t significance of R facial droop and lingual weakness resulting in anterolateral loss   Chin Tuck Ineffective    Head Turn Not Attempted    Spoon Presentations Effective    Volitional Cough Effective    Spontaneous Cough Ineffective           DIAGNOSTIC IMPRESSIONS:  Patient presents with moderate oral dysphagia, mild pharyngeal dysphagia as evidenced by skilled clinical findings outlined above. Oral phase compounded d/t significance of R facial droop and lingual weakness to result in poor labial seal to result in episodic anterolateral loss (specifically with viscosities trialed); decreased cohesive bolus formation, manipulation, and coordination of bolus impacting efficiency of AP transit. Inconsistent R buccal pocketing with limited sensation of oral residuals. Swallow onset timely with slightly reduced hyolaryngeal elevation, anterior excursion/protraction, and epiglottic inversion for airway protection. No appreciable residuals in the pharynx post completion of the swallow. The following airway invasion events were observed: Thin barium: d/t pharyngeal deficits presenting, consistent laryngeal penetration occurring retirement to the VF with static positioning (no volitional ejection out of the laryngeal vestibule); x1 occurrence for progression to micro, anterior tracheal aspiration with cough reflex not sufficient for ejection from the trachea despite effort; did attempt chin tuck positional strategy with no yielded improvements to negate airway occurrences   Throughout remainder of controlled study, imaging did not endorse laryngeal penetration or tracheal aspiration events within all alternate trials completed. Recommendations for minced and moist diet with mildly thick liquids, straw usage for liquid consumption and oral care post meals to negate potential pocketing. Ongoing dysphagia management services are warranted.      DYSPHAGIA THERAPY: Following completion of MBS, transitioned to skilled service provision with direct education provided to patient and daughter re: anatomy/physiology of swallow mechanism via CONI unit, clinical findings from controlled study, rationale for recommended diet level, compensatory swallowing strategies (full upright positioning, small bite/sips, slow rate for intake, alternating solids/liquids, straw usage for viscosity consumption, oral care post meals), and s/s aspiration (immediate/delayed coughing, throat clearing, wet vocal quality) and potential implications (pneumonia, recurrent/prolonged hospitalizations, medical decline). Further education provided re: needs to engage in comprehensive oral care in attempts to reduce potential bacteria colonization and to maximize oral integrity as well as to engage in ambulation with staffing (as able) to assist with mobility and overall pulmonary health. Verbal receptiveness noted. Diet Recommendations:  Minced and moist, mildly thick liquids  Strategies:  Full Upright Position, Small Bite/Sip, Pulmonary Monitoring, Oral Care after all Meals, Alternate Solids and Liquids and Limit Distractions   Rehabilitation Potential: good    EDUCATION:  Learner: Patient and Family  Education:  Reviewed results and recommendations of this evaluation, Reviewed diet and strategies, Reviewed signs, symptoms and risks of aspiration, Reviewed ST goals and Plan of Care and Reviewed recommendations for follow-up  Evaluation of Education: Demonstrates with assistance and Needs further instruction    PLAN:  Skilled SLP intervention on acute care 3-5 x per week or until goals met and/or pt plateaus in function. Specific interventions for next session may include: dysphagia management, cognitive tasks, speech production . PATIENT GOAL:    Did not state. Will further assess during treatment. SHORT TERM GOALS:  Short-term Goals  Timeframe for Short-term Goals: 2 weeks  Goal 1: Patient will safely consume mined and moist diet with mildly thick liquids (oral care post meals; R sided pocketing) without overt s/s aspiration or pulonary compromise to assist with nutrition/hydration measures. Goal 2: Patient will complete pharyngeal strengthening exercises (effortful, maskao, glissando) x15 each, good success to improve pharyngeal musculature and airway protection.   Goal 3: Patient will complete

## 2022-05-10 NOTE — PROGRESS NOTES
Neuro Nurse Navigator Note    Reason for admit: syncope and collapse        NIHSS: 2     Neuro Assessment: Alert and oriented x 4     Prior function/living arrangements: Currently lives alone at home. She uses a cane and drives. She has support from her daughter. Stroke Core Measures    Stroke: ischemic    Dysphagia Screen prior to any oral intake  Documented in ED: Yes  Documented Inpatient: Yes    Document (DVT) Prophylaxis in Daily Cares or MAR  Medication: Yes  Mechanical: No     Pt.  With A-Fib receiving anticoagulation   Antithrombotic meds by end of Day 2: NA     Rehab plan considered: speech-language therapy, occupational therapy and physical therapy     Lipid Profile completed w/in 24 hrs   of admission or last 30 days: Yes    On cholesterol med if   LDL > 70mg/dL: Yes     STROKE EDUCATION  Folder given and documented: Yes  Date given: 5/10/22         Education  Education including risk factors  [x] Smoking   [x] Diabetes  [x] HTN [] High Cholesterol [] A-fib [] Other      Education provided to:   [x] Patient  [] Family  [] Caregiver    New Medication Education

## 2022-05-10 NOTE — PROGRESS NOTES
Neurology Consult Note    Date:5/10/2022       DVZU:[id number]  Patient Name:Karina Morgan     YOB: 1955     Age:66 y.o. Requesting Physician: Omayra Lane MD     Reason for Consult:  Evaluate for Stroke       Chief Complaint: Slurred Speech, Facial Droop    Subjective     Jacki Mandujano is a 79-year-old female with past medical history significant for hypertension, diabetes, hyperlipidemia, CHF who presented to [facility] with complaint of syncope. Patient was admitted for symptomatic bradycardia which was thought to be beta-blocker induced. On 5/8/2022 patient was noted by staff to have dysarthria and a new right facial droop which prompted a code stroke. Initial NIH was 3. Patient was taken to imaging for CT head which revealed no ICH however he did demonstrate old stroke which was age-indeterminate. For this reason tPA was not given as if this was a recent stroke he would be very high risk. CTAs were deferred as this patient has a creatinine of 4.0. Today on exam, pt continues to have slurred speech and facial droop and she is lethargic. She has no specific additional complaints. She denies weakness, vision deficit. Interval history 5/10/2022:  Patient has improved strength in the right lower extremity. Continues with right-sided facial droop and moderate to severe slurred speech. Review of Systems   Review of Systems   Constitutional: Positive for activity change and fatigue. Negative for fever. HENT: Negative for tinnitus and trouble swallowing. Eyes: Negative for photophobia and visual disturbance. Respiratory: Negative for cough and shortness of breath. Cardiovascular: Negative for chest pain and palpitations. Gastrointestinal: Negative for diarrhea, nausea and vomiting. Genitourinary: Negative for dysuria and flank pain. Musculoskeletal: Negative for neck pain and neck stiffness. Skin: Negative for color change and rash.    Neurological: Positive for syncope, facial asymmetry, speech difficulty and weakness. Negative for dizziness, numbness and headaches. Psychiatric/Behavioral: Negative for agitation and confusion. Medications   Scheduled Meds:    atorvastatin  40 mg Oral Daily    calcium replacement protocol   Other RX Placeholder    pregabalin  25 mg Oral QPM    clopidogrel  75 mg Oral Daily    [Held by provider] amLODIPine  5 mg Oral QPM    aspirin  81 mg Oral Daily    [Held by provider] hydrALAZINE  25 mg Oral 2 times per day    insulin glargine  25 Units SubCUTAneous Nightly    insulin lispro  18 Units SubCUTAneous QAM AC    insulin lispro  18 Units SubCUTAneous Daily before lunch    insulin lispro  36 Units SubCUTAneous Dinner    insulin lispro  0-6 Units SubCUTAneous TID WC    insulin lispro  0-3 Units SubCUTAneous Nightly    [Held by provider] isosorbide mononitrate  30 mg Oral Daily    sodium chloride flush  5-40 mL IntraVENous 2 times per day    heparin (porcine)  5,000 Units SubCUTAneous q8h     Continuous Infusions:    sodium chloride 75 mL/hr at 05/10/22 0535    dextrose      sodium chloride       PRN Meds: polyvinyl alcohol, albuterol sulfate HFA, glucagon (rDNA), dextrose, sodium chloride flush, sodium chloride, ondansetron **OR** ondansetron, polyethylene glycol, acetaminophen **OR** acetaminophen, glucose, dextrose bolus **OR** dextrose bolus    Past History    Past Medical History:   has a past medical history of Ankle fracture, left, CHF (congestive heart failure) ([facility] [address] Diabetes mellitus ([facility] [address] HTN (hypertension), benign, and Hypertension. Social History:   reports that she has been smoking. She has never used smokeless tobacco. She reports that she does not drink alcohol and does not use drugs. Family History: History reviewed. No pertinent family history.     Physical Examination      Vitals:  BP (!) 156/52   Pulse 64   Temp 98.8 °F (37.1 °C) (Oral)   Resp 18   Ht 5' (1.524 m)   Wt 175 lb 12.8 oz (79.7 kg) SpO2 98%   BMI 34.33 kg/m²   Temp (24hrs), Av.5 °F (36.9 °C), Min:97.6 °F (36.4 °C), Max:100 °F (37.8 °C)      I/O (24Hr): Intake/Output Summary (Last 24 hours) at 5/10/2022 0839  Last data filed at 5/10/2022 0254  Gross per 24 hour   Intake 0 ml   Output 1675 ml   Net -1675 ml       Physical Exam  Vitals reviewed. Constitutional:       Appearance: Normal appearance. HENT:      Head: Normocephalic and atraumatic. Right Ear: External ear normal.      Left Ear: External ear normal.      Nose: Nose normal.      Mouth/Throat:      Mouth: Mucous membranes are moist.      Pharynx: Oropharynx is clear. Eyes:      Extraocular Movements: Extraocular movements intact and EOM normal.      Pupils: Pupils are equal, round, and reactive to light. Comments: BL exopthalmos   Pulmonary:      Effort: Pulmonary effort is normal. No respiratory distress. Musculoskeletal:         General: No deformity or signs of injury. Cervical back: No rigidity or tenderness. Skin:     General: Skin is warm and dry. Neurological:      Mental Status: She is oriented to person, place, and time. Coordination: Finger-Nose-Finger Test and Heel to [place] tyree Test normal.      Deep Tendon Reflexes:      Reflex Scores:       Bicep reflexes are 1+ on the right side and 1+ on the left side. Patellar reflexes are 1+ on the right side and 1+ on the left side. Psychiatric:         Speech: Speech is slurred. Neurologic Exam     Mental Status   Oriented to person, place, and time. Follows 1 step commands. Speech: slurred   Level of consciousness: alert  Knowledge: good. Able to name object. Able to read. Able to repeat. Speech is moderate to severely slurred. Cranial Nerves     CN II   Visual fields full to confrontation. CN III, IV, VI   Pupils are equal, round, and reactive to light. Extraocular motions are normal.   Right pupil: Size: 3 mm. Reactivity: brisk. Left pupil: Size: 3 mm.  Reactivity: brisk.     CN V   Facial sensation intact. CN VII   Right facial weakness: central  Left facial weakness: none    CN VIII   CN VIII normal.   Hearing: intact    CN IX, X   CN IX normal.   Palate: symmetric    CN XI   CN XI normal.   Right sternocleidomastoid strength: normal  Left sternocleidomastoid strength: normal  Right trapezius strength: normal  Left trapezius strength: normal    CN XII   CN XII normal.   Tongue: not atrophic  Fasciculations: absent  Tongue deviation: none    Motor Exam   Muscle bulk: normal  Overall muscle tone: normal  Strength:  BUE: 5/5  BLE: 5/5       Sensory Exam   Light touch normal.     Gait, Coordination, and Reflexes     Coordination   Finger to nose coordination: normal  Heel to shin coordination: normal    Tremor   Resting tremor: absent  Intention tremor: absent    Reflexes   Right biceps: 1+  Left biceps: 1+  Right patellar: 1+  Left patellar: 1+         Labs/Imaging/Diagnostics   Labs:  CBC:  Recent Labs     05/07/22 1945 05/08/22 0326 05/09/22 0139   WBC 7.3 7.6 7.6   RBC 3.31* 3.36* 3.20*   HGB 9.9* 9.9* 9.6*   HCT 30.7* 31.9* 31.1*   MCV 92.7 94.9 97.2    282 273     CHEMISTRIES:  Recent Labs     05/08/22 0326 05/09/22 0139 05/09/22  0400   * 136 136   K 4.4 4.5 4.6   CL 95* 99 97*   CO2 25 20* 22*   * 128* 126*   CREATININE 3.8* 3.8* 4.0*   GLUCOSE 212* 103 100   PHOS  --  7.1*  --    MG  --  3.2*  --      PT/INR:No results for input(s): PROTIME, INR in the last 72 hours. APTT:No results for input(s): APTT in the last 72 hours. LIVER PROFILE:  Recent Labs     05/09/22  0400   AST 29   ALT 74*   BILITOT 0.4   ALKPHOS 200*       Imaging Last 24 Hours:  CT HEAD WO CONTRAST    Result Date: 5/8/2022  ** ADDENDUM #1 ** This report was discussed with Albert Cooper RN on May 08, 2022 22:15:00 EDT. This document has been electronically signed by: Yuni Grady on 05/08/2022 10:15 PM ** ORIGINAL REPORT * CT BRAIN WITHOUT CONTRAST COMPARISON: None. FINDINGS: Exam is motion limited. There is mild parenchymal atrophy. There is no evidence of an acute infarct or intraparenchymal hemorrhage. Patchy areas of low attenuation are noted in the subcortical and periventricular region which are nonspecific but most likely represent chronic small vessel ischemic disease. Small calcified meningioma adjacent to the right frontal lobe measures 7 mm on coronal image 20. A right falcine calcified meningioma is noted medial to the right frontoparietal junction, and measures 1.3 cm in size on axial image 26. There is no significant mass effect, midline shift, or extra-axial blood. The ventricles are normal in size without evidence of hydrocephalus. The bone windows are unremarkable. 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 1. No acute disease in the brain. 2. Incidental findings are detailed above. This document has been electronically signed by: Leslie Carlin M.D. on 05/08/2022 10:08 PM All CTs at this facility use dose modulation techniques and iterative reconstructions, and/or weight-based dosing when appropriate to reduce radiation to a low as reasonably achievable. US RENAL COMPLETE    Result Date: 5/9/2022  RENAL ULTRASOUND COMPARISON: None. FINDINGS: There is increased renal cortical echogenicity in the right kidney. The left kidney is not optimally visualized. Right kidney measures 10.1 cm in length. Left kidney measures 9.8 cm in length. There is no stone or hydronephrosis. Urinary bladder has a volume of 363 mL. 1. No renal calculi or hydronephrosis. 2. Increased renal cortical echogenicity in the right kidney. The left kidney is not optimally visualized. Increased renal cortical echogenicity can be seen in medical renal disease. This can be correlated with renal function tests. 3. Urinary bladder has a volume of 363 mL.  This document has been electronically signed by: Leslie Carlin M.D. on 05/09/2022 06:07 AM    XR CHEST PORTABLE    Result Date: of 45-70. Continue Lipitor 40mg daily  o Smoking and alcohol cessation when applicable. o Provide stroke eduction for individualized risk factors. o DVT prophylaxis with SCDs and SQ heparin (hold heparin/lovenox for 24 hours after IV tPA or IA intervention)   Other recommendations  o Dysphagia screen prior to oral intake  o PT/OT/SLP Consult  o Consult placed to PM&R for IPR assessment  o EKG/Telemetry to monitor for atrial fibrillation  o Will need 30 day cardiac event monitor at discharge  o NIHSS every shift. Neuro checks per unit unless otherwise specified. o Head of bed 45 degree. Follow up with outpatient neurology Dr Micki Arias in [facility]). This was discussed with Dr. Dorrie Kawasaki and he is in agreement with the assessment and plan.     Electronically signed by Keely Baron PA-C on 5/10/22 at 8:57 AM EDT

## 2022-05-10 NOTE — CONSULTS
Physical Medicine & Rehabilitation   Consult Note      Admitting Physician: Elinor Wayne MD    Primary Care Provider: Dennis Harmon MD     Reason for Consult:  Dysarthria, Facial droop, Ischemic CVA; Rehab needs    History of Present Illness:  Loan Elise is a 61 y.o. female admitted to [address] [place] [address] on 5/7/2022 with a history of hypertension, hyperlipidemia, type II diabetes mellitus and CHF. She presented to the emergency room for evaluation following a 4-day history of worsening shortness of breath, a nonproductive cough and increasing lower extremity edema. She reported that she had dull aching pain in her chest when she took a deep breath. She stated that her shortness of breath was severe, worse with exertion and improved with rest.  She was seen at the clinic at [place] and diagnosed with influenza A, but was negative here. In the emergency room here she was found to have an acute CHF exacerbation with increased lower extremity edema and pulmonary edema. She was also found to have acute renal failure. She was admitted for further evaluation and treatment. Associated signs and symptoms did not include typical chest pain, lightheaded, dizziness, diaphoresis, orthopnea, paroxysmal nocturnal dyspnea, fever or chills. No recent medication changes. She does not use supplemental O2 at home. Patient was admitted for symptomatic bradycardia which was thought to be beta-blocker induced. On 5/8/2022 patient was noted by staff to have dysarthria and a new right facial droop which prompted a code stroke. Initial NIH was 3. Patient was taken to imaging for CT head which revealed no ICH however it did demonstrate old stroke which was age-indeterminate. For that reason tPA was not given as if this was a recent stroke she would be very high risk. CTAs were deferred as this patient had a creatinine of 4.0.   On 5/9/22, on exam, pt continued to have slurred speech and facial droop and she was lethargic. She had no specific additional complaints. She denied weakness, vision deficit. ? MRI of brain without contrast showed a few scattered punctate acute infarcts in the left corona radiata and right centrum semiovale  ? MRA H&N WO showed focal areas of moderate stenosis in the bilateral cavernous and clinoid segments of the internal carotid arteries & motion degraded exam without visualized areas of flow-limiting stenosis   ?  2D echo- EF 55-60%, LVH, dilated L atrium, pulm HTN              Current Rehabilitation Assessments:  PT:  Await evaluation  OT:  Await evaluation  ST:  Await evaluation    Past Medical History:        Diagnosis Date    Ankle fracture, left 1/17/2014    CHF (congestive heart failure) (HCC)     Diabetes mellitus ([facility] [address]     HTN (hypertension), benign 1/17/2014    Hypertension        Past Surgical History:        Procedure Laterality Date    FRACTURE SURGERY Left 01/17/2014    orif tibula/fibula fixation    HYSTERECTOMY         Allergies:    No Known Allergies     Current Medications:   Current Facility-Administered Medications: atorvastatin (LIPITOR) tablet 40 mg, 40 mg, Oral, Daily  calcium replacement protocol, , Other, RX Placeholder  pregabalin (LYRICA) capsule 25 mg, 25 mg, Oral, QPM  polyvinyl alcohol (LIQUIFILM TEARS) 1.4 % ophthalmic solution 1 drop, 1 drop, Both Eyes, Q4H PRN  0.9 % sodium chloride infusion, , IntraVENous, Continuous  clopidogrel (PLAVIX) tablet 75 mg, 75 mg, Oral, Daily  albuterol sulfate  (90 Base) MCG/ACT inhaler 2 puff, 2 puff, Inhalation, Q6H PRN  [Held by provider] amLODIPine (NORVASC) tablet 5 mg, 5 mg, Oral, QPM  aspirin EC tablet 81 mg, 81 mg, Oral, Daily  [Held by provider] hydrALAZINE (APRESOLINE) tablet 25 mg, 25 mg, Oral, 2 times per day  insulin glargine (LANTUS) injection vial 25 Units, 25 Units, SubCUTAneous, Nightly  insulin lispro (HUMALOG) injection vial 18 Units, 18 Units, SubCUTAneous, QAM AC  insulin lispro (HUMALOG) injection vial 18 Units, 18 Units, SubCUTAneous, Daily before lunch  insulin lispro (HUMALOG) injection vial 36 Units, 36 Units, SubCUTAneous, Dinner  glucagon (rDNA) injection 1 mg, 1 mg, IntraMUSCular, PRN  dextrose 5 % solution, 100 mL/hr, IntraVENous, PRN  insulin lispro (HUMALOG) injection vial 0-6 Units, 0-6 Units, SubCUTAneous, TID WC  insulin lispro (HUMALOG) injection vial 0-3 Units, 0-3 Units, SubCUTAneous, Nightly  [Held by provider] isosorbide mononitrate (IMDUR) extended release tablet 30 mg, 30 mg, Oral, Daily  sodium chloride flush 0.9 % injection 5-40 mL, 5-40 mL, IntraVENous, 2 times per day  sodium chloride flush 0.9 % injection 5-40 mL, 5-40 mL, IntraVENous, PRN  0.9 % sodium chloride infusion, , IntraVENous, PRN  heparin (porcine) injection 5,000 Units, 5,000 Units, SubCUTAneous, q8h  ondansetron (ZOFRAN-ODT) disintegrating tablet 4 mg, 4 mg, Oral, Q8H PRN **OR** ondansetron (ZOFRAN) injection 4 mg, 4 mg, IntraVENous, Q6H PRN  polyethylene glycol (GLYCOLAX) packet 17 g, 17 g, Oral, Daily PRN  acetaminophen (TYLENOL) tablet 650 mg, 650 mg, Oral, Q6H PRN **OR** acetaminophen (TYLENOL) suppository 650 mg, 650 mg, Rectal, Q6H PRN  glucose chewable tablet 16 g, 4 tablet, Oral, PRN  dextrose bolus 10% 125 mL, 125 mL, IntraVENous, PRN **OR** dextrose bolus 10% 250 mL, 250 mL, IntraVENous, PRN    Social History:  Social History     Socioeconomic History    Marital status:      Spouse name: Not on file    Number of children: Not on file    Years of education: Not on file    Highest education level: Not on file   Occupational History    Not on file   Tobacco Use    Smoking status: Light Tobacco Smoker    Smokeless tobacco: Never Used   Substance and Sexual Activity    Alcohol use: No    Drug use: No    Sexual activity: Not on file   Other Topics Concern    Not on file   Social History Narrative    Not on file     Social Determinants of Health     Financial Resource Strain:    Eveline Difficulty of Paying Living Expenses: Not on file   Food Insecurity:     Worried About Running Out of Food in the Last Year: Not on file    Ran Out of Food in the Last Year: Not on file   Transportation Needs:     Lack of Transportation (Medical): Not on file    Lack of Transportation (Non-Medical): Not on file   Physical Activity:     Days of Exercise per Week: Not on file    Minutes of Exercise per Session: Not on file   Stress:     Feeling of Stress : Not on file   Social Connections:     Frequency of Communication with Friends and Family: Not on file    Frequency of Social Gatherings with Friends and Family: Not on file    Attends Roman [employer] Services: Not on file    Active Member of [address] [place] [address] [employer] or Organizations: Not on file    Attends Club or Organization Meetings: Not on file    Marital Status: Not on file   Intimate Partner Violence:     Fear of Current or Ex-Partner: Not on file    Emotionally Abused: Not on file    Physically Abused: Not on file    Sexually Abused: Not on file   Housing Stability:     Unable to Pay for Housing in the Last Year: Not on file    Number of [place] in the Last Year: Not on file    Unstable Housing in the Last Year: Not on file     She currently lives at home alone. She was independent. She used a cane and drove. She has support from her daughter. Plan is to return home at discharge    Family History:   History reviewed. No pertinent family history.     Review of Systems:  CONSTITUTIONAL:  positive for  fatigue  EYES:  negative  HEENT:  positive for  voice change and dysarthria  RESPIRATORY:  negative  CARDIOVASCULAR:  positive for  fatigue, syncope  GASTROINTESTINAL:  negative  GENITOURINARY:  negative  SKIN:  negative  HEMATOLOGIC/LYMPHATIC:  negative  MUSCULOSKELETAL:  positive for  decreased range of motion and muscle weakness  NEUROLOGICAL:  positive for speech problems, gait problems and weakness  BEHAVIOR/PSYCH:  negative  System review otherwise negative    Physical Exam:  BP (!) 156/52   Pulse 64   Temp 98.8 °F (37.1 °C) (Oral)   Resp 18   Ht 5' (1.524 m)   Wt 175 lb 12.8 oz (79.7 kg)   SpO2 98%   BMI 34.33 kg/m²   awake  Orientation:   person, place, time  Mood: within normal limits  Affect: calm  General appearance: well groomed and in no acute distress    Memory:   normal,   Attention/Concentration: normal  Language:  normal    Cranial Nerves:  Right facial droop  ROM:  normal  Motor Exam:  Motor exam is symmetrical 5 out of 5 all extremities except 4-/5 in right LE  Tone:  normal  Muscle bulk: within normal limits  Sensory:  Sensory intact  Coordination:   normal  Deep Tendon Reflexes:  Reflexes are intact and symmetrical bilaterally  Plantar Response:  Right:  upgoing and left equivocal  Gait: not tested    Heart: normal rate and regular rhythm  Lungs: clear to auscultation without wheezes or rales  Abdomen: soft, non-tender, non-distended, normal bowel sounds, no masses or organomegaly    Skin: warm and dry, no rash or erythema  Peripheral vascular: Pulses: Normal upper and lower extremity pulses; Edema: 1+      Diagnostics:  Recent Results (from the past 24 hour(s))   Urinalysis with Microscopic    Collection Time: 05/09/22 10:30 AM   Result Value Ref Range    Glucose, Urine NEGATIVE NEGATIVE mg/dl    Bilirubin Urine NEGATIVE NEGATIVE    Ketones, Urine NEGATIVE NEGATIVE    Specific Gravity, UA 1.016 1.002 - 1.030    Blood, Urine NEGATIVE NEGATIVE    pH, UA 5.0 5.0 - 9.0    Protein,  (A) NEGATIVE mg/dl    Urobilinogen, Urine 0.2 0.0 - 1.0 eu/dl    Nitrite, Urine NEGATIVE NEGATIVE    Leukocyte Esterase, Urine NEGATIVE NEGATIVE    Color, UA YELLOW YELLOW-STRAW    Character, Urine CLEAR [id number]    RBC, UA 0-2 0-2/hpf /hpf    WBC, UA 0-2 0-4/hpf /hpf    Epithelial Cells, UA 3-5 3-5/hpf /hpf    Bacteria, UA NONE SEEN FEW/NONE SEEN    Casts NONE SEEN NONE SEEN /lpf    Crystals NONE SEEN NONE SEEN    Renal Epithelial, UA NONE SEEN NONE SEEN    Yeast, UA NONE SEEN NONE SEEN    Casts NONE SEEN /lpf    Miscellaneous Lab Test Result NONE SEEN    Electrolytes urine random    Collection Time: 05/09/22 10:30 AM   Result Value Ref Range    Sodium, Ur 31 meq/l    Potassium, Urine 40.8 meq/l    Chloride, Urine < 20.0 meq/l   Protein / creatinine ratio, urine    Collection Time: 05/09/22 10:30 AM   Result Value Ref Range    Protein, Urine 105.8 mg/dl    Creatinine, Urine 145.2 mg/dl    Prot/Creat Ratio, Ur 0.73    Microalbumin / Creatinine Urine Ratio    Collection Time: 05/09/22 10:30 AM   Result Value Ref Range    Microalbumin, Random Urine 66.21 mg/dL    Creatinine, Urine 145.2 mg/dL    Microalb/Creat Ratio 456 (H) 0 - 30 mg/g   Eosinophil Smear, Urine    Collection Time: 05/09/22 10:30 AM   Result Value Ref Range    Eosinophil Smear, Urine No Eos Seen No Eos Seen   POCT Glucose    Collection Time: 05/09/22 10:38 AM   Result Value Ref Range    POC Glucose 127 (H) 70 - 108 mg/dl   POCT Glucose    Collection Time: 05/09/22  4:32 PM   Result Value Ref Range    POC Glucose 119 (H) 70 - 108 mg/dl   Calcium, Ionized    Collection Time: 05/09/22  4:33 PM   Result Value Ref Range    Calcium, Ion 1.18 1.12 - 1.32 mmol/L   Hepatitis C Antibody    Collection Time: 05/09/22  4:33 PM   Result Value Ref Range    Hepatitis C Ab Negative    POCT Glucose    Collection Time: 05/09/22  8:18 PM   Result Value Ref Range    POC Glucose 111 (H) 70 - 108 mg/dl   Basic Metabolic Panel w/ Reflex to MG    Collection Time: 05/10/22  8:26 AM   Result Value Ref Range    Sodium 143 135 - 145 meq/L    Potassium reflex Magnesium 4.6 3.5 - 5.2 meq/L    Chloride 105 98 - 111 meq/L    CO2 22 (L) 23 - 33 meq/L    Glucose 109 (H) 70 - 108 mg/dL     (H) 7 - 22 mg/dL    CREATININE 2.7 (H) 0.4 - 1.2 mg/dL    Calcium 9.1 8.5 - 10.5 mg/dL   Anion Gap    Collection Time: 05/10/22  8:26 AM   Result Value Ref Range    Anion Gap 16.0 8.0 - 16.0 meq/L   Glomerular Filtration Rate, Estimated Collection Time: 05/10/22  8:26 AM   Result Value Ref Range    Est, Glom Filt Rate 21 (A) ml/min/1.73m2   POCT Glucose    Collection Time: 05/10/22  8:54 AM   Result Value Ref Range    POC Glucose 107 70 - 108 mg/dl   CBC    Collection Time: 05/10/22  9:02 AM   Result Value Ref Range    WBC 6.3 4.8 - 10.8 thou/mm3    RBC 3.36 (L) 4.20 - 5.40 mill/mm3    Hemoglobin 10.0 (L) 12.0 - 16.0 gm/dl    Hematocrit 32.3 (L) 37.0 - 47.0 %    MCV 96.1 81.0 - 99.0 fL    MCH 29.8 26.0 - 33.0 pg    MCHC 31.0 (L) 32.2 - 35.5 gm/dl    RDW-CV 14.9 (H) 11.5 - 14.5 %    RDW-SD 51.6 (H) 35.0 - 45.0 fL    Platelets 745 970 - 033 thou/mm3    MPV 11.2 9.4 - 12.4 fL           Impression:  · Punctate acute ischemic infarcts in the left corona radiata and right centrum semiovale; also chronic stroke  · To incidentally found meningiomas  · Moderate concentric left ventricular hypertrophy  · Grade 2 diastolic dysfunction with elevated left ventricular filling pressures  · Mild mitral regurgitation  · Mildly dilated left atrium  · Moderate pulmonary hypertension  · Mild tricuspid regurgitation  · Acute kidney injury on chronic kidney disease  · Mild hyponatremia  · Essential hypertension  · Hyperlipidemia  · History of diabetes mellitus with diabetic nephropathy  · Recent treatment of diastolic congestive heart failure and influenza [facility] in [place]  · Syncope possibly due to bradycardia from beta-blockers  · EF 55 to 60% per echo 2022  · History of ankle fracture, left  · CODE STATUS DNR CCA    Recommendations:  · Await therapy evaluations  · Patient is from [place] and came to [facility] as her son's father just passed and patient and her daughter Whit Bryant came [facility] for the .  At the time of discharge from [address] [place] [address], she wishes to have follow-up therapy at [place] [address]. [facility] in [place] where she has gone before. It was my pleasure to evaluate Satira Body today. Please call with questions.     Keisha Bhardwaj MD

## 2022-05-10 NOTE — PROGRESS NOTES
Renal Progress Note  [facility]    Patient :  Macrina Payton; 61 y.o. MRN# [id number]  Location:  [facility]15/015-A  Attending:  Nelson Farley MD  Admit Date:  5/7/2022   Hospital Day: 3      Subjective:     Nephrology is following the patient for progressively worsening renal function    -Continues to exhibit RLE weakness and R-sided facial droop  -Producing good urine  -Overnight, code stroke was called, CT head negative for intracranial abnormalities    Outpatient Medications:     Medications Prior to Admission: isosorbide mononitrate (IMDUR) 30 MG extended release tablet, Take 1 tablet by mouth daily  albuterol sulfate HFA (PROVENTIL HFA) 108 (90 Base) MCG/ACT inhaler, Inhale 2 puffs into the lungs every 6 hours as needed for Wheezing (Cough)  hydrALAZINE (APRESOLINE) 25 MG tablet, Take 1 tablet by mouth every 12 hours  metoprolol succinate (TOPROL XL) 25 MG extended release tablet, Take 3 tablets by mouth in the morning and at bedtime  benzocaine-menthol (CEPACOL SORE THROAT) 15-3.6 MG lozenge, Take 1 lozenge by mouth every 2 hours as needed for Sore Throat or Pain  aspirin 81 MG EC tablet, Take 81 mg by mouth daily  potassium chloride (MICRO-K) 10 MEQ extended release capsule, Take 10 mEq by mouth daily  Cholecalciferol (VITAMIN D3) 125 MCG (5000 UT) CAPS, Take 2 capsules by mouth once a week  atorvastatin (LIPITOR) 10 MG tablet, Take 1 tablet by mouth daily  amLODIPine (NORVASC) 5 MG tablet, Take 1 tablet by mouth daily (Patient taking differently: Take 5 mg by mouth every evening )  pregabalin (LYRICA) 150 MG capsule, Take 150 mg by mouth every evening. L-METHYLFOLATE-B6-B12 PO, Take 1 tablet by mouth daily  insulin glargine (LANTUS) 100 UNIT/ML injection, Inject 25 Units into the skin nightly. insulin lispro (HUMALOG) 100 UNIT/ML injection, Inject 0-12 Units into the skin 3 times daily (with meals).  (Patient taking differently: Inject 18-36 Units into the skin 3 times daily (with meals) 18 units with Breakfast and Lunch and 36 units with Dinner)    Current Medications:     Scheduled Meds:    atorvastatin  40 mg Oral Daily    calcium replacement protocol   Other RX Placeholder    pregabalin  25 mg Oral QPM    clopidogrel  75 mg Oral Daily    [Held by provider] amLODIPine  5 mg Oral QPM    aspirin  81 mg Oral Daily    [Held by provider] hydrALAZINE  25 mg Oral 2 times per day    insulin glargine  25 Units SubCUTAneous Nightly    insulin lispro  18 Units SubCUTAneous QAM AC    insulin lispro  18 Units SubCUTAneous Daily before lunch    insulin lispro  36 Units SubCUTAneous Dinner    insulin lispro  0-6 Units SubCUTAneous TID WC    insulin lispro  0-3 Units SubCUTAneous Nightly    [Held by provider] isosorbide mononitrate  30 mg Oral Daily    sodium chloride flush  5-40 mL IntraVENous 2 times per day    heparin (porcine)  5,000 Units SubCUTAneous q8h     Continuous Infusions:    sodium chloride 75 mL/hr at 05/10/22 0535    dextrose      sodium chloride       PRN Meds:  polyvinyl alcohol, albuterol sulfate HFA, glucagon (rDNA), dextrose, sodium chloride flush, sodium chloride, ondansetron **OR** ondansetron, polyethylene glycol, acetaminophen **OR** acetaminophen, glucose, dextrose bolus **OR** dextrose bolus    Input/Output:       I/O last 3 completed shifts: In: 525.1 [I.V.:525.1]  Out: 5658 [Urine:1675].       Patient Vitals for the past 96 hrs (Last 3 readings):   Weight   05/10/22 0253 175 lb 12.8 oz (79.7 kg)   22 2335 179 lb 3.2 oz (81.3 kg)   22 1923 165 lb (74.8 kg)       Vital Signs:   Temperature:  Temp: 98.8 °F (37.1 °C)  TMax:   Temp (24hrs), Av.5 °F (36.9 °C), Min:97.6 °F (36.4 °C), Max:100 °F (37.8 °C)    Respirations:  Resp: 18  Pulse:   Pulse: 64  BP:    BP: (!) 156/52  BP Range: Systolic (92QHR), HFP:373 , Min:125 , GZM:335       Diastolic (75TLU), EI, Min:51, Max:63      Physical Examination:     General:  Awake, alert, no acute distress  HEENT: NC/AT/ MMM  Chest:              Clear B/L no W/R/R  Cardiac:  Bradycardic, normal S1, S2, No m/r/g  Abdomen: Soft, non-tender, with normo-active bowel sounds  Neuro:  R-sided facial droop with 3+ strength in RLE. Unclear whether this is chronic  SKIN:  No rashes, good skin turgor.   Extremities:  2+ pitting edema over bilateral lower extremities, no cyanosis    Labs:       Recent Labs     05/07/22  1945 05/08/22 0326 05/09/22  0139   WBC 7.3 7.6 7.6   RBC 3.31* 3.36* 3.20*   HGB 9.9* 9.9* 9.6*   HCT 30.7* 31.9* 31.1*   MCV 92.7 94.9 97.2   MCH 29.9 29.5 30.0   MCHC 32.2 31.0* 30.9*    282 273   MPV 11.2 11.6 10.9      BMP:   Recent Labs     05/08/22 0326 05/09/22  0139 05/09/22  0400   * 136 136   K 4.4 4.5 4.6   CL 95* 99 97*   CO2 25 20* 22*   * 128* 126*   CREATININE 3.8* 3.8* 4.0*   GLUCOSE 212* 103 100   CALCIUM 8.7 8.3* 8.2*      Phosphorus:     Recent Labs     05/09/22 0139   PHOS 7.1*     Magnesium:    Recent Labs     05/09/22  0139   MG 3.2*     Albumin:    Recent Labs     05/09/22  0400   LABALBU 2.9*     BNP:    No results found for: BNP  DARIANA:    No results found for: DARIANA  SPEP:  Lab Results   Component Value Date    PROT 6.6 05/09/2022     UPEP:   No results found for: LABPE  C3:   No results found for: C3  C4:   No results found for: C4  MPO ANCA:   No results found for: MPO  PR3 ANCA:   No results found for: PR3  Anti-GBM:   No results found for: GBMABIGG  Hep BsAg:       No results found for: HEPBSAG  Hep C AB:          Lab Results   Component Value Date    HEPCAB Negative 05/09/2022       Urinalysis/Chemistries:      Lab Results   Component Value Date    NITRU NEGATIVE 05/09/2022    COLORU YELLOW 05/09/2022    PHUR 5.0 05/09/2022    LABCAST NONE SEEN 05/09/2022    LABCAST NONE SEEN 05/09/2022    WBCUA 0-2 05/09/2022    RBCUA 0-2 05/09/2022    YEAST NONE SEEN 05/09/2022    BACTERIA NONE SEEN 05/09/2022    CLARITYU Clear 05/02/2022    SPECGRAV 1.016 05/09/2022 LEUKOCYTESUR NEGATIVE 05/09/2022    UROBILINOGEN 0.2 05/09/2022    BILIRUBINUR NEGATIVE 05/09/2022    BLOODU NEGATIVE 05/09/2022    GLUCOSEU Negative 05/02/2022    KETUA NEGATIVE 05/09/2022     Urine Sodium:   No results found for: ETELVINA  Urine Potassium:  No results found for: KUR  Urine Chloride:  No results found for: CLUR  Urine Osmolarity: No results found for: OSMOU  Urine Protein:   No components found for: TOTALPROTEIN, URINE   Urine Creatinine:     Lab Results   Component Value Date    LABCREA 145.2 05/09/2022    LABCREA 68.2 05/03/2022     Urine Eosinophils:  No components found for: UEOS    Impression and Plan:  1. Pre-renal vs Intrinsic CHLOE Stage 2 on CKD IIIB eGFR 34 mL/min/1.73m2. ATN cannot be excluded. Baseline sCr 1.8-2.0 mg/dL 04/26/22-04/30/22 with steady worsening since. Multifactorial - pre-renal given BUN:sCr >20 and h/o symptomatic bradycardia but also has proteinuria w/o hypertriglyceridemia (H/o uncontrolled IDDMT2 A1c 8.8% 05/09/22 may explain). Renal US w/o arterial stenosis. Urine P:Cr 0.73 mg/mg but urine mAlb:sCr 456 mg/g. No eosinophiluria. Renal function improved once HR improved but remains hypervolemic on exam.   · Continue gentle IV hydration   · Strict I&Os  · Daily Weights  · Daily BMPs  · Avoid Nephrotoxic Substances - No contrast w/ MRI - risk of NSF  · Renally dose medications: pregabalin  · DARIANA, c-ANCA, p-ANCA, Anti-GBM Ab, Immunofixation serology, Kappa/Lambda Quant FLC, C3, C4, and serum electrophoresis pending. 2. Hyponatremia, resolved  3. Hypocalcemia  4. Stroke  5. Syncope 2/2 Bradycardia, resolved  6. IDDMT2 A1c 8.8%  7. Essential HTN  8. G2 Diastolic Heart Failure    Please don't hesitate to call with any questions.   Electronically signed by Branden Mahmood MD on 5/10/2022 at 8:48 AM

## 2022-05-11 LAB
ANION GAP SERPL CALCULATED.3IONS-SCNC: 10 MEQ/L (ref 8–16)
BUN BLDV-MCNC: 80 MG/DL (ref 7–22)
CALCIUM SERPL-MCNC: 8.4 MG/DL (ref 8.5–10.5)
CHLORIDE BLD-SCNC: 107 MEQ/L (ref 98–111)
CO2: 25 MEQ/L (ref 23–33)
CREAT SERPL-MCNC: 2.4 MG/DL (ref 0.4–1.2)
GFR SERPL CREATININE-BSD FRML MDRD: 25 ML/MIN/1.73M2
GLUCOSE BLD-MCNC: 116 MG/DL (ref 70–108)
GLUCOSE BLD-MCNC: 146 MG/DL (ref 70–108)
GLUCOSE BLD-MCNC: 171 MG/DL (ref 70–108)
GLUCOSE BLD-MCNC: 88 MG/DL (ref 70–108)
GLUCOSE BLD-MCNC: 93 MG/DL (ref 70–108)
KAPPA/LAMBDA FREE LIGHT CHAINS: NORMAL
POTASSIUM SERPL-SCNC: 4.3 MEQ/L (ref 3.5–5.2)
SODIUM BLD-SCNC: 142 MEQ/L (ref 135–145)

## 2022-05-11 PROCEDURE — 97110 THERAPEUTIC EXERCISES: CPT

## 2022-05-11 PROCEDURE — 6370000000 HC RX 637 (ALT 250 FOR IP): Performed by: STUDENT IN AN ORGANIZED HEALTH CARE EDUCATION/TRAINING PROGRAM

## 2022-05-11 PROCEDURE — 6370000000 HC RX 637 (ALT 250 FOR IP)

## 2022-05-11 PROCEDURE — 2060000000 HC ICU INTERMEDIATE R&B

## 2022-05-11 PROCEDURE — 99232 SBSQ HOSP IP/OBS MODERATE 35: CPT | Performed by: INTERNAL MEDICINE

## 2022-05-11 PROCEDURE — 97535 SELF CARE MNGMENT TRAINING: CPT

## 2022-05-11 PROCEDURE — 97166 OT EVAL MOD COMPLEX 45 MIN: CPT

## 2022-05-11 PROCEDURE — 6360000002 HC RX W HCPCS: Performed by: PHYSICIAN ASSISTANT

## 2022-05-11 PROCEDURE — 97530 THERAPEUTIC ACTIVITIES: CPT

## 2022-05-11 PROCEDURE — 6370000000 HC RX 637 (ALT 250 FOR IP): Performed by: PHYSICIAN ASSISTANT

## 2022-05-11 PROCEDURE — 6370000000 HC RX 637 (ALT 250 FOR IP): Performed by: INTERNAL MEDICINE

## 2022-05-11 PROCEDURE — 97116 GAIT TRAINING THERAPY: CPT

## 2022-05-11 PROCEDURE — 2580000003 HC RX 258: Performed by: PHYSICIAN ASSISTANT

## 2022-05-11 PROCEDURE — 6370000000 HC RX 637 (ALT 250 FOR IP): Performed by: NURSE PRACTITIONER

## 2022-05-11 PROCEDURE — 36415 COLL VENOUS BLD VENIPUNCTURE: CPT

## 2022-05-11 PROCEDURE — 80048 BASIC METABOLIC PNL TOTAL CA: CPT

## 2022-05-11 PROCEDURE — 2580000003 HC RX 258: Performed by: INTERNAL MEDICINE

## 2022-05-11 PROCEDURE — 82948 REAGENT STRIP/BLOOD GLUCOSE: CPT

## 2022-05-11 RX ORDER — DOXAZOSIN MESYLATE 4 MG/1
4 TABLET ORAL DAILY
Status: DISCONTINUED | OUTPATIENT
Start: 2022-05-11 | End: 2022-05-20 | Stop reason: HOSPADM

## 2022-05-11 RX ORDER — HYDRALAZINE HYDROCHLORIDE 50 MG/1
50 TABLET, FILM COATED ORAL EVERY 8 HOURS SCHEDULED
Status: DISCONTINUED | OUTPATIENT
Start: 2022-05-11 | End: 2022-05-20 | Stop reason: HOSPADM

## 2022-05-11 RX ORDER — HYDRALAZINE HYDROCHLORIDE 50 MG/1
50 TABLET, FILM COATED ORAL EVERY 12 HOURS SCHEDULED
Status: DISCONTINUED | OUTPATIENT
Start: 2022-05-12 | End: 2022-05-11

## 2022-05-11 RX ADMIN — INSULIN LISPRO 10 UNITS: 100 INJECTION, SOLUTION INTRAVENOUS; SUBCUTANEOUS at 08:11

## 2022-05-11 RX ADMIN — INSULIN LISPRO 10 UNITS: 100 INJECTION, SOLUTION INTRAVENOUS; SUBCUTANEOUS at 11:23

## 2022-05-11 RX ADMIN — HYDRALAZINE HYDROCHLORIDE 50 MG: 50 TABLET ORAL at 14:09

## 2022-05-11 RX ADMIN — ISOSORBIDE MONONITRATE 30 MG: 30 TABLET, EXTENDED RELEASE ORAL at 08:10

## 2022-05-11 RX ADMIN — AMLODIPINE BESYLATE 5 MG: 5 TABLET ORAL at 17:47

## 2022-05-11 RX ADMIN — PREGABALIN 25 MG: 25 CAPSULE ORAL at 21:48

## 2022-05-11 RX ADMIN — SODIUM CHLORIDE: 9 INJECTION, SOLUTION INTRAVENOUS at 10:27

## 2022-05-11 RX ADMIN — ATORVASTATIN CALCIUM 40 MG: 80 TABLET, FILM COATED ORAL at 08:11

## 2022-05-11 RX ADMIN — HYDRALAZINE HYDROCHLORIDE 50 MG: 50 TABLET ORAL at 21:48

## 2022-05-11 RX ADMIN — SODIUM CHLORIDE: 9 INJECTION, SOLUTION INTRAVENOUS at 23:14

## 2022-05-11 RX ADMIN — SODIUM CHLORIDE, PRESERVATIVE FREE 10 ML: 5 INJECTION INTRAVENOUS at 21:48

## 2022-05-11 RX ADMIN — CLOPIDOGREL BISULFATE 75 MG: 75 TABLET ORAL at 08:10

## 2022-05-11 RX ADMIN — DOXAZOSIN 4 MG: 4 TABLET ORAL at 10:23

## 2022-05-11 RX ADMIN — ASPIRIN 81 MG: 81 TABLET, COATED ORAL at 08:10

## 2022-05-11 RX ADMIN — INSULIN LISPRO 1 UNITS: 100 INJECTION, SOLUTION INTRAVENOUS; SUBCUTANEOUS at 11:24

## 2022-05-11 RX ADMIN — HEPARIN SODIUM 5000 UNITS: 5000 INJECTION INTRAVENOUS; SUBCUTANEOUS at 17:47

## 2022-05-11 RX ADMIN — HEPARIN SODIUM 5000 UNITS: 5000 INJECTION INTRAVENOUS; SUBCUTANEOUS at 08:10

## 2022-05-11 RX ADMIN — INSULIN GLARGINE 13 UNITS: 100 INJECTION, SOLUTION SUBCUTANEOUS at 21:55

## 2022-05-11 RX ADMIN — SODIUM CHLORIDE, PRESERVATIVE FREE 10 ML: 5 INJECTION INTRAVENOUS at 08:12

## 2022-05-11 ASSESSMENT — PAIN SCALES - GENERAL
PAINLEVEL_OUTOF10: 0
PAINLEVEL_OUTOF10: 0

## 2022-05-11 NOTE — PROGRESS NOTES
[address] [address]  INPATIENT PHYSICAL THERAPY  DAILY NOTE  [facility] 4A - 4A-15/015-A      Time In: 9744  Time Out: 1333  Timed Code Treatment Minutes: 45 Minutes  Minutes: 38          Date: 2022  Patient Name: Zbigniew Schreiber,  Gender:  female        MRN: [id number]  : 1955  (61 y.o.)     Referring Practitioner: Justin Liang PA-C  Diagnosis: symptomatic bradycardia  Additional Pertinent Hx: Per H&p :Patient presents to the ED following a syncopal episode. The patient was just discharged one day prior to admission following a prolonged stay due to influenza A. The patient developed CHLOE while hospitalized. Today the patient had risen from sitting to standing and began amublating. The patient loss consciousness and subsequently fell. The patient was found to have a resting heart rate 40-48. Pt presents with On 2022 patient was noted by staff to have dysarthria and a new right facial droop which prompted a code stroke. Initial NIH was 3. Patient was taken to imaging for CT head which revealed no ICH however he did demonstrate old stroke which was age-indeterminate. For this reason tPA was not given as if this was a recent stroke he would be very high risk. CTAs were deferred as this patient has a creatinine of 4.0. Today on exam, pt continues to have slurred speech and facial droop and she is lethargic per neurology note.  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 Pt states her kids can assist her if needed, she runs her own errands and cares for her home indep. Pt's children drive for her. Restrictions/Precautions:  Restrictions/Precautions: Fall Risk,General Precautions  Position Activity Restriction  Other position/activity restrictions: monitor BP       SUBJECTIVE: nursing ok'd therapy, pt cooperative she had been incont of urine at start of session     PAIN: 0/10:       Vitals: Blood Pressure: 139/50    OBJECTIVE:  Bed Mobility:  Not Tested    Transfers:  Sit to Stand: Contact Guard Assistance  Stand to [address] [address]  Cues 100% of time for hand placement noted decreased carryover- pt completed transfers from various surfaces   Ambulation:  Minimal Assistance  Distance: 35x1  Surface: Level Tile  Device:Cane  Gait Deviations:  Slow mayito with side base of support and uneven step length with decreaed heel strike at andriy LEs, pt had several near loss of balance needing the min assist  Pt completed a second walk with rolling walker with CGA noted improved step length and base of support- however did require cues for safety with walker in narrow spaces   ** pt does have a walker at home from a previous ankle injury I did encourage her to have daughter bring this up from [facility] when she is ready to transport her to the rehab facility     Balance:  sitting on toilet pt reaching to alvaro her depends she needed CGA for balance and she wasn't able to complete this task w/o assist, standing balance w/o UE at support pt tended to demonstrate a wide base of support and tended to lean against the counter for support     Exercise:  Patient was guided in 1 set(s) 10 reps of exercise to both lower extremities. Seated marches, Seated hamstring curls, Seated heel/toe raises, Long arc quads and Seated isometric hip adduction.  All with UE at support and CGA for balance  Exercises were completed for increased independence with functional mobility. Functional Outcome Measures: Completed  AM-PAC Inpatient Mobility without Stair Climbing Raw Score : 15  AM-PAC Inpatient without Stair Climbing T-Scale Score : 43.03    ASSESSMENT:  Assessment: Patient progressing toward established goals. However pt cont to demonstrate decreased safety awareness and decreased balance w/ balance activity. Pt needed hands on assist and currently using walker for support. Pt did complete balance test yesterday and scored 16/28 putting her at a high fall risk and recommendation of AD use. Pt would greatly benefit from cont skilled therapy prior to return home   Activity Tolerance:  Patient tolerance of  treatment: good.         Equipment Recommendations:Equipment Needed: No (pt has a RW)  Discharge Recommendations: Pt would benefit from an IP rehab stay vs a therapy stay prior to return home- she would be able to tolerate 3 hours of therapy at day  Plan: Current Treatment Recommendations: Strengthening,Balance training,Functional mobility training,Transfer training,Endurance training,Neuromuscular re-education,Gait training,Home exercise program,Safety education & training,Patient/Caregiver education & training,Equipment evaluation, education, & procurement,Therapeutic activities  Plan:  (6x N)    Patient Education  Patient Education: Plan of Care    Goals:  Patient goals : \"no\"  Short Term Goals  Time Frame for Short term goals: by hospital d/c  Short term goal 1: Pt to demo supine <->sit with S for ease with getting in and out of bed  Short term goal 2: Pt to demo sit <->stand with RW and S for improved ability to transfer from any surface safely  Short term goal 3: Pt to ambulate >=40' with RW and S for improved ability to move in her environment  Short term goal 4: Pt to improve Tinetti score to >=19/28 to progress to the moderate fall risk category  Long Term Goals  Time Frame for Long term goals : NA due to short ELOS    Following session, patient left in safe position with all fall risk precautions in place.

## 2022-05-11 NOTE — PLAN OF CARE
Problem: Discharge Planning  Goal: Discharge to home or other facility with appropriate resources  Outcome: Progressing  Flowsheets (Taken 5/11/2022 1624)  Discharge to home or other facility with appropriate resources:   Identify barriers to discharge with patient and caregiver   Identify discharge learning needs (meds, wound care, etc)   Arrange for needed discharge resources and transportation as appropriate   Refer to discharge planning if patient needs post-hospital services based on physician order or complex needs related to functional status, cognitive ability or social support system  Note: Plan to discharge to rehab facility. Problem: Pain  Goal: Verbalizes/displays adequate comfort level or baseline comfort level  Outcome: Progressing  Flowsheets (Taken 5/11/2022 1624)  Verbalizes/displays adequate comfort level or baseline comfort level:   Encourage patient to monitor pain and request assistance   Assess pain using appropriate pain scale   Implement non-pharmacological measures as appropriate and evaluate response  Note: Patient denies pain or discomfort throughout shift. Problem: Safety - Adult  Goal: Free from fall injury  Outcome: Progressing  Flowsheets (Taken 5/11/2022 1623)  Free From Fall Injury: Instruct family/caregiver on patient safety  Note: Patient remained free from falls this shift. Bed is in low position with alarm on and siderails up x2. Patient ambulates with one assist. Education given on use of call light and patient voiced understanding. Patient uses call light appropriately. Call light and beside table within reach. Arm band and falling star in place.        Problem: Cardiovascular - Adult  Goal: Maintains optimal cardiac output and hemodynamic stability  Outcome: Progressing  Flowsheets (Taken 5/11/2022 1624)  Maintains optimal cardiac output and hemodynamic stability:   Monitor blood pressure and heart rate   Assess for signs of decreased cardiac output   Monitor urine output and notify Licensed Independent Practitioner for values outside of normal range  Goal: Absence of cardiac dysrhythmias or at baseline  Outcome: Progressing  Flowsheets (Taken 5/11/2022 1624)  Absence of cardiac dysrhythmias or at baseline:   Monitor cardiac rate and rhythm   Assess for signs of decreased cardiac output     Problem: Chronic Conditions and Co-morbidities  Goal: Patient's chronic conditions and co-morbidity symptoms are monitored and maintained or improved  Outcome: Progressing  Flowsheets (Taken 5/11/2022 1624)  Care Plan - Patient's Chronic Conditions and Co-Morbidity Symptoms are Monitored and Maintained or Improved:   Monitor and assess patient's chronic conditions and comorbid symptoms for stability, deterioration, or improvement   Collaborate with multidisciplinary team to address chronic and comorbid conditions and prevent exacerbation or deterioration   Update acute care plan with appropriate goals if chronic or comorbid symptoms are exacerbated and prevent overall improvement and discharge     Problem: Neurosensory - Adult  Goal: Achieves stable or improved neurological status  Outcome: Progressing  Flowsheets (Taken 5/11/2022 1624)  Achieves stable or improved neurological status:   Assess for and report changes in neurological status   Monitor temperature, glucose, and sodium.  Initiate appropriate interventions as ordered  Goal: Achieves maximal functionality and self care  Outcome: Progressing  Flowsheets (Taken 5/11/2022 1624)  Achieves maximal functionality and self care:   Monitor swallowing and airway patency with patient fatigue and changes in neurological status   Encourage and assist patient to increase activity and self care with guidance from physical therapy/occupational therapy     Problem: Musculoskeletal - Adult  Goal: Return mobility to safest level of function  Outcome: Progressing  Flowsheets (Taken 5/11/2022 1624)  Return Mobility to Safest Level of Function:

## 2022-05-11 NOTE — PLAN OF CARE
Problem: Discharge Planning  Goal: Discharge to home or other facility with appropriate resources  5/11/2022 0139 by Isaias Bumpers, RN  Outcome: Progressing  5/11/2022 0138 by Isaias Bumpers, RN  Outcome: Progressing  5/10/2022 1516 by MAYELA Parikh  Outcome: Progressing     Problem: Pain  Goal: Verbalizes/displays adequate comfort level or baseline comfort level  5/11/2022 0139 by Isaias Bumpers, RN  Outcome: Progressing  5/11/2022 0138 by Isaias Bumpers, RN  Outcome: Progressing  Flowsheets (Taken 5/10/2022 1945)  Verbalizes/displays adequate comfort level or baseline comfort level:   Encourage patient to monitor pain and request assistance   Assess pain using appropriate pain scale     Problem: Safety - Adult  Goal: Free from fall injury  5/11/2022 0139 by Isaias Bumpers, RN  Outcome: Progressing  5/11/2022 0138 by Isaias Bumpers, RN  Outcome: Progressing     Problem: Cardiovascular - Adult  Goal: Maintains optimal cardiac output and hemodynamic stability  5/11/2022 0139 by Isaias Bumpers, RN  Outcome: Progressing  5/11/2022 0138 by Isaias Bumpers, RN  Outcome: Progressing  Goal: Absence of cardiac dysrhythmias or at baseline  5/11/2022 0139 by Isaias Bumpers, RN  Outcome: Progressing  5/11/2022 0138 by Isaias Bumpers, RN  Outcome: Progressing     Problem: Chronic Conditions and Co-morbidities  Goal: Patient's chronic conditions and co-morbidity symptoms are monitored and maintained or improved  5/11/2022 0139 by Isaias Bumpers, RN  Outcome: Progressing  5/11/2022 0138 by Isaias Bumpers, RN  Outcome: Progressing     Problem: Neurosensory - Adult  Goal: Achieves stable or improved neurological status  5/11/2022 0139 by Isaias Bumpers, RN  Outcome: Progressing  5/11/2022 0138 by Isaias Bumpers, RN  Outcome: Progressing  Goal: Achieves maximal functionality and self care  5/11/2022 0139 by Isaias Bumpers, RN  Outcome: Progressing  5/11/2022 0138 by Isaias Bumpers, RN  Outcome: Progressing     Problem: Musculoskeletal - Adult  Goal: Return mobility to safest level of function  5/11/2022 0139 by Michael Huffman RN  Outcome: Progressing  5/11/2022 0138 by Michael Huffman RN  Outcome: Progressing  Goal: Maintain proper alignment of affected body part  5/11/2022 0139 by Michael Huffman RN  Outcome: Progressing  5/11/2022 0138 by Michael Huffman RN  Outcome: Progressing  Goal: Return ADL status to a safe level of function  5/11/2022 0139 by Michael Huffman RN  Outcome: Progressing  5/11/2022 0138 by Michael Huffman RN  Outcome: Progressing     Problem: Skin/Tissue Integrity  Goal: Absence of new skin breakdown  Description: 1. Monitor for areas of redness and/or skin breakdown  2. Assess vascular access sites hourly  3. Every 4-6 hours minimum:  Change oxygen saturation probe site  4. Every 4-6 hours:  If on nasal continuous positive airway pressure, respiratory therapy assess nares and determine need for appliance change or resting period.   5/11/2022 0139 by Michael Huffman RN  Outcome: Progressing  5/11/2022 0138 by Michael Huffman RN  Outcome: Progressing

## 2022-05-11 NOTE — PROGRESS NOTES
Renal Progress Note  [facility]    Patient :  Sky Haywood; 61 y.o. MRN# [id number]  Location:  [facility]15/015-A  Attending:  Kerry Guerra MD  Admit Date:  5/7/2022   Hospital Day: 4      Subjective:     Nephrology is following the patient for progressively worsening renal function    -RLE weakness and facial droop resolving. Pt's diet is advancing.   -Producing good urine output  -Feeling much better and no acute events overnight. Outpatient Medications:     Medications Prior to Admission: isosorbide mononitrate (IMDUR) 30 MG extended release tablet, Take 1 tablet by mouth daily  albuterol sulfate HFA (PROVENTIL HFA) 108 (90 Base) MCG/ACT inhaler, Inhale 2 puffs into the lungs every 6 hours as needed for Wheezing (Cough)  hydrALAZINE (APRESOLINE) 25 MG tablet, Take 1 tablet by mouth every 12 hours  metoprolol succinate (TOPROL XL) 25 MG extended release tablet, Take 3 tablets by mouth in the morning and at bedtime  benzocaine-menthol (CEPACOL SORE THROAT) 15-3.6 MG lozenge, Take 1 lozenge by mouth every 2 hours as needed for Sore Throat or Pain  aspirin 81 MG EC tablet, Take 81 mg by mouth daily  potassium chloride (MICRO-K) 10 MEQ extended release capsule, Take 10 mEq by mouth daily  Cholecalciferol (VITAMIN D3) 125 MCG (5000 UT) CAPS, Take 2 capsules by mouth once a week  atorvastatin (LIPITOR) 10 MG tablet, Take 1 tablet by mouth daily  amLODIPine (NORVASC) 5 MG tablet, Take 1 tablet by mouth daily (Patient taking differently: Take 5 mg by mouth every evening )  pregabalin (LYRICA) 150 MG capsule, Take 150 mg by mouth every evening. L-METHYLFOLATE-B6-B12 PO, Take 1 tablet by mouth daily  insulin glargine (LANTUS) 100 UNIT/ML injection, Inject 25 Units into the skin nightly. insulin lispro (HUMALOG) 100 UNIT/ML injection, Inject 0-12 Units into the skin 3 times daily (with meals).  (Patient taking differently: Inject 18-36 Units into the skin 3 times daily (with meals) 18 units with Breakfast and Lunch and 36 units with Dinner)    Current Medications:     Scheduled Meds:    insulin lispro  15 Units SubCUTAneous Dinner    insulin lispro  10 Units SubCUTAneous Daily before lunch    insulin lispro  10 Units SubCUTAneous QAM AC    atorvastatin  40 mg Oral Daily    calcium replacement protocol   Other RX Placeholder    pregabalin  25 mg Oral QPM    clopidogrel  75 mg Oral Daily    amLODIPine  5 mg Oral QPM    aspirin  81 mg Oral Daily    hydrALAZINE  25 mg Oral 2 times per day    insulin glargine  25 Units SubCUTAneous Nightly    insulin lispro  0-6 Units SubCUTAneous TID WC    insulin lispro  0-3 Units SubCUTAneous Nightly    isosorbide mononitrate  30 mg Oral Daily    sodium chloride flush  5-40 mL IntraVENous 2 times per day    heparin (porcine)  5,000 Units SubCUTAneous q8h     Continuous Infusions:    sodium chloride 75 mL/hr at 05/10/22 2159    dextrose      sodium chloride       PRN Meds:  polyvinyl alcohol, albuterol sulfate HFA, glucagon (rDNA), dextrose, sodium chloride flush, sodium chloride, ondansetron **OR** ondansetron, polyethylene glycol, acetaminophen **OR** acetaminophen, glucose, dextrose bolus **OR** dextrose bolus    Input/Output:       I/O last 3 completed shifts: In: 660 [P.O.:660]  Out: 2375 [Urine:2375].       Patient Vitals for the past 96 hrs (Last 3 readings):   Weight   22 0600 175 lb 9.6 oz (79.7 kg)   05/10/22 0253 175 lb 12.8 oz (79.7 kg)   22 2335 179 lb 3.2 oz (81.3 kg)       Vital Signs:   Temperature:  Temp: 98.6 °F (37 °C)  TMax:   Temp (24hrs), Av.5 °F (36.9 °C), Min:97.8 °F (36.6 °C), Max:98.8 °F (37.1 °C)    Respirations:  Resp: 16  Pulse:   Pulse: 72  BP:    BP: (!) 161/62  BP Range: Systolic (97III), [place]:637 , Min:131 , XAZ:754       Diastolic (55SSX), KQ, Min:56, Max:80      Physical Examination:     General:  Awake, alert, no acute distress  HEENT: NC/AT/ MMM  Chest:              Clear B/L no W/R/R  Cardiac: Bradycardic, normal S1, S2, No m/r/g  Abdomen: Soft, non-tender, with normo-active bowel sounds  Neuro:  R-sided facial droop with 3+ strength in RLE. Unclear whether this is chronic  SKIN:  No rashes, good skin turgor.   Extremities:  2+ pitting edema over bilateral lower extremities, no cyanosis    Labs:       Recent Labs     05/09/22  0139 05/10/22  0902   WBC 7.6 6.3   RBC 3.20* 3.36*   HGB 9.6* 10.0*   HCT 31.1* 32.3*   MCV 97.2 96.1   MCH 30.0 29.8   MCHC 30.9* 31.0*    286   MPV 10.9 11.2      BMP:   Recent Labs     05/09/22  0400 05/10/22  0826 05/11/22  0349    143 142   K 4.6 4.6 4.3   CL 97* 105 107   CO2 22* 22* 25   * 102* 80*   CREATININE 4.0* 2.7* 2.4*   GLUCOSE 100 109* 146*   CALCIUM 8.2* 9.1 8.4*      Phosphorus:     Recent Labs     05/09/22  0139   PHOS 7.1*     Magnesium:    Recent Labs     05/09/22  0139   MG 3.2*     Albumin:    Recent Labs     05/09/22  0400   LABALBU 2.9*     BNP:    No results found for: BNP  DARIANA:    No results found for: DARIANA  SPEP:  Lab Results   Component Value Date    PROT 6.6 05/09/2022     UPEP:   No results found for: LABPE  C3:     Lab Results   Component Value Date    C3 166 05/09/2022     C4:     Lab Results   Component Value Date    C4 32 05/09/2022     MPO ANCA:   No results found for: MPO  PR3 ANCA:   No results found for: PR3  Anti-GBM:   No results found for: GBMABIGG  Hep BsAg:       No results found for: HEPBSAG  Hep C AB:          Lab Results   Component Value Date    HEPCAB Negative 05/09/2022       Urinalysis/Chemistries:      Lab Results   Component Value Date    NITRU NEGATIVE 05/09/2022    COLORU YELLOW 05/09/2022    PHUR 5.0 05/09/2022    LABCAST NONE SEEN 05/09/2022    LABCAST NONE SEEN 05/09/2022    WBCUA 0-2 05/09/2022    RBCUA 0-2 05/09/2022    YEAST NONE SEEN 05/09/2022    BACTERIA NONE SEEN 05/09/2022    CLARITYU Clear 05/02/2022    SPECGRAV 1.016 05/09/2022    LEUKOCYTESUR NEGATIVE 05/09/2022    UROBILINOGEN 0.2 05/09/2022

## 2022-05-11 NOTE — PROGRESS NOTES
Hospitalist Progress Note  Patient:  Johanna Almaraz    Unit/Bed:4A-15/015-A  YOB: 1955  MRN: [id number]   Acct: [de-identified]   PCP: Pee Huynh MD  Date of Admission: 5/7/2022    Assessment/Plan:  1. Acute Ischemic Stroke: Code stroke called on 05/08 overnight. Patient with NIHSS 3 from facial droop, RLE weakness, slurred speech. CT scan head revealed chronic small vessel ischemic disease. Two incidental meningiomas. CTA head w/ contrast not obtained 2/2 severity of renal injury. MRI brain revealed few scattered punctate acute infarcts in left corona radiata and right centrum semiovale with chronic microvascular angiopathy. BRA head with focal areas of moderate stenosis in b/l cavernous and clinoid segments of ICAs. tPA not administered as window had passed and there was initial concern for a subacute indeterminate infarct. TTE with bubble study unremarkable for PFO. SLP w/ barium swallow + moderate dysphagia   - Neurology on board    - NIHSS and neuro checks qShift    - BP control   - Continue IVF, ASA, Plavix, sugar control, statin    - PT/OT following    - 30-day event monitor on DC   2. Moderate Oropharyngeal Dysphagia: New onset s/p recent ischemic stroke(s), SLP eval with MBS on 05/10 revealed moderate oral dysphagia, mild pharyngeal dysphagia without tracheal aspiration events    - Diet minced and moist with mildly thick liquids   3. Symptomatic Bradycardia: Resolving. S/p syncopal episode with collapse and LOC lasting seconds. Likely iatrogenic 2/2 Toprol XL 75 mg which has been held. Pulse ~40s on admission. Blood pressures normal. Labs normal. Pulse improving to ~60s s/p BB washout.    - Cardiology following   - Toprol XL held for washout    - Orthostatics qShift until negative    - IV Albumin q6h prn    4. Acute Kidney Injury in CKD: Creatinine improving. Unsure of etiology at this time. Full workup pending. No recent contrast received. Baseline ~1.8 one week prior.  BUN:Cr points to prerenal etiology.     - Nephrology following   - Renal US revealed CKD     - Avoid nephrotoxins     - Continue gentle IV fluids  5. NIDDM2: A1c 9.1. With diabetic nephropathy. Continue Lantus 25u nightly, Humalog TID. POCT glucose checks AC/HS  6. Hyperlipidemia: Continue home statin   7. Uncontrolled Hypertension: Continue norvasc 5 mg daily, cardura 4mg daily, hydralazine 50 mg q8h, imdur 30 mg daily   8. Chronic HFpEF 55%: Stable and well controlled at this time. Expected [place] [id number] [address] on 05/13 or 05/14    Disposition:    [x] Home       [] TCU       [] Rehab       [] Psych       [] SNF       [] [place]       [] Other-    Chief Complaint: Syncope with collapse, lightheadedness     Hospital Course: Per admitting H&P note \"Patient presents to the ED following a syncopal episode. The patient was just discharged one day prior to admission following a prolonged stay due to influenza A. The patient developed CHLOE while hospitalized. Today the patient had risen from sitting to standing and began amublating. The patient loss consciousness and subsequently fell. The patient was found to have a resting heart rate 40-48. Patient admitted for telemetry and cardiology consult. \"    05/09/22: Patient unfortunately suffered from ischemic stroke overnight but was deemed not a candidate for tPA as there was some concern for an indeterminate subacute infarct. NIHSS 3 with slurred speech, right facial droop and RLE weakness. MRI brain revealed some scattered acute ischemic infarcts bilaterally. Started on ASA, Plavix, high intensity statin. Neurology followed. Renal function worsened with time. As of now no known etiology. Suspecting a possible cardiac etiology for micro thromboembolic disease  68/65/09: Still with facial droop, NIHSS 2, PM&R to evaluate for rehab.  Patient with SLP eval and MBS revealing moderate oropharyngeal dysphagia with recs for diet to be minced and moist with thick liquids. Subjective (past 24 hours): Patient with no complaints or concerns at this time. CHLOE improving. Nearing discharge,  set up transport to [facility] on 05/13 or 05/14. Needs tighter BP control. Otherwise well. Denies CP, SOB, n/v/d.        ROS (12 point review of systems completed. Pertinent positives noted. Otherwise ROS is negative). Medications:  Reviewed    Infusion Medications    sodium chloride 75 mL/hr at 05/11/22 1027    dextrose      sodium chloride       Scheduled Medications    doxazosin  4 mg Oral Daily    hydrALAZINE  50 mg Oral 3 times per day    insulin lispro  15 Units SubCUTAneous Dinner    insulin lispro  10 Units SubCUTAneous Daily before lunch    insulin lispro  10 Units SubCUTAneous QAM AC    atorvastatin  40 mg Oral Daily    calcium replacement protocol   Other RX Placeholder    pregabalin  25 mg Oral QPM    clopidogrel  75 mg Oral Daily    amLODIPine  5 mg Oral QPM    aspirin  81 mg Oral Daily    insulin glargine  25 Units SubCUTAneous Nightly    insulin lispro  0-6 Units SubCUTAneous TID WC    insulin lispro  0-3 Units SubCUTAneous Nightly    isosorbide mononitrate  30 mg Oral Daily    sodium chloride flush  5-40 mL IntraVENous 2 times per day    heparin (porcine)  5,000 Units SubCUTAneous q8h     PRN Meds: polyvinyl alcohol, albuterol sulfate HFA, glucagon (rDNA), dextrose, sodium chloride flush, sodium chloride, ondansetron **OR** ondansetron, polyethylene glycol, acetaminophen **OR** acetaminophen, glucose, dextrose bolus **OR** dextrose bolus      Intake/Output Summary (Last 24 hours) at 5/11/2022 1706  Last data filed at 5/11/2022 1413  Gross per 24 hour   Intake 780 ml   Output 800 ml   Net -20 ml       Diet:  ADULT DIET; Dysphagia - Minced and Moist; 4 carb choices (60 gm/meal); Low Fat/Low Chol/High Fiber/KALEN; Low Sodium (2 gm); Low Potassium (Less than 3000 mg/day); Low Phosphorus (Less than 1000 mg);  Less than 60 gm; Mildly Thick (Nectar)    Exam:  BP (!) 174/57   Pulse 88   Temp 97.7 °F (36.5 °C)   Resp 16   Ht 5' (1.524 m)   Wt 175 lb 9.6 oz (79.7 kg)   SpO2 98%   BMI 34.29 kg/m²     General appearance: No apparent distress, appears stated age and cooperative. Slurred speech   HEENT: Pupils equal, round, and reactive to light. Mild degree of exophthalmos with ride sided facial droop. Conjunctivae/corneas clear. Neck: Supple, with full range of motion. No jugular venous distention. Trachea midline. Respiratory:  Normal respiratory effort. Clear to auscultation, bilaterally without Rales/Wheezes/Rhonchi. Cardiovascular: Regular rate and rhythm with normal S1/S2 without murmurs, rubs or gallops. Abdomen: Soft, non-tender, non-distended with normal bowel sounds. Musculoskeletal: passive and active ROM x 4 extremities. Skin: Skin color, texture, turgor normal.  No rashes or lesions. Neurologic:  Neurovascularly intact without any focal sensory/motor deficits. Cranial nerves: II-XII intact, grossly non-focal.  Psychiatric: Alert and oriented, thought content appropriate, normal insight  Capillary Refill: Brisk,< 3 seconds   Peripheral Pulses: +2 palpable, equal bilaterally     Labs:   Recent Labs     05/09/22  0139 05/10/22  0902   WBC 7.6 6.3   HGB 9.6* 10.0*   HCT 31.1* 32.3*    286     Recent Labs     05/09/22  0139 05/09/22  0139 05/09/22  0400 05/10/22  0826 05/11/22  0349      < > 136 143 142   K 4.5   < > 4.6 4.6 4.3   CL 99   < > 97* 105 107   CO2 20*   < > 22* 22* 25   *   < > 126* 102* 80*   CREATININE 3.8*   < > 4.0* 2.7* 2.4*   CALCIUM 8.3*   < > 8.2* 9.1 8.4*   PHOS 7.1*  --   --   --   --     < > = values in this interval not displayed. Recent Labs     05/09/22  0400   AST 29   ALT 74*   BILITOT 0.4   ALKPHOS 200*     No results for input(s): INR in the last 72 hours. No results for input(s): Zettie Binet in the last 72 hours.     Microbiology:      Urinalysis: Lab Results   Component Value Date    NITRU NEGATIVE 05/09/2022    WBCUA 0-2 05/09/2022    BACTERIA NONE SEEN 05/09/2022    RBCUA 0-2 05/09/2022    BLOODU NEGATIVE 05/09/2022    SPECGRAV 1.016 05/09/2022    GLUCOSEU Negative 05/02/2022     Radiology:  FL MODIFIED BARIUM SWALLOW W VIDEO   Final Result   1. Laryngeal penetration of pureed, thin and soft barium with aspiration of thin barium. 2. Additional recommendations from the speech therapist will follow. **This report has been created using voice recognition software. It may contain minor errors which are inherent in voice recognition technology. **         Final report electronically signed by Dr. Chester Davidson on 5/10/2022 11:56 AM      MRI BRAIN WO CONTRAST   Final Result      1. A few scattered punctate acute infarcts in the left corona radiata and right centrum semiovale. 2. Mild severity chronic microvascular angiopathy. Findings were discussed with Robbie Sparks RN via telephone at the time of interpretation. **This report has been created using voice recognition software. It may contain minor errors which are inherent in voice recognition technology. **      Final report electronically signed by Dr. John Stewart MD on 5/9/2022 4:49 PM      MRA NECK WO CONTRAST   Final Result   Motion degraded exam without visualized areas of flow-limiting stenosis. **This report has been created using voice recognition software. It may contain minor errors which are inherent in voice recognition technology. **      Final report electronically signed by Dr. John Stewart MD on 5/9/2022 5:00 PM      MRA HEAD WO CONTRAST   Final Result    Focal areas of moderate stenosis in the bilateral cavernous and clinoid segments of the internal carotid arteries. **This report has been created using voice recognition software.  It may contain minor errors which are inherent in voice recognition technology. **      Final report electronically signed by Dr. Georgie Márquez MD on 5/9/2022 4:51 PM      US RENAL COMPLETE   Final Result   1. No renal calculi or hydronephrosis. 2. Increased renal cortical echogenicity in the right kidney. The left    kidney is not optimally visualized. Increased renal cortical echogenicity    can be seen in medical renal disease. This can be correlated with renal    function tests. 3. Urinary bladder has a volume of 363 mL. This document has been electronically signed by: Radha Pierre M.D. on    05/09/2022 06:07 AM      CT HEAD WO CONTRAST   Final Result   1. No acute disease in the brain. 2. Incidental findings are detailed above. This document has been electronically signed by: Radha Pierre M.D. on    05/08/2022 10:08 PM      All CTs at this facility use dose modulation techniques and iterative    reconstructions, and/or weight-based dosing   when appropriate to reduce radiation to a low as reasonably achievable. XR CHEST PORTABLE   Final Result   Impression:   Bibasilar hazy opacities, may reflect atelectasis or infection. Mild cardiomegaly. Equivocal left pleural effusion. This document has been electronically signed by:  Ros Ruiz MD on 05/07/2022    08:58 PM        DVT prophylaxis: [] Lovenox                                 [x] SCDs                                 [x] SQ Heparin                                 [] Encourage ambulation           [] Already on Anticoagulation     Code Status: DNR-CCA     PT/OT Eval Status: Pending     Tele:   [x] yes             [] no    Electronically signed by Pop Perkins DO on 5/11/2022 at 5:06 PM

## 2022-05-11 NOTE — PROGRESS NOTES
[facility] 62  INPATIENT OCCUPATIONAL THERAPY  [facility] 4A  EVALUATION    Time:   Time In: 3651  Time Out: 1109  Timed Code Treatment Minutes: 23 Minutes  Minutes: 33          Date: 2022  Patient Name: Jemima Joel,   Gender: female      MRN: [id number]  : 1955  (61 y.o.)  Referring Practitioner: Kendra Peña PA-C  Diagnosis: symptomatic bradycardia  Additional Pertinent Hx: per chart review; Jemima Joel is a 61 y.o. female admitted to [place] [address] on 2022 with a history of hypertension, hyperlipidemia, type II diabetes mellitus and CHF. She presented to the emergency room for evaluation following a 4-day history of worsening shortness of breath, a nonproductive cough and increasing lower extremity edema. She reported that she had dull aching pain in her chest when she took a deep breath. She stated that her shortness of breath was severe, worse with exertion and improved with rest.  She was seen at the clinic at [place] and diagnosed with influenza A, but was negative here. In the emergency room here she was found to have an acute CHF exacerbation with increased lower extremity edema and pulmonary edema. She was also found to have acute renal failure. She was admitted for further evaluation and treatment. Associated signs and symptoms did not include typical chest pain, lightheaded, dizziness, diaphoresis, orthopnea, paroxysmal nocturnal dyspnea, fever or chills. No recent medication changes. She does not use supplemental O2 at home. Patient was admitted for symptomatic bradycardia which was thought to be beta-blocker induced. On 2022 patient was noted by staff to have dysarthria and a new right facial droop which prompted a code stroke. Initial NIH was 3. Patient was taken to imaging for CT head which revealed no ICH however it did demonstrate old stroke which was age-indeterminate.   For that reason tPA was not given as if this was a recent stroke she would be very high risk. CTAs were deferred as this patient had a creatinine of 4.0. On 5/9/22, on exam, pt continued to have slurred speech and facial droop and she was lethargic. She had no specific additional complaints. She denied weakness, vision deficit. MRI of brain without contrast showed a few scattered punctate acute infarcts in the left corona radiata and right centrum semiovale    Restrictions/Precautions:  Restrictions/Precautions: Fall Risk,General Precautions  Position Activity Restriction  Other position/activity restrictions: monitor BP    Subjective  Chart Reviewed: Yes,Orders,Progress Notes,History and Physical,Imaging  Patient assessed for rehabilitation services?: Yes  Response to previous treatment: Patient with no complaints from previous session  Family / Caregiver Present: No    Subjective: RN approved session. patient supine in bed and agreeable to eval. patient A & O x 4. Pain: 0/10:     Vitals: Vitals not assessed per clinical judgement, see nursing flowsheet  patient on thickened liquids and frequently demo drooling t/o eval    Social/Functional History:  Lives With: Alone  Type of Home: House  Home Layout: One level  Home Access: Level entry  Home Equipment: Walker, rolling,Cane   Bathroom Shower/Tub: Tub/Shower unit  Bathroom Toilet: Standard  Bathroom Equipment: Shower chair  Bathroom Accessibility: Walker accessible    Receives Help From: Family  ADL Assistance: Independent  Homemaking Assistance: Independent  Ambulation Assistance: Independent  Transfer Assistance: Independent    Active : Yes  Occupation: Retired  Additional Comments: Pt used cane intermittently when she is feeling off balance. Pt states her kids can assist her if needed, she runs her own errands and cares for her home indep. Pt's children drive for her.     VISION:WFL    HEARING:  WFL    COGNITION: Slow Processing, Decreased Recall, Decreased Insight, Decreased Problem Solving, Decreased Safety Awareness and Impulsive    RANGE OF MOTION:  Right Upper Extremity: WFL  Left Upper Extremity:  WFL    STRENGTH:  Right Upper Extremity: shldr 4/5 elbow flex 4+/5 ext 4/5  (F)  Left Upper Extremity:  shldr, elbow flex and ext 4/5  (F)    SENSATION:   WFL    ADL:   Grooming: with set-up. to apply deodorant following bathing   Bathing: Stand By Assistance. for UB seated EOB, MIN  A for back, patient stood to bathe arin area and wanted to use standard cane for balance, upon standing, patient had post LOB and MIN A to correct, patient then stated she needed to use toilet and then proceeded to urinate on floor. patient sat on EOB, bathed LEs/feet with MAX A. stood and bathed arin area with MAX A and use of 2 w/w for support and incrased balance   Upper Extremity Dressing: Minimal Assistance. to change hospital gown   Lower Extremity Dressing: Stand By Assistance. seated EOB to change slipper socks soiled by urine. BALANCE:  Sitting Balance:  Stand By Assistance. seated EOB  Standing Balance: Contact Guard Assistance. with use of 2 w/w for support    BED MOBILITY:  Supine to Sit: Minimal Assistance increased time to complete and cues for sequencing     TRANSFERS:  Sit to Stand:  Contact Guard Assistance, Minimal Assistance. cues for hand placement and sequencing   Stand Pivot: Contact Guard Assistance. from EOB to recliner with use of 2 w/w for support    FUNCTIONAL MOBILITY:  Assistive Device: Rolling Walker  Assist Level:  Contact Guard Assistance. Distance: taking a few steps while pivoting from EOB to recliner. Activity Tolerance:  Patient tolerance of  treatment: good.  No c/o pain, fatigue, demo SOB, LOB with use of cane during standing, no c/o dizziness      Assessment:  Assessment: patient demonstrates unsteadiness with dyn standing tasks and using a standard cane and demonstrates increased balance when using a 2 w/w. patient would benefit from continued, skilled OT to increase UB re-education,Patient/Caregiver education & training,Self-Care / ADL. See long-term goal time frame for expected duration of plan of care. If no long-term goals established, a short length of stay is anticipated. Goals:  Patient goals : return home [facility]  Short Term Goals  Time Frame for Short term goals: by discharge  Short Term Goal 1: patient will tolerate 5 min functional standing with two hand release with (S) to increase activity tolerance for functional transfers. Short Term Goal 2: patient will functionally ambulate house hold distances with least restrictive device with (S) with 0-1 verbal cues for safety and sequencing. Short Term Goal 3: patient will complete ADL routine with (S). Short Term Goal 4: patient will participate in moderate resistive UB exer to increase UB strength for functional transfers. Following session, patient left in safe position with all fall risk precautions in place.

## 2022-05-11 NOTE — CARE COORDINATION
5/11/22, 10:03 AM EDT    DISCHARGE PLANNING EVALUATION      Received a call from [facility], they are able to accept Chidi at discharge. Had questions about vaccination status, made her aware that she is fully vaccinated and boosted. Will call her back with an estimated discharge day. 5/11/22, 11:49 AM EDT    DISCHARGE PLANNING EVALUATION    Spoke with Aleisha, let her know that the precert could be started today as Chidi will be ready soon. She asked for updated PT and OT notes, let her know that they are both signed up to see Chidi today. Called patient's daughter, Manisha Lopez and updated her on [facility]'s acceptance. Let her know about the insurance precert and maybe transport Friday or Saturday to the facility potentially.

## 2022-05-12 ENCOUNTER — TELEPHONE (OUTPATIENT)
Dept: PHARMACY | Age: 67
End: 2022-05-12

## 2022-05-12 LAB
ANA SCREEN: DETECTED
ANION GAP SERPL CALCULATED.3IONS-SCNC: 11 MEQ/L (ref 8–16)
BUN BLDV-MCNC: 59 MG/DL (ref 7–22)
CALCIUM SERPL-MCNC: 8.3 MG/DL (ref 8.5–10.5)
CHLORIDE BLD-SCNC: 111 MEQ/L (ref 98–111)
CO2: 23 MEQ/L (ref 23–33)
CREAT SERPL-MCNC: 2 MG/DL (ref 0.4–1.2)
GFR SERPL CREATININE-BSD FRML MDRD: 30 ML/MIN/1.73M2
GLUCOSE BLD-MCNC: 46 MG/DL (ref 70–108)
GLUCOSE BLD-MCNC: 71 MG/DL (ref 70–108)
GLUCOSE BLD-MCNC: 82 MG/DL (ref 70–108)
GLUCOSE BLD-MCNC: 88 MG/DL (ref 70–108)
GLUCOSE BLD-MCNC: 92 MG/DL (ref 70–108)
GLUCOSE BLD-MCNC: 95 MG/DL (ref 70–108)
GLUCOSE BLD-MCNC: 97 MG/DL (ref 70–108)
IMMUNOFIXATION RESULT, SERUM: NORMAL
POTASSIUM SERPL-SCNC: 4.3 MEQ/L (ref 3.5–5.2)
PROTEIN ELECTROPHORESIS, SERUM: NORMAL
SODIUM BLD-SCNC: 145 MEQ/L (ref 135–145)

## 2022-05-12 PROCEDURE — 2580000003 HC RX 258: Performed by: PHYSICIAN ASSISTANT

## 2022-05-12 PROCEDURE — 6370000000 HC RX 637 (ALT 250 FOR IP): Performed by: PHYSICIAN ASSISTANT

## 2022-05-12 PROCEDURE — 82948 REAGENT STRIP/BLOOD GLUCOSE: CPT

## 2022-05-12 PROCEDURE — 97116 GAIT TRAINING THERAPY: CPT

## 2022-05-12 PROCEDURE — 36415 COLL VENOUS BLD VENIPUNCTURE: CPT

## 2022-05-12 PROCEDURE — 6360000002 HC RX W HCPCS: Performed by: PHYSICIAN ASSISTANT

## 2022-05-12 PROCEDURE — 6370000000 HC RX 637 (ALT 250 FOR IP): Performed by: STUDENT IN AN ORGANIZED HEALTH CARE EDUCATION/TRAINING PROGRAM

## 2022-05-12 PROCEDURE — 97530 THERAPEUTIC ACTIVITIES: CPT

## 2022-05-12 PROCEDURE — 2580000003 HC RX 258: Performed by: INTERNAL MEDICINE

## 2022-05-12 PROCEDURE — 2060000000 HC ICU INTERMEDIATE R&B

## 2022-05-12 PROCEDURE — 6370000000 HC RX 637 (ALT 250 FOR IP): Performed by: NURSE PRACTITIONER

## 2022-05-12 PROCEDURE — 80048 BASIC METABOLIC PNL TOTAL CA: CPT

## 2022-05-12 PROCEDURE — 6370000000 HC RX 637 (ALT 250 FOR IP): Performed by: INTERNAL MEDICINE

## 2022-05-12 PROCEDURE — 99232 SBSQ HOSP IP/OBS MODERATE 35: CPT | Performed by: INTERNAL MEDICINE

## 2022-05-12 PROCEDURE — 6370000000 HC RX 637 (ALT 250 FOR IP)

## 2022-05-12 PROCEDURE — 97110 THERAPEUTIC EXERCISES: CPT

## 2022-05-12 RX ORDER — INSULIN GLARGINE 100 [IU]/ML
20 INJECTION, SOLUTION SUBCUTANEOUS NIGHTLY
Status: DISCONTINUED | OUTPATIENT
Start: 2022-05-12 | End: 2022-05-13

## 2022-05-12 RX ORDER — SODIUM CHLORIDE 450 MG/100ML
INJECTION, SOLUTION INTRAVENOUS CONTINUOUS
Status: DISCONTINUED | OUTPATIENT
Start: 2022-05-12 | End: 2022-05-16

## 2022-05-12 RX ORDER — INSULIN LISPRO 100 [IU]/ML
8 INJECTION, SOLUTION INTRAVENOUS; SUBCUTANEOUS
Status: DISCONTINUED | OUTPATIENT
Start: 2022-05-13 | End: 2022-05-13

## 2022-05-12 RX ORDER — INSULIN LISPRO 100 [IU]/ML
12 INJECTION, SOLUTION INTRAVENOUS; SUBCUTANEOUS
Status: DISCONTINUED | OUTPATIENT
Start: 2022-05-12 | End: 2022-05-13

## 2022-05-12 RX ADMIN — INSULIN LISPRO 12 UNITS: 100 INJECTION, SOLUTION INTRAVENOUS; SUBCUTANEOUS at 17:28

## 2022-05-12 RX ADMIN — HYDRALAZINE HYDROCHLORIDE 50 MG: 50 TABLET ORAL at 07:21

## 2022-05-12 RX ADMIN — SODIUM CHLORIDE: 4.5 INJECTION, SOLUTION INTRAVENOUS at 14:08

## 2022-05-12 RX ADMIN — HEPARIN SODIUM 5000 UNITS: 5000 INJECTION INTRAVENOUS; SUBCUTANEOUS at 00:10

## 2022-05-12 RX ADMIN — DOXAZOSIN 4 MG: 4 TABLET ORAL at 08:08

## 2022-05-12 RX ADMIN — ATORVASTATIN CALCIUM 40 MG: 80 TABLET, FILM COATED ORAL at 08:08

## 2022-05-12 RX ADMIN — INSULIN LISPRO 10 UNITS: 100 INJECTION, SOLUTION INTRAVENOUS; SUBCUTANEOUS at 08:19

## 2022-05-12 RX ADMIN — CLOPIDOGREL BISULFATE 75 MG: 75 TABLET ORAL at 08:06

## 2022-05-12 RX ADMIN — PREGABALIN 25 MG: 25 CAPSULE ORAL at 17:23

## 2022-05-12 RX ADMIN — SODIUM CHLORIDE, PRESERVATIVE FREE 10 ML: 5 INJECTION INTRAVENOUS at 20:15

## 2022-05-12 RX ADMIN — INSULIN LISPRO 10 UNITS: 100 INJECTION, SOLUTION INTRAVENOUS; SUBCUTANEOUS at 11:48

## 2022-05-12 RX ADMIN — ASPIRIN 81 MG: 81 TABLET, COATED ORAL at 08:06

## 2022-05-12 RX ADMIN — ISOSORBIDE MONONITRATE 30 MG: 30 TABLET, EXTENDED RELEASE ORAL at 08:07

## 2022-05-12 RX ADMIN — HYDRALAZINE HYDROCHLORIDE 50 MG: 50 TABLET ORAL at 22:48

## 2022-05-12 RX ADMIN — AMLODIPINE BESYLATE 5 MG: 5 TABLET ORAL at 17:23

## 2022-05-12 RX ADMIN — HYDRALAZINE HYDROCHLORIDE 50 MG: 50 TABLET ORAL at 13:06

## 2022-05-12 RX ADMIN — HEPARIN SODIUM 5000 UNITS: 5000 INJECTION INTRAVENOUS; SUBCUTANEOUS at 08:07

## 2022-05-12 RX ADMIN — HEPARIN SODIUM 5000 UNITS: 5000 INJECTION INTRAVENOUS; SUBCUTANEOUS at 17:23

## 2022-05-12 RX ADMIN — SODIUM CHLORIDE, PRESERVATIVE FREE 10 ML: 5 INJECTION INTRAVENOUS at 08:09

## 2022-05-12 ASSESSMENT — PAIN SCALES - GENERAL
PAINLEVEL_OUTOF10: 0

## 2022-05-12 NOTE — DISCHARGE INSTR - COC
Continuity of Care Form    Patient Name: Edis Cowart   :  1955  MRN:  [id number]    Admit date:  2022  Discharge date:  ***    Code Status Order: DNR-CCA   Advance Directives:      Admitting Physician:  Rick Russo MD  PCP: Adelaide Gamboa MD    Discharging Nurse: [facility] Unit/Room#: 4A-15/015-A  Discharging Unit Phone Number: [phone]    Emergency Contact:   Extended Emergency Contact Information  Primary Emergency Contact: Gabi Barajas, [address] Phone: [phone]  Relation: Child    Past Surgical History:  Past Surgical History:   Procedure Laterality Date    FRACTURE SURGERY Left 2014    orif tibula/fibula fixation    HYSTERECTOMY         Immunization History:   Immunization History   Administered Date(s) Administered    COVID-19, Cameron Potts, Primary or Immunocompromised, PF, 100mcg/0.5mL 2021       Active Problems:  Patient Active Problem List   Diagnosis Code    Ankle fracture, left S82.892A    DMII (diabetes mellitus, type 2) ([address] E11.9    Essential hypertension I10    Chest pain R07.9    Acute on chronic diastolic (congestive) heart failure (HCC) I50.33    Acute respiratory failure with hypoxia (HCC) J96.01    Influenza A J10.1    Elevated troponin R77.8    Acute pulmonary edema (HCC) J81.0    Acute kidney injury superimposed on CKD ([address] N17.9, N18.9    Hypertensive urgency I16.0    Hyperlipidemia E78.5    Morbid obesity due to excess calories ([address] E66.01    Acute congestive heart failure ([address] I50.9    Symptomatic bradycardia R00.1    Syncope and collapse R55    Diabetic nephropathy with proteinuria ([address] E11.21       Isolation/Infection:   Isolation            No Isolation          Patient Infection Status       Infection Onset Added Last Indicated Last Indicated By Review Planned Expiration Resolved Resolved By    None active    Resolved    COVID-19 (Rule Out) 22 Respiratory Panel, Molecular, with COVID-19 (Restricted: peds pts or suitable admitted adults) (Ordered)   04/27/22 Rule-Out Test Resulted    INFLUENZA 04/26/22 04/27/22 04/27/22 Nancy Lima RN   04/28/22 Nancy Lima RN    Added from external infection. Nurse Assessment:  Last Vital Signs: BP (!) 119/47   Pulse 89   Temp 98 °F (36.7 °C) (Oral)   Resp 18   Ht 5' (1.524 m)   Wt 175 lb 9.6 oz (79.7 kg)   SpO2 99%   BMI 34.29 kg/m²     Last documented pain score (0-10 scale): Pain Level: 0  Last Weight:   Wt Readings from Last 1 Encounters:   05/11/22 175 lb 9.6 oz (79.7 kg)     Mental Status:  oriented and alert    IV Access:  - None    Nursing Mobility/ADLs:  Walking   Assisted  Transfer  Assisted  Bathing  Assisted  Dressing  Assisted  Toileting  Assisted  Feeding  Independent  Med Admin  Independent  Med Delivery   whole    Wound Care Documentation and Therapy:  Incision 01/17/14 Ankle Left (Active)   Number of days: 3036        Elimination:  Continence: Bowel: Yes  Bladder: Yes  Urinary Catheter: None   Colostomy/Ileostomy/Ileal Conduit: No       Date of Last BM: 5/20/2022    Intake/Output Summary (Last 24 hours) at 5/12/2022 0758  Last data filed at 5/12/2022 0003  Gross per 24 hour   Intake 725 ml   Output 820 ml   Net -95 ml     I/O last 3 completed shifts: In: 725 [P.O.:720; I.V.:5]  Out: 1220 [Urine:1220]    Safety Concerns: At Risk for Falls    Impairments/Disabilities:      None    Nutrition Therapy:  Current Nutrition Therapy:   - Oral Diet:  Carb Control 4 carbs/meal (1800kcals/day) and Low Fat    Routes of Feeding: Oral  Liquids: Thin Liquids  Daily Fluid Restriction: no  Last Modified Barium Swallow with Video (Video Swallowing Test): done on 5/20/2022/     Treatments at the Time of Hospital Discharge:   Respiratory Treatments: ***  Oxygen Therapy:  is not on home oxygen therapy.   Ventilator:    - No ventilator support    Rehab Therapies: Physical Therapy and Occupational Therapy  Weight Bearing Status/Restrictions: No weight bearing restrictions  Other Medical Equipment (for information only, NOT a DME order):  walker  Other Treatments: ***    Patient's personal belongings (please select all that are sent with patient):  Glasses    RN SIGNATURE:  Electronically signed by Branden Pozo RN on 5/20/22 at 1:46 PM EDT    CASE MANAGEMENT/SOCIAL WORK SECTION    Inpatient Status Date: ***    Readmission Risk Assessment Score:  Readmission Risk              Risk of Unplanned Readmission:  23           Discharging to Facility/ Agency   Name: [facility]  Address: [place], [place]. [address]  Phone: [phone]  Fax:  [phone]    Dialysis Facility (if applicable)   Name:  Address:  Dialysis Schedule:  Phone:  Fax:    / signature: Electronically signed by MAYELA Coyle on 5/19/22 at 11:21 AM EDT    PHYSICIAN SECTION    Prognosis: Good    Condition at Discharge: Stable    Rehab Potential (if transferring to Rehab): Good    Recommended Labs or Other Treatments After Discharge: Blood glucose, serial BMP    Physician Certification: I certify the above information and transfer of Jennifer Terrazas  is necessary for the continuing treatment of the diagnosis listed and that she requires Acute Rehab for less than 30 days.      Update Admission H&P: No change in H&P    PHYSICIAN SIGNATURE:  Electronically signed by Cristina Ying DO on 5/20/22 at 12:11 PM EDT

## 2022-05-12 NOTE — CARE COORDINATION
5/12/22, 9:07 AM EDT    DISCHARGE PLANNING EVALUATION      Spoke with Bladimir from [place] this morning to ensure she was able to get into [facility] and get the therapy notes from yesterday. She states they got them last night, and started the precert yesterday. She will call  with any updates. 5/13/22, 1:39 PM EDT    DISCHARGE PLANNING EVALUATION    Called Aleisha, no response yet on the precert. She will email her team and get back to  with any updates.

## 2022-05-12 NOTE — PLAN OF CARE
Problem: Discharge Planning  Goal: Discharge to home or other facility with appropriate resources  5/12/2022 0247 by Emelia Hammer RN  Outcome: Progressing  5/11/2022 1624 by Nancy Solorzano RN  Outcome: Progressing  Flowsheets (Taken 5/11/2022 1624)  Discharge to home or other facility with appropriate resources:   Identify barriers to discharge with patient and caregiver   Identify discharge learning needs (meds, wound care, etc)   Arrange for needed discharge resources and transportation as appropriate   Refer to discharge planning if patient needs post-hospital services based on physician order or complex needs related to functional status, cognitive ability or social support system  Note: Plan to discharge to rehab facility. Problem: Pain  Goal: Verbalizes/displays adequate comfort level or baseline comfort level  5/12/2022 0247 by Emelia Hammer RN  Outcome: Progressing  5/11/2022 1624 by Nancy Solorzano RN  Outcome: Progressing  Flowsheets (Taken 5/11/2022 1624)  Verbalizes/displays adequate comfort level or baseline comfort level:   Encourage patient to monitor pain and request assistance   Assess pain using appropriate pain scale   Implement non-pharmacological measures as appropriate and evaluate response  Note: Patient denies pain or discomfort throughout shift. Problem: Safety - Adult  Goal: Free from fall injury  5/12/2022 0247 by Emelia Hammer RN  Outcome: Progressing  5/11/2022 1624 by Nancy Solorzano RN  Outcome: Progressing  Flowsheets (Taken 5/11/2022 1623)  Free From Fall Injury: Instruct family/caregiver on patient safety  Note: Patient remained free from falls this shift. Bed is in low position with alarm on and siderails up x2. Patient ambulates with one assist. Education given on use of call light and patient voiced understanding. Patient uses call light appropriately. Call light and beside table within reach. Arm band and falling star in place.        Problem: Cardiovascular - Adult  Goal: Maintains optimal cardiac output and hemodynamic stability  5/12/2022 0247 by Mauricio Briones RN  Outcome: Progressing  Flowsheets (Taken 5/11/2022 1930)  Maintains optimal cardiac output and hemodynamic stability: Monitor blood pressure and heart rate  5/11/2022 1624 by Montez Gamble RN  Outcome: Progressing  Flowsheets (Taken 5/11/2022 1624)  Maintains optimal cardiac output and hemodynamic stability:   Monitor blood pressure and heart rate   Assess for signs of decreased cardiac output   Monitor urine output and notify Licensed Independent Practitioner for values outside of normal range  Goal: Absence of cardiac dysrhythmias or at baseline  5/12/2022 0247 by Mauricio Briones RN  Outcome: Progressing  [address] (Taken 5/11/2022 1930)  Absence of cardiac dysrhythmias or at baseline: Monitor cardiac rate and rhythm  5/11/2022 1624 by Montez Gamble RN  Outcome: Progressing  Flowsheets (Taken 5/11/2022 1624)  Absence of cardiac dysrhythmias or at baseline:   Monitor cardiac rate and rhythm   Assess for signs of decreased cardiac output     Problem: Chronic Conditions and Co-morbidities  Goal: Patient's chronic conditions and co-morbidity symptoms are monitored and maintained or improved  5/12/2022 0247 by Mauricio Briones RN  Outcome: Progressing  5/11/2022 1624 by Montez Gamble RN  Outcome: Progressing  Flowsheets (Taken 5/11/2022 1624)  Care Plan - Patient's Chronic Conditions and Co-Morbidity Symptoms are Monitored and Maintained or Improved:   Monitor and assess patient's chronic conditions and comorbid symptoms for stability, deterioration, or improvement   Collaborate with multidisciplinary team to address chronic and comorbid conditions and prevent exacerbation or deterioration   Update acute care plan with appropriate goals if chronic or comorbid symptoms are exacerbated and prevent overall improvement and discharge     Problem: Neurosensory - Adult  Goal: Achieves stable or improved neurological status  5/12/2022 0247 by Donald Bumpers, RN  Outcome: Progressing  Flowsheets (Taken 5/11/2022 1930)  Achieves stable or improved neurological status:   Assess for and report changes in neurological status   Monitor temperature, glucose, and sodium. Initiate appropriate interventions as ordered  5/11/2022 1624 by Chetan Carbajal RN  Outcome: Progressing  Flowsheets (Taken 5/11/2022 1624)  Achieves stable or improved neurological status:   Assess for and report changes in neurological status   Monitor temperature, glucose, and sodium.  Initiate appropriate interventions as ordered  Goal: Achieves maximal functionality and self care  5/12/2022 0247 by Isaias Bumpers, RN  Outcome: Progressing  5/11/2022 1624 by Chetan Carbajal RN  Outcome: Progressing  Flowsheets (Taken 5/11/2022 1624)  Achieves maximal functionality and self care:   Monitor swallowing and airway patency with patient fatigue and changes in neurological status   Encourage and assist patient to increase activity and self care with guidance from physical therapy/occupational therapy     Problem: Musculoskeletal - Adult  Goal: Return mobility to safest level of function  5/12/2022 0247 by Isaias Bumpers, RN  Outcome: Progressing  5/11/2022 1624 by Chetan Carbajal RN  Outcome: Progressing  Flowsheets (Taken 5/11/2022 1624)  Return Mobility to Safest Level of Function:   Assess patient stability and activity tolerance for standing, transferring and ambulating with or without assistive devices   Assist with transfers and ambulation using safe patient handling equipment as needed   Obtain physical therapy/occupational therapy consults as needed  Goal: Maintain proper alignment of affected body part  5/12/2022 0247 by Isaias Bumpers, RN  Outcome: Progressing  5/11/2022 1624 by Chetan Carbajal RN  Outcome: Progressing  Flowsheets (Taken 5/11/2022 1624)  Maintain proper alignment of affected body part:   Support and protect limb and body alignment per provider's orders   Instruct and reinforce with

## 2022-05-12 NOTE — PROGRESS NOTES
Renal Progress Note  [facility]    Patient :  Bailee Flor; 61 y.o. MRN# [id number]  Location:  4A15/015-A  Attending:  Severa Byers, MD  Admit Date:  5/7/2022   Hospital Day: 5      Subjective:     Nephrology is following the patient for progressively worsening renal function. -RLE weakness and facial droop resolving. Pt's diet is advancing.   -Producing good urine output  -Feeling much better and no acute events overnight. Outpatient Medications:     Medications Prior to Admission: isosorbide mononitrate (IMDUR) 30 MG extended release tablet, Take 1 tablet by mouth daily  albuterol sulfate HFA (PROVENTIL HFA) 108 (90 Base) MCG/ACT inhaler, Inhale 2 puffs into the lungs every 6 hours as needed for Wheezing (Cough)  hydrALAZINE (APRESOLINE) 25 MG tablet, Take 1 tablet by mouth every 12 hours  metoprolol succinate (TOPROL XL) 25 MG extended release tablet, Take 3 tablets by mouth in the morning and at bedtime  benzocaine-menthol (CEPACOL SORE THROAT) 15-3.6 MG lozenge, Take 1 lozenge by mouth every 2 hours as needed for Sore Throat or Pain  aspirin 81 MG EC tablet, Take 81 mg by mouth daily  potassium chloride (MICRO-K) 10 MEQ extended release capsule, Take 10 mEq by mouth daily  Cholecalciferol (VITAMIN D3) 125 MCG (5000 UT) CAPS, Take 2 capsules by mouth once a week  atorvastatin (LIPITOR) 10 MG tablet, Take 1 tablet by mouth daily  amLODIPine (NORVASC) 5 MG tablet, Take 1 tablet by mouth daily (Patient taking differently: Take 5 mg by mouth every evening )  pregabalin (LYRICA) 150 MG capsule, Take 150 mg by mouth every evening. L-METHYLFOLATE-B6-B12 PO, Take 1 tablet by mouth daily  insulin glargine (LANTUS) 100 UNIT/ML injection, Inject 25 Units into the skin nightly. insulin lispro (HUMALOG) 100 UNIT/ML injection, Inject 0-12 Units into the skin 3 times daily (with meals).  (Patient taking differently: Inject 18-36 Units into the skin 3 times daily (with meals) 18 units with Breakfast and Lunch and 36 units with Dinner)    Current Medications:     Scheduled Meds:    doxazosin  4 mg Oral Daily    hydrALAZINE  50 mg Oral 3 times per day    insulin lispro  15 Units SubCUTAneous Dinner    insulin lispro  10 Units SubCUTAneous Daily before lunch    insulin lispro  10 Units SubCUTAneous QAM AC    atorvastatin  40 mg Oral Daily    calcium replacement protocol   Other RX Placeholder    pregabalin  25 mg Oral QPM    clopidogrel  75 mg Oral Daily    amLODIPine  5 mg Oral QPM    aspirin  81 mg Oral Daily    insulin glargine  25 Units SubCUTAneous Nightly    insulin lispro  0-6 Units SubCUTAneous TID WC    insulin lispro  0-3 Units SubCUTAneous Nightly    isosorbide mononitrate  30 mg Oral Daily    sodium chloride flush  5-40 mL IntraVENous 2 times per day    heparin (porcine)  5,000 Units SubCUTAneous q8h     Continuous Infusions:    sodium chloride 75 mL/hr at 22 2314    dextrose      sodium chloride       PRN Meds:  polyvinyl alcohol, albuterol sulfate HFA, glucagon (rDNA), dextrose, sodium chloride flush, sodium chloride, ondansetron **OR** ondansetron, polyethylene glycol, acetaminophen **OR** acetaminophen, glucose, dextrose bolus **OR** dextrose bolus    Input/Output:       I/O last 3 completed shifts: In: 725 [P.O.:720; I.V.:5]  Out: 1220 [Urine:1220].       Patient Vitals for the past 96 hrs (Last 3 readings):   Weight   22 0600 175 lb 9.6 oz (79.7 kg)   05/10/22 0253 175 lb 12.8 oz (79.7 kg)       Vital Signs:   Temperature:  Temp: 98.1 °F (36.7 °C)  TMax:   Temp (24hrs), Av.1 °F (36.7 °C), Min:97.7 °F (36.5 °C), Max:98.4 °F (36.9 °C)    Respirations:  Resp: 18  Pulse:   Pulse: 90  BP:    BP: (!) 145/60  BP Range: Systolic (94WDR), MPD:309 , Min:119 , FQH:757       Diastolic (18DMP), UAW:40, Min:47, Max:62      Physical Examination:     General:  Awake, alert, no acute distress  HEENT: NC/AT/ MMM  Chest:              Clear B/L no W/R/R  Cardiac: Bradycardic, normal S1, S2, No m/r/g  Abdomen: Soft, non-tender, with normo-active bowel sounds  Neuro:  R-sided facial droop with 3+ strength in RLE. Unclear whether this is chronic  SKIN:  No rashes, good skin turgor. Extremities:  2+ pitting edema over bilateral lower extremities, no cyanosis    Labs:       Recent Labs     05/10/22  0902   WBC 6.3   RBC 3.36*   HGB 10.0*   HCT 32.3*   MCV 96.1   MCH 29.8   MCHC 31.0*      MPV 11.2      BMP:   Recent Labs     05/10/22  0826 05/11/22  0349 05/12/22  0436    142 145   K 4.6 4.3 4.3    107 111   CO2 22* 25 23   * 80* 59*   CREATININE 2.7* 2.4* 2.0*   GLUCOSE 109* 146* 92   CALCIUM 9.1 8.4* 8.3*      Phosphorus:     No results for input(s): PHOS in the last 72 hours. Magnesium:    No results for input(s): MG in the last 72 hours. Albumin:    No results for input(s): LABALBU in the last 72 hours.   BNP:    No results found for: BNP  DARIANA:    No results found for: DARIANA  SPEP:  Lab Results   Component Value Date    PROT 6.6 05/09/2022     UPEP:   No results found for: LABPE  C3:     Lab Results   Component Value Date    C3 166 05/09/2022     C4:     Lab Results   Component Value Date    C4 32 05/09/2022     MPO ANCA:   No results found for: MPO  PR3 ANCA:   No results found for: PR3  Anti-GBM:   No results found for: GBMABIGG  Hep BsAg:       No results found for: HEPBSAG  Hep C AB:          Lab Results   Component Value Date    HEPCAB Negative 05/09/2022       Urinalysis/Chemistries:      Lab Results   Component Value Date    NITRU NEGATIVE 05/09/2022    COLORU YELLOW 05/09/2022    PHUR 5.0 05/09/2022    LABCAST NONE SEEN 05/09/2022    LABCAST NONE SEEN 05/09/2022    WBCUA 0-2 05/09/2022    RBCUA 0-2 05/09/2022    YEAST NONE SEEN 05/09/2022    BACTERIA NONE SEEN 05/09/2022    CLARITYU Clear 05/02/2022    SPECGRAV 1.016 05/09/2022    LEUKOCYTESUR NEGATIVE 05/09/2022    UROBILINOGEN 0.2 05/09/2022    BILIRUBINUR NEGATIVE 05/09/2022    BLOODU NEGATIVE 05/09/2022    GLUCOSEU Negative 05/02/2022    KETUA NEGATIVE 05/09/2022     Urine Sodium:   No results found for: ETELVINA  Urine Potassium:  No results found for: KUR  Urine Chloride:  No results found for: CLUR  Urine Osmolarity: No results found for: OSMOU  Urine Protein:   No components found for: TOTALPROTEIN, URINE   Urine Creatinine:     Lab Results   Component Value Date    LABCREA 145.2 05/09/2022    LABCREA 68.2 05/03/2022     Urine Eosinophils:  No components found for: UEOS    Impression and Plan:  1. Pre-renal CHLOE Stage 2 vs progression to ATN on CKD IIIB eGFR 34 mL/min/1.73m2. IMPROVING. Baseline sCr 1.8-2.0 mg/dL. Multifactorial - pre-renal given BUN:sCr >20 and h/o symptomatic bradycardia but also has proteinuria w/o hypertriglyceridemia (H/o uncontrolled IDDMT2 A1c 8.8% 05/09/22 and mAlb:sCr 456 mg/g may explain). Renal US w/o arterial stenosis. No eosinophiluria. Renal function improved once HR improved but remains hypervolemic on exam. Auto-diuresing. C3/C4 wnl. HC Ab -sharmaine. · Continue gentle IV hydration   · Strict I&Os  · Daily Weights  · Daily BMPs  · Avoid Nephrotoxic Substances  · Renally dose medications: pregabalin  · DARIANA, c-ANCA, p-ANCA, Anti-GBM Ab, Immunofixation serology, Kappa/Lambda Quant FLC, and serum electrophoresis pending. 2. Hyponatremia, resolved  3. Hypocalcemia  4. Stroke  5. Syncope 2/2 Bradycardia, resolved  6. IDDMT2 A1c 8.8%  7. Essential HTN  8. G2 Diastolic Heart Failure    Please don't hesitate to call with any questions.   Electronically signed by Kaylin Burton MD on 5/12/2022 at 8:10 AM

## 2022-05-12 NOTE — PROGRESS NOTES
Hospitalist Progress Note  Patient:  Jennifer Terrazas    Unit/Bed:4A-15/015-A  YOB: 1955  MRN: [id number]   Acct: [de-identified]   PCP: Rom Capone MD  Date of Admission: 5/7/2022    Assessment/Plan:  1. Acute Ischemic Stroke: Code stroke called on 05/08 overnight. Patient with NIHSS 3 from facial droop, RLE weakness, slurred speech. CT scan head revealed chronic small vessel ischemic disease. Two incidental meningiomas. CTA head w/ contrast not obtained 2/2 severity of renal injury. MRI brain revealed few scattered punctate acute infarcts in left corona radiata and right centrum semiovale with chronic microvascular angiopathy. BRA head with focal areas of moderate stenosis in b/l cavernous and clinoid segments of ICAs. tPA not administered as window had passed and there was initial concern for a subacute indeterminate infarct. TTE with bubble study unremarkable for PFO. SLP w/ barium swallow + moderate dysphagia   - NIHSS and neuro checks qShift    - BP control    - Continue IVF, ASA, Plavix, sugar control, statin    - PT/OT following    - 30-day event monitor on DC    - Will complete neuro rehab on DC   2. Moderate Oropharyngeal Dysphagia: New onset s/p recent ischemic stroke(s), SLP eval with MBS on 05/10 revealed moderate oral dysphagia, mild pharyngeal dysphagia without tracheal aspiration events    - Diet minced and moist with mildly thick liquids   3. Symptomatic Bradycardia: Resolved. S/p syncopal episode with collapse and LOC lasting seconds. Likely iatrogenic 2/2 Toprol XL 75 mg which has been held. Pulse ~40s on admission. Pulse improved to ~60s s/p BB washout.    - Toprol XL discontinue on DC   4. Acute Kidney Injury in CKD: Resolving. Creatinine improving. Likely etiology was low cardiac output/bradycardia. Full workup pending. No recent contrast received. Baseline ~1.8 one week prior. BUN:Cr points to prerenal etiology.  Renal US revealed CKD     - Nephrology following   - Avoid nephrotoxins      - Continue gentle IV fluids  5. NIDDM2: A1c 9.1. With diabetic nephropathy. Continue Lantus 25u nightly, Humalog TID. POCT glucose checks AC/HS  6. Hyperlipidemia: Continue home statin   7. Uncontrolled Hypertension: Continue norvasc 5 mg daily, cardura 4mg daily, hydralazine 50 mg q8h, imdur 30 mg daily   8. Chronic HFpEF 55%: Stable and well controlled at this time. Expected discharge date: CT to [address] [address] on 05/13 or 05/14    Disposition:    [x] Home       [] TCU       [] Rehab       [] Psych       [] SNF       [] [facility]       [] Other-    Chief Complaint: Syncope with collapse, lightheadedness     Hospital Course: Per admitting H&P note \"Patient presents to the ED following a syncopal episode. The patient was just discharged one day prior to admission following a prolonged stay due to influenza A. The patient developed CHLOE while hospitalized. Today the patient had risen from sitting to standing and began amublating. The patient loss consciousness and subsequently fell. The patient was found to have a resting heart rate 40-48. Patient admitted for telemetry and cardiology consult. \"    05/09/22: Patient unfortunately suffered from ischemic stroke overnight but was deemed not a candidate for tPA as there was some concern for an indeterminate subacute infarct. NIHSS 3 with slurred speech, right facial droop and RLE weakness. MRI brain revealed some scattered acute ischemic infarcts bilaterally. Started on ASA, Plavix, high intensity statin. Neurology followed. Renal function worsened with time. As of now no known etiology. Suspecting a possible cardiac etiology for micro thromboembolic disease  37/34/38: Still with facial droop, NIHSS 2, PM&R to evaluate for rehab. Patient with SLP eval and MBS revealing moderate oropharyngeal dysphagia with recs for diet to be minced and moist with thick liquids. 05/11/22: CHLOE improving.  Nearing discharge,  set up transport to [facility] on 05/13 or 05/14     Subjective (past 24 hours): Patient with no complaints or concerns at this time. . Facial droop has significantly improved. Still needs tighter BP control. Otherwise well. Denies CP, SOB, n/v/d.        ROS (12 point review of systems completed. Pertinent positives noted. Otherwise ROS is negative). Medications:  Reviewed    Infusion Medications    sodium chloride 50 mL/hr at 05/12/22 1408    dextrose      sodium chloride       Scheduled Medications    insulin glargine  20 Units SubCUTAneous Nightly    [START ON 5/13/2022] insulin lispro  8 Units SubCUTAneous Daily before lunch    [START ON 5/13/2022] insulin lispro  8 Units SubCUTAneous QAM AC    insulin lispro  12 Units SubCUTAneous Dinner    doxazosin  4 mg Oral Daily    hydrALAZINE  50 mg Oral 3 times per day    atorvastatin  40 mg Oral Daily    calcium replacement protocol   Other RX Placeholder    pregabalin  25 mg Oral QPM    clopidogrel  75 mg Oral Daily    amLODIPine  5 mg Oral QPM    aspirin  81 mg Oral Daily    insulin lispro  0-6 Units SubCUTAneous TID WC    insulin lispro  0-3 Units SubCUTAneous Nightly    isosorbide mononitrate  30 mg Oral Daily    sodium chloride flush  5-40 mL IntraVENous 2 times per day    heparin (porcine)  5,000 Units SubCUTAneous q8h     PRN Meds: polyvinyl alcohol, albuterol sulfate HFA, glucagon (rDNA), dextrose, sodium chloride flush, sodium chloride, ondansetron **OR** ondansetron, polyethylene glycol, acetaminophen **OR** acetaminophen, glucose, dextrose bolus **OR** dextrose bolus      Intake/Output Summary (Last 24 hours) at 5/12/2022 1808  Last data filed at 5/12/2022 1708  Gross per 24 hour   Intake 3406.17 ml   Output 420 ml   Net 2986.17 ml       Diet:  ADULT DIET; Dysphagia - Minced and Moist; 4 carb choices (60 gm/meal); Low Fat/Low Chol/High Fiber/KALEN; Low Sodium (2 gm); Low Potassium (Less than 3000 mg/day);  Low Phosphorus (Less than 1000 mg); Less than 60 gm; Mildly Thick (Nectar)    Exam:  BP (!) 142/57   Pulse 102   Temp 98.1 °F (36.7 °C)   Resp 16   Ht 5' (1.524 m)   Wt 175 lb 9.6 oz (79.7 kg)   SpO2 98%   BMI 34.29 kg/m²     General appearance: No apparent distress, appears stated age and cooperative. Slurred speech   HEENT: Pupils equal, round, and reactive to light. Mild degree of exophthalmos with ride sided facial droop. Conjunctivae/corneas clear. Neck: Supple, with full range of motion. No jugular venous distention. Trachea midline. Respiratory:  Normal respiratory effort. Clear to auscultation, bilaterally without Rales/Wheezes/Rhonchi. Cardiovascular: Regular rate and rhythm with normal S1/S2 without murmurs, rubs or gallops. Abdomen: Soft, non-tender, non-distended with normal bowel sounds. Musculoskeletal: passive and active ROM x 4 extremities. Skin: Skin color, texture, turgor normal.  No rashes or lesions. Neurologic:  Neurovascularly intact without any focal sensory/motor deficits. Cranial nerves: II-XII intact, grossly non-focal.  Psychiatric: Alert and oriented, thought content appropriate, normal insight  Capillary Refill: Brisk,< 3 seconds   Peripheral Pulses: +2 palpable, equal bilaterally     Labs:   Recent Labs     05/10/22  0902   WBC 6.3   HGB 10.0*   HCT 32.3*        Recent Labs     05/10/22  0826 05/11/22  0349 05/12/22  0436    142 145   K 4.6 4.3 4.3    107 111   CO2 22* 25 23   * 80* 59*   CREATININE 2.7* 2.4* 2.0*   CALCIUM 9.1 8.4* 8.3*     No results for input(s): AST, ALT, BILIDIR, BILITOT, ALKPHOS in the last 72 hours. No results for input(s): INR in the last 72 hours. No results for input(s): [place] in the last 72 hours.     Microbiology:      Urinalysis:      Lab Results   Component Value Date    NITRU NEGATIVE 05/09/2022    WBCUA 0-2 05/09/2022    BACTERIA NONE SEEN 05/09/2022    RBCUA 0-2 05/09/2022    BLOODU NEGATIVE 05/09/2022    SPECGRAV 1.016 05/09/2022    GLUCOSEU Negative 05/02/2022     Radiology:  FL MODIFIED BARIUM SWALLOW W VIDEO   Final Result   1. Laryngeal penetration of pureed, thin and soft barium with aspiration of thin barium. 2. Additional recommendations from the speech therapist will follow. **This report has been created using voice recognition software. It may contain minor errors which are inherent in voice recognition technology. **         Final report electronically signed by Dr. Lucero Anguiano on 5/10/2022 11:56 AM      MRI BRAIN WO CONTRAST   Final Result      1. A few scattered punctate acute infarcts in the left corona radiata and right centrum semiovale. 2. Mild severity chronic microvascular angiopathy. Findings were discussed with Camilla Javier RN via telephone at the time of interpretation. **This report has been created using voice recognition software. It may contain minor errors which are inherent in voice recognition technology. **      Final report electronically signed by Dr. Sandra Sam MD on 5/9/2022 4:49 PM      MRA NECK WO CONTRAST   Final Result   Motion degraded exam without visualized areas of flow-limiting stenosis. **This report has been created using voice recognition software. It may contain minor errors which are inherent in voice recognition technology. **      Final report electronically signed by Dr. Sandra Sam MD on 5/9/2022 5:00 PM      MRA HEAD WO CONTRAST   Final Result    Focal areas of moderate stenosis in the bilateral cavernous and clinoid segments of the internal carotid arteries. **This report has been created using voice recognition software. It may contain minor errors which are inherent in voice recognition technology. **      Final report electronically signed by Dr. Sandra Sam MD on 5/9/2022 4:51 PM      US RENAL COMPLETE   Final Result   1. No renal calculi or hydronephrosis.    2. Increased renal cortical echogenicity in the right kidney. The left    kidney is not optimally visualized. Increased renal cortical echogenicity    can be seen in medical renal disease. This can be correlated with renal    function tests. 3. Urinary bladder has a volume of 363 mL. This document has been electronically signed by: Chana Hurtado M.D. on    05/09/2022 06:07 AM      CT HEAD WO CONTRAST   Final Result   1. No acute disease in the brain. 2. Incidental findings are detailed above. This document has been electronically signed by: Chana Hurtado M.D. on    05/08/2022 10:08 PM      All CTs at this facility use dose modulation techniques and iterative    reconstructions, and/or weight-based dosing   when appropriate to reduce radiation to a low as reasonably achievable. XR CHEST PORTABLE   Final Result   Impression:   Bibasilar hazy opacities, may reflect atelectasis or infection. Mild cardiomegaly. Equivocal left pleural effusion. This document has been electronically signed by:  Mj Leon MD on 05/07/2022    08:58 PM        DVT prophylaxis: [] Lovenox                                 [x] SCDs                                 [x] SQ Heparin                                 [] Encourage ambulation           [] Already on Anticoagulation     Code Status: DNR-CCA     PT/OT Eval Status: Pending     Tele:   [x] yes             [] no    Electronically signed by Eliezer Jung DO on 5/12/2022 at 6:08 PM

## 2022-05-12 NOTE — PROGRESS NOTES
[facility]  INPATIENT PHYSICAL THERAPY  DAILY NOTE  [facility] 4A - 4A-15/015-A      Time In: 7158  Time Out: 09  Timed Code Treatment Minutes: 39 Minutes  Minutes: 41          Date: 2022  Patient Name: Yessica Del Castillo,  Gender:  female        MRN: [id number]  : 1955  (61 y.o.)     Referring Practitioner: Trevon Do PA-C  Diagnosis: symptomatic bradycardia  Additional Pertinent Hx: Per H&p :Patient presents to the ED following a syncopal episode. The patient was just discharged one day prior to admission following a prolonged stay due to influenza A. The patient developed CHLOE while hospitalized. Today the patient had risen from sitting to standing and began amublating. The patient loss consciousness and subsequently fell. The patient was found to have a resting heart rate 40-48. Pt presents with On 2022 patient was noted by staff to have dysarthria and a new right facial droop which prompted a code stroke. Initial NIH was 3. Patient was taken to imaging for CT head which revealed no ICH however he did demonstrate old stroke which was age-indeterminate. For this reason tPA was not given as if this was a recent stroke he would be very high risk. CTAs were deferred as this patient has a creatinine of 4.0. Today on exam, pt continues to have slurred speech and facial droop and she is lethargic per neurology note.  MRI of the brain indicating acute infarcts in the left corona radiata and right centrum semiovale     Prior Level of Function:  Lives With: Alone  Type of Home: House  Home Layout: One level  Home Access: Level entry  Home Equipment: Mohini Marisa, rolling,Cane   Bathroom Shower/Tub: Tub/Shower unit  Bathroom Toilet: Standard  Bathroom Equipment: Shower chair  Bathroom Accessibility: Walker accessible    Receives Help From: Family  ADL Assistance: Independent  Homemaking Assistance: Independent  Ambulation Assistance: Independent  Transfer Assistance: Independent  Active : Yes  Additional Comments: Pt used cane intermittently when she is feeling off balance. Pt states her kids can assist her if needed, she runs her own errands and cares for her home indep. Pt's children drive for her.     Restrictions/Precautions:  Restrictions/Precautions: Fall Risk,General Precautions  Position Activity Restriction  Other position/activity restrictions: monitor BP       SUBJECTIVE: pt in bed on arrival and she was agreeable for therapy but did ask to return to bed pt stated \"I don't normally get up till 10-11 at home - pt did ask to use bathroom during session     PAIN: 0/10:       Vitals: Blood Pressure: 145/60    OBJECTIVE:  Bed Mobility:  Supine to Sit: Minimal Assistance, with head of bed raised, with increased time for completion, cues given for rolling technique   Sit to Supine: Stand By Assistance, once in bed she did complete bridges to scoot up to [facility] with extra time and several scoots      Transfers:  Sit to Stand: Contact Guard Assistance  Stand to [employer] Assistance  * noted wide base of support and cues for hand placment each time from edge of bed and from toilet noted decreased carryover    Ambulation:  Contact Guard Assistance  Distance: to and from bathroom- ~ 45 feet in halls   Surface: Level Tile  Device:Rolling Walker  Gait Deviations:  Cues needed for safety with walker placement in narrow spaces, noted fair step length and heel strike when using walker vs no AD -     Balance:  standing balance w/o UE at support with CGA noted wide base and min trunk sway, pt demonstrated limited reaching out of base of support when she didn't have an UE at support and would reach for table or walker when having to reach out of base   Standing w/ one UE at support pt completed controlled stepping and tapping with CGA for balance noted a slight delay at left vs right LE but able to tap target with accuracy   Exercise:  Patient was guided in 1 set(s) 10 -12reps of exercise to both lower extremities. Long arc quads, Standing heel/toe raises, Standing marches, Standing hip abduction/adduction and Standing hamstring curls. Exercises were completed for increased independence with functional mobility. Functional Outcome Measures: Completed  AM-PAC Inpatient Mobility without Stair Climbing Raw Score : 15  AM-PAC Inpatient without Stair Climbing T-Scale Score : 43.03    ASSESSMENT:  Assessment: Patient progressing toward established goals. Activity Tolerance:  Patient tolerance of  treatment: fair. Equipment Recommendations:Equipment Needed: No (pt has a RW)  Discharge Recommendations: Continue to assess pending progress  Plan: Current Treatment Recommendations: Strengthening,Balance training,Functional mobility training,Transfer training,Endurance training,Neuromuscular re-education,Gait training,Home exercise program,Safety education & training,Patient/Caregiver education & training,Equipment evaluation, education, & procurement,Therapeutic activities  Plan:  (6x N)    Patient Education  Patient Education: Plan of Care    Goals:  Patient goals : \"no\"  Short Term Goals  Time Frame for Short term goals: by hospital d/c  Short term goal 1: Pt to demo supine <->sit with S for ease with getting in and out of bed  Short term goal 2: Pt to demo sit <->stand with RW and S for improved ability to transfer from any surface safely  Short term goal 3: Pt to ambulate >=40' with RW and S for improved ability to move in her environment  Short term goal 4: Pt to improve Tinetti score to >=19/28 to progress to the moderate fall risk category  Long Term Goals  Time Frame for Long term goals : NA due to short ELOS    Following session, patient left in safe position with all fall risk precautions in place.

## 2022-05-12 NOTE — TELEPHONE ENCOUNTER
Reviewing chart for this heart failure patient to ensure f/u scheduled appropriately after recent discharge from [facility] 5/6/22. I see that she was readmitted to [address] [place] [address] 5/7/22 after a syncopal episode. I will follow along as her outpatient f/u here may likely need to changed.      Shorty Flowers, PharmD  [address] [place] [address]  Heart Failure Service  [phone]

## 2022-05-13 LAB
ANCA IFA PATTERN: NORMAL
ANCA IFA TITER: NORMAL
ANION GAP SERPL CALCULATED.3IONS-SCNC: 11 MEQ/L (ref 8–16)
BUN BLDV-MCNC: 43 MG/DL (ref 7–22)
CALCIUM SERPL-MCNC: 8.3 MG/DL (ref 8.5–10.5)
CHLORIDE BLD-SCNC: 112 MEQ/L (ref 98–111)
CO2: 23 MEQ/L (ref 23–33)
CREAT SERPL-MCNC: 2 MG/DL (ref 0.4–1.2)
GFR SERPL CREATININE-BSD FRML MDRD: 30 ML/MIN/1.73M2
GLUCOSE BLD-MCNC: 112 MG/DL (ref 70–108)
GLUCOSE BLD-MCNC: 113 MG/DL (ref 70–108)
GLUCOSE BLD-MCNC: 136 MG/DL (ref 70–108)
GLUCOSE BLD-MCNC: 141 MG/DL (ref 70–108)
GLUCOSE BLD-MCNC: 145 MG/DL (ref 70–108)
GLUCOSE BLD-MCNC: 152 MG/DL (ref 70–108)
GLUCOSE BLD-MCNC: 53 MG/DL (ref 70–108)
POTASSIUM SERPL-SCNC: 4.6 MEQ/L (ref 3.5–5.2)
SODIUM BLD-SCNC: 146 MEQ/L (ref 135–145)

## 2022-05-13 PROCEDURE — 6370000000 HC RX 637 (ALT 250 FOR IP): Performed by: STUDENT IN AN ORGANIZED HEALTH CARE EDUCATION/TRAINING PROGRAM

## 2022-05-13 PROCEDURE — 99232 SBSQ HOSP IP/OBS MODERATE 35: CPT | Performed by: INTERNAL MEDICINE

## 2022-05-13 PROCEDURE — 6370000000 HC RX 637 (ALT 250 FOR IP): Performed by: PHYSICIAN ASSISTANT

## 2022-05-13 PROCEDURE — 2060000000 HC ICU INTERMEDIATE R&B

## 2022-05-13 PROCEDURE — 82948 REAGENT STRIP/BLOOD GLUCOSE: CPT

## 2022-05-13 PROCEDURE — 6370000000 HC RX 637 (ALT 250 FOR IP): Performed by: NURSE PRACTITIONER

## 2022-05-13 PROCEDURE — 6370000000 HC RX 637 (ALT 250 FOR IP)

## 2022-05-13 PROCEDURE — 2580000003 HC RX 258: Performed by: PHYSICIAN ASSISTANT

## 2022-05-13 PROCEDURE — 80048 BASIC METABOLIC PNL TOTAL CA: CPT

## 2022-05-13 PROCEDURE — 36415 COLL VENOUS BLD VENIPUNCTURE: CPT

## 2022-05-13 PROCEDURE — 97110 THERAPEUTIC EXERCISES: CPT

## 2022-05-13 PROCEDURE — 6360000002 HC RX W HCPCS: Performed by: PHYSICIAN ASSISTANT

## 2022-05-13 RX ORDER — CARVEDILOL 6.25 MG/1
12.5 TABLET ORAL 2 TIMES DAILY WITH MEALS
Status: DISCONTINUED | OUTPATIENT
Start: 2022-05-13 | End: 2022-05-20 | Stop reason: HOSPADM

## 2022-05-13 RX ORDER — INSULIN LISPRO 100 [IU]/ML
4 INJECTION, SOLUTION INTRAVENOUS; SUBCUTANEOUS
Status: DISCONTINUED | OUTPATIENT
Start: 2022-05-14 | End: 2022-05-20 | Stop reason: HOSPADM

## 2022-05-13 RX ORDER — INSULIN GLARGINE 100 [IU]/ML
10 INJECTION, SOLUTION SUBCUTANEOUS NIGHTLY
Status: DISCONTINUED | OUTPATIENT
Start: 2022-05-13 | End: 2022-05-20 | Stop reason: HOSPADM

## 2022-05-13 RX ADMIN — CLOPIDOGREL BISULFATE 75 MG: 75 TABLET ORAL at 09:33

## 2022-05-13 RX ADMIN — HEPARIN SODIUM 5000 UNITS: 5000 INJECTION INTRAVENOUS; SUBCUTANEOUS at 01:29

## 2022-05-13 RX ADMIN — HYDRALAZINE HYDROCHLORIDE 50 MG: 50 TABLET ORAL at 17:15

## 2022-05-13 RX ADMIN — ATORVASTATIN CALCIUM 40 MG: 80 TABLET, FILM COATED ORAL at 09:32

## 2022-05-13 RX ADMIN — SODIUM CHLORIDE, PRESERVATIVE FREE 10 ML: 5 INJECTION INTRAVENOUS at 21:22

## 2022-05-13 RX ADMIN — INSULIN GLARGINE 10 UNITS: 100 INJECTION, SOLUTION SUBCUTANEOUS at 21:18

## 2022-05-13 RX ADMIN — ISOSORBIDE MONONITRATE 30 MG: 30 TABLET, EXTENDED RELEASE ORAL at 09:32

## 2022-05-13 RX ADMIN — HEPARIN SODIUM 5000 UNITS: 5000 INJECTION INTRAVENOUS; SUBCUTANEOUS at 09:33

## 2022-05-13 RX ADMIN — INSULIN LISPRO 8 UNITS: 100 INJECTION, SOLUTION INTRAVENOUS; SUBCUTANEOUS at 09:33

## 2022-05-13 RX ADMIN — HEPARIN SODIUM 5000 UNITS: 5000 INJECTION INTRAVENOUS; SUBCUTANEOUS at 17:15

## 2022-05-13 RX ADMIN — INSULIN LISPRO 1 UNITS: 100 INJECTION, SOLUTION INTRAVENOUS; SUBCUTANEOUS at 21:17

## 2022-05-13 RX ADMIN — HYDRALAZINE HYDROCHLORIDE 50 MG: 50 TABLET ORAL at 06:33

## 2022-05-13 RX ADMIN — ACETAMINOPHEN 650 MG: 325 TABLET ORAL at 23:23

## 2022-05-13 RX ADMIN — CARVEDILOL 12.5 MG: 6.25 TABLET, FILM COATED ORAL at 18:28

## 2022-05-13 RX ADMIN — INSULIN LISPRO 1 UNITS: 100 INJECTION, SOLUTION INTRAVENOUS; SUBCUTANEOUS at 12:33

## 2022-05-13 RX ADMIN — PREGABALIN 25 MG: 25 CAPSULE ORAL at 17:15

## 2022-05-13 RX ADMIN — DOXAZOSIN 4 MG: 4 TABLET ORAL at 09:33

## 2022-05-13 RX ADMIN — HYDRALAZINE HYDROCHLORIDE 50 MG: 50 TABLET ORAL at 21:12

## 2022-05-13 RX ADMIN — AMLODIPINE BESYLATE 5 MG: 5 TABLET ORAL at 17:15

## 2022-05-13 RX ADMIN — ASPIRIN 81 MG: 81 TABLET, COATED ORAL at 09:33

## 2022-05-13 RX ADMIN — INSULIN LISPRO 8 UNITS: 100 INJECTION, SOLUTION INTRAVENOUS; SUBCUTANEOUS at 12:33

## 2022-05-13 ASSESSMENT — PAIN SCALES - GENERAL
PAINLEVEL_OUTOF10: 0
PAINLEVEL_OUTOF10: 0
PAINLEVEL_OUTOF10: 3

## 2022-05-13 ASSESSMENT — PAIN DESCRIPTION - ORIENTATION: ORIENTATION: INNER

## 2022-05-13 ASSESSMENT — PAIN DESCRIPTION - DESCRIPTORS: DESCRIPTORS: DISCOMFORT

## 2022-05-13 ASSESSMENT — PAIN DESCRIPTION - LOCATION: LOCATION: HEAD

## 2022-05-13 NOTE — PROGRESS NOTES
Pt 2000 blood sugar 45. Pt A/ox4, denies light headedness or any accompanying sx. Given orange juice. 2nd blood sugar post intervention 71.  Will reassess in the 2300 hour

## 2022-05-13 NOTE — CARE COORDINATION
5/13/22, 1:39 PM EDT    DISCHARGE ON GOING EVALUATION    Perfecto López day: 6  Location: [facility] Reason for admit: Symptomatic bradycardia [R00.1]   Procedure: none  Barriers to Discharge: PT/OT/ST  PCP: Matti Gupta MD  Readmission Risk Score: 19.9 ( )%  Patient Goals/Plan/Treatment Preferences: From home. Awaiting precert for ECF. SW following.

## 2022-05-13 NOTE — PROGRESS NOTES
[facility]         Renal Inpatient Follow-Up note         5/13/2022 9:50 AM    Pt Name:   Medhat Sanford:   1955  Attending:   Nelson Farley MD    Chief Complaint : Macrina Payton is a 61 y.o. female being followed by nephrology for CHLOE/CKD    Interval History :   Patient seen and examined by me. No distress  Feels well. No cp or SOB. Scheduled Medications :    insulin glargine  20 Units SubCUTAneous Nightly    insulin lispro  8 Units SubCUTAneous Daily before lunch    insulin lispro  8 Units SubCUTAneous QAM AC    insulin lispro  12 Units SubCUTAneous Dinner    doxazosin  4 mg Oral Daily    hydrALAZINE  50 mg Oral 3 times per day    atorvastatin  40 mg Oral Daily    calcium replacement protocol   Other RX Placeholder    pregabalin  25 mg Oral QPM    clopidogrel  75 mg Oral Daily    amLODIPine  5 mg Oral QPM    aspirin  81 mg Oral Daily    insulin lispro  0-6 Units SubCUTAneous TID WC    insulin lispro  0-3 Units SubCUTAneous Nightly    isosorbide mononitrate  30 mg Oral Daily    sodium chloride flush  5-40 mL IntraVENous 2 times per day    heparin (porcine)  5,000 Units SubCUTAneous q8h      sodium chloride 50 mL/hr at 05/12/22 1408    dextrose      sodium chloride         Vitals :  BP (!) 148/62   Pulse 103   Temp 98.9 °F (37.2 °C) (Oral)   Resp 18   Ht 5' (1.524 m)   Wt 175 lb 9.6 oz (79.7 kg)   SpO2 97%   BMI 34.29 kg/m²     24HR INTAKE/OUTPUT:      Intake/Output Summary (Last 24 hours) at 5/13/2022 0950  Last data filed at 5/13/2022 0752  Gross per 24 hour   Intake 2921.17 ml   Output 250 ml   Net 2671.17 ml     Last 3 weights  Wt Readings from Last 3 Encounters:   05/11/22 175 lb 9.6 oz (79.7 kg)   05/06/22 178 lb 9.2 oz (81 kg)   10/17/18 175 lb 0.7 oz (79.4 kg)           Physical Exam :  General Appearance:  Well developed.  No distress  Mouth/Throat:  Oral mucosa moist  Neck:  Supple, no JVD  Lungs:  Breath sounds: clear  Heart[de-identified] S1,S2 heard  Abdomen:  Soft, non - tender  Musculoskeletal:  Edema - mild edema         Last 3 CBC   No results for input(s): WBC, RBC, HGB, HCT, PLT in the last 72 hours. Last 3 CMP  Recent Labs     05/11/22  0349 05/12/22  0436 05/13/22  0443    145 146*   K 4.3 4.3 4.6    111 112*   CO2 25 23 23   BUN 80* 59* 43*   CREATININE 2.4* 2.0* 2.0*   CALCIUM 8.4* 8.3* 8.3*             ASSESSMENT / Plan   1. Renal -acute kidney injury with progressive worsening of renal function and significantly elevated BUN  ? Exact etiology not clear she has been getting diuretics in the outside hospital in [place] she was also bradycardic upon arrival  ? Renal ultrasound scan shows CKD urine lites show more of a prerenal etiology  ? Continue IV hydration   ? Clinically creatinine started to improve with hydration now stable mostly have reached the baseline  ? Stop IV fluids encourage oral intake of liquids  ? All serologies are negative except DARIANA which was positive     2. Electrolytes -mild hypernatremia increase oral intake of liquids stop IV fluids  3. Essential hypertension  4. Hx of diabetes mellitus with diabetic nephropathy  5. Recent treatment with congestive heart failure and influenza  6. Syncope may be due to bradycardia being seen by cardiology  7. Meds reviewed discussed with the patient overall much better from renal standpoint  8. Awaiting on placement advised to follow-up with nephrology in [place]    Dr. Reyna Mccray MD, M,D.  [facility].

## 2022-05-13 NOTE — PROGRESS NOTES
Follow Up / Progress Note        Patient:   Ephraim Orta  YOB: 1955  Age:  61 y.o. Room:  [facility]-  MRN:  [id number]                  Plan/Follow-Up:  Please call palliative care if needs arise.       Electronically signed by Eduarda Prado RN on 5/13/2022 at 10:38 AM             Palliative Care Office: [phone]

## 2022-05-13 NOTE — PROGRESS NOTES
[facility]  INPATIENT PHYSICAL THERAPY  DAILY NOTE  [facility] 4A - 4A-15/015-A      Time In: 1401  Time Out: 1410  Timed Code Treatment Minutes: 9 Minutes  Minutes: 9          Date: 2022  Patient Name: Juan Murray,  Gender:  female        MRN: [id number]  : 1955  (61 y.o.)     Referring Practitioner: Bhavin Delgado PA-C  Diagnosis: symptomatic bradycardia  Additional Pertinent Hx: Per H&p :Patient presents to the ED following a syncopal episode. The patient was just discharged one day prior to admission following a prolonged stay due to influenza A. The patient developed CHLOE while hospitalized. Today the patient had risen from sitting to standing and began amublating. The patient loss consciousness and subsequently fell. The patient was found to have a resting heart rate 40-48. Pt presents with On 2022 patient was noted by staff to have dysarthria and a new right facial droop which prompted a code stroke. Initial NIH was 3. Patient was taken to imaging for CT head which revealed no ICH however he did demonstrate old stroke which was age-indeterminate. For this reason tPA was not given as if this was a recent stroke he would be very high risk. CTAs were deferred as this patient has a creatinine of 4.0. Today on exam, pt continues to have slurred speech and facial droop and she is lethargic per neurology note.  MRI of the brain indicating acute infarcts in the left corona radiata and right centrum semiovale     Prior Level of Function:  Lives With: Alone  Type of Home: House  Home Layout: One level  Home Access: Level entry  Home Equipment: alan CarranzaCane   Bathroom Shower/Tub: Tub/Shower unit  Bathroom Toilet: Standard  Bathroom Equipment: Shower chair  Bathroom Accessibility: Walker accessible    Receives Help From: Family  ADL Assistance: Independent  Homemaking Assistance: Independent  Ambulation Assistance: Independent  Transfer Assistance: Independent  Active : Yes  Additional Comments: Pt used cane intermittently when she is feeling off balance. Pt states her kids can assist her if needed, she runs her own errands and cares for her home indep. Pt's children drive for her. Restrictions/Precautions:  Restrictions/Precautions: Fall Risk,General Precautions  Position Activity Restriction  Other position/activity restrictions: monitor BP       SUBJECTIVE: had attempted pt a few times this date she was sleeping one attempt this am and second she was still eating her lunch, last attempt pt still in bed but refused any out of bed activity reported that she was too tired, w/ encouragement she did agree to some exercises     PAIN: 0/10:       Vitals: Vitals not assessed per clinical judgement, see nursing flowsheet    OBJECTIVE:  Bed Mobility:  Not Tested    Transfers:  Not Tested    Exercise:  Patient was guided in 1 set(s) 10 reps of exercise to both lower extremities. Ankle pumps, Glut sets, Quad sets, Heelslides, Hip abduction/adduction and pt declined further ex or activity. Exercises were completed for increased independence with functional mobility. Functional Outcome Measures: Completed  AM-PAC Inpatient Mobility without Stair Climbing Raw Score : 15  AM-PAC Inpatient without Stair Climbing T-Scale Score : 43.03    ASSESSMENT:  Assessment: Patient progressing toward established goals. Activity Tolerance:  Patient tolerance of  treatment: fair.  Pt declined further activity      Equipment Recommendations:Equipment Needed: No (pt has a RW)  Discharge Recommendations: Subacte/Skilled Nursing Facility  Plan: Current Treatment Recommendations: Strengthening,Balance training,Functional mobility training,Transfer training,Endurance training,Neuromuscular re-education,Gait training,Home exercise program,Safety education & training,Patient/Caregiver education & training,Equipment evaluation, education, & procurement,Therapeutic activities  Plan: (6x N)    Patient Education  Patient Education: Plan of Care    Goals:  Patient goals : \"no\"  Short Term Goals  Time Frame for Short term goals: by hospital d/c  Short term goal 1: Pt to demo supine <->sit with S for ease with getting in and out of bed  Short term goal 2: Pt to demo sit <->stand with RW and S for improved ability to transfer from any surface safely  Short term goal 3: Pt to ambulate >=40' with RW and S for improved ability to move in her environment  Short term goal 4: Pt to improve Tinetti score to >=19/28 to progress to the moderate fall risk category  Long Term Goals  Time Frame for Long term goals : NA due to short ELOS    Following session, patient left in safe position with all fall risk precautions in place.

## 2022-05-13 NOTE — PLAN OF CARE
Problem: Discharge Planning  Goal: Discharge to home or other facility with appropriate resources  Outcome: Progressing  Flowsheets (Taken 5/12/2022 2007 by Vandana Chavira RN)  Discharge to home or other facility with appropriate resources: Identify barriers to discharge with patient and caregiver  Note: Patient from home. Plans to go to [facility] upon discharge. Problem: Pain  Goal: Verbalizes/displays adequate comfort level or baseline comfort level  Outcome: Progressing  Flowsheets (Taken 5/11/2022 1624 by Ramesh Lawton RN)  Verbalizes/displays adequate comfort level or baseline comfort level:   Encourage patient to monitor pain and request assistance   Assess pain using appropriate pain scale   Implement non-pharmacological measures as appropriate and evaluate response  Note: Patient denies pain with each assessment this shift. Problem: Safety - Adult  Goal: Free from fall injury  Outcome: Progressing  Flowsheets (Taken 5/11/2022 1623 by Ramesh Lawton RN)  Free From Fall Injury: Instruct family/caregiver on patient safety  Note: Patient remained free from falls this shift. Bed is in low position with alarm on and siderails up x2. Patient ambulates with one assist. Education given on use of call light and patient voiced understanding. Patient uses call light appropriately. Call light and beside table within reach. Arm band and falling star in place.        Problem: Cardiovascular - Adult  Goal: Maintains optimal cardiac output and hemodynamic stability  Outcome: Progressing  Flowsheets (Taken 5/12/2022 1222 by Kalie Bowden RN)  Maintains optimal cardiac output and hemodynamic stability: Monitor blood pressure and heart rate  Note: BP (!) 147/53   Pulse 106   Temp 98.4 °F (36.9 °C) (Oral)   Resp 16   Ht 5' (1.524 m)   Wt 175 lb 9.6 oz (79.7 kg)   SpO2 95%   BMI 34.29 kg/m²     Goal: Absence of cardiac dysrhythmias or at baseline  Outcome: Progressing  Flowsheets (Taken 5/12/2022 1222 by Kalie Bowden

## 2022-05-13 NOTE — PROGRESS NOTES
Hospitalist Progress Note  Patient:  Chrissy Green    Unit/Bed:4A-15/015-A  YOB: 1955  MRN: [id number]   Acct: [de-identified]   PCP: Sana Keller MD  Date of Admission: 5/7/2022    Assessment/Plan:  1. Acute Ischemic Stroke: Code stroke called on 05/08 overnight. Patient with NIHSS 3 from facial droop, RLE weakness, slurred speech. CT scan head revealed chronic small vessel ischemic disease. Two incidental meningiomas. CTA head w/ contrast not obtained 2/2 severity of renal injury. MRI brain revealed few scattered punctate acute infarcts in left corona radiata and right centrum semiovale with chronic microvascular angiopathy. MRA head with focal areas of moderate stenosis in b/l cavernous and clinoid segments of ICAs. tPA not administered as window had passed and there was initial concern for a subacute indeterminate infarct. TTE with bubble study unremarkable for PFO. SLP w/ barium swallow + moderate dysphagia   - NIHSS and neuro checks qShift    - BP control, Coreg 12.5 BID started     - Continue IVF, ASA, Plavix, sugar control, statin    - PT/OT following    - 30-day event monitor on DC    - Will complete neuro rehab on DC   2. Moderate Oropharyngeal Dysphagia: New onset s/p recent ischemic stroke(s), SLP eval with MBS on 05/10 revealed moderate oral dysphagia, mild pharyngeal dysphagia without tracheal aspiration events    - Diet minced and moist with mildly thick liquids   3. Symptomatic Bradycardia: Resolved. S/p syncopal episode with collapse and LOC lasting seconds. Likely iatrogenic 2/2 Toprol XL 75 mg which has been held. Pulse ~40s on admission. Pulse improved to ~60s s/p BB washout.    - Toprol XL discontinued on DC    - 05/13: patient now with mild tachycardia HR 100s, starting Coreg 12.5 BID   4. Acute Kidney Injury in CKD: Resolving. Creatinine improving. Likely etiology was low cardiac output/bradycardia. Full workup pending. No recent contrast received.  Baseline ~1.8 one week prior. BUN:Cr points to prerenal etiology. Renal US revealed CKD     - Nephrology following   - Avoid nephrotoxins      - Continue gentle IV fluids  5. NIDDM2: A1c 9.1. With diabetic nephropathy. Uncontrolled; sugars have been running too low. - Reduced lantus to 10u nightly, Humalog TID reduced to 4u. POCT glucose checks AC/HS  6. Hyperlipidemia: Continue home statin   7. Uncontrolled Hypertension: Continue norvasc 5 mg daily, cardura 4mg daily, hydralazine 50 mg q8h, imdur 30 mg daily   8. Chronic HFpEF 55%: Stable and well controlled at this time. Expected discharge date: Plan for DC to [id number] [address] on 05/14    Disposition:    [] Home       [] TCU       [x] Rehab       [] Psych       [] SNF       [] [place]       [] Other-    Chief Complaint: Syncope with collapse, lightheadedness     Hospital Course: Per admitting H&P note \"Patient presents to the ED following a syncopal episode. The patient was just discharged one day prior to admission following a prolonged stay due to influenza A. The patient developed CHLOE while hospitalized. Today the patient had risen from sitting to standing and began amublating. The patient loss consciousness and subsequently fell. The patient was found to have a resting heart rate 40-48. Patient admitted for telemetry and cardiology consult. \"    05/09/22: Patient unfortunately suffered from ischemic stroke overnight but was deemed not a candidate for tPA as there was some concern for an indeterminate subacute infarct. NIHSS 3 with slurred speech, right facial droop and RLE weakness. MRI brain revealed some scattered acute ischemic infarcts bilaterally. Started on ASA, Plavix, high intensity statin. Neurology followed. Renal function worsened with time. As of now no known etiology. Suspecting a possible cardiac etiology for micro thromboembolic disease  32/71/91: Still with facial droop, NIHSS 2, PM&R to evaluate for rehab.  Patient with SLP eval and MBS revealing moderate oropharyngeal dysphagia with recs for diet to be minced and moist with thick liquids. 05/11/22: CHLOE improving. Nearing discharge,  set up transport to [facility] on 05/13 or 05/14 05/12/22: Facial droop significantly improved. BP still elevated ~150s. Subjective (past 24 hours): Patient with no complaints or concerns at this time. Sugars have been running low with SSI, lantus decreased to 10u nightly, with humalog decreased to 4u daily . Otherwise well. Denies CP, SOB, n/v/d.        ROS (12 point review of systems completed. Pertinent positives noted. Otherwise ROS is negative).     Medications:  Reviewed    Infusion Medications    sodium chloride 50 mL/hr at 05/12/22 1408    dextrose      sodium chloride       Scheduled Medications    carvedilol  12.5 mg Oral BID WC    insulin glargine  10 Units SubCUTAneous Nightly    [START ON 5/14/2022] insulin lispro  4 Units SubCUTAneous QAM AC    [START ON 5/14/2022] insulin lispro  4 Units SubCUTAneous Daily before lunch    [START ON 5/14/2022] insulin lispro  4 Units SubCUTAneous Dinner    doxazosin  4 mg Oral Daily    hydrALAZINE  50 mg Oral 3 times per day    atorvastatin  40 mg Oral Daily    calcium replacement protocol   Other RX Placeholder    pregabalin  25 mg Oral QPM    clopidogrel  75 mg Oral Daily    amLODIPine  5 mg Oral QPM    aspirin  81 mg Oral Daily    insulin lispro  0-6 Units SubCUTAneous TID WC    insulin lispro  0-3 Units SubCUTAneous Nightly    isosorbide mononitrate  30 mg Oral Daily    sodium chloride flush  5-40 mL IntraVENous 2 times per day    heparin (porcine)  5,000 Units SubCUTAneous q8h     PRN Meds: polyvinyl alcohol, albuterol sulfate HFA, glucagon (rDNA), dextrose, sodium chloride flush, sodium chloride, ondansetron **OR** ondansetron, polyethylene glycol, acetaminophen **OR** acetaminophen, glucose, dextrose bolus **OR** dextrose bolus      Intake/Output Summary (Last 24 hours) at 5/13/2022 1737  Last data filed at 5/13/2022 1427  Gross per 24 hour   Intake 290 ml   Output 250 ml   Net 40 ml       Diet:  ADULT DIET; Dysphagia - Minced and Moist; 4 carb choices (60 gm/meal); Low Fat/Low Chol/High Fiber/KALEN; Low Sodium (2 gm); Low Potassium (Less than 3000 mg/day); Low Phosphorus (Less than 1000 mg); Less than 60 gm; Mildly Thick (Nectar)    Exam:  BP (!) 164/70   Pulse 105   Temp 98.2 °F (36.8 °C) (Axillary)   Resp 18   Ht 5' (1.524 m)   Wt 175 lb 9.6 oz (79.7 kg)   SpO2 95%   BMI 34.29 kg/m²     General appearance: No apparent distress, appears stated age and cooperative. Slurred speech   HEENT: Pupils equal, round, and reactive to light. Mild degree of exophthalmos with ride sided facial droop. Conjunctivae/corneas clear. Neck: Supple, with full range of motion. No jugular venous distention. Trachea midline. Respiratory:  Normal respiratory effort. Clear to auscultation, bilaterally without Rales/Wheezes/Rhonchi. Cardiovascular: Regular rate and rhythm with normal S1/S2 without murmurs, rubs or gallops. Abdomen: Soft, non-tender, non-distended with normal bowel sounds. Musculoskeletal: passive and active ROM x 4 extremities. Skin: Skin color, texture, turgor normal.  No rashes or lesions. Neurologic:  Neurovascularly intact without any focal sensory/motor deficits. Cranial nerves: II-XII intact, grossly non-focal.  Psychiatric: Alert and oriented, thought content appropriate, normal insight  Capillary Refill: Brisk,< 3 seconds   Peripheral Pulses: +2 palpable, equal bilaterally     Labs:   No results for input(s): WBC, HGB, HCT, PLT in the last 72 hours. Recent Labs     05/11/22  0349 05/12/22  0436 05/13/22  0443    145 146*   K 4.3 4.3 4.6    111 112*   CO2 25 23 23   BUN 80* 59* 43*   CREATININE 2.4* 2.0* 2.0*   CALCIUM 8.4* 8.3* 8.3*     No results for input(s): AST, ALT, BILIDIR, BILITOT, ALKPHOS in the last 72 hours.   No results for input(s): INR in the last 72 hours. No results for input(s): Sixto King in the last 72 hours. Microbiology:      Urinalysis:      Lab Results   Component Value Date    NITRU NEGATIVE 05/09/2022    WBCUA 0-2 05/09/2022    BACTERIA NONE SEEN 05/09/2022    RBCUA 0-2 05/09/2022    BLOODU NEGATIVE 05/09/2022    SPECGRAV 1.016 05/09/2022    GLUCOSEU Negative 05/02/2022     Radiology:  FL MODIFIED BARIUM SWALLOW W VIDEO   Final Result   1. Laryngeal penetration of pureed, thin and soft barium with aspiration of thin barium. 2. Additional recommendations from the speech therapist will follow. **This report has been created using voice recognition software. It may contain minor errors which are inherent in voice recognition technology. **         Final report electronically signed by Dr. Bryce Andrew on 5/10/2022 11:56 AM      MRI BRAIN WO CONTRAST   Final Result      1. A few scattered punctate acute infarcts in the left corona radiata and right centrum semiovale. 2. Mild severity chronic microvascular angiopathy. Findings were discussed with Carly Rae RN via telephone at the time of interpretation. **This report has been created using voice recognition software. It may contain minor errors which are inherent in voice recognition technology. **      Final report electronically signed by Dr. Naun Eduardo MD on 5/9/2022 4:49 PM      MRA NECK WO CONTRAST   Final Result   Motion degraded exam without visualized areas of flow-limiting stenosis. **This report has been created using voice recognition software. It may contain minor errors which are inherent in voice recognition technology. **      Final report electronically signed by Dr. Naun Eduardo MD on 5/9/2022 5:00 PM      MRA HEAD WO CONTRAST   Final Result    Focal areas of moderate stenosis in the bilateral cavernous and clinoid segments of the internal carotid arteries.                **This report

## 2022-05-14 LAB
ANA PATTERN 2: ABNORMAL
ANA PATTERN: ABNORMAL
ANA TITER 2: ABNORMAL
ANA TITER: ABNORMAL
ANION GAP SERPL CALCULATED.3IONS-SCNC: 12 MEQ/L (ref 8–16)
ANTINUCLEAR ANTIBODY, HEP-2, IGG: DETECTED
BUN BLDV-MCNC: 38 MG/DL (ref 7–22)
CALCIUM SERPL-MCNC: 8.4 MG/DL (ref 8.5–10.5)
CHLORIDE BLD-SCNC: 110 MEQ/L (ref 98–111)
CO2: 22 MEQ/L (ref 23–33)
CREAT SERPL-MCNC: 2.1 MG/DL (ref 0.4–1.2)
GFR SERPL CREATININE-BSD FRML MDRD: 29 ML/MIN/1.73M2
GLUCOSE BLD-MCNC: 117 MG/DL (ref 70–108)
GLUCOSE BLD-MCNC: 130 MG/DL (ref 70–108)
GLUCOSE BLD-MCNC: 136 MG/DL (ref 70–108)
GLUCOSE BLD-MCNC: 148 MG/DL (ref 70–108)
GLUCOSE BLD-MCNC: 166 MG/DL (ref 70–108)
POTASSIUM SERPL-SCNC: 4.6 MEQ/L (ref 3.5–5.2)
SODIUM BLD-SCNC: 144 MEQ/L (ref 135–145)

## 2022-05-14 PROCEDURE — 94760 N-INVAS EAR/PLS OXIMETRY 1: CPT

## 2022-05-14 PROCEDURE — 2580000003 HC RX 258: Performed by: PHYSICIAN ASSISTANT

## 2022-05-14 PROCEDURE — 97110 THERAPEUTIC EXERCISES: CPT

## 2022-05-14 PROCEDURE — 6370000000 HC RX 637 (ALT 250 FOR IP): Performed by: NURSE PRACTITIONER

## 2022-05-14 PROCEDURE — 2060000000 HC ICU INTERMEDIATE R&B

## 2022-05-14 PROCEDURE — 6370000000 HC RX 637 (ALT 250 FOR IP): Performed by: PHYSICIAN ASSISTANT

## 2022-05-14 PROCEDURE — 2580000003 HC RX 258: Performed by: INTERNAL MEDICINE

## 2022-05-14 PROCEDURE — 6360000002 HC RX W HCPCS: Performed by: PHYSICIAN ASSISTANT

## 2022-05-14 PROCEDURE — 6370000000 HC RX 637 (ALT 250 FOR IP): Performed by: STUDENT IN AN ORGANIZED HEALTH CARE EDUCATION/TRAINING PROGRAM

## 2022-05-14 PROCEDURE — 36415 COLL VENOUS BLD VENIPUNCTURE: CPT

## 2022-05-14 PROCEDURE — 82948 REAGENT STRIP/BLOOD GLUCOSE: CPT

## 2022-05-14 PROCEDURE — 80048 BASIC METABOLIC PNL TOTAL CA: CPT

## 2022-05-14 PROCEDURE — 6370000000 HC RX 637 (ALT 250 FOR IP)

## 2022-05-14 PROCEDURE — 99232 SBSQ HOSP IP/OBS MODERATE 35: CPT | Performed by: INTERNAL MEDICINE

## 2022-05-14 PROCEDURE — 97116 GAIT TRAINING THERAPY: CPT

## 2022-05-14 RX ADMIN — HYDRALAZINE HYDROCHLORIDE 50 MG: 50 TABLET ORAL at 23:47

## 2022-05-14 RX ADMIN — PREGABALIN 25 MG: 25 CAPSULE ORAL at 17:36

## 2022-05-14 RX ADMIN — SODIUM CHLORIDE, PRESERVATIVE FREE 10 ML: 5 INJECTION INTRAVENOUS at 20:56

## 2022-05-14 RX ADMIN — CLOPIDOGREL BISULFATE 75 MG: 75 TABLET ORAL at 07:55

## 2022-05-14 RX ADMIN — INSULIN LISPRO 1 UNITS: 100 INJECTION, SOLUTION INTRAVENOUS; SUBCUTANEOUS at 17:37

## 2022-05-14 RX ADMIN — CARVEDILOL 12.5 MG: 6.25 TABLET, FILM COATED ORAL at 07:55

## 2022-05-14 RX ADMIN — SODIUM CHLORIDE, PRESERVATIVE FREE 10 ML: 5 INJECTION INTRAVENOUS at 09:13

## 2022-05-14 RX ADMIN — SODIUM CHLORIDE: 4.5 INJECTION, SOLUTION INTRAVENOUS at 09:20

## 2022-05-14 RX ADMIN — ASPIRIN 81 MG: 81 TABLET, COATED ORAL at 07:55

## 2022-05-14 RX ADMIN — HEPARIN SODIUM 5000 UNITS: 5000 INJECTION INTRAVENOUS; SUBCUTANEOUS at 09:12

## 2022-05-14 RX ADMIN — HYDRALAZINE HYDROCHLORIDE 50 MG: 50 TABLET ORAL at 06:04

## 2022-05-14 RX ADMIN — DOXAZOSIN 4 MG: 4 TABLET ORAL at 07:55

## 2022-05-14 RX ADMIN — ATORVASTATIN CALCIUM 40 MG: 80 TABLET, FILM COATED ORAL at 07:55

## 2022-05-14 RX ADMIN — INSULIN LISPRO 4 UNITS: 100 INJECTION, SOLUTION INTRAVENOUS; SUBCUTANEOUS at 07:55

## 2022-05-14 RX ADMIN — INSULIN GLARGINE 10 UNITS: 100 INJECTION, SOLUTION SUBCUTANEOUS at 20:56

## 2022-05-14 RX ADMIN — INSULIN LISPRO 1 UNITS: 100 INJECTION, SOLUTION INTRAVENOUS; SUBCUTANEOUS at 11:59

## 2022-05-14 RX ADMIN — ISOSORBIDE MONONITRATE 30 MG: 30 TABLET, EXTENDED RELEASE ORAL at 07:55

## 2022-05-14 RX ADMIN — HYDRALAZINE HYDROCHLORIDE 50 MG: 50 TABLET ORAL at 14:40

## 2022-05-14 RX ADMIN — HEPARIN SODIUM 5000 UNITS: 5000 INJECTION INTRAVENOUS; SUBCUTANEOUS at 01:28

## 2022-05-14 RX ADMIN — CARVEDILOL 12.5 MG: 6.25 TABLET, FILM COATED ORAL at 17:36

## 2022-05-14 RX ADMIN — INSULIN LISPRO 4 UNITS: 100 INJECTION, SOLUTION INTRAVENOUS; SUBCUTANEOUS at 17:38

## 2022-05-14 RX ADMIN — HEPARIN SODIUM 5000 UNITS: 5000 INJECTION INTRAVENOUS; SUBCUTANEOUS at 17:36

## 2022-05-14 RX ADMIN — INSULIN LISPRO 4 UNITS: 100 INJECTION, SOLUTION INTRAVENOUS; SUBCUTANEOUS at 11:58

## 2022-05-14 RX ADMIN — AMLODIPINE BESYLATE 5 MG: 5 TABLET ORAL at 17:36

## 2022-05-14 NOTE — PROGRESS NOTES
Hospitalist Progress Note  Patient:  Saturnino Priest    Unit/Bed:4A-15/015-A  YOB: 1955  MRN: [id number]   Acct: [de-identified]   PCP: Partha Miranda MD  Date of Admission: 5/7/2022    Assessment/Plan:  1. Acute Ischemic Stroke: Code stroke called on 05/08 overnight. Patient with NIHSS 3 from facial droop, RLE weakness, slurred speech. CT scan head revealed chronic small vessel ischemic disease. Two incidental meningiomas. CTA head w/ contrast not obtained 2/2 severity of renal injury. MRI brain revealed few scattered punctate acute infarcts in left corona radiata and right centrum semiovale with chronic microvascular angiopathy. MRA head with focal areas of moderate stenosis in b/l cavernous and clinoid segments of ICAs. tPA not administered as window had passed and there was initial concern for a subacute indeterminate infarct. TTE with bubble study unremarkable for PFO. SLP w/ barium swallow + moderate dysphagia   - NIHSS and neuro checks qShift    - BP control, Coreg 12.5 BID started     - Continue IVF, ASA, Plavix, sugar control, statin    - PT/OT following    - 30-day event monitor on DC    - Will complete neuro rehab on DC   - awaiting precert for nursing home  2. Moderate Oropharyngeal Dysphagia: New onset s/p recent ischemic stroke(s), SLP eval with MBS on 05/10 revealed moderate oral dysphagia, mild pharyngeal dysphagia without tracheal aspiration events    - Diet minced and moist with mildly thick liquids   3. Symptomatic Bradycardia: Resolved. S/p syncopal episode with collapse and LOC lasting seconds. Likely iatrogenic 2/2 Toprol XL 75 mg which has been held. Pulse ~40s on admission. Pulse improved to ~60s s/p BB washout.    - Toprol XL discontinued on DC    - 05/13: patient now with mild tachycardia HR 100s, starting Coreg 12.5 BID   4. Acute Kidney Injury in CKD: Resolving. Creatinine improving. Likely etiology was low cardiac output/bradycardia. Full workup pending.  No recent contrast received. Baseline ~1.8 one week prior. BUN:Cr points to prerenal etiology. Renal US revealed CKD     - Nephrology following   - Avoid nephrotoxins      - Continue gentle IV fluids  5. NIDDM2: A1c 9.1. With diabetic nephropathy. Uncontrolled; sugars have been running too low. - Reduced lantus to 10u nightly, Humalog TID reduced to 4u. POCT glucose checks AC/HS  6. Hyperlipidemia: Continue home statin   7. Uncontrolled Hypertension: Continue norvasc 5 mg daily, cardura 4mg daily, hydralazine 50 mg q8h, imdur 30 mg daily   8. Chronic HFpEF 55%: Stable and well controlled at this time. Expected discharge date: Plan for DC to [address] [address] on 44/60 -precert pending    Disposition:    [] Home       [] TCU       [x] Rehab       [] Psych       [] SNF       [] [place]       [] Other-    Chief Complaint: Syncope with collapse, lightheadedness     Hospital Course: Per admitting H&P note \"Patient presents to the ED following a syncopal episode. The patient was just discharged one day prior to admission following a prolonged stay due to influenza A. The patient developed CHLOE while hospitalized. Today the patient had risen from sitting to standing and began amublating. The patient loss consciousness and subsequently fell. The patient was found to have a resting heart rate 40-48. Patient admitted for telemetry and cardiology consult. \"    05/09/22: Patient unfortunately suffered from ischemic stroke overnight but was deemed not a candidate for tPA as there was some concern for an indeterminate subacute infarct. NIHSS 3 with slurred speech, right facial droop and RLE weakness. MRI brain revealed some scattered acute ischemic infarcts bilaterally. Started on ASA, Plavix, high intensity statin. Neurology followed. Renal function worsened with time. As of now no known etiology.  Suspecting a possible cardiac etiology for micro thromboembolic disease  88/63/79: Still with facial droop, NIHSS 2, PM&R to evaluate for rehab. Patient with SLP eval and MBS revealing moderate oropharyngeal dysphagia with recs for diet to be minced and moist with thick liquids. 05/11/22: CHLOE improving. Nearing discharge,  set up transport to [facility] on 05/13 or 05/14 05/12/22: Facial droop significantly improved. BP still elevated ~150s. Subjective (past 24 hours): Patient with no complaints or concerns at this time. Participating with PT, no nausea or vomiting, weakness improving       ROS (12 point review of systems completed. Pertinent positives noted. Otherwise ROS is negative).     Medications:  Reviewed    Infusion Medications    sodium chloride 50 mL/hr at 05/14/22 0920    dextrose      sodium chloride       Scheduled Medications    carvedilol  12.5 mg Oral BID WC    insulin glargine  10 Units SubCUTAneous Nightly    insulin lispro  4 Units SubCUTAneous QAM AC    insulin lispro  4 Units SubCUTAneous Daily before lunch    insulin lispro  4 Units SubCUTAneous Dinner    doxazosin  4 mg Oral Daily    hydrALAZINE  50 mg Oral 3 times per day    atorvastatin  40 mg Oral Daily    calcium replacement protocol   Other RX Placeholder    pregabalin  25 mg Oral QPM    clopidogrel  75 mg Oral Daily    amLODIPine  5 mg Oral QPM    aspirin  81 mg Oral Daily    insulin lispro  0-6 Units SubCUTAneous TID WC    insulin lispro  0-3 Units SubCUTAneous Nightly    isosorbide mononitrate  30 mg Oral Daily    sodium chloride flush  5-40 mL IntraVENous 2 times per day    heparin (porcine)  5,000 Units SubCUTAneous q8h     PRN Meds: polyvinyl alcohol, albuterol sulfate HFA, glucagon (rDNA), dextrose, sodium chloride flush, sodium chloride, ondansetron **OR** ondansetron, polyethylene glycol, acetaminophen **OR** acetaminophen, glucose, dextrose bolus **OR** dextrose bolus      Intake/Output Summary (Last 24 hours) at 5/14/2022 1152  Last data filed at 5/14/2022 0740  Gross per 24 hour   Intake 800 ml Output --   Net 800 ml       Diet:  ADULT DIET; Dysphagia - Minced and Moist; 4 carb choices (60 gm/meal); Low Fat/Low Chol/High Fiber/KALEN; Low Sodium (2 gm); Low Potassium (Less than 3000 mg/day); Low Phosphorus (Less than 1000 mg); Less than 60 gm; Mildly Thick (Nectar)    Exam:  BP (!) 142/67   Pulse 95   Temp 97.8 °F (36.6 °C) (Axillary)   Resp 20   Ht 5' (1.524 m)   Wt 175 lb 9.6 oz (79.7 kg)   SpO2 94%   BMI 34.29 kg/m²     General appearance: No apparent distress, appears stated age and cooperative. Slurred speech   HEENT: Pupils equal, round, and reactive to light. Mild degree of exophthalmos with ride sided facial droop. Conjunctivae/corneas clear. Neck: Supple, with full range of motion. No jugular venous distention. Trachea midline. Respiratory:  Normal respiratory effort. Clear to auscultation, bilaterally without Rales/Wheezes/Rhonchi. Cardiovascular: Regular rate and rhythm with normal S1/S2 without murmurs, rubs or gallops. Abdomen: Soft, non-tender, non-distended with normal bowel sounds. Musculoskeletal: passive and active ROM x 4 extremities. Skin: Skin color, texture, turgor normal.  No rashes or lesions. Neurologic:  Neurovascularly intact without any focal sensory/motor deficits. Cranial nerves: II-XII intact, grossly non-focal.  Psychiatric: Alert and oriented, thought content appropriate, normal insight  Capillary Refill: Brisk,< 3 seconds   Peripheral Pulses: +2 palpable, equal bilaterally     Labs:   No results for input(s): WBC, HGB, HCT, PLT in the last 72 hours. Recent Labs     05/12/22  0436 05/13/22  0443 05/14/22  0404    146* 144   K 4.3 4.6 4.6    112* 110   CO2 23 23 22*   BUN 59* 43* 38*   CREATININE 2.0* 2.0* 2.1*   CALCIUM 8.3* 8.3* 8.4*     No results for input(s): AST, ALT, BILIDIR, BILITOT, ALKPHOS in the last 72 hours. No results for input(s): INR in the last 72 hours.   No results for input(s): Funmilayo Mary in the last 72 hours.    Microbiology:      Urinalysis:      Lab Results   Component Value Date    NITRU NEGATIVE 05/09/2022    WBCUA 0-2 05/09/2022    BACTERIA NONE SEEN 05/09/2022    RBCUA 0-2 05/09/2022    BLOODU NEGATIVE 05/09/2022    SPECGRAV 1.016 05/09/2022    GLUCOSEU Negative 05/02/2022     Radiology:  FL MODIFIED BARIUM SWALLOW W VIDEO   Final Result   1. Laryngeal penetration of pureed, thin and soft barium with aspiration of thin barium. 2. Additional recommendations from the speech therapist will follow. **This report has been created using voice recognition software. It may contain minor errors which are inherent in voice recognition technology. **         Final report electronically signed by Dr. Rosenda Frost on 5/10/2022 11:56 AM      MRI BRAIN WO CONTRAST   Final Result      1. A few scattered punctate acute infarcts in the left corona radiata and right centrum semiovale. 2. Mild severity chronic microvascular angiopathy. Findings were discussed with Sarina Avalos RN via telephone at the time of interpretation. **This report has been created using voice recognition software. It may contain minor errors which are inherent in voice recognition technology. **      Final report electronically signed by Dr. Yoanna Bernal MD on 5/9/2022 4:49 PM      MRA NECK WO CONTRAST   Final Result   Motion degraded exam without visualized areas of flow-limiting stenosis. **This report has been created using voice recognition software. It may contain minor errors which are inherent in voice recognition technology. **      Final report electronically signed by Dr. Yoanna Bernal MD on 5/9/2022 5:00 PM      MRA HEAD WO CONTRAST   Final Result    Focal areas of moderate stenosis in the bilateral cavernous and clinoid segments of the internal carotid arteries. **This report has been created using voice recognition software.  It may contain minor errors which are inherent in voice recognition technology. **      Final report electronically signed by Dr. Nehemias Sequeira MD on 5/9/2022 4:51 PM      US RENAL COMPLETE   Final Result   1. No renal calculi or hydronephrosis. 2. Increased renal cortical echogenicity in the right kidney. The left    kidney is not optimally visualized. Increased renal cortical echogenicity    can be seen in medical renal disease. This can be correlated with renal    function tests. 3. Urinary bladder has a volume of 363 mL. This document has been electronically signed by: Kalli Groves M.D. on    05/09/2022 06:07 AM      CT HEAD WO CONTRAST   Final Result   1. No acute disease in the brain. 2. Incidental findings are detailed above. This document has been electronically signed by: Kalli Groves M.D. on    05/08/2022 10:08 PM      All CTs at this facility use dose modulation techniques and iterative    reconstructions, and/or weight-based dosing   when appropriate to reduce radiation to a low as reasonably achievable. XR CHEST PORTABLE   Final Result   Impression:   Bibasilar hazy opacities, may reflect atelectasis or infection. Mild cardiomegaly. Equivocal left pleural effusion. This document has been electronically signed by:  Steffan Holstein, MD on 05/07/2022    08:58 PM        Electronically signed by Michael Swenson MD on 5/14/2022 at 11:53 AM

## 2022-05-14 NOTE — PROGRESS NOTES
[address] [address]  INPATIENT PHYSICAL THERAPY  DAILY NOTE  [facility] 4A - 4A-15/015-A    Time In: 1691  Time Out: 1345  Timed Code Treatment Minutes: 27 Minutes  Minutes: 27          Date: 2022  Patient Name: Chrissy Green,  Gender:  female        MRN: [id number]  : 1955  (61 y.o.)     Referring Practitioner: Esau Guillory PA-C  Diagnosis: symptomatic bradycardia  Additional Pertinent Hx: Per H&p :Patient presents to the ED following a syncopal episode. The patient was just discharged one day prior to admission following a prolonged stay due to influenza A. The patient developed CHLOE while hospitalized. Today the patient had risen from sitting to standing and began amublating. The patient loss consciousness and subsequently fell. The patient was found to have a resting heart rate 40-48. Pt presents with On 2022 patient was noted by staff to have dysarthria and a new right facial droop which prompted a code stroke. Initial NIH was 3. Patient was taken to imaging for CT head which revealed no ICH however he did demonstrate old stroke which was age-indeterminate. For this reason tPA was not given as if this was a recent stroke he would be very high risk. CTAs were deferred as this patient has a creatinine of 4.0. Today on exam, pt continues to have slurred speech and facial droop and she is lethargic per neurology note.  MRI of the brain indicating acute infarcts in the left corona radiata and right centrum semiovale     Prior Level of Function:  Lives With: Alone  Type of Home: House  Home Layout: One level  Home Access: Level entry  Home Equipment: [place] [place], rolling,Cane   Bathroom Shower/Tub: Tub/Shower unit  Bathroom Toilet: Standard  Bathroom Equipment: Shower chair  Bathroom Accessibility: Walker accessible    Receives Help From: Family  ADL Assistance: Independent  Homemaking Assistance: Independent  Ambulation Assistance: Independent  Transfer Assistance: Independent  Active : Yes  Additional Comments: Pt used cane intermittently when she is feeling off balance. Pt states her kids can assist her if needed, she runs her own errands and cares for her home indep. Pt's children drive for her. Restrictions/Precautions:  Restrictions/Precautions: Fall Risk,General Precautions  Position Activity Restriction  Other position/activity restrictions: monitor BP     SUBJECTIVE: RN approved session. First trial patient was asleep at arrival and declined due to being tired. Second trial patient agreed to therapy with encouragement. PAIN: Patient denied any pain. Vitals: Vitals not assessed per clinical judgement, see nursing flowsheet    OBJECTIVE:  Bed Mobility:  Not Tested    Transfers:  Sit to Stand: [employer], with increased time for completion, Patient stood slowly with cues for proper hand placement, required a brief standing break once standing prior to amulation  Stand to [employer], with increased time for completion, Patient was able to slowly descend into chair with bilateral UE support on arm rests. *Wide base of support with all transfers      Ambulation:  Contact Guard Assistance, with increased time for completion  Distance: 45'  Surface: Level Tile  Device:Rolling Walker  Gait Deviations:  Slow Candy, Mild Path Deviations and cues needed to properly manage walker in tight spaces, Patient took one rest break during ambulation due to fatigue. Balance:  Static Standing Balance: Contact Guard Assistance, patient stood while reading information on wall for about 2 minutes, Patient utilized bilateral UE support and no LOB. Exercise:  Patient was guided in 1 set(s) 10 reps of exercise to both lower extremities. Ankle pumps, Glut sets, Quad sets, Hip abduction/adduction, Straight leg raises, Seated marches, Seated heel/toe raises, Long arc quads and crossing feet.  Patient required cues and demonstrations to complete all exercises properly and maintain on task. Patient was challenged with crossing her feet being unable to complete without assistance. Rest breaks provided between all exercises with fair tolerance. .  Exercises were completed for increased independence with functional mobility. Functional Outcome Measures: Completed  AM-PAC Inpatient Mobility without Stair Climbing Raw Score : 15  AM-PAC Inpatient without Stair Climbing T-Scale Score : 43.03    ASSESSMENT:  Assessment: Patient progressing toward established goals. Activity Tolerance:  Patient tolerance of  treatment: fair. Equipment Recommendations:Equipment Needed: No (pt has a RW)  Discharge Recommendations: Continue to assess pending progress  Plan: Current Treatment Recommendations: Strengthening,Balance training,Functional mobility training,Transfer training,Endurance training,Neuromuscular re-education,Gait training,Home exercise program,Safety education & training,Patient/Caregiver education & training,Equipment evaluation, education, & procurement,Therapeutic activities  Plan:  (6x N)    Patient Education  Patient Education: Plan of Care, Home Exercise Program    Goals:  Patient goals : \"no\"  Short Term Goals  Time Frame for Short term goals: by hospital d/c  Short term goal 1: Pt to demo supine <->sit with S for ease with getting in and out of bed  Short term goal 2: Pt to demo sit <->stand with RW and S for improved ability to transfer from any surface safely  Short term goal 3: Pt to ambulate >=40' with RW and S for improved ability to move in her environment  Short term goal 4: Pt to improve Tinetti score to >=19/28 to progress to the moderate fall risk category  Long Term Goals  Time Frame for Long term goals : NA due to short ELOS    Following session, patient left in safe position with all fall risk precautions in place.

## 2022-05-14 NOTE — PROGRESS NOTES
[facility] THERAPY COMMUNICATION NOTE  [facility] NEUROSCIENCES 4A      Date: 2022  Patient Name: Sky Haywood        MRN: [id number]    : 1955  (61 y.o.)    REASON FOR COMMUNICATION:  ST received report from nutritionist, Aron Sam, that patient with presence of trina crackers on tray table despite current diet order of minced and moist diet with mildly thick liquids. Cheema Presser, with attempt to request patient allow removal of trina crackers, however, patient with refusal and report RN, Ashly, provided them. ST with chart review of patient with MBS completed 5/10 with the following impressions, \"Patient presents with moderate oral dysphagia, mild pharyngeal dysphagia as evidenced by skilled clinical findings outlined above. Oral phase compounded d/t significance of R facial droop and lingual weakness to result in poor labial seal to result in episodic anterolateral loss (specifically with viscosities trialed); decreased cohesive bolus formation, manipulation, and coordination of bolus impacting efficiency of AP transit. Inconsistent R buccal pocketing with limited sensation of oral residuals. Swallow onset timely with slightly reduced hyolaryngeal elevation, anterior excursion/protraction, and epiglottic inversion for airway protection. No appreciable residuals in the pharynx post completion of the swallow. The following airway invasion events were observed:   Thin barium: d/t pharyngeal deficits presenting, consistent laryngeal penetration occurring assisted to the VF with static positioning (no volitional ejection out of the laryngeal vestibule); x1 occurrence for progression to micro, anterior tracheal aspiration with cough reflex not sufficient for ejection from the trachea despite effort; did attempt chin tuck positional strategy with no yielded improvements to negate airway occurrences   Throughout remainder of controlled study, imaging did not endorse laryngeal penetration or tracheal aspiration events within all alternate trials completed. Recommendations for minced and moist diet with mildly thick liquids, straw usage for liquid consumption and oral care post meals to negate potential pocketing. Ongoing dysphagia management services are warranted. \"     ST with DIRECT education with RNKwame with review of MBS report. RN with verbal receptiveness to ST recommendations with immediate removal of trina crackers from patient's room.      Mercedes Posada M.S., [facility]

## 2022-05-14 NOTE — PROGRESS NOTES
[facility]   Nephrology progress note  5/14/2022, 10:46 AM      Pt Name:    Sky Haywood  MRN:     [id number]     YOB: 1955  Admit Date:    5/7/2022  7:23 PM  Primary Care Physician:  Joanna Carlson MD   Room number  4A-15/015-A    Chief Complaint: Nephrology following for CHLOE/CKD    Subjective:  Patient seen and examined  No chest pain or shortness of breath  Feels okay    Objective:  24HR INTAKE/OUTPUT:    Intake/Output Summary (Last 24 hours) at 5/14/2022 1046  Last data filed at 5/14/2022 0740  Gross per 24 hour   Intake 800 ml   Output --   Net 800 ml     I/O last 3 completed shifts: In: 840 [P.O.:840]  Out: 250 [Urine:250]  I/O this shift:  In: 200 [P.O.:200]  Out: -   Admission weight: 165 lb (74.8 kg)  Wt Readings from Last 3 Encounters:   05/11/22 175 lb 9.6 oz (79.7 kg)   05/06/22 178 lb 9.2 oz (81 kg)   10/17/18 175 lb 0.7 oz (79.4 kg)     Body mass index is 34.29 kg/m². Physical examination  VITALS:     Vitals:    05/13/22 2315 05/14/22 0347 05/14/22 0604 05/14/22 0733   BP: 139/62 (!) 152/69 (!) 156/68 (!) 142/67   Pulse: 89 93  95   Resp: 16 20  20   Temp: 100.4 °F (38 °C) 98.7 °F (37.1 °C)  97.8 °F (36.6 °C)   TempSrc: Oral Oral  Axillary   SpO2: 95% 94%  94%   Weight:       Height:         General Appearance: alert and cooperative with exam, appears comfortable, no distress  Mouth/Throat: Oral mucosa moist  Neck: No JVD  Lungs: Air entry B/L, no rales, no use of accessory muscles  Heart:  S1, S2 heard  GI: soft, non-tender, no guarding  Extremities: trace LE edema      Lab Data  CBC: No results for input(s): WBC, HGB, HCT, PLT in the last 72 hours.   BMP:  Recent Labs     05/12/22  0436 05/13/22  0443 05/14/22  0404    146* 144   K 4.3 4.6 4.6    112* 110   CO2 23 23 22*   BUN 59* 43* 38*   CREATININE 2.0* 2.0* 2.1*   GLUCOSE 92 112* 130*   CALCIUM 8.3* 8.3* 8.4*     Hepatic: No results for input(s): LABALBU, AST, ALT, ALB, BILITOT, ALKPHOS in the last 72 hours. Meds:  Infusion:    sodium chloride 50 mL/hr at 05/14/22 0920    dextrose      sodium chloride       Meds:    carvedilol  12.5 mg Oral BID WC    insulin glargine  10 Units SubCUTAneous Nightly    insulin lispro  4 Units SubCUTAneous QAM AC    insulin lispro  4 Units SubCUTAneous Daily before lunch    insulin lispro  4 Units SubCUTAneous Dinner    doxazosin  4 mg Oral Daily    hydrALAZINE  50 mg Oral 3 times per day    atorvastatin  40 mg Oral Daily    calcium replacement protocol   Other RX Placeholder    pregabalin  25 mg Oral QPM    clopidogrel  75 mg Oral Daily    amLODIPine  5 mg Oral QPM    aspirin  81 mg Oral Daily    insulin lispro  0-6 Units SubCUTAneous TID WC    insulin lispro  0-3 Units SubCUTAneous Nightly    isosorbide mononitrate  30 mg Oral Daily    sodium chloride flush  5-40 mL IntraVENous 2 times per day    heparin (porcine)  5,000 Units SubCUTAneous q8h     Meds prn: polyvinyl alcohol, albuterol sulfate HFA, glucagon (rDNA), dextrose, sodium chloride flush, sodium chloride, ondansetron **OR** ondansetron, polyethylene glycol, acetaminophen **OR** acetaminophen, glucose, dextrose bolus **OR** dextrose bolus       Impression and Plan:  1. CHLOE on CKD  Baseline creatinine 1.8-2.3  Peaked at 4.0, down to 2.1 today  Was bradycardic upon arrival  Improved with IV fluids  2. Electrolytes/hypernatremia:  stable, sodium improved  3. Diabetes mellitus with nephropathy  4. Hypertension. Blood pressure readings reviewed  5. IDDM    D/W patient     Candace Royal MD  [facility]    This report has been created using voice recognition software.  It may contain minor errors which are inherent in voice recognition technology

## 2022-05-15 LAB
ANION GAP SERPL CALCULATED.3IONS-SCNC: 11 MEQ/L (ref 8–16)
BUN BLDV-MCNC: 36 MG/DL (ref 7–22)
CALCIUM SERPL-MCNC: 8 MG/DL (ref 8.5–10.5)
CHLORIDE BLD-SCNC: 109 MEQ/L (ref 98–111)
CO2: 22 MEQ/L (ref 23–33)
CREAT SERPL-MCNC: 1.9 MG/DL (ref 0.4–1.2)
GFR SERPL CREATININE-BSD FRML MDRD: 32 ML/MIN/1.73M2
GLOMERULAR BASEMENT MEMBRANE ANTIBODY: NORMAL
GLUCOSE BLD-MCNC: 103 MG/DL (ref 70–108)
GLUCOSE BLD-MCNC: 80 MG/DL (ref 70–108)
GLUCOSE BLD-MCNC: 85 MG/DL (ref 70–108)
GLUCOSE BLD-MCNC: 87 MG/DL (ref 70–108)
GLUCOSE BLD-MCNC: 98 MG/DL (ref 70–108)
MYELOPEROXIDASE AB, IGG: 0 AU/ML (ref 0–19)
POTASSIUM SERPL-SCNC: 4.2 MEQ/L (ref 3.5–5.2)
SERINE PROTEASE 3, IGG: 1 AU/ML (ref 0–19)
SODIUM BLD-SCNC: 142 MEQ/L (ref 135–145)

## 2022-05-15 PROCEDURE — 82948 REAGENT STRIP/BLOOD GLUCOSE: CPT

## 2022-05-15 PROCEDURE — 80048 BASIC METABOLIC PNL TOTAL CA: CPT

## 2022-05-15 PROCEDURE — 6360000002 HC RX W HCPCS: Performed by: PHYSICIAN ASSISTANT

## 2022-05-15 PROCEDURE — 6370000000 HC RX 637 (ALT 250 FOR IP): Performed by: STUDENT IN AN ORGANIZED HEALTH CARE EDUCATION/TRAINING PROGRAM

## 2022-05-15 PROCEDURE — 2060000000 HC ICU INTERMEDIATE R&B

## 2022-05-15 PROCEDURE — 36415 COLL VENOUS BLD VENIPUNCTURE: CPT

## 2022-05-15 PROCEDURE — 6370000000 HC RX 637 (ALT 250 FOR IP): Performed by: NURSE PRACTITIONER

## 2022-05-15 PROCEDURE — 99232 SBSQ HOSP IP/OBS MODERATE 35: CPT | Performed by: INTERNAL MEDICINE

## 2022-05-15 PROCEDURE — 6370000000 HC RX 637 (ALT 250 FOR IP): Performed by: PHYSICIAN ASSISTANT

## 2022-05-15 PROCEDURE — 6370000000 HC RX 637 (ALT 250 FOR IP)

## 2022-05-15 PROCEDURE — 2580000003 HC RX 258: Performed by: INTERNAL MEDICINE

## 2022-05-15 PROCEDURE — 2580000003 HC RX 258: Performed by: PHYSICIAN ASSISTANT

## 2022-05-15 PROCEDURE — 2500000003 HC RX 250 WO HCPCS: Performed by: STUDENT IN AN ORGANIZED HEALTH CARE EDUCATION/TRAINING PROGRAM

## 2022-05-15 RX ORDER — CALCIUM CHLORIDE 100 MG/ML
1000 INJECTION INTRAVENOUS; INTRAVENTRICULAR ONCE
Status: DISCONTINUED | OUTPATIENT
Start: 2022-05-15 | End: 2022-05-15

## 2022-05-15 RX ORDER — CALCIUM GLUCONATE 10 MG/ML
1000 INJECTION, SOLUTION INTRAVENOUS ONCE
Status: COMPLETED | OUTPATIENT
Start: 2022-05-15 | End: 2022-05-15

## 2022-05-15 RX ADMIN — DOXAZOSIN 4 MG: 4 TABLET ORAL at 08:10

## 2022-05-15 RX ADMIN — ATORVASTATIN CALCIUM 40 MG: 80 TABLET, FILM COATED ORAL at 08:10

## 2022-05-15 RX ADMIN — PREGABALIN 25 MG: 25 CAPSULE ORAL at 18:41

## 2022-05-15 RX ADMIN — HYDRALAZINE HYDROCHLORIDE 50 MG: 50 TABLET ORAL at 21:36

## 2022-05-15 RX ADMIN — SODIUM CHLORIDE, PRESERVATIVE FREE 10 ML: 5 INJECTION INTRAVENOUS at 08:11

## 2022-05-15 RX ADMIN — SODIUM CHLORIDE: 4.5 INJECTION, SOLUTION INTRAVENOUS at 08:08

## 2022-05-15 RX ADMIN — HEPARIN SODIUM 5000 UNITS: 5000 INJECTION INTRAVENOUS; SUBCUTANEOUS at 01:26

## 2022-05-15 RX ADMIN — INSULIN GLARGINE 10 UNITS: 100 INJECTION, SOLUTION SUBCUTANEOUS at 21:36

## 2022-05-15 RX ADMIN — HEPARIN SODIUM 5000 UNITS: 5000 INJECTION INTRAVENOUS; SUBCUTANEOUS at 08:10

## 2022-05-15 RX ADMIN — CARVEDILOL 12.5 MG: 6.25 TABLET, FILM COATED ORAL at 08:10

## 2022-05-15 RX ADMIN — INSULIN LISPRO 4 UNITS: 100 INJECTION, SOLUTION INTRAVENOUS; SUBCUTANEOUS at 08:15

## 2022-05-15 RX ADMIN — HEPARIN SODIUM 5000 UNITS: 5000 INJECTION INTRAVENOUS; SUBCUTANEOUS at 18:40

## 2022-05-15 RX ADMIN — HYDRALAZINE HYDROCHLORIDE 50 MG: 50 TABLET ORAL at 06:37

## 2022-05-15 RX ADMIN — ASPIRIN 81 MG: 81 TABLET, COATED ORAL at 08:10

## 2022-05-15 RX ADMIN — CLOPIDOGREL BISULFATE 75 MG: 75 TABLET ORAL at 08:10

## 2022-05-15 RX ADMIN — SODIUM CHLORIDE, PRESERVATIVE FREE 10 ML: 5 INJECTION INTRAVENOUS at 21:38

## 2022-05-15 RX ADMIN — HYDRALAZINE HYDROCHLORIDE 50 MG: 50 TABLET ORAL at 15:09

## 2022-05-15 RX ADMIN — AMLODIPINE BESYLATE 5 MG: 5 TABLET ORAL at 18:41

## 2022-05-15 RX ADMIN — CALCIUM GLUCONATE 1000 MG: 10 INJECTION, SOLUTION INTRAVENOUS at 08:27

## 2022-05-15 RX ADMIN — ISOSORBIDE MONONITRATE 30 MG: 30 TABLET, EXTENDED RELEASE ORAL at 08:10

## 2022-05-15 RX ADMIN — CARVEDILOL 12.5 MG: 6.25 TABLET, FILM COATED ORAL at 18:41

## 2022-05-15 NOTE — PROGRESS NOTES
[facility]   Nephrology progress note  5/15/2022, 12:28 PM      Pt Name:    Gordo Aguirre  MRN:     [id number]     [place]:    1955  Admit Date:    5/7/2022  7:23 PM  Primary Care Physician:  Blair Randle MD   Room number  4A-15/015-A    Chief Complaint: Nephrology following for CHLOE/CKD    Subjective:  Patient seen and examined  No chest pain or shortness of breath  Feels okay  Late entry  Poor historian    Objective:  24HR INTAKE/OUTPUT:      Intake/Output Summary (Last 24 hours) at 5/15/2022 1228  Last data filed at 5/15/2022 0932  Gross per 24 hour   Intake 200 ml   Output --   Net 200 ml     I/O last 3 completed shifts: In: 420 [P.O.:420]  Out: -   I/O this shift:  In: 100 [P.O.:100]  Out: -   Admission weight: 165 lb (74.8 kg)  Wt Readings from Last 3 Encounters:   05/11/22 175 lb 9.6 oz (79.7 kg)   05/06/22 178 lb 9.2 oz (81 kg)   10/17/18 175 lb 0.7 oz (79.4 kg)     Body mass index is 34.29 kg/m². Physical examination  VITALS:     Vitals:    05/15/22 0348 05/15/22 0637 05/15/22 0802 05/15/22 1152   BP: (!) 153/64 (!) 151/65 (!) 153/72 (!) 122/53   Pulse: 81  83 73   Resp: 16  16 16   Temp: 99.2 °F (37.3 °C)  99.2 °F (37.3 °C) 97.5 °F (36.4 °C)   TempSrc: Oral  Oral Oral   SpO2: 94%  95% 98%   Weight:       Height:         General Appearance: appears comfortable, no distress  Mouth/Throat: Oral mucosa moist  Neck: No JVD  Lungs:  no use of accessory muscles, poor effort  Heart:  S1, S2 heard  GI: soft, non-tender, no guarding  Extremities: trace LE edema      Lab Data  CBC: No results for input(s): WBC, HGB, HCT, PLT in the last 72 hours.   BMP:  Recent Labs     05/13/22  0443 05/14/22  0404 05/15/22  0411   * 144 142   K 4.6 4.6 4.2   * 110 109   CO2 23 22* 22*   BUN 43* 38* 36*   CREATININE 2.0* 2.1* 1.9*   GLUCOSE 112* 130* 80   CALCIUM 8.3* 8.4* 8.0*     Hepatic: No results for input(s): LABALBU, AST, ALT, ALB, BILITOT, ALKPHOS in the last 72 hours.      Meds:  Infusion:    sodium chloride 50 mL/hr at 05/15/22 0808    dextrose      sodium chloride       Meds:    carvedilol  12.5 mg Oral BID WC    insulin glargine  10 Units SubCUTAneous Nightly    insulin lispro  4 Units SubCUTAneous QAM AC    insulin lispro  4 Units SubCUTAneous Daily before lunch    insulin lispro  4 Units SubCUTAneous Dinner    doxazosin  4 mg Oral Daily    hydrALAZINE  50 mg Oral 3 times per day    atorvastatin  40 mg Oral Daily    calcium replacement protocol   Other RX Placeholder    pregabalin  25 mg Oral QPM    clopidogrel  75 mg Oral Daily    amLODIPine  5 mg Oral QPM    aspirin  81 mg Oral Daily    insulin lispro  0-6 Units SubCUTAneous TID WC    insulin lispro  0-3 Units SubCUTAneous Nightly    isosorbide mononitrate  30 mg Oral Daily    sodium chloride flush  5-40 mL IntraVENous 2 times per day    heparin (porcine)  5,000 Units SubCUTAneous q8h     Meds prn: polyvinyl alcohol, albuterol sulfate HFA, glucagon (rDNA), dextrose, sodium chloride flush, sodium chloride, ondansetron **OR** ondansetron, polyethylene glycol, acetaminophen **OR** acetaminophen, glucose, dextrose bolus **OR** dextrose bolus       Impression and Plan:  1. CHLOE on CKD  Baseline creatinine 1.8-2.3  Peaked at 4.0  Creatinine down to 1.9  Was bradycardic upon arrival  Improved with IV fluids (half-normal saline)  Stable/improved renal function  2. Electrolytes/hypernatremia:  stable, sodium improved with 0.45% saline  3. Diabetes mellitus with nephropathy  4. Hypertension. Blood pressure readings reviewed  5. IDDM  6. Disposition--pre-CERT pending    D/W patient     Nino Prince MD  [facility]    This report has been created using voice recognition software.  It may contain minor errors which are inherent in voice recognition technology

## 2022-05-16 LAB
ANION GAP SERPL CALCULATED.3IONS-SCNC: 11 MEQ/L (ref 8–16)
BUN BLDV-MCNC: 36 MG/DL (ref 7–22)
CALCIUM SERPL-MCNC: 8 MG/DL (ref 8.5–10.5)
CHLORIDE BLD-SCNC: 109 MEQ/L (ref 98–111)
CO2: 20 MEQ/L (ref 23–33)
CREAT SERPL-MCNC: 1.9 MG/DL (ref 0.4–1.2)
GFR SERPL CREATININE-BSD FRML MDRD: 32 ML/MIN/1.73M2
GLUCOSE BLD-MCNC: 103 MG/DL (ref 70–108)
GLUCOSE BLD-MCNC: 115 MG/DL (ref 70–108)
GLUCOSE BLD-MCNC: 130 MG/DL (ref 70–108)
GLUCOSE BLD-MCNC: 86 MG/DL (ref 70–108)
GLUCOSE BLD-MCNC: 89 MG/DL (ref 70–108)
POTASSIUM SERPL-SCNC: 4.5 MEQ/L (ref 3.5–5.2)
SARS-COV-2, NAAT: DETECTED
SODIUM BLD-SCNC: 140 MEQ/L (ref 135–145)

## 2022-05-16 PROCEDURE — 2580000003 HC RX 258: Performed by: PHYSICIAN ASSISTANT

## 2022-05-16 PROCEDURE — 99232 SBSQ HOSP IP/OBS MODERATE 35: CPT | Performed by: INTERNAL MEDICINE

## 2022-05-16 PROCEDURE — 2060000000 HC ICU INTERMEDIATE R&B

## 2022-05-16 PROCEDURE — 87635 SARS-COV-2 COVID-19 AMP PRB: CPT

## 2022-05-16 PROCEDURE — 80048 BASIC METABOLIC PNL TOTAL CA: CPT

## 2022-05-16 PROCEDURE — 6370000000 HC RX 637 (ALT 250 FOR IP): Performed by: PHYSICIAN ASSISTANT

## 2022-05-16 PROCEDURE — 6370000000 HC RX 637 (ALT 250 FOR IP)

## 2022-05-16 PROCEDURE — 82948 REAGENT STRIP/BLOOD GLUCOSE: CPT

## 2022-05-16 PROCEDURE — 6370000000 HC RX 637 (ALT 250 FOR IP): Performed by: STUDENT IN AN ORGANIZED HEALTH CARE EDUCATION/TRAINING PROGRAM

## 2022-05-16 PROCEDURE — 97530 THERAPEUTIC ACTIVITIES: CPT

## 2022-05-16 PROCEDURE — 97535 SELF CARE MNGMENT TRAINING: CPT

## 2022-05-16 PROCEDURE — 6360000002 HC RX W HCPCS: Performed by: PHYSICIAN ASSISTANT

## 2022-05-16 PROCEDURE — 97110 THERAPEUTIC EXERCISES: CPT

## 2022-05-16 PROCEDURE — 6370000000 HC RX 637 (ALT 250 FOR IP): Performed by: NURSE PRACTITIONER

## 2022-05-16 PROCEDURE — 36415 COLL VENOUS BLD VENIPUNCTURE: CPT

## 2022-05-16 RX ADMIN — HYDRALAZINE HYDROCHLORIDE 50 MG: 50 TABLET ORAL at 13:48

## 2022-05-16 RX ADMIN — AMLODIPINE BESYLATE 5 MG: 5 TABLET ORAL at 16:02

## 2022-05-16 RX ADMIN — SODIUM CHLORIDE, PRESERVATIVE FREE 10 ML: 5 INJECTION INTRAVENOUS at 20:43

## 2022-05-16 RX ADMIN — HEPARIN SODIUM 5000 UNITS: 5000 INJECTION INTRAVENOUS; SUBCUTANEOUS at 08:02

## 2022-05-16 RX ADMIN — HEPARIN SODIUM 5000 UNITS: 5000 INJECTION INTRAVENOUS; SUBCUTANEOUS at 16:02

## 2022-05-16 RX ADMIN — CARVEDILOL 12.5 MG: 6.25 TABLET, FILM COATED ORAL at 08:01

## 2022-05-16 RX ADMIN — CLOPIDOGREL BISULFATE 75 MG: 75 TABLET ORAL at 08:02

## 2022-05-16 RX ADMIN — INSULIN GLARGINE 10 UNITS: 100 INJECTION, SOLUTION SUBCUTANEOUS at 21:30

## 2022-05-16 RX ADMIN — DOXAZOSIN 4 MG: 4 TABLET ORAL at 08:01

## 2022-05-16 RX ADMIN — HYDRALAZINE HYDROCHLORIDE 50 MG: 50 TABLET ORAL at 06:29

## 2022-05-16 RX ADMIN — PREGABALIN 25 MG: 25 CAPSULE ORAL at 18:55

## 2022-05-16 RX ADMIN — HEPARIN SODIUM 5000 UNITS: 5000 INJECTION INTRAVENOUS; SUBCUTANEOUS at 01:01

## 2022-05-16 RX ADMIN — ASPIRIN 81 MG: 81 TABLET, COATED ORAL at 08:02

## 2022-05-16 RX ADMIN — CARVEDILOL 12.5 MG: 6.25 TABLET, FILM COATED ORAL at 16:02

## 2022-05-16 RX ADMIN — HYDRALAZINE HYDROCHLORIDE 50 MG: 50 TABLET ORAL at 21:30

## 2022-05-16 RX ADMIN — ISOSORBIDE MONONITRATE 30 MG: 30 TABLET, EXTENDED RELEASE ORAL at 08:01

## 2022-05-16 RX ADMIN — ATORVASTATIN CALCIUM 40 MG: 80 TABLET, FILM COATED ORAL at 08:01

## 2022-05-16 NOTE — PROGRESS NOTES
Notified Dr. Lemuel Manrique and Dr. Pati Fay, resident, that patient had positive Covid swab today.

## 2022-05-16 NOTE — CARE COORDINATION
5/16/22, 9:04 AM EDT    DISCHARGE PLANNING EVALUATION      Called Aleisha from [place] to check on the status of patient's precert. She states that she sent a message up this morning, will call  as soon as she has an update.

## 2022-05-16 NOTE — PROGRESS NOTES
Hospitalist Progress Note  Patient:  Jemima Joel    Unit/Bed:4A-15/015-A  YOB: 1955  MRN: [id number]   Acct: [de-identified]   PCP: Frandy Stevens MD  Date of Admission: 5/7/2022    Assessment/Plan:  1. Acute Ischemic Stroke: Code stroke called on 05/08 overnight. Patient with NIHSS 3 from facial droop, RLE weakness, slurred speech. CT scan head revealed chronic small vessel ischemic disease. Two incidental meningiomas. CTA head w/ contrast not obtained 2/2 severity of renal injury. MRI brain revealed few scattered punctate acute infarcts in left corona radiata and right centrum semiovale with chronic microvascular angiopathy. MRA head with focal areas of moderate stenosis in b/l cavernous and clinoid segments of ICAs. tPA not administered as window had passed and there was initial concern for a subacute indeterminate infarct. TTE with bubble study unremarkable for PFO. SLP w/ barium swallow + moderate dysphagia   - NIHSS and neuro checks qShift    - BP control, continue Coreg 12.5 BID    - Continue IVF, ASA, Plavix, sugar control, statin    - PT/OT following    - 30-day event monitor on DC    - Will complete neuro rehab on DC    - Precert accepted but patient tested positive for COVID19  2. Moderate Oropharyngeal Dysphagia: New onset s/p recent ischemic stroke(s), SLP eval with MBS on 05/10 revealed moderate oral dysphagia, mild pharyngeal dysphagia without tracheal aspiration events    - Diet minced and moist with mildly thick liquids   3. Symptomatic Bradycardia: Resolved. S/p syncopal episode with collapse and LOC lasting seconds. Likely iatrogenic 2/2 Toprol XL 75 mg which has been held. Pulse ~40s on admission. Pulse improved to ~60s s/p BB washout.    - Toprol XL discontinued on DC    - 05/13: patient now with mild tachycardia HR 100s, starting Coreg 12.5 BID   4. Acute Kidney Injury in CKD: Resolving. Creatinine improving. Likely etiology was low cardiac output/bradycardia.  Full workup pending. No recent contrast received. Baseline ~1.8 one week prior. BUN:Cr points to prerenal etiology. Renal US revealed CKD     - Nephrology following   - Avoid nephrotoxins      - Continue gentle IV fluids  5. NIDDM2: A1c 9.1. With diabetic nephropathy. Uncontrolled; sugars have been running too low. - Reduced lantus to 10u nightly, Humalog TID reduced to 4u. POCT glucose checks AC/HS  6. Hyperlipidemia: Continue home statin   7. Uncontrolled Hypertension: Continue norvasc 5 mg daily, cardura 4mg daily, hydralazine 50 mg q8h, imdur 30 mg daily   8. Chronic HFpEF 55%: Stable and well controlled at this time. Expected discharge date: Plan for DC to [id number] [address] on 89/09 -precert pending    Disposition:    [] Home       [] TCU       [x] Rehab       [] Psych       [] SNF       [] [place]       [] Other-    Chief Complaint: Syncope with collapse, lightheadedness     Hospital Course: Per admitting H&P note \"Patient presents to the ED following a syncopal episode. The patient was just discharged one day prior to admission following a prolonged stay due to influenza A. The patient developed CHLOE while hospitalized. Today the patient had risen from sitting to standing and began amublating. The patient loss consciousness and subsequently fell. The patient was found to have a resting heart rate 40-48. Patient admitted for telemetry and cardiology consult. \"    05/09/22: Patient unfortunately suffered from ischemic stroke overnight but was deemed not a candidate for tPA as there was some concern for an indeterminate subacute infarct. NIHSS 3 with slurred speech, right facial droop and RLE weakness. MRI brain revealed some scattered acute ischemic infarcts bilaterally. Started on ASA, Plavix, high intensity statin. Neurology followed. Renal function worsened with time. As of now no known etiology.  Suspecting a possible cardiac etiology for micro thromboembolic disease  07/85/62: Still with facial droop, NIHSS 2, PM&R to evaluate for rehab. Patient with SLP eval and MBS revealing moderate oropharyngeal dysphagia with recs for diet to be minced and moist with thick liquids. 05/11/22: CHLOE improving. Nearing discharge,  set up transport to [facility] on 05/13 or 05/14 05/12/22: Facial droop significantly improved. BP still elevated ~150s.   13/86-24/82: Awaiting precert for rehab       Subjective (past 24 hours): Patient laying in bed comfortably with no complaints or concerns. No acute events or concerns. Patient laying in bed comfortably with no complaints or concerns. She was accepted to rehab, precert accepted but patient tested positive for COVID19.     ROS (12 point review of systems completed. Pertinent positives noted. Otherwise ROS is negative).     Medications:  Reviewed    Infusion Medications    dextrose      sodium chloride       Scheduled Medications    carvedilol  12.5 mg Oral BID WC    insulin glargine  10 Units SubCUTAneous Nightly    insulin lispro  4 Units SubCUTAneous QAM AC    insulin lispro  4 Units SubCUTAneous Daily before lunch    insulin lispro  4 Units SubCUTAneous Dinner    doxazosin  4 mg Oral Daily    hydrALAZINE  50 mg Oral 3 times per day    atorvastatin  40 mg Oral Daily    calcium replacement protocol   Other RX Placeholder    pregabalin  25 mg Oral QPM    clopidogrel  75 mg Oral Daily    amLODIPine  5 mg Oral QPM    aspirin  81 mg Oral Daily    insulin lispro  0-6 Units SubCUTAneous TID WC    insulin lispro  0-3 Units SubCUTAneous Nightly    isosorbide mononitrate  30 mg Oral Daily    sodium chloride flush  5-40 mL IntraVENous 2 times per day    heparin (porcine)  5,000 Units SubCUTAneous q8h     PRN Meds: polyvinyl alcohol, albuterol sulfate HFA, glucagon (rDNA), dextrose, sodium chloride flush, sodium chloride, ondansetron **OR** ondansetron, polyethylene glycol, acetaminophen **OR** acetaminophen, glucose, dextrose bolus **OR** dextrose bolus      Intake/Output Summary (Last 24 hours) at 5/16/2022 1853  Last data filed at 5/16/2022 1341  Gross per 24 hour   Intake 0 ml   Output --   Net 0 ml       Diet:  ADULT DIET; Dysphagia - Minced and Moist; 4 carb choices (60 gm/meal); Low Fat/Low Chol/High Fiber/KALEN; Low Sodium (2 gm); Low Potassium (Less than 3000 mg/day); Low Phosphorus (Less than 1000 mg); Less than 60 gm; Mildly Thick (Nectar)  ADULT ORAL NUTRITION SUPPLEMENT; Lunch, Breakfast, Dinner; Diabetic Oral Supplement    Exam:  BP (!) 144/67   Pulse 78   Temp 97.7 °F (36.5 °C) (Oral)   Resp 18   Ht 5' (1.524 m)   Wt 175 lb 9.6 oz (79.7 kg)   SpO2 97%   BMI 34.29 kg/m²     General appearance: No apparent distress, appears stated age and cooperative. Slurred speech   HEENT: Pupils equal, round, and reactive to light. Mild degree of exophthalmos with ride sided facial droop. Conjunctivae/corneas clear. Neck: Supple, with full range of motion. No jugular venous distention. Trachea midline. Respiratory:  Normal respiratory effort. Clear to auscultation, bilaterally without Rales/Wheezes/Rhonchi. Cardiovascular: Regular rate and rhythm with normal S1/S2 without murmurs, rubs or gallops. Abdomen: Soft, non-tender, non-distended with normal bowel sounds. Musculoskeletal: passive and active ROM x 4 extremities. Skin: Skin color, texture, turgor normal.  No rashes or lesions. Neurologic:  Neurovascularly intact without any focal sensory/motor deficits. Cranial nerves: II-XII intact, grossly non-focal.  Psychiatric: Alert and oriented, thought content appropriate, normal insight  Capillary Refill: Brisk,< 3 seconds   Peripheral Pulses: +2 palpable, equal bilaterally     Labs:   No results for input(s): WBC, HGB, HCT, PLT in the last 72 hours.   Recent Labs     05/14/22  0404 05/15/22  0411 05/16/22  0408    142 140   K 4.6 4.2 4.5    109 109   CO2 22* 22* 20*   BUN 38* 36* 36*   CREATININE 2.1* 1.9* 1.9*   CALCIUM 8.4* 8.0* 8.0*     No results for input(s): AST, ALT, BILIDIR, BILITOT, ALKPHOS in the last 72 hours. No results for input(s): INR in the last 72 hours. No results for input(s): Joeline Reeks in the last 72 hours. Microbiology:      Urinalysis:      Lab Results   Component Value Date    NITRU NEGATIVE 05/09/2022    WBCUA 0-2 05/09/2022    BACTERIA NONE SEEN 05/09/2022    RBCUA 0-2 05/09/2022    BLOODU NEGATIVE 05/09/2022    SPECGRAV 1.016 05/09/2022    GLUCOSEU Negative 05/02/2022     Radiology:  FL MODIFIED BARIUM SWALLOW W VIDEO   Final Result   1. Laryngeal penetration of pureed, thin and soft barium with aspiration of thin barium. 2. Additional recommendations from the speech therapist will follow. **This report has been created using voice recognition software. It may contain minor errors which are inherent in voice recognition technology. **         Final report electronically signed by Dr. Franky Garg on 5/10/2022 11:56 AM      MRI BRAIN WO CONTRAST   Final Result      1. A few scattered punctate acute infarcts in the left corona radiata and right centrum semiovale. 2. Mild severity chronic microvascular angiopathy. Findings were discussed with Mp Rodriguez RN via telephone at the time of interpretation. **This report has been created using voice recognition software. It may contain minor errors which are inherent in voice recognition technology. **      Final report electronically signed by Dr. Rola Stephen MD on 5/9/2022 4:49 PM      MRA NECK WO CONTRAST   Final Result   Motion degraded exam without visualized areas of flow-limiting stenosis. **This report has been created using voice recognition software. It may contain minor errors which are inherent in voice recognition technology. **      Final report electronically signed by Dr. Rola Stephen MD on 5/9/2022 5:00 PM      MRA HEAD WO CONTRAST   Final Result    Focal areas of moderate stenosis in the bilateral cavernous and clinoid segments of the internal carotid arteries. **This report has been created using voice recognition software. It may contain minor errors which are inherent in voice recognition technology. **      Final report electronically signed by Dr. Angelo Rucker MD on 5/9/2022 4:51 PM      US RENAL COMPLETE   Final Result   1. No renal calculi or hydronephrosis. 2. Increased renal cortical echogenicity in the right kidney. The left    kidney is not optimally visualized. Increased renal cortical echogenicity    can be seen in medical renal disease. This can be correlated with renal    function tests. 3. Urinary bladder has a volume of 363 mL. This document has been electronically signed by: Pinky Diego M.D. on    05/09/2022 06:07 AM      CT HEAD WO CONTRAST   Final Result   1. No acute disease in the brain. 2. Incidental findings are detailed above. This document has been electronically signed by: Pinky Diego M.D. on    05/08/2022 10:08 PM      All CTs at this facility use dose modulation techniques and iterative    reconstructions, and/or weight-based dosing   when appropriate to reduce radiation to a low as reasonably achievable. XR CHEST PORTABLE   Final Result   Impression:   Bibasilar hazy opacities, may reflect atelectasis or infection. Mild cardiomegaly. Equivocal left pleural effusion. This document has been electronically signed by:  Cathi Villarreal MD on 05/07/2022    08:58 PM        Electronically signed by Cookie Bustamante DO on 5/16/2022 at 6:53 PM

## 2022-05-16 NOTE — PROGRESS NOTES
[address]  Occupational Therapy  Daily Note  Time:   Time In: 5500  Time Out: 0845  Timed Code Treatment Minutes: 23 Minutes  Minutes: 23          Date: 2022  Patient Name: Christiano Bal,   Gender: female      Room: [facility]15/015-A  MRN: [id number]  : 1955  (61 y.o.)  Referring Practitioner: Catherine Paulino PA-C  Diagnosis: symptomatic bradycardia  Additional Pertinent Hx: per chart review; Christiano Bal is a 61 y.o. female admitted to [facility] on 2022 with a history of hypertension, hyperlipidemia, type II diabetes mellitus and CHF. She presented to the emergency room for evaluation following a 4-day history of worsening shortness of breath, a nonproductive cough and increasing lower extremity edema. She reported that she had dull aching pain in her chest when she took a deep breath. She stated that her shortness of breath was severe, worse with exertion and improved with rest.  She was seen at the clinic at [facility] and diagnosed with influenza A, but was negative here. In the emergency room here she was found to have an acute CHF exacerbation with increased lower extremity edema and pulmonary edema. She was also found to have acute renal failure. She was admitted for further evaluation and treatment. Associated signs and symptoms did not include typical chest pain, lightheaded, dizziness, diaphoresis, orthopnea, paroxysmal nocturnal dyspnea, fever or chills. No recent medication changes. She does not use supplemental O2 at home. Patient was admitted for symptomatic bradycardia which was thought to be beta-blocker induced. On 2022 patient was noted by staff to have dysarthria and a new right facial droop which prompted a code stroke. Initial NIH was 3. Patient was taken to imaging for CT head which revealed no ICH however it did demonstrate old stroke which was age-indeterminate.   For that reason tPA was not given as if this was a recent stroke she would be very high risk. CTAs were deferred as this patient had a creatinine of 4.0. On 5/9/22, on exam, pt continued to have slurred speech and facial droop and she was lethargic. She had no specific additional complaints. She denied weakness, vision deficit. MRI of brain without contrast showed a few scattered punctate acute infarcts in the left corona radiata and right centrum semiovale    Restrictions/Precautions:  Restrictions/Precautions: Fall Risk,General Precautions  Position Activity Restriction  Other position/activity restrictions: monitor BP     SUBJECTIVE: Pt sitting in recliner upon arrival, pt agreeable to OT session, RN gave verbal approval for session     PAIN: 0/10:     Vitals: Blood Pressure: 135/60    COGNITION: Slow Processing    ADL:   Grooming: Stand By Assistance. with pt standing at the sink to brush teeth with RW present . BALANCE:  Standing Balance: Stand By Assistance. with RW, and BUE release from the walker, with pt standing for 2 minutes during ADL routine    BED MOBILITY:  Sit to Supine: Stand By Assistance with HOB lowered and not requiring handrails    TRANSFERS:  Sit to Stand:  Stand By Assistance. from the recliner  Stand to Sit: Stand By Assistance. to the EOB    FUNCTIONAL MOBILITY:  Assistive Device: Rolling Walker  Assist Level:  Contact Guard Assistance. Distance: HH distances, with vc's for walker navigation due to pt veering to one side       ADDITIONAL ACTIVITIES:  Patient completed BUE strengthening exercises with skilled education on HEP: completed x10 reps x1 set with a medium resistance band in all joints and all planes in order to improve UE strength and activity tolerance required for BADL routine and toilet / shower transfers. Patient tolerated good, requiring min rest breaks. Patient also required min cues for technique. ASSESSMENT:     Activity Tolerance:  Patient tolerance of  treatment: good.        Discharge Recommendations: Home with Home Health OT  Equipment Recommendations: Equipment Needed: No  Other: patient would be able to safely get in and out of a car with daughter. Plan: Times per Week: 5x  Times per Day: Daily  Current Treatment Recommendations: Strengthening,Balance training,Functional mobility training,Endurance training,Safety education & training,Neuromuscular re-education,Patient/Caregiver education & training,Self-Care / ADL    Patient Education  Patient Education: Home Exercise Program    Goals  Short Term Goals  Time Frame for Short term goals: by discharge  Short Term Goal 1: patient will tolerate 5 min functional standing with two hand release with (S) to increase activity tolerance for functional transfers. Short Term Goal 2: patient will functionally ambulate house hold distances with least restrictive device with (S) with 0-1 verbal cues for safety and sequencing. Short Term Goal 3: patient will complete ADL routine with (S). Short Term Goal 4: patient will participate in moderate resistive UB exer to increase UB strength for functional transfers. Following session, patient left in safe position with all fall risk precautions in place.

## 2022-05-16 NOTE — PROGRESS NOTES
[facility]         Renal Inpatient Follow-Up note         5/16/2022 12:21 PM    Pt Name:   Darius :   1955  Attending:   Michael Swenson MD    Chief Complaint : Gordo Aguirre is a 61 y.o. female being followed by nephrology for CHLOE/CKD    Interval History :   Patient seen and examined by me. No distress  Feels well. No cp or SOB. Overall doing well awaiting placement     Scheduled Medications :    carvedilol  12.5 mg Oral BID WC    insulin glargine  10 Units SubCUTAneous Nightly    insulin lispro  4 Units SubCUTAneous QAM AC    insulin lispro  4 Units SubCUTAneous Daily before lunch    insulin lispro  4 Units SubCUTAneous Dinner    doxazosin  4 mg Oral Daily    hydrALAZINE  50 mg Oral 3 times per day    atorvastatin  40 mg Oral Daily    calcium replacement protocol   Other RX Placeholder    pregabalin  25 mg Oral QPM    clopidogrel  75 mg Oral Daily    amLODIPine  5 mg Oral QPM    aspirin  81 mg Oral Daily    insulin lispro  0-6 Units SubCUTAneous TID WC    insulin lispro  0-3 Units SubCUTAneous Nightly    isosorbide mononitrate  30 mg Oral Daily    sodium chloride flush  5-40 mL IntraVENous 2 times per day    heparin (porcine)  5,000 Units SubCUTAneous q8h      sodium chloride 50 mL/hr at 05/15/22 0808    dextrose      sodium chloride         Vitals :  BP (!) 109/53   Pulse 73   Temp 97.5 °F (36.4 °C) (Oral)   Resp 16   Ht 5' (1.524 m)   Wt 175 lb 9.6 oz (79.7 kg)   SpO2 96%   BMI 34.29 kg/m²     24HR INTAKE/OUTPUT:      Intake/Output Summary (Last 24 hours) at 5/16/2022 1221  Last data filed at 5/15/2022 1747  Gross per 24 hour   Intake --   Output 200 ml   Net -200 ml     Last 3 weights  Wt Readings from Last 3 Encounters:   05/11/22 175 lb 9.6 oz (79.7 kg)   05/06/22 178 lb 9.2 oz (81 kg)   10/17/18 175 lb 0.7 oz (79.4 kg)           Physical Exam :  General Appearance:  Well developed.  No distress  Mouth/Throat:  Oral mucosa moist  Neck:  Supple, no JVD  Lungs:  Breath sounds: clear  Heart[de-identified]  S1,S2 heard  Abdomen:  Soft, non - tender  Musculoskeletal:  Edema - mild edema         Last 3 CBC   No results for input(s): WBC, RBC, HGB, HCT, PLT in the last 72 hours. Last 3 CMP  Recent Labs     05/14/22  0404 05/15/22  0411 05/16/22  0408    142 140   K 4.6 4.2 4.5    109 109   CO2 22* 22* 20*   BUN 38* 36* 36*   CREATININE 2.1* 1.9* 1.9*   CALCIUM 8.4* 8.0* 8.0*             ASSESSMENT / Plan   1. Renal -acute kidney injury with progressive worsening of renal function and significantly elevated BUN  ? Exact etiology not clear she has been getting diuretics in the outside hospital in [place] she was also bradycardic upon arrival  ? Renal ultrasound scan shows CKD urine lites show more of a prerenal etiology  ? Continue IV hydration   ? Overall creatinine improved with IV hydration currently at 1.2 which is probably her baseline  ? All serologies are negative except DARIANA which was positive  ? Thing and drinking we will stop IV fluids and monitor     2. Electrolytes -within normal limits  3. Essential hypertension  4. Hx of diabetes mellitus with diabetic nephropathy  5. Recent treatment with congestive heart failure and influenza  6. Syncope may be due to bradycardia being seen by cardiology  7. Meds reviewed discussed with the patient overall much better from renal standpoint  8. Awaiting on placement advised to follow-up with nephrology in [place]    Dr. Sumi Bojorquez MD, M,D.  [facility].

## 2022-05-16 NOTE — PROGRESS NOTES
Hospitalist Progress Note  Patient:  Hedy Alvarado    Unit/Bed:4A-15/015-A  YOB: 1955  MRN: [id number]   Acct: [de-identified]   PCP: Matti Gupta MD  Date of Admission: 5/7/2022    Assessment/Plan:  1. Acute Ischemic Stroke: Code stroke called on 05/08 overnight. Patient with NIHSS 3 from facial droop, RLE weakness, slurred speech. CT scan head revealed chronic small vessel ischemic disease. Two incidental meningiomas. CTA head w/ contrast not obtained 2/2 severity of renal injury. MRI brain revealed few scattered punctate acute infarcts in left corona radiata and right centrum semiovale with chronic microvascular angiopathy. MRA head with focal areas of moderate stenosis in b/l cavernous and clinoid segments of ICAs. tPA not administered as window had passed and there was initial concern for a subacute indeterminate infarct. TTE with bubble study unremarkable for PFO. SLP w/ barium swallow + moderate dysphagia   - NIHSS and neuro checks qShift    - BP control, continue Coreg 12.5 BID    - Continue IVF, ASA, Plavix, sugar control, statin    - PT/OT following    - 30-day event monitor on DC    - Will complete neuro rehab on DC   - awaiting precert for nursing home  2. Moderate Oropharyngeal Dysphagia: New onset s/p recent ischemic stroke(s), SLP eval with MBS on 05/10 revealed moderate oral dysphagia, mild pharyngeal dysphagia without tracheal aspiration events    - Diet minced and moist with mildly thick liquids   3. Symptomatic Bradycardia: Resolved. S/p syncopal episode with collapse and LOC lasting seconds. Likely iatrogenic 2/2 Toprol XL 75 mg which has been held. Pulse ~40s on admission. Pulse improved to ~60s s/p BB washout.    - Toprol XL discontinued on DC    - 05/13: patient now with mild tachycardia HR 100s, starting Coreg 12.5 BID   4. Acute Kidney Injury in CKD: Resolving. Creatinine improving. Likely etiology was low cardiac output/bradycardia. Full workup pending.  No recent contrast received. Baseline ~1.8 one week prior. BUN:Cr points to prerenal etiology. Renal US revealed CKD     - Nephrology following   - Avoid nephrotoxins      - Continue gentle IV fluids  5. NIDDM2: A1c 9.1. With diabetic nephropathy. Uncontrolled; sugars have been running too low. - Reduced lantus to 10u nightly, Humalog TID reduced to 4u. POCT glucose checks AC/HS  6. Hyperlipidemia: Continue home statin   7. Uncontrolled Hypertension: Continue norvasc 5 mg daily, cardura 4mg daily, hydralazine 50 mg q8h, imdur 30 mg daily   8. Chronic HFpEF 55%: Stable and well controlled at this time. Expected discharge date: Plan for DC to [id number] [address] on 82/44 -precert pending    Disposition:    [] Home       [] TCU       [x] Rehab       [] Psych       [] SNF       [] [place]       [] Other-    Chief Complaint: Syncope with collapse, lightheadedness     Hospital Course: Per admitting H&P note \"Patient presents to the ED following a syncopal episode. The patient was just discharged one day prior to admission following a prolonged stay due to influenza A. The patient developed CHLOE while hospitalized. Today the patient had risen from sitting to standing and began amublating. The patient loss consciousness and subsequently fell. The patient was found to have a resting heart rate 40-48. Patient admitted for telemetry and cardiology consult. \"    05/09/22: Patient unfortunately suffered from ischemic stroke overnight but was deemed not a candidate for tPA as there was some concern for an indeterminate subacute infarct. NIHSS 3 with slurred speech, right facial droop and RLE weakness. MRI brain revealed some scattered acute ischemic infarcts bilaterally. Started on ASA, Plavix, high intensity statin. Neurology followed. Renal function worsened with time. As of now no known etiology.  Suspecting a possible cardiac etiology for micro thromboembolic disease  09/06/24: Still with facial droop, NIHSS 2, PM&R to evaluate for rehab. Patient with SLP eval and MBS revealing moderate oropharyngeal dysphagia with recs for diet to be minced and moist with thick liquids. 05/11/22: CHLOE improving. Nearing discharge,  set up transport to [facility] on 05/13 or 05/14 05/12/22: Facial droop significantly improved. BP still elevated ~150s.   40/17-09/33: Awaiting precert for rehab      Subjective (past 24 hours): Patient laying in bed comfortably with no complaints or concerns. No acute events overnight. Sugars much better. Still awaiting precert. ROS (12 point review of systems completed. Pertinent positives noted. Otherwise ROS is negative).     Medications:  Reviewed    Infusion Medications    sodium chloride 50 mL/hr at 05/15/22 0808    dextrose      sodium chloride       Scheduled Medications    carvedilol  12.5 mg Oral BID WC    insulin glargine  10 Units SubCUTAneous Nightly    insulin lispro  4 Units SubCUTAneous QAM AC    insulin lispro  4 Units SubCUTAneous Daily before lunch    insulin lispro  4 Units SubCUTAneous Dinner    doxazosin  4 mg Oral Daily    hydrALAZINE  50 mg Oral 3 times per day    atorvastatin  40 mg Oral Daily    calcium replacement protocol   Other RX Placeholder    pregabalin  25 mg Oral QPM    clopidogrel  75 mg Oral Daily    amLODIPine  5 mg Oral QPM    aspirin  81 mg Oral Daily    insulin lispro  0-6 Units SubCUTAneous TID WC    insulin lispro  0-3 Units SubCUTAneous Nightly    isosorbide mononitrate  30 mg Oral Daily    sodium chloride flush  5-40 mL IntraVENous 2 times per day    heparin (porcine)  5,000 Units SubCUTAneous q8h     PRN Meds: polyvinyl alcohol, albuterol sulfate HFA, glucagon (rDNA), dextrose, sodium chloride flush, sodium chloride, ondansetron **OR** ondansetron, polyethylene glycol, acetaminophen **OR** acetaminophen, glucose, dextrose bolus **OR** dextrose bolus      Intake/Output Summary (Last 24 hours) at 5/15/2022 2113  Last data filed at 5/15/2022 3262  Gross per 24 hour   Intake 200 ml   Output 200 ml   Net 0 ml       Diet:  ADULT DIET; Dysphagia - Minced and Moist; 4 carb choices (60 gm/meal); Low Fat/Low Chol/High Fiber/KALEN; Low Sodium (2 gm); Low Potassium (Less than 3000 mg/day); Low Phosphorus (Less than 1000 mg); Less than 60 gm; Mildly Thick (Nectar)    Exam:  BP (!) 141/65   Pulse 76   Temp 98.3 °F (36.8 °C) (Oral)   Resp 16   Ht 5' (1.524 m)   Wt 175 lb 9.6 oz (79.7 kg)   SpO2 96%   BMI 34.29 kg/m²     General appearance: No apparent distress, appears stated age and cooperative. Slurred speech   HEENT: Pupils equal, round, and reactive to light. Mild degree of exophthalmos with ride sided facial droop. Conjunctivae/corneas clear. Neck: Supple, with full range of motion. No jugular venous distention. Trachea midline. Respiratory:  Normal respiratory effort. Clear to auscultation, bilaterally without Rales/Wheezes/Rhonchi. Cardiovascular: Regular rate and rhythm with normal S1/S2 without murmurs, rubs or gallops. Abdomen: Soft, non-tender, non-distended with normal bowel sounds. Musculoskeletal: passive and active ROM x 4 extremities. Skin: Skin color, texture, turgor normal.  No rashes or lesions. Neurologic:  Neurovascularly intact without any focal sensory/motor deficits. Cranial nerves: II-XII intact, grossly non-focal.  Psychiatric: Alert and oriented, thought content appropriate, normal insight  Capillary Refill: Brisk,< 3 seconds   Peripheral Pulses: +2 palpable, equal bilaterally     Labs:   No results for input(s): WBC, HGB, HCT, PLT in the last 72 hours. Recent Labs     05/13/22  0443 05/14/22  0404 05/15/22  0411   * 144 142   K 4.6 4.6 4.2   * 110 109   CO2 23 22* 22*   BUN 43* 38* 36*   CREATININE 2.0* 2.1* 1.9*   CALCIUM 8.3* 8.4* 8.0*     No results for input(s): AST, ALT, BILIDIR, BILITOT, ALKPHOS in the last 72 hours. No results for input(s): INR in the last 72 hours.   No results for input(s): Pierre Davidson in the last 72 hours. Microbiology:      Urinalysis:      Lab Results   Component Value Date    NITRU NEGATIVE 05/09/2022    WBCUA 0-2 05/09/2022    BACTERIA NONE SEEN 05/09/2022    RBCUA 0-2 05/09/2022    BLOODU NEGATIVE 05/09/2022    SPECGRAV 1.016 05/09/2022    GLUCOSEU Negative 05/02/2022     Radiology:  FL MODIFIED BARIUM SWALLOW W VIDEO   Final Result   1. Laryngeal penetration of pureed, thin and soft barium with aspiration of thin barium. 2. Additional recommendations from the speech therapist will follow. **This report has been created using voice recognition software. It may contain minor errors which are inherent in voice recognition technology. **         Final report electronically signed by Dr. Nasrin Forde on 5/10/2022 11:56 AM      MRI BRAIN WO CONTRAST   Final Result      1. A few scattered punctate acute infarcts in the left corona radiata and right centrum semiovale. 2. Mild severity chronic microvascular angiopathy. Findings were discussed with Padma Cruz RN via telephone at the time of interpretation. **This report has been created using voice recognition software. It may contain minor errors which are inherent in voice recognition technology. **      Final report electronically signed by Dr. Jovon Freeman MD on 5/9/2022 4:49 PM      MRA NECK WO CONTRAST   Final Result   Motion degraded exam without visualized areas of flow-limiting stenosis. **This report has been created using voice recognition software. It may contain minor errors which are inherent in voice recognition technology. **      Final report electronically signed by Dr. Jovon Freeman MD on 5/9/2022 5:00 PM      MRA HEAD WO CONTRAST   Final Result    Focal areas of moderate stenosis in the bilateral cavernous and clinoid segments of the internal carotid arteries.                **This report has been created using voice recognition software. It may contain minor errors which are inherent in voice recognition technology. **      Final report electronically signed by Dr. Sheela Mae MD on 5/9/2022 4:51 PM      US RENAL COMPLETE   Final Result   1. No renal calculi or hydronephrosis. 2. Increased renal cortical echogenicity in the right kidney. The left    kidney is not optimally visualized. Increased renal cortical echogenicity    can be seen in medical renal disease. This can be correlated with renal    function tests. 3. Urinary bladder has a volume of 363 mL. This document has been electronically signed by: Bonita Burrell M.D. on    05/09/2022 06:07 AM      CT HEAD WO CONTRAST   Final Result   1. No acute disease in the brain. 2. Incidental findings are detailed above. This document has been electronically signed by: Bonita Burrell M.D. on    05/08/2022 10:08 PM      All CTs at this facility use dose modulation techniques and iterative    reconstructions, and/or weight-based dosing   when appropriate to reduce radiation to a low as reasonably achievable. XR CHEST PORTABLE   Final Result   Impression:   Bibasilar hazy opacities, may reflect atelectasis or infection. Mild cardiomegaly. Equivocal left pleural effusion. This document has been electronically signed by:  Sebastián Oneil MD on 05/07/2022    08:58 PM        Electronically signed by Jenny Smith DO on 5/15/2022 at 9:13 PM

## 2022-05-16 NOTE — PROGRESS NOTES
[facility]  INPATIENT PHYSICAL THERAPY  DAILY NOTE  [facility] 4A - 4A-15/015-A    Time In: 990  Time Out: 1443  Timed Code Treatment Minutes: 18 Minutes  Minutes: 18          Date: 2022  Patient Name: Christiano Bal,  Gender:  female        MRN: [id number]  : 1955  (61 y.o.)     Referring Practitioner: Catherine Paulino PA-C  Diagnosis: symptomatic bradycardia  Additional Pertinent Hx: Per H&p :Patient presents to the ED following a syncopal episode. The patient was just discharged one day prior to admission following a prolonged stay due to influenza A. The patient developed CHLOE while hospitalized. Today the patient had risen from sitting to standing and began amublating. The patient loss consciousness and subsequently fell. The patient was found to have a resting heart rate 40-48. Pt presents with On 2022 patient was noted by staff to have dysarthria and a new right facial droop which prompted a code stroke. Initial NIH was 3. Patient was taken to imaging for CT head which revealed no ICH however he did demonstrate old stroke which was age-indeterminate. For this reason tPA was not given as if this was a recent stroke he would be very high risk. CTAs were deferred as this patient has a creatinine of 4.0. Today on exam, pt continues to have slurred speech and facial droop and she is lethargic per neurology note.  MRI of the brain indicating acute infarcts in the left corona radiata and right centrum semiovale     Prior Level of Function:  Lives With: Alone  Type of Home: House  Home Layout: One level  Home Access: Level entry  Home Equipment: [employer], rolling,Cane   Bathroom Shower/Tub: Tub/Shower unit  Bathroom Toilet: Standard  Bathroom Equipment: Shower chair  Bathroom Accessibility: Walker accessible    Receives Help From: Family  ADL Assistance: Independent  Homemaking Assistance: Independent  Ambulation Assistance: Independent  Transfer Assistance: Independent  Active : Yes  Additional Comments: Pt used cane intermittently when she is feeling off balance. Pt states her kids can assist her if needed, she runs her own errands and cares for her home indep. Pt's children drive for her. Restrictions/Precautions:  Restrictions/Precautions: Fall Risk,General Precautions  Position Activity Restriction  Other position/activity restrictions: monitor BP     SUBJECTIVE: RN approved session. Patient in bed upon arrival and agreeable to therapy. Pt stated she did not want to do much because she was tired from the session this morning. PAIN: Not rated    Vitals: Vitals not assessed per clinical judgement, see nursing flowsheet    OBJECTIVE:  Bed Mobility:  Supine to Sit: Stand By Assistance  Sit to Supine: Stand By Assistance   Scooting: Stand By Assistance    Transfers:  Sit to Stand: [address], with increased time for completion, cues for hand placement, x2; x1 EOB, x1 toilet  Stand to Sit:Stand By Assistance    Ambulation:  Stand By Assistance, with increased time for completion  Distance: 2x8 ft  Surface: Level Tile  Device:Rolling Walker  Gait Deviations: Forward Flexed Posture, Slow Candy, Decreased Step Length Bilaterally and Decreased Gait Speed    Balance:  Dynamic Sitting Balance: Stand By Assistance  Dynamic Standing Balance: Stand By Assistance    Exercise:  Patient was guided in 1 set(s) 15 reps of exercise to both lower extremities. Seated marches, Seated heel/toe raises and Long arc quads. Exercises were completed for increased independence with functional mobility. Functional Outcome Measures: Completed  AM-PAC Inpatient Mobility without Stair Climbing Raw Score : 15  -PAC Inpatient without Stair Climbing T-Scale Score : 43.03    ASSESSMENT:  Assessment: Patient progressing toward established goals. Activity Tolerance:  Patient tolerance of  treatment: good.       Equipment Recommendations:Equipment Needed: No (pt has a RW)  Discharge Recommendations: Continue to assess pending progress and Patient would benefit from continued PT at discharge  Plan: Current Treatment Recommendations: Strengthening,Balance training,Functional mobility training,Transfer training,Endurance training,Neuromuscular re-education,Gait training,Home exercise program,Safety education & training,Patient/Caregiver education & training,Equipment evaluation, education, & procurement,Therapeutic activities  Plan:  (6x N)    Patient Education  Patient Education: Bed Mobility, Transfers, Gait, Verbal Exercise Instruction    Goals:  Patient goals : \"no\"  Short Term Goals  Time Frame for Short term goals: by hospital d/c  Short term goal 1: Pt to demo supine <->sit with S for ease with getting in and out of bed  Short term goal 2: Pt to demo sit <->stand with RW and S for improved ability to transfer from any surface safely  Short term goal 3: Pt to ambulate >=40' with RW and S for improved ability to move in her environment  Short term goal 4: Pt to improve Tinetti score to >=19/28 to progress to the moderate fall risk category  Long Term Goals  Time Frame for Long term goals : NA due to short ELOS    Following session, patient left in safe position with all fall risk precautions in place.

## 2022-05-17 PROBLEM — I63.81: Status: ACTIVE | Noted: 2022-05-10

## 2022-05-17 LAB
ANION GAP SERPL CALCULATED.3IONS-SCNC: 12 MEQ/L (ref 8–16)
BUN BLDV-MCNC: 36 MG/DL (ref 7–22)
CALCIUM SERPL-MCNC: 8.2 MG/DL (ref 8.5–10.5)
CHLORIDE BLD-SCNC: 111 MEQ/L (ref 98–111)
CO2: 19 MEQ/L (ref 23–33)
CREAT SERPL-MCNC: 1.8 MG/DL (ref 0.4–1.2)
GFR SERPL CREATININE-BSD FRML MDRD: 34 ML/MIN/1.73M2
GLUCOSE BLD-MCNC: 102 MG/DL (ref 70–108)
GLUCOSE BLD-MCNC: 83 MG/DL (ref 70–108)
GLUCOSE BLD-MCNC: 85 MG/DL (ref 70–108)
GLUCOSE BLD-MCNC: 88 MG/DL (ref 70–108)
GLUCOSE BLD-MCNC: 90 MG/DL (ref 70–108)
POTASSIUM SERPL-SCNC: 4.1 MEQ/L (ref 3.5–5.2)
SODIUM BLD-SCNC: 142 MEQ/L (ref 135–145)

## 2022-05-17 PROCEDURE — 2580000003 HC RX 258: Performed by: PHYSICIAN ASSISTANT

## 2022-05-17 PROCEDURE — 6360000002 HC RX W HCPCS: Performed by: PHYSICIAN ASSISTANT

## 2022-05-17 PROCEDURE — 82948 REAGENT STRIP/BLOOD GLUCOSE: CPT

## 2022-05-17 PROCEDURE — 97116 GAIT TRAINING THERAPY: CPT

## 2022-05-17 PROCEDURE — 99232 SBSQ HOSP IP/OBS MODERATE 35: CPT | Performed by: INTERNAL MEDICINE

## 2022-05-17 PROCEDURE — 6370000000 HC RX 637 (ALT 250 FOR IP): Performed by: STUDENT IN AN ORGANIZED HEALTH CARE EDUCATION/TRAINING PROGRAM

## 2022-05-17 PROCEDURE — 6370000000 HC RX 637 (ALT 250 FOR IP)

## 2022-05-17 PROCEDURE — 80048 BASIC METABOLIC PNL TOTAL CA: CPT

## 2022-05-17 PROCEDURE — 6370000000 HC RX 637 (ALT 250 FOR IP): Performed by: PHYSICIAN ASSISTANT

## 2022-05-17 PROCEDURE — 2580000003 HC RX 258: Performed by: INTERNAL MEDICINE

## 2022-05-17 PROCEDURE — 36415 COLL VENOUS BLD VENIPUNCTURE: CPT

## 2022-05-17 PROCEDURE — 2060000000 HC ICU INTERMEDIATE R&B

## 2022-05-17 PROCEDURE — 97530 THERAPEUTIC ACTIVITIES: CPT

## 2022-05-17 PROCEDURE — 6370000000 HC RX 637 (ALT 250 FOR IP): Performed by: NURSE PRACTITIONER

## 2022-05-17 RX ORDER — SODIUM CHLORIDE 450 MG/100ML
INJECTION, SOLUTION INTRAVENOUS CONTINUOUS
Status: DISCONTINUED | OUTPATIENT
Start: 2022-05-17 | End: 2022-05-18

## 2022-05-17 RX ADMIN — INSULIN GLARGINE 10 UNITS: 100 INJECTION, SOLUTION SUBCUTANEOUS at 20:05

## 2022-05-17 RX ADMIN — HYDRALAZINE HYDROCHLORIDE 50 MG: 50 TABLET ORAL at 06:06

## 2022-05-17 RX ADMIN — HYDRALAZINE HYDROCHLORIDE 50 MG: 50 TABLET ORAL at 23:44

## 2022-05-17 RX ADMIN — ISOSORBIDE MONONITRATE 30 MG: 30 TABLET, EXTENDED RELEASE ORAL at 10:07

## 2022-05-17 RX ADMIN — HEPARIN SODIUM 5000 UNITS: 5000 INJECTION INTRAVENOUS; SUBCUTANEOUS at 16:42

## 2022-05-17 RX ADMIN — HYDRALAZINE HYDROCHLORIDE 50 MG: 50 TABLET ORAL at 15:28

## 2022-05-17 RX ADMIN — AMLODIPINE BESYLATE 5 MG: 5 TABLET ORAL at 16:43

## 2022-05-17 RX ADMIN — DOXAZOSIN 4 MG: 4 TABLET ORAL at 10:06

## 2022-05-17 RX ADMIN — HEPARIN SODIUM 5000 UNITS: 5000 INJECTION INTRAVENOUS; SUBCUTANEOUS at 01:03

## 2022-05-17 RX ADMIN — ASPIRIN 81 MG: 81 TABLET, COATED ORAL at 10:07

## 2022-05-17 RX ADMIN — PREGABALIN 25 MG: 25 CAPSULE ORAL at 16:42

## 2022-05-17 RX ADMIN — SODIUM CHLORIDE: 4.5 INJECTION, SOLUTION INTRAVENOUS at 15:13

## 2022-05-17 RX ADMIN — HEPARIN SODIUM 5000 UNITS: 5000 INJECTION INTRAVENOUS; SUBCUTANEOUS at 10:07

## 2022-05-17 RX ADMIN — CLOPIDOGREL BISULFATE 75 MG: 75 TABLET ORAL at 10:07

## 2022-05-17 RX ADMIN — ATORVASTATIN CALCIUM 40 MG: 80 TABLET, FILM COATED ORAL at 10:07

## 2022-05-17 RX ADMIN — CARVEDILOL 12.5 MG: 6.25 TABLET, FILM COATED ORAL at 16:42

## 2022-05-17 RX ADMIN — SODIUM CHLORIDE, PRESERVATIVE FREE 10 ML: 5 INJECTION INTRAVENOUS at 10:07

## 2022-05-17 RX ADMIN — CARVEDILOL 12.5 MG: 6.25 TABLET, FILM COATED ORAL at 10:07

## 2022-05-17 NOTE — PROGRESS NOTES
[address] [address]  INPATIENT PHYSICAL THERAPY  DAILY NOTE  [facility] 4A - 4A-15/015-A    Time In: 4336  Time Out: 1413  Timed Code Treatment Minutes: 28 Minutes  Minutes: 28        Date: 2022  Patient Name: Jennifer Terrazas,  Gender:  female        MRN: [id number]  : 1955  (61 y.o.)     Referring Practitioner: May Montgomery PA-C  Diagnosis: symptomatic bradycardia  Additional Pertinent Hx: Per H&p :Patient presents to the ED following a syncopal episode. The patient was just discharged one day prior to admission following a prolonged stay due to influenza A. The patient developed CHLOE while hospitalized. Today the patient had risen from sitting to standing and began amublating. The patient loss consciousness and subsequently fell. The patient was found to have a resting heart rate 40-48. Pt presents with On 2022 patient was noted by staff to have dysarthria and a new right facial droop which prompted a code stroke. Initial NIH was 3. Patient was taken to imaging for CT head which revealed no ICH however he did demonstrate old stroke which was age-indeterminate. For this reason tPA was not given as if this was a recent stroke he would be very high risk. CTAs were deferred as this patient has a creatinine of 4.0. Today on exam, pt continues to have slurred speech and facial droop and she is lethargic per neurology note.  MRI of the brain indicating acute infarcts in the left corona radiata and right centrum semiovale     Prior Level of Function:  Lives With: Alone  Type of Home: House  Home Layout: One level  Home Access: Level entry  Home Equipment: [employer]   Bathroom Shower/Tub: Tub/Shower unit  Bathroom Toilet: Standard  Bathroom Equipment: Shower chair  Bathroom Accessibility: Walker accessible    Receives Help From: Family  ADL Assistance: Independent  Homemaking Assistance: Independent  Ambulation Assistance: Independent  Transfer Assistance: Independent  Active : Yes  Additional Comments: Pt used cane intermittently when she is feeling off balance. Pt states her kids can assist her if needed, she runs her own errands and cares for her home indep. Pt's children drive for her. Restrictions/Precautions:  Restrictions/Precautions: Fall Risk,General Precautions  Position Activity Restriction  Other position/activity restrictions: monitor BP     SUBJECTIVE: RN approved session, pt is seated in recliner, agreeable to PT. Pt stating she is bored and eager to be DC'd, assisted with finding the movie channels at the end of the session. PAIN: denies    Vitals: Vitals not assessed per clinical judgement, see nursing flowsheet    OBJECTIVE:  Bed Mobility:  Sit to Supine: Stand By Assistance, X 1, with head of bed raised     Transfers:  Sit to Stand: Contact Guard Assistance, X 1  Stand to [facility], X 1    Ambulation:  Contact Guard Assistance, X 1  Distance: 35 feet  Surface: Level Tile  Device:Rolling Walker  Gait Deviations: Forward Flexed Posture, Slow Candy, Decreased Step Length on Right and Decreased Gait Speed    Contact Guard Assistance, Minimal Assistance  Distance: 10 feet x 2  Surface: Level Tile  Device:Cane  Gait Deviations: Forward Flexed Posture, Decreased Step Length on Right, Decreased Gait Speed and Mild Path Deviations  **Increased lateral sway, pt lacks insight to current deficits, stating \"I can walk, I'm fine\", however, is unsteady, high fall risk as evidenced by Tinetti score    Balance:  Dynamic Standing Balance: Contact Guard Assistance; min A to maintain balance with feet together, able to reach within KEVYN with feet shoulder width apart with CGA    Exercise:  Patient was guided in 1 set(s) 10 reps of exercise to both lower extremities. Standing heel/toe raises and Standing marches. Exercises were completed for increased independence with functional mobility.     Functional Outcome Measures:

## 2022-05-17 NOTE — CARE COORDINATION
5/17/22, 8:45 AM EDT    DISCHARGE PLANNING EVALUATION    Spoke with Aleisha from [facility] and informed her that patient has now tested positive for COVID (as of 5/16). She reports that they do not accept COVID positive patients, but gave SW the name of a new facility that does accept COVID patients. The facility is [facility], phone number: [phone] and admissions fax number: [phone]. Admissions at [facility] states that they do accept COVID+ patients and are in network with [employer]. MICKEY will present this option to Adarshnael and her family today. Left a message as well for [facility] ([phone]) asking about COVID + patients, as this was the second choice the patient had listed previously. 5/17/22, 1:56 PM EDT    DISCHARGE PLANNING EVALUATION    MICKEY spoke with patient's daughter Alessia Owen, she was not aware that the patient had tested positive for COVID yesterday. MICKEY updated her on the patient's situation with the ECF, as they will not accept with COVID positive. Yissel Ni the name of the facility that will accept, she states that she would like to talk to Chidi first before making any decisions. She will call MICKEY back with a decision. 5/17/22, 3:18 PM EDT    Referral faxed to [facility].

## 2022-05-17 NOTE — PROGRESS NOTES
[facility] 62  PHYSICAL THERAPY MISSED TREATMENT NOTE  [facility] NEUROSCIENCES 4A    Date: 2022  Patient Name: Frandy Almanza        MRN: [id number]   : 1955  (61 y.o.)  Gender: female   Referring Practitioner: Darylene Sneddon, PA-C  Diagnosis: symptomatic bradycardia         REASON FOR MISSED TREATMENT:  Patient refused treatment.   Pt declines PT, just now eating breakfast.

## 2022-05-17 NOTE — CARE COORDINATION
5/17/22, 2:42 PM EDT    DISCHARGE ON GOING EVALUATION    Ana Hernandez day: 10  Location: [facility]15/015-A Reason for admit: Symptomatic bradycardia [R00.1]   Procedure: none  Barriers to Discharge: PT/OT/ST continue. Pt tested (+) for Covid, 98% on room air. New facility needed due to diagnosis. PCP: Clifford Meléndez MD  Readmission Risk Score: 19.6 ( )%  Patient Goals/Plan/Treatment Preferences: SW following to arrange new facility. Refer to SW note.

## 2022-05-17 NOTE — PROGRESS NOTES
[facility]         Renal Inpatient Follow-Up note         5/17/2022 12:56 PM    Pt Name:   Juan Murray  YOB: 1955  Attending:   Yun Christensen MD    Chief Complaint : Juan Murray is a 61 y.o. female being followed by nephrology for CHLOE/CKD    Interval History :   Patient seen and examined by me. No distress  Resting comfortably. Not much communicative  As per RN not eating or drinking much      Scheduled Medications :    carvedilol  12.5 mg Oral BID WC    insulin glargine  10 Units SubCUTAneous Nightly    insulin lispro  4 Units SubCUTAneous QAM AC    insulin lispro  4 Units SubCUTAneous Daily before lunch    insulin lispro  4 Units SubCUTAneous Dinner    doxazosin  4 mg Oral Daily    hydrALAZINE  50 mg Oral 3 times per day    atorvastatin  40 mg Oral Daily    calcium replacement protocol   Other RX Placeholder    pregabalin  25 mg Oral QPM    clopidogrel  75 mg Oral Daily    amLODIPine  5 mg Oral QPM    aspirin  81 mg Oral Daily    insulin lispro  0-6 Units SubCUTAneous TID WC    insulin lispro  0-3 Units SubCUTAneous Nightly    isosorbide mononitrate  30 mg Oral Daily    sodium chloride flush  5-40 mL IntraVENous 2 times per day    heparin (porcine)  5,000 Units SubCUTAneous q8h      dextrose      sodium chloride         Vitals :  BP (!) 163/72   Pulse 85   Temp 98.3 °F (36.8 °C) (Oral)   Resp 18   Ht 5' (1.524 m)   Wt 192 lb 4.8 oz (87.2 kg)   SpO2 98%   BMI 37.56 kg/m²     24HR INTAKE/OUTPUT:      Intake/Output Summary (Last 24 hours) at 5/17/2022 1256  Last data filed at 5/16/2022 2331  Gross per 24 hour   Intake 100 ml   Output --   Net 100 ml     Last 3 weights  Wt Readings from Last 3 Encounters:   05/16/22 192 lb 4.8 oz (87.2 kg)   05/06/22 178 lb 9.2 oz (81 kg)   10/17/18 175 lb 0.7 oz (79.4 kg)           Physical Exam :limited due to covid 19  General Appearance:  Well developed.  No distress  Mouth/Throat:  No accessory muscle use  Lungs:  Breath sounds: clear per RN  Musculoskeletal:  Edema - no edema         Last 3 CBC   No results for input(s): WBC, RBC, HGB, HCT, PLT in the last 72 hours. Last 3 CMP  Recent Labs     05/15/22  0411 05/16/22  0408 05/17/22  0409    140 142   K 4.2 4.5 4.1    109 111   CO2 22* 20* 19*   BUN 36* 36* 36*   CREATININE 1.9* 1.9* 1.8*   CALCIUM 8.0* 8.0* 8.2*             ASSESSMENT / Plan   1. Renal -acute kidney injury with progressive worsening of renal function and significantly elevated BUN  ? Exact etiology not clear she has been getting diuretics in the outside hospital in [place] she was also bradycardic upon arrival  ? Renal ultrasound scan shows CKD urine lites show more of a prerenal etiology  ? Continue IV hydration   ? Overall creatinine improved with IV hydration currently at 1.8 which is probably her baseline  ? All serologies are negative except DARIANA which was positive  ? As she is not drinking or eating much will add IVF @ 40 ml/hr     2. Electrolytes -within normal limits  3. Essential hypertension  4. Hx of diabetes mellitus with diabetic nephropathy  5. Recent treatment with congestive heart failure and influenza  6. Syncope may be due to bradycardia being seen by cardiology  7. Meds reviewed discussed with the patient overall much better from renal standpoint  8. Awaiting on placement     Dr. Dany Mansfield MD, M,D.  [facility].

## 2022-05-17 NOTE — PROGRESS NOTES
[address] THERAPY MISSED TREATMENT NOTE  [facility] NEUROSCIENCES 4A  4A-15/015-A      Date: 2022  Patient Name: Aj Huynh        CSN: [id number]   : 1955  (61 y.o.)  Gender: female   Referring Practitioner: Alli Bird PA-C  Diagnosis: symptomatic bradycardia         REASON FOR MISSED TREATMENT: Patient Unavailable patient currently working with PT, will attempt back as time allows.

## 2022-05-17 NOTE — PLAN OF CARE
Problem: Discharge Planning  Goal: Discharge to home or other facility with appropriate resources  Outcome: Progressing  Note: Discharge planning in process and discussed with patient/family. Social work consulted for any additional needs. Care manager aware of discharge needs. Problem: Pain  Goal: Verbalizes/displays adequate comfort level or baseline comfort level  Outcome: Progressing  Note: Patient able to use 0-10 pain scale. Denies pain at this time. Agreeable to take PRN pain medications. Problem: Safety - Adult  Goal: Free from fall injury  Outcome: Progressing  Note: Patient remained free from falls this shift. Bed is in low position with alarm on and siderails up x2. Education given on use of call light and patient voiced understanding. Call light and beside table within reach. Arm band and falling star in place. Hourly rounds completed. Will continue to monitor. Problem: Cardiovascular - Adult  Goal: Maintains optimal cardiac output and hemodynamic stability  Outcome: Progressing  Note: /63   Pulse 77   Temp 98.1 °F (36.7 °C) (Oral)   Resp 20   Ht 5' (1.524 m)   Wt 175 lb 9.6 oz (79.7 kg)   SpO2 96%   BMI 34.29 kg/m²     VS stable, will continue to monitor. Problem: Neurosensory - Adult  Goal: Achieves stable or improved neurological status  Outcome: Progressing  Note: Pt  A&O x4. NIH assessed each shift. Neuro checks every 4 hours. Will continue to monitor. Problem: Skin/Tissue Integrity  Goal: Absence of new skin breakdown  Description: 1. Monitor for areas of redness and/or skin breakdown  2. Assess vascular access sites hourly  3. Every 4-6 hours minimum:  Change oxygen saturation probe site  4. Every 4-6 hours:  If on nasal continuous positive airway pressure, respiratory therapy assess nares and determine need for appliance change or resting period. Outcome: Progressing  Note: No signs of skin breakdown. Skin warm, dry, and intact.   Mucous membranes pink and moist.  Assistance with turns/ambulation provided PRN. Will continue to monitor. Care plan reviewed with patient. Patient verbalizes understanding of the plan of care and contributed to goal setting.

## 2022-05-17 NOTE — PROGRESS NOTES
Patient stated, \"I want a different diet. Can you talk to someone to see if I can eat regular food? \" This RN spoke with Fiorella Vegas from speech therapy on a follow up evaluation. Patient has had poor oral intake for the past few days d/t loss of appetite. This RN is trying to encourage the patient to eat and drink with meals.

## 2022-05-18 LAB
ANION GAP SERPL CALCULATED.3IONS-SCNC: 11 MEQ/L (ref 8–16)
BUN BLDV-MCNC: 34 MG/DL (ref 7–22)
CALCIUM SERPL-MCNC: 8.3 MG/DL (ref 8.5–10.5)
CHLORIDE BLD-SCNC: 111 MEQ/L (ref 98–111)
CO2: 21 MEQ/L (ref 23–33)
CREAT SERPL-MCNC: 1.6 MG/DL (ref 0.4–1.2)
GFR SERPL CREATININE-BSD FRML MDRD: 39 ML/MIN/1.73M2
GLUCOSE BLD-MCNC: 80 MG/DL (ref 70–108)
GLUCOSE BLD-MCNC: 86 MG/DL (ref 70–108)
GLUCOSE BLD-MCNC: 86 MG/DL (ref 70–108)
GLUCOSE BLD-MCNC: 92 MG/DL (ref 70–108)
GLUCOSE BLD-MCNC: 94 MG/DL (ref 70–108)
POTASSIUM SERPL-SCNC: 4.2 MEQ/L (ref 3.5–5.2)
SODIUM BLD-SCNC: 143 MEQ/L (ref 135–145)

## 2022-05-18 PROCEDURE — 36415 COLL VENOUS BLD VENIPUNCTURE: CPT

## 2022-05-18 PROCEDURE — 6370000000 HC RX 637 (ALT 250 FOR IP)

## 2022-05-18 PROCEDURE — 99232 SBSQ HOSP IP/OBS MODERATE 35: CPT | Performed by: HOSPITALIST

## 2022-05-18 PROCEDURE — 97116 GAIT TRAINING THERAPY: CPT

## 2022-05-18 PROCEDURE — 6370000000 HC RX 637 (ALT 250 FOR IP): Performed by: NURSE PRACTITIONER

## 2022-05-18 PROCEDURE — 82948 REAGENT STRIP/BLOOD GLUCOSE: CPT

## 2022-05-18 PROCEDURE — 6360000002 HC RX W HCPCS: Performed by: PHYSICIAN ASSISTANT

## 2022-05-18 PROCEDURE — 6370000000 HC RX 637 (ALT 250 FOR IP): Performed by: STUDENT IN AN ORGANIZED HEALTH CARE EDUCATION/TRAINING PROGRAM

## 2022-05-18 PROCEDURE — 97110 THERAPEUTIC EXERCISES: CPT

## 2022-05-18 PROCEDURE — 99232 SBSQ HOSP IP/OBS MODERATE 35: CPT | Performed by: INTERNAL MEDICINE

## 2022-05-18 PROCEDURE — 2060000000 HC ICU INTERMEDIATE R&B

## 2022-05-18 PROCEDURE — 6370000000 HC RX 637 (ALT 250 FOR IP): Performed by: PHYSICIAN ASSISTANT

## 2022-05-18 PROCEDURE — 80048 BASIC METABOLIC PNL TOTAL CA: CPT

## 2022-05-18 PROCEDURE — 2580000003 HC RX 258: Performed by: PHYSICIAN ASSISTANT

## 2022-05-18 RX ORDER — PREGABALIN 75 MG/1
75 CAPSULE ORAL EVERY EVENING
Status: DISCONTINUED | OUTPATIENT
Start: 2022-05-18 | End: 2022-05-20 | Stop reason: HOSPADM

## 2022-05-18 RX ADMIN — HEPARIN SODIUM 5000 UNITS: 5000 INJECTION INTRAVENOUS; SUBCUTANEOUS at 16:14

## 2022-05-18 RX ADMIN — SODIUM CHLORIDE, PRESERVATIVE FREE 10 ML: 5 INJECTION INTRAVENOUS at 20:58

## 2022-05-18 RX ADMIN — CARVEDILOL 12.5 MG: 6.25 TABLET, FILM COATED ORAL at 09:18

## 2022-05-18 RX ADMIN — CARVEDILOL 12.5 MG: 6.25 TABLET, FILM COATED ORAL at 16:14

## 2022-05-18 RX ADMIN — HEPARIN SODIUM 5000 UNITS: 5000 INJECTION INTRAVENOUS; SUBCUTANEOUS at 09:18

## 2022-05-18 RX ADMIN — AMLODIPINE BESYLATE 5 MG: 5 TABLET ORAL at 18:22

## 2022-05-18 RX ADMIN — HYDRALAZINE HYDROCHLORIDE 50 MG: 50 TABLET ORAL at 16:14

## 2022-05-18 RX ADMIN — CLOPIDOGREL BISULFATE 75 MG: 75 TABLET ORAL at 09:20

## 2022-05-18 RX ADMIN — HYDRALAZINE HYDROCHLORIDE 50 MG: 50 TABLET ORAL at 05:41

## 2022-05-18 RX ADMIN — HEPARIN SODIUM 5000 UNITS: 5000 INJECTION INTRAVENOUS; SUBCUTANEOUS at 00:39

## 2022-05-18 RX ADMIN — PREGABALIN 75 MG: 75 CAPSULE ORAL at 18:22

## 2022-05-18 RX ADMIN — ISOSORBIDE MONONITRATE 30 MG: 30 TABLET, EXTENDED RELEASE ORAL at 09:18

## 2022-05-18 RX ADMIN — ASPIRIN 81 MG: 81 TABLET, COATED ORAL at 09:20

## 2022-05-18 RX ADMIN — ATORVASTATIN CALCIUM 40 MG: 80 TABLET, FILM COATED ORAL at 09:18

## 2022-05-18 RX ADMIN — HYDRALAZINE HYDROCHLORIDE 50 MG: 50 TABLET ORAL at 23:28

## 2022-05-18 RX ADMIN — DOXAZOSIN 4 MG: 4 TABLET ORAL at 09:18

## 2022-05-18 ASSESSMENT — PAIN SCALES - GENERAL
PAINLEVEL_OUTOF10: 0
PAINLEVEL_OUTOF10: 0

## 2022-05-18 NOTE — CARE COORDINATION
5/18/22, 8:15 AM EDT    DISCHARGE PLANNING EVALUATION      Left a message for admissions to see if they received the referral packet faxed yesterday afternoon. Asked for a call back to discuss patient. 12:27PM Update- Received a call from Ham Jasmine at [facility], they do have a COVID bed available and are reviewing her right now. Will call back with an any updates.

## 2022-05-18 NOTE — PROGRESS NOTES
[address] [address]  INPATIENT PHYSICAL THERAPY  DAILY NOTE  [facility] 4A - 4A-15/015-A      Time In: 5242  Time Out: 1105  Timed Code Treatment Minutes: 23 Minutes  Minutes: 23          Date: 2022  Patient Name: Bailee Flor,  Gender:  female        MRN: [id number]  : 1955  (61 y.o.)     Referring Practitioner: Joseph Acosta PA-C  Diagnosis: symptomatic bradycardia  Additional Pertinent Hx: Per H&p :Patient presents to the ED following a syncopal episode. The patient was just discharged one day prior to admission following a prolonged stay due to influenza A. The patient developed CHLOE while hospitalized. Today the patient had risen from sitting to standing and began amublating. The patient loss consciousness and subsequently fell. The patient was found to have a resting heart rate 40-48. Pt presents with On 2022 patient was noted by staff to have dysarthria and a new right facial droop which prompted a code stroke. Initial NIH was 3. Patient was taken to imaging for CT head which revealed no ICH however he did demonstrate old stroke which was age-indeterminate. For this reason tPA was not given as if this was a recent stroke he would be very high risk. CTAs were deferred as this patient has a creatinine of 4.0. Today on exam, pt continues to have slurred speech and facial droop and she is lethargic per neurology note.  MRI of the brain indicating acute infarcts in the left corona radiata and right centrum semiovale     Prior Level of Function:  Lives With: Alone  Type of Home: House  Home Layout: One level  Home Access: Level entry  Home Equipment: alan MooreCane   Bathroom Shower/Tub: Tub/Shower unit  Bathroom Toilet: Standard  Bathroom Equipment: Shower chair  Bathroom Accessibility: Walker accessible    Receives Help From: Family  ADL Assistance: Independent  Homemaking Assistance: Independent  Ambulation Assistance: Independent  Transfer Assistance: Independent  Active : Yes  Additional Comments: Pt used cane intermittently when she is feeling off balance. Pt states her kids can assist her if needed, she runs her own errands and cares for her home indep. Pt's children drive for her. Restrictions/Precautions:  Restrictions/Precautions: Fall Risk,General Precautions  Position Activity Restriction  Other position/activity restrictions: 5/16 pt tested + for COVID       SUBJECTIVE: pt in bed on arrival and required max encouragement to participate in therapy this date - she had been incont and needed assist to change depends pt did request to return to bed following session - pt impulsive at times needing several cues for safety     PAIN: 0/10:       Vitals: tech in room and just checked vitals- pts on room air but was SOB and coughing her O2 sats 99% when spot checked     OBJECTIVE:  Bed Mobility:  Supine to Sit: Minimal Assistance, with head of bed raised, with increased time for completion  Sit to Supine: Contact Guard Assistance, and then dependent to boost up in bed      Transfers:  Sit to Stand: Contact Guard Assistance  Stand to [address] for safe hand placement noted poor carryover   Ambulation:  Contact Guard Assistance  Distance: 10x1, 40 feet in room  Surface: Level Tile  Device:Rolling Walker  Gait Deviations:  Pt needed cues for walker placement and safety along with awareness of her IV line as she was impulsive with her turns, noted fair step length      Balance:  sitting edge of toilet with SBA while pt required assist for donning new depends  Dynamic balance w/ one UE at support with CGA pt reaching in base of support to complete toileting task     Exercise:  Patient was guided in 1 set(s) 10 reps of exercise to both lower extremities. Ankle pumps, Heelslides, Hip abduction/adduction, Straight leg raises and Hooklying hip abduction/adduction.  Noted increased fatigue in left vs right   Exercises were completed for increased independence with functional mobility. Functional Outcome Measures: Completed  AM-PAC Inpatient Mobility without Stair Climbing Raw Score : 15  AM-PAC Inpatient without Stair Climbing T-Scale Score : 43.03    ASSESSMENT:  Assessment: Patient progressing toward established goals. Activity Tolerance:  Patient tolerance of  treatment: fair. Equipment Recommendations:Equipment Needed: No (pt has a RW)  Discharge Recommendations: Subacte/Skilled Nursing Facility  Plan: Current Treatment Recommendations: Strengthening,Balance training,Functional mobility training,Transfer training,Endurance training,Neuromuscular re-education,Gait training,Home exercise program,Safety education & training,Patient/Caregiver education & training,Equipment evaluation, education, & procurement,Therapeutic activities  Plan:  (5-6xC)    Patient Education  Patient Education: Plan of Care    Goals:  Patient goals : \"no\"  Short Term Goals  Time Frame for Short term goals: by hospital d/c  Short term goal 1: Pt to demo supine <->sit with S for ease with getting in and out of bed  Short term goal 2: Pt to demo sit <->stand with RW and S for improved ability to transfer from any surface safely  Short term goal 3: Pt to ambulate >=40' with RW and S for improved ability to move in her environment  Short term goal 4: Pt to improve Tinetti score to >=19/28 to progress to the moderate fall risk category  Long Term Goals  Time Frame for Long term goals : NA due to short ELOS    Following session, patient left in safe position with all fall risk precautions in place.

## 2022-05-18 NOTE — PROGRESS NOTES
Renal Adjustment Follow-up    Recent Labs     05/17/22  0409 05/18/22  0414   BUN 36* 34*   CREATININE 1.8* 1.6*     Estimated Creatinine Clearance: 35 mL/min (A) (based on SCr of 1.6 mg/dL (H)). Plan: Scr continues to improve. Since CrCl remains > 30 mL/min, will increase Lyrica up to 75 mg nightly per renal consult. Pharmacy will continue to monitor.     Lizette Cooks, PharmD, BCPS  5/18/2022  7:04 AM

## 2022-05-18 NOTE — PROGRESS NOTES
Hospitalist Progress Note  Patient:  Saturnino Priest    Unit/Bed:4A-15/015-A  YOB: 1955  MRN: [id number]   Acct: [de-identified]   PCP: Partha Miranda MD  Date of Admission: 5/7/2022    Assessment/Plan:  1. Acute Ischemic Stroke: Code stroke called on 05/08 overnight. Patient with NIHSS 3 from facial droop, RLE weakness, slurred speech. CT scan head revealed chronic small vessel ischemic disease. Two incidental meningiomas. CTA head w/ contrast not obtained 2/2 severity of renal injury. MRI brain revealed few scattered punctate acute infarcts in left corona radiata and right centrum semiovale with chronic microvascular angiopathy. MRA head with focal areas of moderate stenosis in b/l cavernous and clinoid segments of ICAs. tPA not administered as window had passed and there was initial concern for a subacute indeterminate infarct. TTE with bubble study unremarkable for PFO. SLP w/ barium swallow + moderate dysphagia   - NIHSS and neuro checks qShift    - BP control, continue Coreg 12.5 BID    - Continue IVF, ASA, Plavix, sugar control, statin    - PT/OT following    - 30-day event monitor on DC    - Will complete neuro rehab on DC    - Precert accepted but patient tested positive for COVID19  2. Moderate Oropharyngeal Dysphagia: New onset s/p recent ischemic stroke(s), SLP eval with MBS on 05/10 revealed moderate oral dysphagia, mild pharyngeal dysphagia without tracheal aspiration events    - Diet minced and moist with mildly thick liquids   3. Symptomatic Bradycardia: Resolved. S/p syncopal episode with collapse and LOC lasting seconds. Likely iatrogenic 2/2 Toprol XL 75 mg which has been held. Pulse ~40s on admission. Pulse improved to ~60s s/p BB washout.    - Toprol XL 25 mg BIDdiscontinued on DC    - Continue Coreg 12.5 BID   4. Acute Kidney Injury in CKD: Resolving. Creatinine improving. Likely etiology was low cardiac output/bradycardia. Full workup pending. No recent contrast received. Baseline ~1.8 one week prior. BUN:Cr points to prerenal etiology. Renal US revealed CKD     - Nephrology following   - Avoid nephrotoxins      - Continue gentle IV fluids  5. NIDDM2: A1c 9.1. With diabetic nephropathy. Uncontrolled; sugars have been running too low. - Reduced lantus to 10u nightly, Humalog TID reduced to 4u. POCT glucose checks AC/HS  6. Hyperlipidemia: Continue home statin   7. Uncontrolled Hypertension: Continue norvasc 5 mg daily, cardura 4mg daily, hydralazine 50 mg q8h, imdur 30 mg daily   8. Chronic HFpEF 55%: Stable and well controlled at this time. Expected discharge date: Plan for DC to [id number] [address] on 76[address]74 -precert pending    Disposition:    [] Home       [] TCU       [x] Rehab       [] Psych       [] SNF       [] [place]       [] Other-    Chief Complaint: Syncope with collapse, lightheadedness     Hospital Course: Per admitting H&P note \"Patient presents to the ED following a syncopal episode. The patient was just discharged one day prior to admission following a prolonged stay due to influenza A. The patient developed CHLOE while hospitalized. Today the patient had risen from sitting to standing and began amublating. The patient loss consciousness and subsequently fell. The patient was found to have a resting heart rate 40-48. Patient admitted for telemetry and cardiology consult. \"    05/09/22: Patient unfortunately suffered from ischemic stroke overnight but was deemed not a candidate for tPA as there was some concern for an indeterminate subacute infarct. NIHSS 3 with slurred speech, right facial droop and RLE weakness. MRI brain revealed some scattered acute ischemic infarcts bilaterally. Started on ASA, Plavix, high intensity statin. Neurology followed. Renal function worsened with time. As of now no known etiology.  Suspecting a possible cardiac etiology for micro thromboembolic disease  64/89/14: Still with facial droop, NIHSS 2, PM&R to evaluate data filed at 5/16/2022 2331  Gross per 24 hour   Intake 100 ml   Output --   Net 100 ml       Diet:  ADULT DIET; Dysphagia - Minced and Moist; 4 carb choices (60 gm/meal); Low Fat/Low Chol/High Fiber/KALEN; Low Sodium (2 gm); Low Potassium (Less than 3000 mg/day); Low Phosphorus (Less than 1000 mg); Less than 60 gm; Mildly Thick (Nectar)    Exam:  BP (!) 144/69   Pulse 77   Temp 98.1 °F (36.7 °C) (Oral)   Resp 16   Ht 5' (1.524 m)   Wt 192 lb 4.8 oz (87.2 kg)   SpO2 99%   BMI 37.56 kg/m²     General appearance: No apparent distress, appears stated age and cooperative. Slurred speech   HEENT: Pupils equal, round, and reactive to light. Mild degree of exophthalmos with ride sided facial droop. Conjunctivae/corneas clear. Neck: Supple, with full range of motion. No jugular venous distention. Trachea midline. Respiratory:  Normal respiratory effort. Clear to auscultation, bilaterally without Rales/Wheezes/Rhonchi. Cardiovascular: Regular rate and rhythm with normal S1/S2 without murmurs, rubs or gallops. Abdomen: Soft, non-tender, non-distended with normal bowel sounds. Musculoskeletal: passive and active ROM x 4 extremities. Skin: Skin color, texture, turgor normal.  No rashes or lesions. Neurologic:  Neurovascularly intact without any focal sensory/motor deficits. Cranial nerves: II-XII intact, grossly non-focal.  Psychiatric: Alert and oriented, thought content appropriate, normal insight  Capillary Refill: Brisk,< 3 seconds   Peripheral Pulses: +2 palpable, equal bilaterally     Labs:   No results for input(s): WBC, HGB, HCT, PLT in the last 72 hours. Recent Labs     05/15/22  0411 05/16/22  0408 05/17/22  0409    140 142   K 4.2 4.5 4.1    109 111   CO2 22* 20* 19*   BUN 36* 36* 36*   CREATININE 1.9* 1.9* 1.8*   CALCIUM 8.0* 8.0* 8.2*     No results for input(s): AST, ALT, BILIDIR, BILITOT, ALKPHOS in the last 72 hours. No results for input(s): INR in the last 72 hours.   No results for input(s): Shoaib Causey in the last 72 hours. Microbiology:      Urinalysis:      Lab Results   Component Value Date    NITRU NEGATIVE 05/09/2022    WBCUA 0-2 05/09/2022    BACTERIA NONE SEEN 05/09/2022    RBCUA 0-2 05/09/2022    BLOODU NEGATIVE 05/09/2022    SPECGRAV 1.016 05/09/2022    GLUCOSEU Negative 05/02/2022     Radiology:  FL MODIFIED BARIUM SWALLOW W VIDEO   Final Result   1. Laryngeal penetration of pureed, thin and soft barium with aspiration of thin barium. 2. Additional recommendations from the speech therapist will follow. **This report has been created using voice recognition software. It may contain minor errors which are inherent in voice recognition technology. **         Final report electronically signed by Dr. Roshni Amaya on 5/10/2022 11:56 AM      MRI BRAIN WO CONTRAST   Final Result      1. A few scattered punctate acute infarcts in the left corona radiata and right centrum semiovale. 2. Mild severity chronic microvascular angiopathy. Findings were discussed with Jason Cary RN via telephone at the time of interpretation. **This report has been created using voice recognition software. It may contain minor errors which are inherent in voice recognition technology. **      Final report electronically signed by Dr. Ron Zelaya MD on 5/9/2022 4:49 PM      MRA NECK WO CONTRAST   Final Result   Motion degraded exam without visualized areas of flow-limiting stenosis. **This report has been created using voice recognition software. It may contain minor errors which are inherent in voice recognition technology. **      Final report electronically signed by Dr. Ron Zelaya MD on 5/9/2022 5:00 PM      MRA HEAD WO CONTRAST   Final Result    Focal areas of moderate stenosis in the bilateral cavernous and clinoid segments of the internal carotid arteries.                **This report has been created using voice recognition software. It may contain minor errors which are inherent in voice recognition technology. **      Final report electronically signed by Dr. Elder Larkin MD on 5/9/2022 4:51 PM      US RENAL COMPLETE   Final Result   1. No renal calculi or hydronephrosis. 2. Increased renal cortical echogenicity in the right kidney. The left    kidney is not optimally visualized. Increased renal cortical echogenicity    can be seen in medical renal disease. This can be correlated with renal    function tests. 3. Urinary bladder has a volume of 363 mL. This document has been electronically signed by: Reji Forde M.D. on    05/09/2022 06:07 AM      CT HEAD WO CONTRAST   Final Result   1. No acute disease in the brain. 2. Incidental findings are detailed above. This document has been electronically signed by: Reji Forde M.D. on    05/08/2022 10:08 PM      All CTs at this facility use dose modulation techniques and iterative    reconstructions, and/or weight-based dosing   when appropriate to reduce radiation to a low as reasonably achievable. XR CHEST PORTABLE   Final Result   Impression:   Bibasilar hazy opacities, may reflect atelectasis or infection. Mild cardiomegaly. Equivocal left pleural effusion. This document has been electronically signed by:  Ana Luisa Arzola MD on 05/07/2022    08:58 PM        Electronically signed by Austen Morales DO on 5/17/2022 at 9:26 PM

## 2022-05-18 NOTE — PROGRESS NOTES
[address] 62  OCCUPATIONAL THERAPY MISSED TREATMENT NOTE  [facility]  4A-15/015-A      Date: 2022  Patient Name: Sonny Cabrales        CSN: [id number]   : 1955  (61 y.o.)  Gender: female   Referring Practitioner: Meliton Coulter PA-C  Diagnosis: symptomatic bradycardia         REASON FOR MISSED TREATMENT: Pt adamantly declined participation despite much encouragement. Will check back at a later date.

## 2022-05-18 NOTE — PLAN OF CARE
membranes pink and moist.  Assistance with turns/ambulation provided PRN. Will continue to monitor.      Care plan reviewed with patient.   Patient verbalizes understanding of the plan of care and contributed to goal setting.

## 2022-05-18 NOTE — PROGRESS NOTES
[facility]         Renal Inpatient Follow-Up note         5/18/2022 10:32 AM    Pt Name:   Samir Nose:   1955  Attending:   Racquel Lundberg MD    Chief Complaint : Vista Osgood is a 61 y.o. female being followed by nephrology for CHLOE/CKD    Interval History :   Patient seen and examined by me. No distress  Resting comfortably.   She is much more awake and alert today  Denies any chest pain or shortness of breath     Scheduled Medications :    pregabalin  75 mg Oral QPM    carvedilol  12.5 mg Oral BID WC    insulin glargine  10 Units SubCUTAneous Nightly    insulin lispro  4 Units SubCUTAneous QAM AC    insulin lispro  4 Units SubCUTAneous Daily before lunch    insulin lispro  4 Units SubCUTAneous Dinner    doxazosin  4 mg Oral Daily    hydrALAZINE  50 mg Oral 3 times per day    atorvastatin  40 mg Oral Daily    calcium replacement protocol   Other RX Placeholder    clopidogrel  75 mg Oral Daily    amLODIPine  5 mg Oral QPM    aspirin  81 mg Oral Daily    insulin lispro  0-6 Units SubCUTAneous TID WC    insulin lispro  0-3 Units SubCUTAneous Nightly    isosorbide mononitrate  30 mg Oral Daily    sodium chloride flush  5-40 mL IntraVENous 2 times per day    heparin (porcine)  5,000 Units SubCUTAneous q8h      sodium chloride 40 mL/hr at 05/18/22 0405    dextrose      sodium chloride         Vitals :  BP (!) 168/82   Pulse 80   Temp 98 °F (36.7 °C) (Oral)   Resp 20   Ht 5' (1.524 m)   Wt 192 lb 4.8 oz (87.2 kg)   SpO2 97%   BMI 37.56 kg/m²     24HR INTAKE/OUTPUT:      Intake/Output Summary (Last 24 hours) at 5/18/2022 1032  Last data filed at 5/18/2022 0405  Gross per 24 hour   Intake 510.77 ml   Output --   Net 510.77 ml     Last 3 weights  Wt Readings from Last 3 Encounters:   05/16/22 192 lb 4.8 oz (87.2 kg)   05/06/22 178 lb 9.2 oz (81 kg)   10/17/18 175 lb 0.7 oz (79.4 kg)           Physical Exam :limited due to covid 19  General Appearance:  Well developed. No distress  Mouth/Throat:  No accessory muscle use  Musculoskeletal:  Edema - no edema  CNS grossly intact  Psych-not agitated         Last 3 CBC   No results for input(s): WBC, RBC, HGB, HCT, PLT in the last 72 hours. Last 3 CMP  Recent Labs     05/16/22  0408 05/17/22  0409 05/18/22  0414    142 143   K 4.5 4.1 4.2    111 111   CO2 20* 19* 21*   BUN 36* 36* 34*   CREATININE 1.9* 1.8* 1.6*   CALCIUM 8.0* 8.2* 8.3*             ASSESSMENT / Plan   1. Renal -acute kidney injury with progressive worsening of renal function and significantly elevated BUN  ? Exact etiology not clear she has been getting diuretics in the outside hospital in [place] she was also bradycardic upon arrival  ? Renal ultrasound scan shows CKD urine lites show more of a prerenal etiology  ? Continue IV hydration   ? Overall creatinine improved with IV hydration currently at 1.6 which is slightly better than her baseline  ? All serologies are negative except DARIANA which was positive  ? She is much more awake and alert and probably eating and drinking better stop IV fluids and monitor     2. Electrolytes -within normal limits  3. Essential hypertension  4. Hx of diabetes mellitus with diabetic nephropathy  5. Recent treatment with congestive heart failure and influenza  6. Syncope may be due to bradycardia being seen by cardiology  7. Meds reviewed discussed with the patient overall much better from renal standpoint  8. Awaiting on placement     Dr. Trip Borden MD, M,D.  [facility].

## 2022-05-18 NOTE — PROGRESS NOTES
Hospitalist Progress Note  Patient:  Rogelio Englihs    Unit/Bed:4A-15/015-A  YOB: 1955  MRN: [id number]   Acct: [de-identified]   PCP: Amber Abraham MD  Date of Admission: 5/7/2022    Assessment/Plan:  1. Acute Ischemic Stroke: Code stroke called on 05/08 overnight. Patient with NIHSS 3 from facial droop, RLE weakness, slurred speech. CT scan head revealed chronic small vessel ischemic disease. Two incidental meningiomas. CTA head w/ contrast not obtained 2/2 severity of renal injury. MRI brain revealed few scattered punctate acute infarcts in left corona radiata and right centrum semiovale with chronic microvascular angiopathy. MRA head with focal areas of moderate stenosis in b/l cavernous and clinoid segments of ICAs. tPA not administered as window had passed and there was initial concern for a subacute indeterminate infarct. TTE with bubble study unremarkable for PFO. SLP w/ barium swallow + moderate dysphagia   - NIHSS and neuro checks qShift    - BP control, continue Coreg 12.5 BID    - Continue IVF, ASA, Plavix, sugar control, statin    - PT/OT following    - 30-day event monitor on DC    - Will complete neuro rehab on DC    - Precert accepted but patient tested positive for COVID19  2. Moderate Oropharyngeal Dysphagia: New onset s/p recent ischemic stroke(s), SLP eval with MBS on 05/10 revealed moderate oral dysphagia, mild pharyngeal dysphagia without tracheal aspiration events    - Diet minced and moist with mildly thick liquids     - Ensure fit nutritional supplements added per patient request   3. Symptomatic Bradycardia: Resolved. S/p syncopal episode with collapse and LOC lasting seconds. Likely iatrogenic 2/2 Toprol XL 75 mg which has been held. Pulse ~40s on admission. Pulse improved to ~60s s/p BB washout.    - Toprol XL 25 mg BIDdiscontinued on DC    - Continue Coreg 12.5 BID   4. Acute Kidney Injury in CKD: Resolving. Creatinine improving.  Likely etiology was low cardiac output/bradycardia. Full workup pending. No recent contrast received. Baseline ~1.8 one week prior. BUN:Cr points to prerenal etiology. Renal US revealed CKD     - Nephrology following   - Avoid nephrotoxins      - Continue gentle IV fluids  5. NIDDM2: A1c 9.1. With diabetic nephropathy. Uncontrolled; sugars have been running too low. - Continue lantus to 10u nightly, Humalog TID reduced to 4u. POCT glucose checks AC/HS  6. Hyperlipidemia: Continue home statin   7. Uncontrolled Hypertension: Continue norvasc 5 mg daily, cardura 4mg daily, hydralazine 50 mg q8h, imdur 30 mg daily   8. Chronic HFpEF 55%: Stable and well controlled at this time. Expected discharge date: Plan for DC to [address] [address] on 82/60 -precert pending    Disposition:    [] Home       [] TCU       [x] Rehab       [] Psych       [] SNF       [] [place]       [] Other-    Chief Complaint: Syncope with collapse, lightheadedness      Hospital Course: Per admitting H&P note \"Patient presents to the ED following a syncopal episode. The patient was just discharged one day prior to admission following a prolonged stay due to influenza A. The patient developed CHLOE while hospitalized. Today the patient had risen from sitting to standing and began amublating. The patient loss consciousness and subsequently fell. The patient was found to have a resting heart rate 40-48. Patient admitted for telemetry and cardiology consult. \"    05/09/22: Patient unfortunately suffered from ischemic stroke overnight but was deemed not a candidate for tPA as there was some concern for an indeterminate subacute infarct. NIHSS 3 with slurred speech, right facial droop and RLE weakness. MRI brain revealed some scattered acute ischemic infarcts bilaterally. Started on ASA, Plavix, high intensity statin. Neurology followed. Renal function worsened with time. As of now no known etiology.  Suspecting a possible cardiac etiology for micro thromboembolic disease  40/94/92: Still with facial droop, NIHSS 2, PM&R to evaluate for rehab. Patient with SLP eval and MBS revealing moderate oropharyngeal dysphagia with recs for diet to be minced and moist with thick liquids. 05/11/22: CHLOE improving. Nearing discharge,  set up transport to [facility] on 05/13 or 05/14 05/12/22: Facial droop significantly improved. BP still elevated ~150s.   26/57-29/79: Awaiting precert for rehab    01/28/13: Off tele. Accepted to rehab but tested positive for COVID    Subjective (past 24 hours): Patient laying in bed comfortably. No change since yesterday. Stable medical condition but not eating much, requesting ensure fits. Otherwise no complaints or concerns     ROS (12 point review of systems completed. Pertinent positives noted. Otherwise ROS is negative).     Medications:  Reviewed    Infusion Medications    dextrose      sodium chloride       Scheduled Medications    pregabalin  75 mg Oral QPM    carvedilol  12.5 mg Oral BID WC    insulin glargine  10 Units SubCUTAneous Nightly    insulin lispro  4 Units SubCUTAneous QAM AC    insulin lispro  4 Units SubCUTAneous Daily before lunch    insulin lispro  4 Units SubCUTAneous Dinner    doxazosin  4 mg Oral Daily    hydrALAZINE  50 mg Oral 3 times per day    atorvastatin  40 mg Oral Daily    calcium replacement protocol   Other RX Placeholder    clopidogrel  75 mg Oral Daily    amLODIPine  5 mg Oral QPM    aspirin  81 mg Oral Daily    insulin lispro  0-6 Units SubCUTAneous TID WC    insulin lispro  0-3 Units SubCUTAneous Nightly    isosorbide mononitrate  30 mg Oral Daily    sodium chloride flush  5-40 mL IntraVENous 2 times per day    heparin (porcine)  5,000 Units SubCUTAneous q8h     PRN Meds: polyvinyl alcohol, albuterol sulfate HFA, glucagon (rDNA), dextrose, sodium chloride flush, sodium chloride, ondansetron **OR** ondansetron, polyethylene glycol, acetaminophen **OR** acetaminophen, glucose, dextrose bolus **OR** dextrose bolus      Intake/Output Summary (Last 24 hours) at 5/18/2022 1441  Last data filed at 5/18/2022 0912  Gross per 24 hour   Intake 610.77 ml   Output --   Net 610.77 ml       Diet:  ADULT DIET; Dysphagia - Minced and Moist; 4 carb choices (60 gm/meal); Low Fat/Low Chol/High Fiber/KALEN; Low Sodium (2 gm); Low Potassium (Less than 3000 mg/day); Low Phosphorus (Less than 1000 mg); Less than 60 gm; Mildly Thick (Nectar)  ADULT ORAL NUTRITION SUPPLEMENT; AM Snack, PM Snack; Standard High Calorie/High Protein Oral Supplement    Exam:  BP (!) 129/57   Pulse 72   Temp 98 °F (36.7 °C) (Axillary)   Resp 18   Ht 5' (1.524 m)   Wt 192 lb 4.8 oz (87.2 kg)   SpO2 99%   BMI 37.56 kg/m²     General appearance: No apparent distress, appears stated age and cooperative. Slurred speech   HEENT: Pupils equal, round, and reactive to light. Mild degree of exophthalmos with ride sided facial droop. Conjunctivae/corneas clear. Neck: Supple, with full range of motion. No jugular venous distention. Trachea midline. Respiratory:  Normal respiratory effort. Clear to auscultation, bilaterally without Rales/Wheezes/Rhonchi. Cardiovascular: Regular rate and rhythm with normal S1/S2 without murmurs, rubs or gallops. Abdomen: Soft, non-tender, non-distended with normal bowel sounds. Musculoskeletal: passive and active ROM x 4 extremities. Skin: Skin color, texture, turgor normal.  No rashes or lesions. Neurologic:  Neurovascularly intact without any focal sensory/motor deficits. Cranial nerves: II-XII intact, grossly non-focal.  Psychiatric: Alert and oriented, thought content appropriate, normal insight  Capillary Refill: Brisk,< 3 seconds   Peripheral Pulses: +2 palpable, equal bilaterally     Labs:   No results for input(s): WBC, HGB, HCT, PLT in the last 72 hours.   Recent Labs     05/16/22  0408 05/17/22  0409 05/18/22  0414    142 143   K 4.5 4.1 4.2    111 111 CO2 20* 19* 21*   BUN 36* 36* 34*   CREATININE 1.9* 1.8* 1.6*   CALCIUM 8.0* 8.2* 8.3*     No results for input(s): AST, ALT, BILIDIR, BILITOT, ALKPHOS in the last 72 hours. No results for input(s): INR in the last 72 hours. No results for input(s): Eva Oropeza in the last 72 hours. Microbiology:      Urinalysis:      Lab Results   Component Value Date    NITRU NEGATIVE 05/09/2022    WBCUA 0-2 05/09/2022    BACTERIA NONE SEEN 05/09/2022    RBCUA 0-2 05/09/2022    BLOODU NEGATIVE 05/09/2022    SPECGRAV 1.016 05/09/2022    GLUCOSEU Negative 05/02/2022     Radiology:  FL MODIFIED BARIUM SWALLOW W VIDEO   Final Result   1. Laryngeal penetration of pureed, thin and soft barium with aspiration of thin barium. 2. Additional recommendations from the speech therapist will follow. **This report has been created using voice recognition software. It may contain minor errors which are inherent in voice recognition technology. **         Final report electronically signed by Dr. Jayden Faustin on 5/10/2022 11:56 AM      MRI BRAIN WO CONTRAST   Final Result      1. A few scattered punctate acute infarcts in the left corona radiata and right centrum semiovale. 2. Mild severity chronic microvascular angiopathy. Findings were discussed with Tessy Kauffman RN via telephone at the time of interpretation. **This report has been created using voice recognition software. It may contain minor errors which are inherent in voice recognition technology. **      Final report electronically signed by Dr. Frida Jean MD on 5/9/2022 4:49 PM      MRA NECK WO CONTRAST   Final Result   Motion degraded exam without visualized areas of flow-limiting stenosis. **This report has been created using voice recognition software. It may contain minor errors which are inherent in voice recognition technology. **      Final report electronically signed by Dr. Frida Jean MD on 5/9/2022 5:00 PM      MRA HEAD WO CONTRAST   Final Result    Focal areas of moderate stenosis in the bilateral cavernous and clinoid segments of the internal carotid arteries. **This report has been created using voice recognition software. It may contain minor errors which are inherent in voice recognition technology. **      Final report electronically signed by Dr. Dony Washington MD on 5/9/2022 4:51 PM      US RENAL COMPLETE   Final Result   1. No renal calculi or hydronephrosis. 2. Increased renal cortical echogenicity in the right kidney. The left    kidney is not optimally visualized. Increased renal cortical echogenicity    can be seen in medical renal disease. This can be correlated with renal    function tests. 3. Urinary bladder has a volume of 363 mL. This document has been electronically signed by: Tone Garcia M.D. on    05/09/2022 06:07 AM      CT HEAD WO CONTRAST   Final Result   1. No acute disease in the brain. 2. Incidental findings are detailed above. This document has been electronically signed by: Tone Garcia M.D. on    05/08/2022 10:08 PM      All CTs at this facility use dose modulation techniques and iterative    reconstructions, and/or weight-based dosing   when appropriate to reduce radiation to a low as reasonably achievable. XR CHEST PORTABLE   Final Result   Impression:   Bibasilar hazy opacities, may reflect atelectasis or infection. Mild cardiomegaly. Equivocal left pleural effusion. This document has been electronically signed by:  Abraham North MD on 05/07/2022    08:58 PM        Electronically signed by Reji Villela DO on 5/18/2022 at 2:41 PM

## 2022-05-19 LAB
GLUCOSE BLD-MCNC: 105 MG/DL (ref 70–108)
GLUCOSE BLD-MCNC: 117 MG/DL (ref 70–108)
GLUCOSE BLD-MCNC: 74 MG/DL (ref 70–108)
GLUCOSE BLD-MCNC: 80 MG/DL (ref 70–108)

## 2022-05-19 PROCEDURE — 99232 SBSQ HOSP IP/OBS MODERATE 35: CPT | Performed by: HOSPITALIST

## 2022-05-19 PROCEDURE — 6360000002 HC RX W HCPCS: Performed by: PHYSICIAN ASSISTANT

## 2022-05-19 PROCEDURE — 6370000000 HC RX 637 (ALT 250 FOR IP): Performed by: STUDENT IN AN ORGANIZED HEALTH CARE EDUCATION/TRAINING PROGRAM

## 2022-05-19 PROCEDURE — 92526 ORAL FUNCTION THERAPY: CPT

## 2022-05-19 PROCEDURE — 97116 GAIT TRAINING THERAPY: CPT

## 2022-05-19 PROCEDURE — 97530 THERAPEUTIC ACTIVITIES: CPT

## 2022-05-19 PROCEDURE — 6370000000 HC RX 637 (ALT 250 FOR IP)

## 2022-05-19 PROCEDURE — 6370000000 HC RX 637 (ALT 250 FOR IP): Performed by: PHYSICIAN ASSISTANT

## 2022-05-19 PROCEDURE — 97110 THERAPEUTIC EXERCISES: CPT

## 2022-05-19 PROCEDURE — 2580000003 HC RX 258: Performed by: PHYSICIAN ASSISTANT

## 2022-05-19 PROCEDURE — 2060000000 HC ICU INTERMEDIATE R&B

## 2022-05-19 PROCEDURE — 6370000000 HC RX 637 (ALT 250 FOR IP): Performed by: NURSE PRACTITIONER

## 2022-05-19 PROCEDURE — 99232 SBSQ HOSP IP/OBS MODERATE 35: CPT | Performed by: INTERNAL MEDICINE

## 2022-05-19 PROCEDURE — 82948 REAGENT STRIP/BLOOD GLUCOSE: CPT

## 2022-05-19 RX ADMIN — ATORVASTATIN CALCIUM 40 MG: 80 TABLET, FILM COATED ORAL at 08:35

## 2022-05-19 RX ADMIN — CARVEDILOL 12.5 MG: 6.25 TABLET, FILM COATED ORAL at 16:35

## 2022-05-19 RX ADMIN — CLOPIDOGREL BISULFATE 75 MG: 75 TABLET ORAL at 08:40

## 2022-05-19 RX ADMIN — SODIUM CHLORIDE, PRESERVATIVE FREE 10 ML: 5 INJECTION INTRAVENOUS at 08:35

## 2022-05-19 RX ADMIN — HYDRALAZINE HYDROCHLORIDE 50 MG: 50 TABLET ORAL at 14:50

## 2022-05-19 RX ADMIN — HYDRALAZINE HYDROCHLORIDE 50 MG: 50 TABLET ORAL at 06:22

## 2022-05-19 RX ADMIN — HEPARIN SODIUM 5000 UNITS: 5000 INJECTION INTRAVENOUS; SUBCUTANEOUS at 01:28

## 2022-05-19 RX ADMIN — ASPIRIN 81 MG: 81 TABLET, COATED ORAL at 08:40

## 2022-05-19 RX ADMIN — DOXAZOSIN 4 MG: 4 TABLET ORAL at 08:35

## 2022-05-19 RX ADMIN — AMLODIPINE BESYLATE 5 MG: 5 TABLET ORAL at 16:35

## 2022-05-19 RX ADMIN — ISOSORBIDE MONONITRATE 30 MG: 30 TABLET, EXTENDED RELEASE ORAL at 08:34

## 2022-05-19 RX ADMIN — HYDRALAZINE HYDROCHLORIDE 50 MG: 50 TABLET ORAL at 20:50

## 2022-05-19 RX ADMIN — HEPARIN SODIUM 5000 UNITS: 5000 INJECTION INTRAVENOUS; SUBCUTANEOUS at 16:35

## 2022-05-19 RX ADMIN — SODIUM CHLORIDE, PRESERVATIVE FREE 10 ML: 5 INJECTION INTRAVENOUS at 20:50

## 2022-05-19 RX ADMIN — PREGABALIN 75 MG: 75 CAPSULE ORAL at 16:42

## 2022-05-19 RX ADMIN — CARVEDILOL 12.5 MG: 6.25 TABLET, FILM COATED ORAL at 08:34

## 2022-05-19 RX ADMIN — HEPARIN SODIUM 5000 UNITS: 5000 INJECTION INTRAVENOUS; SUBCUTANEOUS at 08:34

## 2022-05-19 ASSESSMENT — PAIN SCALES - GENERAL: PAINLEVEL_OUTOF10: 0

## 2022-05-19 NOTE — PROGRESS NOTES
[address]  Occupational Therapy  Daily Note  Time:   Time In: 6388  Time Out: 1348  Timed Code Treatment Minutes: 13 Minutes  Minutes: 13          Date: 2022  Patient Name: Kathie Marvin,   Gender: female      Room: [facility]15/015-A  MRN: [id number]  : 1955  (61 y.o.)  Referring Practitioner: Alexa Salgado PA-C  Diagnosis: symptomatic bradycardia  Additional Pertinent Hx: per chart review; Kathie Marvin is a 61 y.o. female admitted to [facility] on 2022 with a history of hypertension, hyperlipidemia, type II diabetes mellitus and CHF. She presented to the emergency room for evaluation following a 4-day history of worsening shortness of breath, a nonproductive cough and increasing lower extremity edema. She reported that she had dull aching pain in her chest when she took a deep breath. She stated that her shortness of breath was severe, worse with exertion and improved with rest.  She was seen at the clinic at [facility] and diagnosed with influenza A, but was negative here. In the emergency room here she was found to have an acute CHF exacerbation with increased lower extremity edema and pulmonary edema. She was also found to have acute renal failure. She was admitted for further evaluation and treatment. Associated signs and symptoms did not include typical chest pain, lightheaded, dizziness, diaphoresis, orthopnea, paroxysmal nocturnal dyspnea, fever or chills. No recent medication changes. She does not use supplemental O2 at home. Patient was admitted for symptomatic bradycardia which was thought to be beta-blocker induced. On 2022 patient was noted by staff to have dysarthria and a new right facial droop which prompted a code stroke. Initial NIH was 3. Patient was taken to imaging for CT head which revealed no ICH however it did demonstrate old stroke which was age-indeterminate.   For that reason tPA was not given as if this was a recent stroke she would be very high risk. CTAs were deferred as this patient had a creatinine of 4.0. On 5/9/22, on exam, pt continued to have slurred speech and facial droop and she was lethargic. She had no specific additional complaints. She denied weakness, vision deficit. MRI of brain without contrast showed a few scattered punctate acute infarcts in the left corona radiata and right centrum semiovale    Restrictions/Precautions:  Restrictions/Precautions: Fall Risk,General Precautions  Position Activity Restriction  Other position/activity restrictions: 5/16 pt tested + for COVID      SUBJECTIVE: Pt sleeping in bed upon arrival, agreeable to OT session with max encouragement. PAIN: 0/10:     Vitals: Vitals not assessed per clinical judgement, see nursing flowsheet    COGNITION: Decreased Insight and Decreased Problem Solving    ADL:   Feeding: Independent.  eating lunch upon departure. Pt declined any other ADLs, reporting had completed all earlier this date. BALANCE:  Sitting Balance:  Supervision. Standing Balance: Stand By Assistance, [employer]. BED MOBILITY:  Supine to Sit: Stand By Assistance    Scooting: Supervision      TRANSFERS:  Sit to Stand:  [employer], cues for hand placement. Stand to Sit: Stand By Assistance. FUNCTIONAL MOBILITY:  Assistive Device: Rolling Walker  Assist Level:  Contact Guard Assistance. Distance: 2 laps in room, declined progressing to household distance in hallway     ADDITIONAL ACTIVITIES:  Pt declined any further activities, despite max encouragement, due to now wanting to eat. ASSESSMENT:     Activity Tolerance:  Patient tolerance of  treatment: fair. Discharge Recommendations: ECF with OT  Equipment Recommendations: Equipment Needed: No  Other: patient would be able to safely get in and out of a car with daughter.   Plan: Times per Week: 5x  Times per Day: Daily  Current Treatment Recommendations: Strengthening,Balance training,Functional mobility training,Endurance training,Safety education & training,Neuromuscular re-education,Patient/Caregiver education & training,Self-Care / ADL    Patient Education  Patient Education: Plan of Care and Importance of Increasing Activity    Goals  Short Term Goals  Time Frame for Short term goals: by discharge  Short Term Goal 1: patient will tolerate 5 min functional standing with two hand release with (S) to increase activity tolerance for functional transfers. Short Term Goal 2: patient will functionally ambulate house hold distances with least restrictive device with (S) with 0-1 verbal cues for safety and sequencing. Short Term Goal 3: patient will complete ADL routine with (S). Short Term Goal 4: patient will participate in moderate resistive UB exer to increase UB strength for functional transfers. Following session, patient left in safe position with all fall risk precautions in place.

## 2022-05-19 NOTE — PLAN OF CARE
Problem: Pain  Goal: Verbalizes/displays adequate comfort level or baseline comfort level  Outcome: Progressing  Flowsheets (Taken 5/19/2022 0949)  Verbalizes/displays adequate comfort level or baseline comfort level:   Encourage patient to monitor pain and request assistance   Assess pain using appropriate pain scale     Problem: Safety - Adult  Goal: Free from fall injury  Outcome: Progressing  Flowsheets (Taken 5/19/2022 0949)  Free From Fall Injury: Instruct family/caregiver on patient safety     Problem: Cardiovascular - Adult  Goal: Maintains optimal cardiac output and hemodynamic stability  Outcome: Progressing  Flowsheets (Taken 5/19/2022 0949)  Maintains optimal cardiac output and hemodynamic stability: Monitor blood pressure and heart rate

## 2022-05-19 NOTE — PROGRESS NOTES
Hospitalist Progress Note  Patient:  [facility]    Unit/Bed:4A-15/015-A  YOB: 1955  MRN: [id number]   Acct: [de-identified]   PCP: Rivera Parnell MD  Date of Admission: 5/7/2022    Assessment/Plan:  1. Acute Ischemic Stroke: Code stroke called on 05/08 overnight. Patient with NIHSS 3 from facial droop, RLE weakness, slurred speech. CT scan head revealed chronic small vessel ischemic disease. Two incidental meningiomas. CTA head w/ contrast not obtained 2/2 severity of renal injury. MRI brain revealed few scattered punctate acute infarcts in left corona radiata and right centrum semiovale with chronic microvascular angiopathy. MRA head with focal areas of moderate stenosis in b/l cavernous and clinoid segments of ICAs. tPA not administered as window had passed and there was initial concern for a subacute indeterminate infarct. TTE with bubble study unremarkable for PFO. SLP w/ barium swallow + moderate dysphagia   - NIHSS and neuro checks qShift    - BP control, continue Coreg 12.5 BID    - Continue IVF, ASA, Plavix, sugar control, statin    - PT/OT following    - 30-day event monitor on DC    - Will complete neuro rehab on DC    - Awaiting precert, patient tested positive for COVID19. [facility] has accepted   2. Moderate Oropharyngeal Dysphagia: New onset s/p recent ischemic stroke(s), SLP eval with MBS on 05/10 revealed moderate oral dysphagia, mild pharyngeal dysphagia without tracheal aspiration events    - Diet minced and moist with mildly thick liquids     - Ensure fit nutritional supplements added per patient request   3. Symptomatic Bradycardia: Resolved. S/p syncopal episode with collapse and LOC lasting seconds. Likely iatrogenic 2/2 Toprol XL 75 mg which has been held. Pulse ~40s on admission. Pulse improved to ~60s s/p BB washout.    - Toprol XL 25 mg BIDdiscontinued on DC    - Continue Coreg 12.5 BID   4. Acute Kidney Injury in CKD: Resolving. Creatinine improving.  Likely etiology was low cardiac output/bradycardia. Full workup pending. No recent contrast received. Baseline ~1.8 one week prior. BUN:Cr points to prerenal etiology. Renal US revealed CKD     - Nephrology following   - Avoid nephrotoxins     5. NIDDM2: A1c 9.1. With diabetic nephropathy. Uncontrolled; sugars have been running too low. - Continue lantus to 10u nightly, Humalog TID reduced to 4u. POCT glucose checks AC/HS  6. Hyperlipidemia: Continue home statin   7. Uncontrolled Hypertension: Continue norvasc 5 mg daily, cardura 4mg daily, hydralazine 50 mg q8h, imdur 30 mg daily   8. Chronic HFpEF 55%: Stable and well controlled at this time. Expected discharge date: Plan for DC to [address] [place] [address] on 23/34 -precert pending    Disposition:    [] Home       [] TCU       [x] Rehab       [] Psych       [] SNF       [] [place]       [] Other-    Chief Complaint: Syncope with collapse, lightheadedness      Hospital Course: Per admitting H&P note \"Patient presents to the ED following a syncopal episode. The patient was just discharged one day prior to admission following a prolonged stay due to influenza A. The patient developed CHLOE while hospitalized. Today the patient had risen from sitting to standing and began amublating. The patient loss consciousness and subsequently fell. The patient was found to have a resting heart rate 40-48. Patient admitted for telemetry and cardiology consult. \"    05/09/22: Patient unfortunately suffered from ischemic stroke overnight but was deemed not a candidate for tPA as there was some concern for an indeterminate subacute infarct. NIHSS 3 with slurred speech, right facial droop and RLE weakness. MRI brain revealed some scattered acute ischemic infarcts bilaterally. Started on ASA, Plavix, high intensity statin. Neurology followed. Renal function worsened with time. As of now no known etiology.  Suspecting a possible cardiac etiology for micro thromboembolic disease  05/10/22: Still with facial droop, NIHSS 2, PM&R to evaluate for rehab. Patient with SLP eval and MBS revealing moderate oropharyngeal dysphagia with recs for diet to be minced and moist with thick liquids. 05/11/22: CHLOE improving. Nearing discharge,  set up transport to [facility] on 05/13 or 05/14 05/12/22: Facial droop significantly improved. BP still elevated ~150s.   77/59-34/84: Awaiting precert for rehab    32/69/68: Off tele. Accepted to rehab but tested positive for COVID    Subjective (past 24 hours): Patient laying in bed comfortably this morning with no concerns overnight. No change since yesterday. Awaiting precert. Per case management, Meagan at [facility] accepting of patient. Needs transport setup and precert finalized. Stable medical condition but not eating much, not a fan of the current food options and dietary restrictions. Otherwise no complaints or concerns     ROS (12 point review of systems completed. Pertinent positives noted. Otherwise ROS is negative).     Medications:  Reviewed    Infusion Medications    dextrose      sodium chloride       Scheduled Medications    pregabalin  75 mg Oral QPM    carvedilol  12.5 mg Oral BID WC    insulin glargine  10 Units SubCUTAneous Nightly    insulin lispro  4 Units SubCUTAneous QAM AC    insulin lispro  4 Units SubCUTAneous Daily before lunch    insulin lispro  4 Units SubCUTAneous Dinner    doxazosin  4 mg Oral Daily    hydrALAZINE  50 mg Oral 3 times per day    atorvastatin  40 mg Oral Daily    calcium replacement protocol   Other RX Placeholder    clopidogrel  75 mg Oral Daily    amLODIPine  5 mg Oral QPM    aspirin  81 mg Oral Daily    insulin lispro  0-6 Units SubCUTAneous TID WC    insulin lispro  0-3 Units SubCUTAneous Nightly    isosorbide mononitrate  30 mg Oral Daily    sodium chloride flush  5-40 mL IntraVENous 2 times per day    heparin (porcine)  5,000 Units SubCUTAneous q8h     PRN Meds: polyvinyl alcohol, albuterol sulfate HFA, glucagon (rDNA), dextrose, sodium chloride flush, sodium chloride, ondansetron **OR** ondansetron, polyethylene glycol, acetaminophen **OR** acetaminophen, glucose, dextrose bolus **OR** dextrose bolus    No intake or output data in the 24 hours ending 05/19/22 1302    Diet:  ADULT DIET; Dysphagia - Minced and Moist; 4 carb choices (60 gm/meal); Low Fat/Low Chol/High Fiber/KALEN; Low Sodium (2 gm); Low Potassium (Less than 3000 mg/day); Low Phosphorus (Less than 1000 mg); Less than 60 gm; Mildly Thick (Nectar)  ADULT ORAL NUTRITION SUPPLEMENT; Breakfast, Dinner, Lunch; Other Oral Supplement; vanilla greek yogurt  ADULT ORAL NUTRITION SUPPLEMENT; Breakfast, Lunch, Dinner; Frozen Oral Supplement    Exam:  /65   Pulse 79   Temp 98.1 °F (36.7 °C) (Oral)   Resp 16   Ht 5' (1.524 m)   Wt 186 lb 14.4 oz (84.8 kg)   SpO2 98%   BMI 36.50 kg/m²     General appearance: No apparent distress, appears stated age and cooperative. Slurred speech   HEENT: Pupils equal, round, and reactive to light. Mild degree of exophthalmos with ride sided facial droop. Conjunctivae/corneas clear. Neck: Supple, with full range of motion. No jugular venous distention. Trachea midline. Respiratory:  Normal respiratory effort. Clear to auscultation, bilaterally without Rales/Wheezes/Rhonchi. Cardiovascular: Regular rate and rhythm with normal S1/S2 without murmurs, rubs or gallops. Abdomen: Soft, non-tender, non-distended with normal bowel sounds. Musculoskeletal: passive and active ROM x 4 extremities. Skin: Skin color, texture, turgor normal.  No rashes or lesions. Neurologic:  Neurovascularly intact without any focal sensory/motor deficits.  Cranial nerves: II-XII intact, grossly non-focal.  Psychiatric: Alert and oriented, thought content appropriate, normal insight  Capillary Refill: Brisk,< 3 seconds   Peripheral Pulses: +2 palpable, equal bilaterally     Labs:   No results for input(s): WBC, HGB, HCT, PLT in the last 72 hours. Recent Labs     05/17/22  0409 05/18/22  0414    143   K 4.1 4.2    111   CO2 19* 21*   BUN 36* 34*   CREATININE 1.8* 1.6*   CALCIUM 8.2* 8.3*     No results for input(s): AST, ALT, BILIDIR, BILITOT, ALKPHOS in the last 72 hours. No results for input(s): INR in the last 72 hours. No results for input(s): Jadene Moh in the last 72 hours. Microbiology:      Urinalysis:      Lab Results   Component Value Date    NITRU NEGATIVE 05/09/2022    WBCUA 0-2 05/09/2022    BACTERIA NONE SEEN 05/09/2022    RBCUA 0-2 05/09/2022    BLOODU NEGATIVE 05/09/2022    SPECGRAV 1.016 05/09/2022    GLUCOSEU Negative 05/02/2022     Radiology:  FL MODIFIED BARIUM SWALLOW W VIDEO   Final Result   1. Laryngeal penetration of pureed, thin and soft barium with aspiration of thin barium. 2. Additional recommendations from the speech therapist will follow. **This report has been created using voice recognition software. It may contain minor errors which are inherent in voice recognition technology. **         Final report electronically signed by Dr. Reyna Forde on 5/10/2022 11:56 AM      MRI BRAIN WO CONTRAST   Final Result      1. A few scattered punctate acute infarcts in the left corona radiata and right centrum semiovale. 2. Mild severity chronic microvascular angiopathy. Findings were discussed with Morelia Crowder RN via telephone at the time of interpretation. **This report has been created using voice recognition software. It may contain minor errors which are inherent in voice recognition technology. **      Final report electronically signed by Dr. Kailey Kerr MD on 5/9/2022 4:49 PM      MRA NECK WO CONTRAST   Final Result   Motion degraded exam without visualized areas of flow-limiting stenosis. **This report has been created using voice recognition software.  It may contain minor errors which are inherent in voice recognition technology. **      Final report electronically signed by Dr. Kailey Kerr MD on 5/9/2022 5:00 PM      MRA HEAD WO CONTRAST   Final Result    Focal areas of moderate stenosis in the bilateral cavernous and clinoid segments of the internal carotid arteries. **This report has been created using voice recognition software. It may contain minor errors which are inherent in voice recognition technology. **      Final report electronically signed by Dr. Kailey Kerr MD on 5/9/2022 4:51 PM      US RENAL COMPLETE   Final Result   1. No renal calculi or hydronephrosis. 2. Increased renal cortical echogenicity in the right kidney. The left    kidney is not optimally visualized. Increased renal cortical echogenicity    can be seen in medical renal disease. This can be correlated with renal    function tests. 3. Urinary bladder has a volume of 363 mL. This document has been electronically signed by: Elaina Power M.D. on    05/09/2022 06:07 AM      CT HEAD WO CONTRAST   Final Result   1. No acute disease in the brain. 2. Incidental findings are detailed above. This document has been electronically signed by: Elaina Power M.D. on    05/08/2022 10:08 PM      All CTs at this facility use dose modulation techniques and iterative    reconstructions, and/or weight-based dosing   when appropriate to reduce radiation to a low as reasonably achievable. XR CHEST PORTABLE   Final Result   Impression:   Bibasilar hazy opacities, may reflect atelectasis or infection. Mild cardiomegaly. Equivocal left pleural effusion. This document has been electronically signed by:  Nikos Aceves MD on 05/07/2022    08:58 PM        Electronically signed by Evan Bautista DO on 5/19/2022 at 1:02 PM

## 2022-05-19 NOTE — PROGRESS NOTES
[facility]  INPATIENT PHYSICAL THERAPY  DAILY NOTE  [facility] 4A - 4A-15/015-A      Time In: 4509  Time Out: 0945  Timed Code Treatment Minutes: 48 Minutes  Minutes: 53          Date: 2022  Patient Name: Emelina Meadows,  Gender:  female        MRN: [id number]  : 1955  (61 y.o.)     Referring Practitioner: Vandana Grady PA-C  Diagnosis: symptomatic bradycardia  Additional Pertinent Hx: Per H&p :Patient presents to the ED following a syncopal episode. The patient was just discharged one day prior to admission following a prolonged stay due to influenza A. The patient developed CHLOE while hospitalized. Today the patient had risen from sitting to standing and began amublating. The patient loss consciousness and subsequently fell. The patient was found to have a resting heart rate 40-48. Pt presents with On 2022 patient was noted by staff to have dysarthria and a new right facial droop which prompted a code stroke. Initial NIH was 3. Patient was taken to imaging for CT head which revealed no ICH however he did demonstrate old stroke which was age-indeterminate. For this reason tPA was not given as if this was a recent stroke he would be very high risk. CTAs were deferred as this patient has a creatinine of 4.0. Today on exam, pt continues to have slurred speech and facial droop and she is lethargic per neurology note.  MRI of the brain indicating acute infarcts in the left corona radiata and right centrum semiovale     Prior Level of Function:  Lives With: Alone  Type of Home: House  Home Layout: One level  Home Access: Level entry  Home Equipment: Isabel Shawn, rolling,Cane   Bathroom Shower/Tub: Tub/Shower unit  Bathroom Toilet: Standard  Bathroom Equipment: Shower chair  Bathroom Accessibility: Walker accessible    Receives Help From: Family  ADL Assistance: Independent  Homemaking Assistance: Independent  Ambulation Assistance: Independent  Transfer Assistance: Independent  Active : Yes  Additional Comments: Pt used cane intermittently when she is feeling off balance. Pt states her kids can assist her if needed, she runs her own errands and cares for her home indep. Pt's children drive for her. Restrictions/Precautions:  Restrictions/Precautions: Fall Risk,General Precautions  Position Activity Restriction  Other position/activity restrictions: 5/16 pt tested + for COVID       SUBJECTIVE: nursing okd therapy, pt in bed and needed encouragement to particiapte as she was upset about her current diet and wanting to see ST, pt had been incont of BM during session and required to use bathroom 3 times during session     PAIN: 0/10:     Vitals: Vitals not assessed per clinical judgement, see nursing flowsheet    OBJECTIVE:  Bed Mobility:  Rolling to Right: Stand By Assistance   Supine to Sit: Stand By Assistance  Sit to Supine: Stand By Assistance     Transfers:  Sit to Stand: Contact Guard Assistance  Stand to Sit:Contact Guard Assistance  Pt impulsive and very impulsive in the bathroom and would sit w/o warning or backing upto surface cues for safety   Ambulation:  Contact Guard Assistance  Distance: short distances in room- pt limited due to incontinence   Surface: Level Tile  Device:Rolling Walker  Gait Deviations:  Fair mayito and required cues for walker management, pt needed to stay in walker and for walker placement when going through narrow spaces     Balance:  standing w/o UE at support with CGA to close SBA noted wide base of support pt completed reaching task to shoulder ht and below knee ht and noted she would have a loss of balance anterior and lean against the sink for support - pt stood multiple times for ~ 5 min each     Exercise:  Patient was guided in 1 set(s) 10 reps of exercise to both lower extremities.   Ankle pumps, Quad sets, Heelslides, Short arc quads, Hip abduction/adduction, Straight leg raises, Hooklying hip abduction/adduction and noted increased fatigue in left vs right LE . Exercises were completed for increased independence with functional mobility. Functional Outcome Measures: Completed  AM-PAC Inpatient Mobility without Stair Climbing Raw Score : 15  AM-PAC Inpatient without Stair Climbing T-Scale Score : 43.03    ASSESSMENT:  Assessment: Patient progressing toward established goals. Activity Tolerance:  Patient tolerance of  treatment: fair. Equipment Recommendations:Equipment Needed: No (pt has a RW)  Discharge Recommendations: Subacte/Skilled Nursing Facility  Plan: Current Treatment Recommendations: Strengthening,Balance training,Functional mobility training,Transfer training,Endurance training,Neuromuscular re-education,Gait training,Home exercise program,Safety education & training,Patient/Caregiver education & training,Equipment evaluation, education, & procurement,Therapeutic activities  Plan:  (5xC-N)    Patient Education  Patient Education: Plan of Care, Transfers    Goals:  Patient goals : \"no\"  Short Term Goals  Time Frame for Short term goals: by hospital d/c  Short term goal 1: Pt to demo supine <->sit with S for ease with getting in and out of bed  Short term goal 2: Pt to demo sit <->stand with RW and S for improved ability to transfer from any surface safely  Short term goal 3: Pt to ambulate >=40' with RW and S for improved ability to move in her environment  Short term goal 4: Pt to improve Tinetti score to >=19/28 to progress to the moderate fall risk category  Long Term Goals  Time Frame for Long term goals : NA due to short ELOS    Following session, patient left in safe position with all fall risk precautions in place.

## 2022-05-19 NOTE — PROGRESS NOTES
[facility]         Renal Inpatient Follow-Up note         5/19/2022 8:09 AM    Pt Name:   Jemima Joel  YOB: 1955  Attending:   Sung Hernandez MD    Chief Complaint : Jemima Joel is a 61 y.o. female being followed by nephrology for CHLOE/CKD    Interval History :   Patient seen and examined by me. No distress  Resting comfortably. She is much more awake and alert  Denies any chest pain or shortness of breath. Scheduled Medications :    pregabalin  75 mg Oral QPM    carvedilol  12.5 mg Oral BID WC    insulin glargine  10 Units SubCUTAneous Nightly    insulin lispro  4 Units SubCUTAneous QAM AC    insulin lispro  4 Units SubCUTAneous Daily before lunch    insulin lispro  4 Units SubCUTAneous Dinner    doxazosin  4 mg Oral Daily    hydrALAZINE  50 mg Oral 3 times per day    atorvastatin  40 mg Oral Daily    calcium replacement protocol   Other RX Placeholder    clopidogrel  75 mg Oral Daily    amLODIPine  5 mg Oral QPM    aspirin  81 mg Oral Daily    insulin lispro  0-6 Units SubCUTAneous TID WC    insulin lispro  0-3 Units SubCUTAneous Nightly    isosorbide mononitrate  30 mg Oral Daily    sodium chloride flush  5-40 mL IntraVENous 2 times per day    heparin (porcine)  5,000 Units SubCUTAneous q8h      dextrose      sodium chloride         Vitals :  BP (!) 151/70   Pulse 87   Temp 98.3 °F (36.8 °C) (Oral)   Resp 18   Ht 5' (1.524 m)   Wt 192 lb 4.8 oz (87.2 kg)   SpO2 93%   BMI 37.56 kg/m²     24HR INTAKE/OUTPUT:      Intake/Output Summary (Last 24 hours) at 5/19/2022 0809  Last data filed at 5/18/2022 0912  Gross per 24 hour   Intake 100 ml   Output --   Net 100 ml     Last 3 weights  Wt Readings from Last 3 Encounters:   05/16/22 192 lb 4.8 oz (87.2 kg)   05/06/22 178 lb 9.2 oz (81 kg)   10/17/18 175 lb 0.7 oz (79.4 kg)           Physical Exam :limited due to covid 19  General Appearance:  Well developed.  No distress  Mouth/Throat:  No accessory muscle use  Musculoskeletal:  Edema - no edema  CNS - grossly intact  Psych-not agitated         Last 3 CBC   No results for input(s): WBC, RBC, HGB, HCT, PLT in the last 72 hours. Last 3 CMP  Recent Labs     05/17/22  0409 05/18/22  0414    143   K 4.1 4.2    111   CO2 19* 21*   BUN 36* 34*   CREATININE 1.8* 1.6*   CALCIUM 8.2* 8.3*             ASSESSMENT / Plan   1. Renal -acute kidney injury with progressive worsening of renal function and significantly elevated BUN  ? Exact etiology not clear she has been getting diuretics in the outside hospital in [place] she was also bradycardic upon arrival  ? Renal ultrasound scan shows CKD urine lites show more of a prerenal etiology  ? Overall creatinine improved with IV hydration currently at 1.6 which is slightly better than her baseline. No new labs . ? All serologies are negative except DARIANA which was positive .     2. Electrolytes -within normal limits  3. Essential hypertension  4. Hx of diabetes mellitus with diabetic nephropathy  5. Recent treatment with congestive heart failure and influenza  6. Syncope may be due to bradycardia being seen by cardiology  7. Meds reviewed discussed with the patient overall much better from renal standpoint  8. Awaiting on placement . Having issues due to COVID    Dr. Charma Kawasaki, MD, M,D.  [facility].

## 2022-05-19 NOTE — PROGRESS NOTES
[address] [place] [address] THERAPY MISSED TREATMENT NOTE  [facility] NEUROSCIENCES 4A  4A-15/015-A      Date: 2022  Patient Name: Gordo Aguirre        CSN: [id number]   : 1955  (61 y.o.)  Gender: female   Referring Practitioner: Kirit Ya PA-C  Diagnosis: symptomatic bradycardia         REASON FOR MISSED TREATMENT: Patient Refused stating \"I'm just too tired. I've done my exercises, walked to the bathroom, and already gotten cleaned up today. I just want to rest now. \" Despite encouragement, pt continued to decline.  Will check back as able

## 2022-05-19 NOTE — PROGRESS NOTES
Comprehensive Nutrition Assessment    Type and Reason for Visit:  Initial,Consult (patient requested to see, strict diet, needs ideas, supplements)    Nutrition Recommendations/Plan:   1. Diet texture per SLP, talked to them about patient's dislike and frustration with current diet, plan to see today for potential diet upgrade as appropriate. 2. ONS: Magic Cup TID. 3. Greek Yogurt TID. 4. Consider renal MVI, folbee plus. Malnutrition Assessment:  Malnutrition Status: At risk for malnutrition (Comment) (05/19/22 1000)    Context:  Acute Illness     Findings of the 6 clinical characteristics of malnutrition:  Energy Intake:  Mild decrease in energy intake (Comment) (since admit d/t not liking dysphagia diet)  Weight Loss:  No significant weight loss     Body Fat Loss:  No significant body fat loss     Muscle Mass Loss:  No significant muscle mass loss    Fluid Accumulation:  No significant fluid accumulation     Strength:  Not Performed    Nutrition Assessment:     Pt. nutritionally compromised AEB reports of declined oral intake d/t dislike of dysphagia diet. At risk for further nutrition compromise r/t admit with symptomatic bradycardia, covid (positive 5/16/22), CVA, dysphagia, CHLOE on CKD, extended hospital stay, and underlying medical condition (hx: DB, CHF, HTN, HLD). Nutrition Related Findings:    Pt. Report/Treatments/Miscellaneous: Patient seen, working with therapy. Introduced myself and she immediately stated she can swallow fine, wants some cheerios, etc.  Explained that SLP following and would recommend diet advancement when safe to do so. Called SLP, plan to see later today, reported back to patient. Patient states good appetite prior to admit. Just not eating as much now d/t dislike of diet. GI Status: BM 5/17/22  Pertinent Labs: 5/9/22: HgbA1c 8.8/204 average glucose.   5/18/22: Sodium 143, Potassium 4.2, BUN 34, Creatinine 1.6, Glucose 92  Pertinent Meds: lipitor, lantus, humalog    Wound Type: None       Current Nutrition Intake & Therapies:    Average Meal Intake: %,26-50%     ADULT DIET; Dysphagia - Minced and Moist; 4 carb choices (60 gm/meal); Low Fat/Low Chol/High Fiber/KALEN; Low Sodium (2 gm); Low Potassium (Less than 3000 mg/day); Low Phosphorus (Less than 1000 mg); Less than 60 gm; Mildly Thick (Nectar)  ADULT ORAL NUTRITION SUPPLEMENT; Breakfast, Dinner, Lunch; Other Oral Supplement; vanilla greek yogurt  ADULT ORAL NUTRITION SUPPLEMENT; Breakfast, Lunch, Dinner; Frozen Oral Supplement    Anthropometric Measures:  Height: 5' (152.4 cm)  Ideal Body Weight (IBW): 100 lbs (45 kg)    Admission Body Weight: 179 lb 3.2 oz (81.3 kg) (5/7/22 +1 LE edema)  Current Body Weight: 186 lb 14.4 oz (84.8 kg) (5/19/22 no edema),   IBW. Current BMI (kg/m2): 36.5  Usual Body Weight:  (~ 165# per pt. Per EMR: 4/26/22: 178#9. 2oz)                       BMI Categories: Obese Class 2 (BMI 35.0 -39.9)    Estimated Daily Nutrient Needs:  Energy Requirements Based On: Kcal/kg     Energy (kcal/day): ~ 1696-5025 kcals (15-18 kcals/kg/day)  Weight Used for Protein Requirements: Ideal  Protein (g/day): 45-54 grams (1-1.2 grams/kg IBW/day) pending renal status      Nutrition Diagnosis:   · Inadequate oral intake related to swallowing difficulty as evidenced by  (some meal intake less than 75%)      Nutrition Interventions:   Food and/or Nutrient Delivery: Continue Current Diet,Start Oral Nutrition Supplement  Nutrition Education/Counseling: Education initiated (Reinforced with patient that SLP advises about diet texture advancement, reached out to SLP, plan to see patient today for possibe diet advancement.)  Coordination of Nutrition Care: Continue to monitor while inpatient       Goals:     Goals: PO intake 75% or greater,by next RD assessment       Nutrition Monitoring and Evaluation:      Food/Nutrient Intake Outcomes: Diet Advancement/Tolerance,Food and Nutrient Intake,Supplement Intake  Physical Signs/Symptoms Outcomes: Biochemical Data,Chewing or Swallowing,GI Status,Fluid Status or Edema,Nutrition Focused Physical Findings,Weight    Discharge Planning:     Too soon to determine     Genna Avina RD, LD  Contact: [phone]

## 2022-05-19 NOTE — PROGRESS NOTES
[facility]  INPATIENT SPEECH THERAPY  [facility] NEUROSCIENCES 4A  DAILY NOTE    TIME   SLP Individual Minutes  Time In: 1344  Time Out:   Minutes: 16  Timed Code Treatment Minutes: 0 Minutes       Date: 2022  Patient Name: Loan Elise      CSN: [id number]   : 1955  (61 y.o.)  Gender: female   Referring Physician:  Elinor Wayne MD  Diagnosis: Symptomatic bradycardiaPrecautions:   Precautions: DROPLET PLUS (covid), aspiraiton  Current Diet: Minced & moist, mildly thick   Swallowing Strategies: Standard Universal Swallow Precautions  Date of Last MBS/FEES: 5/10/22    Pain:  No pain reported. Subjective:  Patient seen upright in recliner chair with RN permission. The pt is alert, verbalizing frustrations, stating; \"This is long over due. I have been asking for ST to return for days. \" ST educated to pt on the rationale for ST unable to return and was very apologetic -- PT verbalized understanding and expressed gratitude for ST coming on this date for tx. Short-Term Goals:  SHORT TERM GOAL #1:  Goal 1: Patient will safely consume mined and moist diet with mildly thick liquids (oral care post meals; R sided pocketing) without overt s/s aspiration or pulonary compromise to assist with nutrition/hydration measures. INTERVENTIONS: Completed skilled dietary analysis with advanced solids and thin liquids to further assess swallow function and determine readiness for a diet upgrade and/or readiness for a repeat MBS to upgrade liquid level. PO Trials include:   Ice: x5 -- EXCELLENT success, unremarkable with no overt s/s of airway invasion  Cup drinks of H2O x10 -- EXCELLENT success, unremarkable with no overt s/s of airway invasion  *certianly unable to rule out at the bedside alone. Vadim crackers x4 -- completed with slow but functional mastication, no oral residue and no overt s/s of aspiration/penetration.      Recommended upgrade to regular solids, remain on mildly thick Verbalizes understanding    ASSESSMENT/PLAN:  Activity Tolerance:  Patient tolerance of  treatment: good. Assessment/Plan: Patient progressing toward established goals. Continues to require skilled care of licensed speech pathologist to progress toward achievement of established goals and plan of care. .     Plan for Next Session: [address] [facility] Constance GROVER, Robert Bhatti / KINGSLEY#.[phone]

## 2022-05-20 ENCOUNTER — APPOINTMENT (OUTPATIENT)
Dept: GENERAL RADIOLOGY | Age: 67
DRG: 308 | End: 2022-05-20
Payer: COMMERCIAL

## 2022-05-20 VITALS
BODY MASS INDEX: 36.56 KG/M2 | WEIGHT: 186.2 LBS | TEMPERATURE: 97.5 F | DIASTOLIC BLOOD PRESSURE: 83 MMHG | HEART RATE: 79 BPM | OXYGEN SATURATION: 97 % | HEIGHT: 60 IN | RESPIRATION RATE: 20 BRPM | SYSTOLIC BLOOD PRESSURE: 121 MMHG

## 2022-05-20 PROBLEM — R00.1 SYMPTOMATIC BRADYCARDIA: Status: RESOLVED | Noted: 2022-05-07 | Resolved: 2022-05-20

## 2022-05-20 LAB
ANION GAP SERPL CALCULATED.3IONS-SCNC: 11 MEQ/L (ref 8–16)
BUN BLDV-MCNC: 34 MG/DL (ref 7–22)
CALCIUM SERPL-MCNC: 8.4 MG/DL (ref 8.5–10.5)
CHLORIDE BLD-SCNC: 112 MEQ/L (ref 98–111)
CO2: 20 MEQ/L (ref 23–33)
CREAT SERPL-MCNC: 1.6 MG/DL (ref 0.4–1.2)
GFR SERPL CREATININE-BSD FRML MDRD: 39 ML/MIN/1.73M2
GLUCOSE BLD-MCNC: 107 MG/DL (ref 70–108)
GLUCOSE BLD-MCNC: 111 MG/DL (ref 70–108)
GLUCOSE BLD-MCNC: 90 MG/DL (ref 70–108)
POTASSIUM SERPL-SCNC: 4.5 MEQ/L (ref 3.5–5.2)
SODIUM BLD-SCNC: 143 MEQ/L (ref 135–145)

## 2022-05-20 PROCEDURE — 36415 COLL VENOUS BLD VENIPUNCTURE: CPT

## 2022-05-20 PROCEDURE — 6370000000 HC RX 637 (ALT 250 FOR IP): Performed by: PHYSICIAN ASSISTANT

## 2022-05-20 PROCEDURE — 6370000000 HC RX 637 (ALT 250 FOR IP): Performed by: STUDENT IN AN ORGANIZED HEALTH CARE EDUCATION/TRAINING PROGRAM

## 2022-05-20 PROCEDURE — 6370000000 HC RX 637 (ALT 250 FOR IP): Performed by: NURSE PRACTITIONER

## 2022-05-20 PROCEDURE — 99232 SBSQ HOSP IP/OBS MODERATE 35: CPT | Performed by: NURSE PRACTITIONER

## 2022-05-20 PROCEDURE — 97530 THERAPEUTIC ACTIVITIES: CPT

## 2022-05-20 PROCEDURE — 74230 X-RAY XM SWLNG FUNCJ C+: CPT

## 2022-05-20 PROCEDURE — 6360000002 HC RX W HCPCS: Performed by: PHYSICIAN ASSISTANT

## 2022-05-20 PROCEDURE — 2500000003 HC RX 250 WO HCPCS: Performed by: HOSPITALIST

## 2022-05-20 PROCEDURE — 80048 BASIC METABOLIC PNL TOTAL CA: CPT

## 2022-05-20 PROCEDURE — 82948 REAGENT STRIP/BLOOD GLUCOSE: CPT

## 2022-05-20 PROCEDURE — 97535 SELF CARE MNGMENT TRAINING: CPT

## 2022-05-20 PROCEDURE — 2580000003 HC RX 258: Performed by: PHYSICIAN ASSISTANT

## 2022-05-20 PROCEDURE — 99239 HOSP IP/OBS DSCHRG MGMT >30: CPT | Performed by: HOSPITALIST

## 2022-05-20 PROCEDURE — 92611 MOTION FLUOROSCOPY/SWALLOW: CPT

## 2022-05-20 RX ORDER — HYDRALAZINE HYDROCHLORIDE 50 MG/1
50 TABLET, FILM COATED ORAL EVERY 8 HOURS SCHEDULED
Qty: 90 TABLET | Refills: 1 | Status: SHIPPED | OUTPATIENT
Start: 2022-05-20 | End: 2022-08-02 | Stop reason: DRUGHIGH

## 2022-05-20 RX ORDER — PREGABALIN 75 MG/1
75 CAPSULE ORAL EVERY EVENING
Qty: 60 CAPSULE | Refills: 1 | Status: ON HOLD | OUTPATIENT
Start: 2022-05-20 | End: 2022-07-22

## 2022-05-20 RX ORDER — CARVEDILOL 12.5 MG/1
12.5 TABLET ORAL 2 TIMES DAILY WITH MEALS
Qty: 60 TABLET | Refills: 1 | Status: SHIPPED | OUTPATIENT
Start: 2022-05-20 | End: 2022-06-16 | Stop reason: SDUPTHER

## 2022-05-20 RX ORDER — DOXAZOSIN MESYLATE 4 MG/1
4 TABLET ORAL DAILY
Qty: 30 TABLET | Refills: 1 | Status: SHIPPED | OUTPATIENT
Start: 2022-05-21

## 2022-05-20 RX ORDER — INSULIN GLARGINE 100 [IU]/ML
8 INJECTION, SOLUTION SUBCUTANEOUS NIGHTLY
Qty: 5 ML | Refills: 1 | Status: CANCELLED | OUTPATIENT
Start: 2022-05-20 | End: 2022-06-19

## 2022-05-20 RX ORDER — ATORVASTATIN CALCIUM 40 MG/1
40 TABLET, FILM COATED ORAL DAILY
Qty: 30 TABLET | Refills: 1 | Status: SHIPPED | OUTPATIENT
Start: 2022-05-21 | End: 2022-06-16 | Stop reason: SDUPTHER

## 2022-05-20 RX ORDER — CLOPIDOGREL BISULFATE 75 MG/1
75 TABLET ORAL DAILY
Qty: 30 TABLET | Refills: 1 | Status: SHIPPED | OUTPATIENT
Start: 2022-05-21 | End: 2022-06-16 | Stop reason: SDUPTHER

## 2022-05-20 RX ADMIN — CARVEDILOL 12.5 MG: 6.25 TABLET, FILM COATED ORAL at 08:47

## 2022-05-20 RX ADMIN — CLOPIDOGREL BISULFATE 75 MG: 75 TABLET ORAL at 08:47

## 2022-05-20 RX ADMIN — INSULIN LISPRO 4 UNITS: 100 INJECTION, SOLUTION INTRAVENOUS; SUBCUTANEOUS at 12:29

## 2022-05-20 RX ADMIN — SODIUM CHLORIDE, PRESERVATIVE FREE 10 ML: 5 INJECTION INTRAVENOUS at 08:47

## 2022-05-20 RX ADMIN — ASPIRIN 81 MG: 81 TABLET, COATED ORAL at 08:47

## 2022-05-20 RX ADMIN — HEPARIN SODIUM 5000 UNITS: 5000 INJECTION INTRAVENOUS; SUBCUTANEOUS at 01:24

## 2022-05-20 RX ADMIN — HYDRALAZINE HYDROCHLORIDE 50 MG: 50 TABLET ORAL at 05:45

## 2022-05-20 RX ADMIN — ISOSORBIDE MONONITRATE 30 MG: 30 TABLET, EXTENDED RELEASE ORAL at 08:47

## 2022-05-20 RX ADMIN — HEPARIN SODIUM 5000 UNITS: 5000 INJECTION INTRAVENOUS; SUBCUTANEOUS at 08:46

## 2022-05-20 RX ADMIN — DOXAZOSIN 4 MG: 4 TABLET ORAL at 08:47

## 2022-05-20 RX ADMIN — ATORVASTATIN CALCIUM 40 MG: 80 TABLET, FILM COATED ORAL at 08:47

## 2022-05-20 RX ADMIN — BARIUM SULFATE 20 ML: 400 PASTE ORAL at 10:00

## 2022-05-20 RX ADMIN — BARIUM SULFATE 100 ML: 0.81 POWDER, FOR SUSPENSION ORAL at 10:00

## 2022-05-20 NOTE — CARE COORDINATION
5/20/22, 11:33 AM EDT    DISCHARGE PLANNING EVALUATION      SW left a message for Светлана at [facility] this morning, asking about the precert for patient. Received a call at 87:62VJ that precert has been approved, patient is able to discharge to facility at this time. SW will work to set up transport. Received a call back from Steven Lam will [facility], transport is set up for 2:00PM by ambulette. Made RN [facility] aware. 5/20/22, 1:23 PM EDT    DISCHARGE PLANNING EVALUATION    Left a message for Светлана at [facility] to let her know patient was leaving around 2:00pm today. Gave her SW number if she had any questions. 5/20/22, 2:06 PM EDT    Patient goals/plan/ treatment preferences discussed by  and . Patient goals/plan/ treatment preferences reviewed with patient/ family. Patient/ family verbalize understanding of discharge plan and are in agreement with goal/plan/treatment preferences. Understanding was demonstrated using the teach back method. AVS provided by RN at time of discharge, which includes all necessary medical information pertaining to the patients current course of illness, treatment, post-discharge goals of care, and treatment preferences. Services At/After Discharge: [facility] and In [facility] of Tezjill Hinds will be discharged today to [address] [place] [address], where she will be skilled under her [employer] benefit. She will be transported at 2:00PM by ambulette with [facility]. RN [facility] is aware and has called report and faxed the AVS to [facility]. Transport papers have been faxed to [facility] and placed on the chart.

## 2022-05-20 NOTE — PROGRESS NOTES
[facility]  PHYSICAL THERAPY MISSED TREATMENT NOTE  [facility] NEUROSCIENCES 4A    Date: 2022  Patient Name: Blake Wills        MRN: [id number]   : 1955  (61 y.o.)  Gender: female   Referring Practitioner: Getachew Chang PA-C  Diagnosis: symptomatic bradycardia         REASON FOR MISSED TREATMENT:  Patient refused treatment. Pt in bed, not willing to work with PT this AM, reports she is not in a good mood and does not want to do any therapy this AM. Will check back as able.      Tita Olvera PT, DPT

## 2022-05-20 NOTE — PLAN OF CARE
Problem: Discharge Planning  Goal: Discharge to home or other facility with appropriate resources  Outcome: Progressing     Problem: Pain  Goal: Verbalizes/displays adequate comfort level or baseline comfort level  Outcome: Progressing     Problem: Safety - Adult  Goal: Free from fall injury  Outcome: Progressing     Problem: Cardiovascular - Adult  Goal: Maintains optimal cardiac output and hemodynamic stability  Outcome: Progressing  Goal: Absence of cardiac dysrhythmias or at baseline  Outcome: Progressing     Problem: Neurosensory - Adult  Goal: Achieves stable or improved neurological status  Outcome: Progressing     Problem: Musculoskeletal - Adult  Goal: Return mobility to safest level of function  Outcome: Progressing  Goal: Return ADL status to a safe level of function  Outcome: Progressing     Problem: Skin/Tissue Integrity  Goal: Absence of new skin breakdown  Description: 1. Monitor for areas of redness and/or skin breakdown  2. Assess vascular access sites hourly  3. Every 4-6 hours minimum:  Change oxygen saturation probe site  4. Every 4-6 hours:  If on nasal continuous positive airway pressure, respiratory therapy assess nares and determine need for appliance change or resting period. Outcome: Progressing     Problem: Nutrition Deficit:  Goal: Optimize nutritional status  Outcome: Progressing       Continue POC.  Electronically signed by Jones Finch RN on 5/20/2022 at 6:22 AM

## 2022-05-20 NOTE — PROGRESS NOTES
Nephrology Progress Note    Patient - Destini Gonzalez   MRN -  [id number]   Acct # - [de-identified]      - 1955    61 y.o. Admit Date: 2022  Hospital Day: 13  Location: -15/[facility]  Date of evaluation -  2022    Subjective:   CC: loss of consciousness  Denies shortness of breath   UOP not measured  BP ok  BP Range: Systolic (40RZP), VXW:008 , Min:139 , DQU:938      Diastolic (59LQZ), VLL:98, Min:64, Max:74      Objective:   VITALS:  BP (!) 151/74   Pulse 78   Temp 98.2 °F (36.8 °C) (Oral)   Resp 18   Ht 5' (1.524 m)   Wt 186 lb 3.2 oz (84.5 kg)   SpO2 98%   BMI 36.36 kg/m²    Patient Vitals for the past 24 hrs:   BP Temp Temp src Pulse Resp SpO2 Weight   22 0335 (!) 151/74 98.2 °F (36.8 °C) Oral 78 18 98 % 186 lb 3.2 oz (84.5 kg)   22 2000 (!) 150/71 97.7 °F (36.5 °C) -- 82 20 99 % --   22 1606 (!) 163/64 98.1 °F (36.7 °C) -- 85 -- 99 % --   22 1055 139/65 98.1 °F (36.7 °C) Oral 79 16 98 % --          Intake/Output Summary (Last 24 hours) at 2022 9668  Last data filed at 2022 0335  Gross per 24 hour   Intake 240 ml   Output 0 ml   Net 240 ml       Admission weight: 165 lb (74.8 kg) Body mass index is 36.36 kg/m². Patient Vitals for the past 96 hrs (Last 3 readings):   Weight   22 0335 186 lb 3.2 oz (84.5 kg)   22 0600 186 lb 14.4 oz (84.8 kg)   22 0600 186 lb 14.4 oz (84.8 kg)       EXAM:  CONSTITUTIONAL:  No acute distress. Pleasant  HEENT:  Head is normocephalic, Extraocular movement intact. Neck is supple. Voice is clear. CARDIOVASCULAR:  S1, S2  regular rate and rhythm. RESPIRATORY: Clear to ausculation bilaterally. Equal breath sounds. No wheezes. No shortness of breath noted at rest.  ABDOMEN: soft, non tender  NEUROLOGICAL: Patient is alert and oriented to person, place, and time. Recent and remote memory intact. Thought is coherant. SKIN: no rash, No significant bruises on exposed surfaces  MUSCULOSKELETAL: Movement is coordinated. Moves all extremities   EXTREMITIES: Distal lower extremity temp is warm, No lower extremity edema. PSYCHIATRIC: mood and affect appropriate. Medications:   Med reviewed  Scheduled Meds:   pregabalin  75 mg Oral QPM    carvedilol  12.5 mg Oral BID WC    insulin glargine  10 Units SubCUTAneous Nightly    insulin lispro  4 Units SubCUTAneous QAM AC    insulin lispro  4 Units SubCUTAneous Daily before lunch    insulin lispro  4 Units SubCUTAneous Dinner    doxazosin  4 mg Oral Daily    hydrALAZINE  50 mg Oral 3 times per day    atorvastatin  40 mg Oral Daily    calcium replacement protocol   Other RX Placeholder    clopidogrel  75 mg Oral Daily    amLODIPine  5 mg Oral QPM    aspirin  81 mg Oral Daily    insulin lispro  0-6 Units SubCUTAneous TID WC    insulin lispro  0-3 Units SubCUTAneous Nightly    isosorbide mononitrate  30 mg Oral Daily    sodium chloride flush  5-40 mL IntraVENous 2 times per day    heparin (porcine)  5,000 Units SubCUTAneous q8h     Labs:   Labs reviewed    Lab Results   Component Value Date    LABA1C 8.8 (H) 05/09/2022     Recent Labs     05/18/22  0414 05/20/22  0412    143   K 4.2 4.5    112*   CO2 21* 20*   BUN 34* 34*   CREATININE 1.6* 1.6*   CALCIUM 8.3* 8.4*   ANIONGAP 11.0 11.0     ASSESSMENT:  1. Acute Kidney Injury resolving. Serologies neg except DARIANA, Renal US ok. Creatinine likely near baseline. 2. Chronic Kidney Disease Stage IIIB  3. Acute CVA with Right LE weakness  4. Dysphagia  5. 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 A1C 8.8 (5/9/2022)  6. Essential Hypertension with nephrosclerosis   7.  Chronic HFpEF  8. COVID 19    BMP in AM  Principal Problem:    Symptomatic bradycardia  Active Problems:    Acute kidney injury superimposed on CKD (HCC)    Syncope and collapse    Diabetic nephropathy with proteinuria (HCC)    Ischemic cerebral stroke due to small artery occlusion (HCC)    DMII (diabetes mellitus, type 2) ([facility]    Essential hypertension  Resolved Problems:    * No resolved hospital problems.  SARA Lopez - RUDDY 8:32 AM 5/20/2022

## 2022-05-20 NOTE — TELEPHONE ENCOUNTER
Still inpatient 5/20/22. Discharge plan to go to [facility] ECF. I will cancel her appointment with us 5/23/22. Will follow along.      Giorgio Diallo, PharmD  [address] [facility]  Heart Failure Service  [phone]

## 2022-05-20 NOTE — DISCHARGE SUMMARY
DISCHARGE SUMMARY NOTE    Patient - Macrina Payton   MRN -  [id number]   [facility]   - 1955      Date of Admission -  2022  7:23 PM  Date of Discharge-  2022  Length of Stay - 13 days  Code Status:  DNR-CCA  Attending Physician: Imelda Jasmine MD  Primary Care Physician - Rhianna Jett MD     Chief Complaint:     Active Hospital Problems    Diagnosis Date Noted    Ischemic cerebral stroke due to small artery occlusion ([facility] [I63.81] 05/10/2022     Priority: High    Diabetic nephropathy with proteinuria ([facility] [E11.21]      Priority: Medium    Acute kidney injury superimposed on CKD ([facility] [N17.9, N18.9] 2022     Priority: Medium    DMII (diabetes mellitus, type 2) ([facility] [E11.9] 2014    Essential hypertension [I10] 2014     Discharge Diagnosis:   Acute Ischemic Stroke: Code stroke called on  overnight. Patient with NIHSS 3 from facial droop, RLE weakness, slurred speech. CT scan head revealed chronic small vessel ischemic disease. Two incidental meningiomas. CTA head w/ contrast not obtained 2/2 severity of renal injury. MRI brain revealed few scattered punctate acute infarcts in left corona radiata and right centrum semiovale with chronic microvascular angiopathy. MRA head with focal areas of moderate stenosis in b/l cavernous and clinoid segments of ICAs. tPA not administered as window had passed and there was initial concern for a subacute indeterminate infarct. TTE with bubble study unremarkable for PFO.  SLP w/ barium swallow + moderate dysphagia              - NIHSS and neuro checks qShift               - BP control, continue Coreg 12.5 BID               - Continue IVF, ASA, Plavix, sugar control, statin               - PT/OT following               - Will complete neuro rehab on DC   Moderate Oropharyngeal Dysphagia: New onset s/p recent ischemic stroke(s), SLP eval with MBS on 05/10 revealed moderate oral dysphagia, mild pharyngeal dysphagia without tracheal aspiration events               - Dietician on board               - Diet minced and moist with mildly thick liquids                - Ensure fit nutritional supplements added per patient request   Symptomatic Bradycardia: Resolved. S/p syncopal episode with collapse and LOC lasting seconds. Likely iatrogenic 2/2 Toprol XL 75 mg which has been held. Pulse ~40s on admission. Pulse improved to ~60s s/p BB washout.               - Toprol XL 25 mg BID discontinued                - Continue Coreg 12.5 BID   Acute Kidney Injury in CKD: Resolved. Creatinine improving. Likely etiology was low cardiac output/bradycardia. Full workup pending. No recent contrast received. Baseline ~1.8 one week prior. BUN:Cr points to prerenal etiology. Renal US revealed CKD          - Nephrology following        - Avoid nephrotoxins     NIDDM2: A1c 8.8. With diabetic nephropathy. Sugars have been running too low. She also has not been eating much at all since dysphagia initiation.               - At this time, we will hold off on her insulin and recommend close outpatient monitoring   Hyperlipidemia: Continue home statin   Uncontrolled Hypertension: Continue norvasc 5 mg daily, cardura 4mg daily, hydralazine 50 mg q8h, imdur 30 mg daily   Chronic HFpEF 55%: Stable and well controlled at this time. Consultations:   IP CONSULT TO CARDIOLOGY  IP CONSULT TO NEPHROLOGY  IP CONSULT TO NEUROLOGY  PHARMACY CONSULT FOR RENAL DOSING  PALLIATIVE CARE EVAL  IP CONSULT TO PHYSICAL MEDICINE REHAB  IP CONSULT TO SOCIAL WORK  IP CONSULT TO DIETITIAN     HPI & Hospital Course:   Per admitting H&P note \"Patient presents to the ED following a syncopal episode. The patient was just discharged one day prior to admission following a prolonged stay due to influenza A. The patient developed CHLOE while hospitalized. Today the patient had risen from sitting to standing and began amublating. The patient loss consciousness and subsequently fell.  The patient was found to have a resting heart rate 40-48. Patient admitted for telemetry and cardiology consult. \"    05/09/22: Patient unfortunately suffered from ischemic stroke overnight but was deemed not a candidate for tPA as there was some concern for an indeterminate subacute infarct. NIHSS 3 with slurred speech, right facial droop and RLE weakness. MRI brain revealed some scattered acute ischemic infarcts bilaterally. Started on ASA, Plavix, high intensity statin. Neurology followed. Renal function worsened with time. As of now no known etiology. Suspecting a possible cardiac etiology for micro thromboembolic disease  14/06/49: Still with facial droop, NIHSS 2, PM&R to evaluate for rehab. Patient with SLP eval and MBS revealing moderate oropharyngeal dysphagia with recs for diet to be minced and moist with thick liquids. 05/11/22: CHLOE improving. Nearing discharge,  set up transport to [facility] on 05/13 or 05/14 05/12/22: Facial droop significantly improved. BP still elevated ~150s.   07/20-34/39: Awaiting precert for rehab    70/68/78: Off tele. Accepted to rehab but tested positive for COVID  34/91/33: Awaiting precert. Per case management, Meagan at [facility] accepting of patient. Needs transport setup and precert finalized    Physical Exam   General appearance: No apparent distress, appears stated age and cooperative. Slurred speech   HEENT: Pupils equal, round, and reactive to light. Mild degree of exophthalmos with ride sided facial droop. Conjunctivae/corneas clear. Neck: Supple, with full range of motion. No jugular venous distention. Trachea midline. Respiratory:  Normal respiratory effort. Clear to auscultation, bilaterally without Rales/Wheezes/Rhonchi. Cardiovascular: Regular rate and rhythm with normal S1/S2 without murmurs, rubs or gallops. Abdomen: Soft, non-tender, non-distended with normal bowel sounds. Musculoskeletal: passive and active ROM x 4 extremities.   Skin: Skin color, texture, turgor normal.  No rashes or lesions. Neurologic:  Neurovascularly intact without any focal sensory/motor deficits. Cranial nerves: II-XII intact, grossly non-focal.  Psychiatric: Alert and oriented, thought content appropriate, normal insight  Capillary Refill: Brisk,< 3 seconds   Peripheral Pulses: +2 palpable, equal bilaterally    Radiology      FL MODIFIED BARIUM SWALLOW W VIDEO   Final Result   Significantly improved exam with laryngeal penetration without evidence of aspiration of thin liquids. Recommendations available from speech therapy            **This report has been created using voice recognition software. It may contain minor errors which are inherent in voice recognition technology. **      Final report electronically signed by Dr. Lluvia Elizondo on 5/20/2022 11:14 AM      FL MODIFIED BARIUM SWALLOW W VIDEO   Final Result   1. Laryngeal penetration of pureed, thin and soft barium with aspiration of thin barium. 2. Additional recommendations from the speech therapist will follow. **This report has been created using voice recognition software. It may contain minor errors which are inherent in voice recognition technology. **         Final report electronically signed by Dr. Vera Hammonds on 5/10/2022 11:56 AM      MRI BRAIN WO CONTRAST   Final Result      1. A few scattered punctate acute infarcts in the left corona radiata and right centrum semiovale. 2. Mild severity chronic microvascular angiopathy. Findings were discussed with Fauzia Gusman RN via telephone at the time of interpretation. **This report has been created using voice recognition software. It may contain minor errors which are inherent in voice recognition technology. **      Final report electronically signed by Dr. Phillip Capone MD on 5/9/2022 4:49 PM      MRA NECK WO CONTRAST   Final Result   Motion degraded exam without visualized areas of flow-limiting stenosis. **This report has been created using voice recognition software. It may contain minor errors which are inherent in voice recognition technology. **      Final report electronically signed by Dr. Annabelle Vázquez MD on 5/9/2022 5:00 PM      MRA HEAD WO CONTRAST   Final Result    Focal areas of moderate stenosis in the bilateral cavernous and clinoid segments of the internal carotid arteries. **This report has been created using voice recognition software. It may contain minor errors which are inherent in voice recognition technology. **      Final report electronically signed by Dr. Annabelle Vázquez MD on 5/9/2022 4:51 PM      US RENAL COMPLETE   Final Result   1. No renal calculi or hydronephrosis. 2. Increased renal cortical echogenicity in the right kidney. The left    kidney is not optimally visualized. Increased renal cortical echogenicity    can be seen in medical renal disease. This can be correlated with renal    function tests. 3. Urinary bladder has a volume of 363 mL. This document has been electronically signed by: Yves Jeans, M.D. on    05/09/2022 06:07 AM      CT HEAD WO CONTRAST   Final Result   1. No acute disease in the brain. 2. Incidental findings are detailed above. This document has been electronically signed by: Yves Jeans, M.D. on    05/08/2022 10:08 PM      All CTs at this facility use dose modulation techniques and iterative    reconstructions, and/or weight-based dosing   when appropriate to reduce radiation to a low as reasonably achievable. XR CHEST PORTABLE   Final Result   Impression:   Bibasilar hazy opacities, may reflect atelectasis or infection. Mild cardiomegaly. Equivocal left pleural effusion. This document has been electronically signed by:  Magi Rodriguez MD on 05/07/2022    08:58 PM        Labs:   CBC:    Lab Results   Component Value Date    WBC 6.3 05/10/2022    HGB 10.0 05/10/2022    HCT 32.3 05/10/2022     05/10/2022     Renal:    Lab Results   Component Value Date     05/20/2022    K 4.5 05/20/2022    K 4.6 05/10/2022     05/20/2022    CO2 20 05/20/2022    BUN 34 05/20/2022    CREATININE 1.6 05/20/2022    CALCIUM 8.4 05/20/2022    PHOS 7.1 05/09/2022     Disposition:   Stable to Neuro-Rehab on 5/20/2022     Discharge Medications:         Medication List      START taking these medications    carvedilol 12.5 MG tablet  Commonly known as: COREG  Take 1 tablet by mouth 2 times daily (with meals)     clopidogrel 75 MG tablet  Commonly known as: PLAVIX  Take 1 tablet by mouth daily  Start taking on: May 21, 2022     doxazosin 4 MG tablet  Commonly known as: CARDURA  Take 1 tablet by mouth daily  Start taking on: May 21, 2022        CHANGE how you take these medications    amLODIPine 5 MG tablet  Commonly known as: NORVASC  Take 1 tablet by mouth daily  What changed: when to take this     atorvastatin 40 MG tablet  Commonly known as: LIPITOR  Take 1 tablet by mouth daily  Start taking on: May 21, 2022  What changed:   medication strength  how much to take     hydrALAZINE 50 MG tablet  Commonly known as: APRESOLINE  Take 1 tablet by mouth every 8 hours  What changed:   medication strength  how much to take  when to take this     pregabalin 75 MG capsule  Commonly known as: LYRICA  Take 1 capsule by mouth every evening for 30 days.   What changed:   medication strength  how much to take        CONTINUE taking these medications    albuterol sulfate  (90 Base) MCG/ACT inhaler  Commonly known as: Proventil HFA  Inhale 2 puffs into the lungs every 6 hours as needed for Wheezing (Cough)     aspirin 81 MG EC tablet     benzocaine-menthol 15-3.6 MG lozenge  Commonly known as: CEPACOL SORE THROAT  Take 1 lozenge by mouth every 2 hours as needed for Sore Throat or Pain     isosorbide mononitrate 30 MG extended release tablet  Commonly known as: IMDUR  Take 1 tablet by mouth daily     L-METHYLFOLATE-B6-B12 PO potassium chloride 10 MEQ extended release capsule  Commonly known as: MICRO-K     vitamin D3 125 MCG (5000 UT) Caps        STOP taking these medications    insulin glargine 100 UNIT/ML injection vial  Commonly known as: LANTUS     insulin lispro 100 UNIT/ML injection vial  Commonly known as: HUMALOG     metoprolol succinate 25 MG extended release tablet  Commonly known as: TOPROL XL           Where to Get Your Medications      These medications were sent to [facility] [address] [place] [phone] [address] [id number]    Phone: [phone]   atorvastatin 40 MG tablet  carvedilol 12.5 MG tablet  clopidogrel 75 MG tablet  doxazosin 4 MG tablet  hydrALAZINE 50 MG tablet     You can get these medications from any pharmacy    Bring a paper prescription for each of these medications  pregabalin 75 MG capsule       Follow-up Appointments:    - PCP in 1 week for serial monitoring of blood pressures, glucose levels, and renal function     Electronically signed by   Sugar Orozco DO on 5/20/2022 at 12:51 PM

## 2022-05-20 NOTE — FLOWSHEET NOTE
Pt ready for discharge, discharge instructions discussed with pt at bedside. All questions and concerns addressed. PIV removed before transport arrived. Facility called and report given, daughter also called and updated.

## 2022-05-20 NOTE — PROGRESS NOTES
[facility]  Modified Barium Swallow    SLP Individual Minutes  Time In: 5196  Time Out: 6384  Minutes: 12  Timed Code Treatment Minutes: 0 Minutes       Date: 2022  Patient Name: Johanna Almaraz      CSN: [id number]   : 1955  (61 y.o.)  Gender: female   Referring Physician: Brian Yeung MD  Diagnosis: Symptomatic bradycardia   Precautions: fall risk   ? History of Present Illness/Injury: Patient admit with above medical dx along with COVID-19 infection, slurred speech, facial droop and lethargy. Please refer to physician H&P for full medical hx. MRI of brain without contrast showed a few scattered punctate acute infarcts in the left corona radiata and right centrum semiovale  ? MRA H&N WO showed focal areas of moderate stenosis in the bilateral cavernous and clinoid segments of the internal carotid arteries & motion degraded exam without visualized areas of flow-limiting stenosis   ST recommendations for repeat MBS to further assess and determine appropriateness for further dietary upgrade. has a past medical history of Ankle fracture, left, CHF (congestive heart failure) ([address] Diabetes mellitus ([address] HTN (hypertension), benign, and Hypertension. Current Diet: Regular diet and mildly thick liquids (burch free water protocol)     Pain: No pain reported. SUBJECTIVE:  Patient brought down to fluoroscopy suite via bed. Alert and agitated given poor understanding for rationale of assessment given recent advancement of diet at the bedside. Despite rationale provided; patient with poor understanding. Agreeable to complete assessment. OBJECTIVE:    Respiratory Status:  Room Air    Behavioral Observation:  Alert and Agitated    PATIENT WAS EVALUATED USING:  Barium: Thin Liquids, Puree, Soft Solids, Coarse Solids and Mixed Consistency    CRANIAL NERVE ASSESSMENT:  CN V (Trigeminal) Closes and Opens Mandible [facility]    Rotary Jaw Movement Impaired Raises Hyoid Impaired      CN VII (Facial) Cheeks Hold Food out of Sulci WFL    Opens, Closes/Seals, Protrudes, Retracts WFL    General Appearance WFL    Sensation Not Tested      CN IX (Glossopharyngeal) Raises and Dilates Pharynx WFL      CN X (Vagus) Pulls Soft Palate Down and Forward WFL    Adducts Vocal Folds Not Tested      CN X (Vagus - Pharyngeal) Raises Back of Tongue WFL    Lifts Soft Palate WFL    Squeezes Pharynx WFL      CN X (Vagus - Recurrent Pharyngeal) Draws Hyoid Up and Forward Impaired      CN XI (Accessory) Lifts Soft Palate WFL    Shuts Of Nasopharynx WFL      CN XII (Hypoglossal) Elevates Tongue Up and Back WFL    Pulls Tongue Tip Downward WFL    [place] and Elongates Tongue/Protrusion WFL    Draws Hyoid Up and Forward Impaired    Pulls Thyroid Up to [facility]oid [facility]    Sensation Not Tested      Other Observations Dentition Limited dentition     Vocal Quality WFL    Cough Not tested      ORAL PHASE OLGA SCORE: (Dysphagia outcome and severity scale)  6 = WFL/Modified Independent - Normal Diet - May have mild oral delay    PHARYNGEAL PHASE OLGA SCORE: (Dysphagia outcome and severity scale)  6 = WFL/Modified Independent - May have mild pharyngeal delay or residue but clears spontaneously - No aspiration or laryngeal penetration    EVIDENCE FOR LARYNGEAL PENETRATION AND/OR ASPIRATION:  No evidence of aspiration  Laryngeal penetration evident with shallow/transient with thin barium x1    PENETRATION-ASPIRATION SCALE (PAS):   Thin Liquids: 2 = Material enters the airway, remains above vocal folds, and is ejected from the airway  Puree:  1 = Material does not enter the airway  Soft Solid:  1 = Material does not enter the airway  Hard Solid: 1 = Material does not enter the airway    ESOPHAGEAL PHASE:   No significant findings    ATTEMPTED TECHNIQUES:  Small Bolus Size Effective    Straw Effective    Cup Effective    Chin Tuck Not Attempted    Head Turn Not Attempted    Spoon Presentations Not Attempted Volitional Cough Not Attempted    Spontaneous Cough Not Attempted           DIAGNOSTIC IMPRESSIONS:  Patient presents with Mod I oropharyngeal swallow function evidenced by the above skilled level findings. Slow/uncoordinated mastication s/t limited dentition. Appropriate bolus breakdown/formation/transit when provided with extra time. Slightly decreased oral bolus control with premature spillage to the valleculae. Patient with a timely swallow and slightly decreased laryngeal elevation and anterior hyolaryngeal excursion. Shallow/transient laryngeal penetration of thin liquids by cup x1. No observed aspiration. No appreciable oropharyngael stasis to follow. Recommendations for upgrade to a regular diet and thin liquids. No further dysphagia intervention warranted. Will continue with cognitive rehabilitation. Diet Recommendations:  Regular diet and thin liquids   Strategies:  Full Upright Position, Small Bite/Sip, Pulmonary Monitoring and Alternate Solids and Liquids   Rehabilitation Potential: excellent    EDUCATION:  Learner: Patient  Education:  Reviewed results and recommendations of this evaluation, Reviewed diet and strategies and Reviewed ST goals and Plan of Care  Evaluation of Education: Verbalizes understanding    PLAN:  No further speech therapy services indicated. PATIENT GOAL:    Return to prior level of function. SHORT TERM GOALS:  Short-term Goals  Timeframe for Short-term Goals: 2 weeks  Goal 1: Patient will complete immediate/delayed recall and working memory tasks with 70% accuracy, mod cues to improve retention of pertinent information. Goal 2: Patient will complete problem solving, verbal/visual reasoning, thought organization, and executive functioning (time, money/math/finances, medications) tasks with 70% accuracy, mod cues to improve contributions within ADLs/IADLs.   Goal 3: Patient will complete structured attention tasks (selective, alternating, divided) with no more than 5 errors until task completion to permit potential return to multi-tasking, IADLs, driving, and occupational hobbies. Goal 4: Patient will complete structured attention tasks (selective, alternating, divided) with no more than x5 errors within task completion to permit return to multi tasking, IADL's, driving and occupational hobbies. Goal 5: . Goal 6: .       LONG TERM GOALS:  No established LTG's given short ELOS       Dylan Ogden M.S. [facility] [address] 5/20/2022

## 2022-05-20 NOTE — PROGRESS NOTES
Hospitalist Progress Note  Patient:  Robin Dudley    Unit/Bed:4A-15/015-A  YOB: 1955  MRN: [id number]   Acct: [de-identified]   PCP: Shaheen Gauthier MD  Date of Admission: 5/7/2022    Assessment/Plan:  1. Acute Ischemic Stroke: Code stroke called on 05/08 overnight. Patient with NIHSS 3 from facial droop, RLE weakness, slurred speech. CT scan head revealed chronic small vessel ischemic disease. Two incidental meningiomas. CTA head w/ contrast not obtained 2/2 severity of renal injury. MRI brain revealed few scattered punctate acute infarcts in left corona radiata and right centrum semiovale with chronic microvascular angiopathy. MRA head with focal areas of moderate stenosis in b/l cavernous and clinoid segments of ICAs. tPA not administered as window had passed and there was initial concern for a subacute indeterminate infarct. TTE with bubble study unremarkable for PFO. SLP w/ barium swallow + moderate dysphagia   - NIHSS and neuro checks qShift    - BP control, continue Coreg 12.5 BID    - Continue IVF, ASA, Plavix, sugar control, statin    - PT/OT following    - 30-day event monitor on DC    - Will complete neuro rehab on DC    - Still awaiting precert, delay 2/2 +[id number] rapid. [facility]   2. Moderate Oropharyngeal Dysphagia: New onset s/p recent ischemic stroke(s), SLP eval with MBS on 05/10 revealed moderate oral dysphagia, mild pharyngeal dysphagia without tracheal aspiration events    - Dietician on board    - Diet minced and moist with mildly thick liquids     - Ensure fit nutritional supplements added per patient request   3. Symptomatic Bradycardia: Resolved. S/p syncopal episode with collapse and LOC lasting seconds. Likely iatrogenic 2/2 Toprol XL 75 mg which has been held. Pulse ~40s on admission. Pulse improved to ~60s s/p BB washout.    - Toprol XL 25 mg BIDdiscontinued on DC    - Continue Coreg 12.5 BID   4. Acute Kidney Injury in CKD: Resolving.  Creatinine improving. Likely etiology was low cardiac output/bradycardia. Full workup pending. No recent contrast received. Baseline ~1.8 one week prior. BUN:Cr points to prerenal etiology. Renal US revealed CKD     - Nephrology following   - Avoid nephrotoxins     5. NIDDM2: A1c 9.1. With diabetic nephropathy. Uncontrolled; sugars have been running too low. - Continue lantus to 10u nightly, Humalog TID reduced to 4u. POCT glucose checks AC/HS  6. Hyperlipidemia: Continue home statin   7. Uncontrolled Hypertension: Continue norvasc 5 mg daily, cardura 4mg daily, hydralazine 50 mg q8h, imdur 30 mg daily   8. Chronic HFpEF 55%: Stable and well controlled at this time. Expected discharge date: Plan for DC to [address] [address] on 91/77 -precert pending    Disposition:    [] Home       [] TCU       [x] Rehab       [] Psych       [] SNF       [] [place]       [] Other-    Chief Complaint: Syncope with collapse, lightheadedness      Hospital Course: Per admitting H&P note \"Patient presents to the ED following a syncopal episode. The patient was just discharged one day prior to admission following a prolonged stay due to influenza A. The patient developed CHLOE while hospitalized. Today the patient had risen from sitting to standing and began amublating. The patient loss consciousness and subsequently fell. The patient was found to have a resting heart rate 40-48. Patient admitted for telemetry and cardiology consult. \"    05/09/22: Patient unfortunately suffered from ischemic stroke overnight but was deemed not a candidate for tPA as there was some concern for an indeterminate subacute infarct. NIHSS 3 with slurred speech, right facial droop and RLE weakness. MRI brain revealed some scattered acute ischemic infarcts bilaterally. Started on ASA, Plavix, high intensity statin. Neurology followed. Renal function worsened with time. As of now no known etiology.  Suspecting a possible cardiac etiology for micro thromboembolic disease  72/68/72: Still with facial droop, NIHSS 2, PM&R to evaluate for rehab. Patient with SLP eval and MBS revealing moderate oropharyngeal dysphagia with recs for diet to be minced and moist with thick liquids. 05/11/22: CHLOE improving. Nearing discharge,  set up transport to [facility] on 05/13 or 05/14 05/12/22: Facial droop significantly improved. BP still elevated ~150s.   05/45-57/20: Awaiting precert for rehab    03/42/11: Off tele. Accepted to rehab but tested positive for COVID  24/72/75: Awaiting precert. Per case management, Me[facility] at [facility] accepting of patient. Needs transport setup and precert finalized    Subjective (past 24 hours): Patient laying in bed this morning asleep, woke up promptly for encounter. Says she's very frustrated that she's still here and would like to go already. Asks if she could take another COVID test. I informed her that the test would not make a difference and that we are already trying to setup transport for her once precert approved. She is otherwise well. ROS (12 point review of systems completed. Pertinent positives noted. Otherwise ROS is negative).     Medications:  Reviewed    Infusion Medications    dextrose      sodium chloride       Scheduled Medications    pregabalin  75 mg Oral QPM    carvedilol  12.5 mg Oral BID WC    insulin glargine  10 Units SubCUTAneous Nightly    insulin lispro  4 Units SubCUTAneous QAM AC    insulin lispro  4 Units SubCUTAneous Daily before lunch    insulin lispro  4 Units SubCUTAneous Dinner    doxazosin  4 mg Oral Daily    hydrALAZINE  50 mg Oral 3 times per day    atorvastatin  40 mg Oral Daily    calcium replacement protocol   Other RX Placeholder    clopidogrel  75 mg Oral Daily    amLODIPine  5 mg Oral QPM    aspirin  81 mg Oral Daily    insulin lispro  0-6 Units SubCUTAneous TID WC    insulin lispro  0-3 Units SubCUTAneous Nightly    isosorbide mononitrate  30 mg Oral Daily    sodium chloride flush  5-40 mL IntraVENous 2 times per day    heparin (porcine)  5,000 Units SubCUTAneous q8h     PRN Meds: polyvinyl alcohol, albuterol sulfate HFA, glucagon (rDNA), dextrose, sodium chloride flush, sodium chloride, ondansetron **OR** ondansetron, polyethylene glycol, acetaminophen **OR** acetaminophen, glucose, dextrose bolus **OR** dextrose bolus      Intake/Output Summary (Last 24 hours) at 5/20/2022 7459  Last data filed at 5/20/2022 0335  Gross per 24 hour   Intake 240 ml   Output 0 ml   Net 240 ml       Diet:  ADULT ORAL NUTRITION SUPPLEMENT; Breakfast, Dinner, Lunch; Other Oral Supplement; vanilla greek yogurt  ADULT ORAL NUTRITION SUPPLEMENT; Breakfast, Lunch, Dinner; Frozen Oral Supplement  ADULT DIET; Regular; 4 carb choices (60 gm/meal); Low Fat/Low Chol/High Fiber/KALEN; Low Sodium (2 gm); Low Potassium (Less than 3000 mg/day); Low Phosphorus (Less than 1000 mg); Less than 60 gm; Mildly Thick (Nectar); No Drinking Straws    Exam:  BP (!) 151/74   Pulse 78   Temp 98.2 °F (36.8 °C) (Oral)   Resp 18   Ht 5' (1.524 m)   Wt 186 lb 3.2 oz (84.5 kg)   SpO2 98%   BMI 36.36 kg/m²     General appearance: No apparent distress, appears stated age and cooperative. Slurred speech   HEENT: Pupils equal, round, and reactive to light. Mild degree of exophthalmos with ride sided facial droop. Conjunctivae/corneas clear. Neck: Supple, with full range of motion. No jugular venous distention. Trachea midline. Respiratory:  Normal respiratory effort. Clear to auscultation, bilaterally without Rales/Wheezes/Rhonchi. Cardiovascular: Regular rate and rhythm with normal S1/S2 without murmurs, rubs or gallops. Abdomen: Soft, non-tender, non-distended with normal bowel sounds. Musculoskeletal: passive and active ROM x 4 extremities. Skin: Skin color, texture, turgor normal.  No rashes or lesions. Neurologic:  Neurovascularly intact without any focal sensory/motor deficits.  Cranial nerves: II-XII intact, grossly non-focal.  Psychiatric: Alert and oriented, thought content appropriate, normal insight  Capillary Refill: Brisk,< 3 seconds   Peripheral Pulses: +2 palpable, equal bilaterally     Labs:   No results for input(s): WBC, HGB, HCT, PLT in the last 72 hours. Recent Labs     05/18/22 0414 05/20/22 0412    143   K 4.2 4.5    112*   CO2 21* 20*   BUN 34* 34*   CREATININE 1.6* 1.6*   CALCIUM 8.3* 8.4*     No results for input(s): AST, ALT, BILIDIR, BILITOT, ALKPHOS in the last 72 hours. No results for input(s): INR in the last 72 hours. No results for input(s): Ronald Sebastián in the last 72 hours. Microbiology:      Urinalysis:      Lab Results   Component Value Date    NITRU NEGATIVE 05/09/2022    WBCUA 0-2 05/09/2022    BACTERIA NONE SEEN 05/09/2022    RBCUA 0-2 05/09/2022    BLOODU NEGATIVE 05/09/2022    SPECGRAV 1.016 05/09/2022    GLUCOSEU Negative 05/02/2022     Radiology:  FL MODIFIED BARIUM SWALLOW W VIDEO   Final Result   1. Laryngeal penetration of pureed, thin and soft barium with aspiration of thin barium. 2. Additional recommendations from the speech therapist will follow. **This report has been created using voice recognition software. It may contain minor errors which are inherent in voice recognition technology. **         Final report electronically signed by Dr. Laura Miller on 5/10/2022 11:56 AM      MRI BRAIN WO CONTRAST   Final Result      1. A few scattered punctate acute infarcts in the left corona radiata and right centrum semiovale. 2. Mild severity chronic microvascular angiopathy. Findings were discussed with Leanna Murdock RN via telephone at the time of interpretation. **This report has been created using voice recognition software. It may contain minor errors which are inherent in voice recognition technology. **      Final report electronically signed by Dr. Angelo Rucker MD on 5/9/2022 4:49 PM MRA NECK WO CONTRAST   Final Result   Motion degraded exam without visualized areas of flow-limiting stenosis. **This report has been created using voice recognition software. It may contain minor errors which are inherent in voice recognition technology. **      Final report electronically signed by Dr. Graciela Dinero MD on 5/9/2022 5:00 PM      MRA HEAD WO CONTRAST   Final Result    Focal areas of moderate stenosis in the bilateral cavernous and clinoid segments of the internal carotid arteries. **This report has been created using voice recognition software. It may contain minor errors which are inherent in voice recognition technology. **      Final report electronically signed by Dr. Graciela Dinero MD on 5/9/2022 4:51 PM      US RENAL COMPLETE   Final Result   1. No renal calculi or hydronephrosis. 2. Increased renal cortical echogenicity in the right kidney. The left    kidney is not optimally visualized. Increased renal cortical echogenicity    can be seen in medical renal disease. This can be correlated with renal    function tests. 3. Urinary bladder has a volume of 363 mL. This document has been electronically signed by: Zaina Landrum M.D. on    05/09/2022 06:07 AM      CT HEAD WO CONTRAST   Final Result   1. No acute disease in the brain. 2. Incidental findings are detailed above. This document has been electronically signed by: Zaina Landrum M.D. on    05/08/2022 10:08 PM      All CTs at this facility use dose modulation techniques and iterative    reconstructions, and/or weight-based dosing   when appropriate to reduce radiation to a low as reasonably achievable. XR CHEST PORTABLE   Final Result   Impression:   Bibasilar hazy opacities, may reflect atelectasis or infection. Mild cardiomegaly. Equivocal left pleural effusion. This document has been electronically signed by:  Renee Borja MD on 05/07/2022    08:58 PM      FL MODIFIED BARIUM SWALLOW W VIDEO    (Results Pending)     Electronically signed by Yelena Guzmán DO on 5/20/2022 at 7:28 AM

## 2022-05-20 NOTE — FLOWSHEET NOTE
This RN attempted to call report twice to the [facility] at [facility], for patient's discharge. Spoke with a woman at the  desk twice and she then stated she was going to transfer me to a nurse. Phone call went to voicemail both times; left voicemail after second phone call attempt for callback to give report on pt.

## 2022-05-20 NOTE — PROGRESS NOTES
[address] [place] [address]  Occupational Therapy  Daily Note  Time:   Time In: 1330  Time Out: 4360  Timed Code Treatment Minutes: 23 Minutes  Minutes: 23          Date: 2022  Patient Name: Edis Cowart,   Gender: female      Room: [facility]15/015-A  MRN: [id number]  : 1955  (61 y.o.)  Referring Practitioner: Michael Haro PA-C  Diagnosis: symptomatic bradycardia  Additional Pertinent Hx: per chart review; Edis Cowart is a 61 y.o. female admitted to [facility] on 2022 with a history of hypertension, hyperlipidemia, type II diabetes mellitus and CHF. She presented to the emergency room for evaluation following a 4-day history of worsening shortness of breath, a nonproductive cough and increasing lower extremity edema. She reported that she had dull aching pain in her chest when she took a deep breath. She stated that her shortness of breath was severe, worse with exertion and improved with rest.  She was seen at the clinic at [place] and diagnosed with influenza A, but was negative here. In the emergency room here she was found to have an acute CHF exacerbation with increased lower extremity edema and pulmonary edema. She was also found to have acute renal failure. She was admitted for further evaluation and treatment. Associated signs and symptoms did not include typical chest pain, lightheaded, dizziness, diaphoresis, orthopnea, paroxysmal nocturnal dyspnea, fever or chills. No recent medication changes. She does not use supplemental O2 at home. Patient was admitted for symptomatic bradycardia which was thought to be beta-blocker induced. On 2022 patient was noted by staff to have dysarthria and a new right facial droop which prompted a code stroke. Initial NIH was 3. Patient was taken to imaging for CT head which revealed no ICH however it did demonstrate old stroke which was age-indeterminate.   For that reason tPA was not given as if this was a recent stroke she would be very high risk. CTAs were deferred as this patient had a creatinine of 4.0. On 5/9/22, on exam, pt continued to have slurred speech and facial droop and she was lethargic. She had no specific additional complaints. She denied weakness, vision deficit. MRI of brain without contrast showed a few scattered punctate acute infarcts in the left corona radiata and right centrum semiovale    Restrictions/Precautions:  Restrictions/Precautions: Fall Risk,General Precautions  Position Activity Restriction  Other position/activity restrictions: 5/16 pt tested + for COVID     SUBJECTIVE: Pt sitting in recliner upon arrival, pt stating she is going home, pt agreeable to OT session     PAIN: /10:     Vitals: Nurse checked vitals prior to session    COGNITION: Slow Processing, Decreased Insight and Impaired Memory    ADL:   Upper Extremity Dressing: Supervision. with donning shirt  Lower Extremity Dressing: Minimal Assistance. with donning pants around feet . BALANCE:  Standing Balance: Stand By Assistance. with pt donning clothing with no AE used    BED MOBILITY:  Not Tested    TRANSFERS:  Sit to Stand:  Stand By Assistance. for safety  Stand to Sit: Stand By Assistance. for safety    FUNCTIONAL MOBILITY:  Assistive Device: None  Assist Level:  Stand By Assistance. Distance: in room     ASSESSMENT:     Activity Tolerance:  Patient tolerance of  treatment: good. Discharge Recommendations: Home with Home Health OT  Equipment Recommendations: Equipment Needed: No  Other: patient would be able to safely get in and out of a car with daughter.   Plan: Times per Week: 5x  Times per Day: Daily  Current Treatment Recommendations: Strengthening,Balance training,Functional mobility training,Endurance training,Safety education & training,Neuromuscular re-education,Patient/Caregiver education & training,Self-Care / ADL    Patient Education  Patient Education: ADL's    Goals  Short Term Goals  Time Frame for Short term goals: by discharge  Short Term Goal 1: patient will tolerate 5 min functional standing with two hand release with (S) to increase activity tolerance for functional transfers. Short Term Goal 2: patient will functionally ambulate house hold distances with least restrictive device with (S) with 0-1 verbal cues for safety and sequencing. Short Term Goal 3: patient will complete ADL routine with (S). Short Term Goal 4: patient will participate in moderate resistive UB exer to increase UB strength for functional transfers. Following session, patient left in safe position with all fall risk precautions in place.

## 2022-05-27 PROBLEM — J10.1 INFLUENZA A: Status: RESOLVED | Noted: 2022-04-27 | Resolved: 2022-05-27

## 2022-05-27 PROBLEM — R79.89 ELEVATED TROPONIN: Status: RESOLVED | Noted: 2022-04-27 | Resolved: 2022-05-27

## 2022-05-27 PROBLEM — R77.8 ELEVATED TROPONIN: Status: RESOLVED | Noted: 2022-04-27 | Resolved: 2022-05-27

## 2022-06-02 ENCOUNTER — TELEPHONE (OUTPATIENT)
Dept: PHARMACY | Age: 67
End: 2022-06-02

## 2022-06-02 NOTE — TELEPHONE ENCOUNTER
Reached out. No answer - no voice mail.      Will continue to follow along    Kala Truong PharmD  [address]  Heart Failure Service  [phone]

## 2022-06-03 NOTE — PROGRESS NOTES
Physician Progress Note      PATIENT:               Niya Huynh  CSN #:                  [id number]  :                       1955  ADMIT DATE:       2022 7:23 PM  [address] [place] DATE:        2022 2:12 PM  RESPONDING  PROVIDER #:        Odilia Lamas          QUERY TEXT:    Patient was admitted for symptomatic bradycardia which was thought to be   beta-blocker induced. On 2022 patient was noted by staff to have   dysarthria and a new right facial droop which prompted a code stroke. Initial   NIH was 3. Patient was taken to imaging for CT head which revealed no ICH   however it did demonstrate old stroke which was age-indeterminate. For that   reason tPA was not given as if this was a recent stroke she would be very high   risk. On 22, on exam, pt continued to have slurred speech and facial   droop and she was lethargic. MRI of brain without contra    The medical record reflects the following:  Risk Factors: facial droop  Clinical Indicators: Patient was admitted for symptomatic bradycardia which   was thought to be beta-blocker induced. On 2022 patient was noted by   staff to have dysarthria and a new right facial droop which prompted a code   stroke. Initial NIH was 3. Patient was taken to imaging for CT head which   revealed no ICH however it did demonstrate old stroke which was   age-indeterminate. For that reason tPA was not given as if this was a recent   stroke she would be very high risk. On 22, on exam, pt continued to have   slurred speech and facial droop and she was lethargic. Treatment: MRI of brain without contrast showed a few scattered punctate acute   infarcts in the left corona radiata and right centrum semiovale. Thank you. Please call if you have any questions. (P) [phone].   Signed   by William Hermosillo RN Clinical , CRCR  Options provided:  -- Yes, Acute CVA  was present at the time of the order to admit to the   hospital  -- No, Acute CVA  was not present on admission and developed during the   inpatient stay  -- Other - I will add my own diagnosis  -- Disagree - Not applicable / Not valid  -- Disagree - Clinically unable to determine / Unknown  -- Refer to Clinical Documentation Reviewer    PROVIDER RESPONSE TEXT:    No, Acute CVA was not present on admission and developed during the inpatient   stay.     Query created by: Jim Greenwood on 6/3/2022 8:09 AM      Electronically signed by:  Angélica Dudley 6/3/2022 11:57 AM

## 2022-06-08 ENCOUNTER — TELEPHONE (OUTPATIENT)
Dept: PHARMACY | Age: 67
End: 2022-06-08

## 2022-06-08 NOTE — TELEPHONE ENCOUNTER
Heart Failure referral.  Was recently at a SNF. Calling to check status. No answer - mailbox full    Left message for her daughter Ela Root.      Will continue to follow along    Giorgio Diallo PharmD  [address] [place] [address]  Heart Failure Service  [phone]

## 2022-06-14 ENCOUNTER — TELEPHONE (OUTPATIENT)
Dept: PHARMACY | Age: 67
End: 2022-06-14

## 2022-06-14 NOTE — TELEPHONE ENCOUNTER
Heart Failure referral.  Was recently at a SNF. Calling to check status.  No answer - mailbox full    Will continue to follow along    Juan Lockhart PharmD  [address]  Heart Failure Service  [phone]

## 2022-06-15 NOTE — PROGRESS NOTES
[address]      Cardiology Consult    [facility]  1955 June 16, 2022    CC: \"my breathing has improved. \"     HPI:  The patient is 61 y.o. female with a past medical history significant for HTN, HLD, DM-II, CHF. Admitted 4/26-5/6/22 with worsening SOB, BLE edema and NP-cough. CHLOE on CKD with acute hypoxic respiratory failure, influenza A. She was then admitted 5/7-5/20/22 for acute ischemic stroke with facial droop, RLE weakness, slurred speech, dysphagia, symptomatic bradycardia, CHLOE on CKD, uncontrolled HTN presents today in follow up. Today, she reports breathing has improved but sometimes gets winded. She uses a cane for walking aid. She does not feel the right side of her face is slurred anymore. Patient denies exertional chest pain/pressure, dyspnea at rest, PND, orthopnea, palpitations, lightheadedness, weight changes, changes in LE edema, and syncope. The patient admits to medical therapy compliance and feels she is tolerating. Also denies any abnormal bruising or bleeding on DAPT. Past Medical History:   Diagnosis Date    Ankle fracture, left 1/17/2014    CHF (congestive heart failure) (HCC)     Diabetes mellitus ([facility] [address]     HTN (hypertension), benign 1/17/2014    Hypertension      Past Surgical History:   Procedure Laterality Date    FRACTURE SURGERY Left 01/17/2014    orif tibula/fibula fixation    HYSTERECTOMY (CERVIX STATUS UNKNOWN)       No family history on file. Social History     Tobacco Use    Smoking status: Never Smoker    Smokeless tobacco: Never Used   Substance Use Topics    Alcohol use: No    Drug use: No       No Known Allergies  Current Outpatient Medications   Medication Sig Dispense Refill    pregabalin (LYRICA) 75 MG capsule Take 1 capsule by mouth every evening for 30 days.  60 capsule 1    atorvastatin (LIPITOR) 40 MG tablet Take 1 tablet by mouth daily 30 tablet 1    doxazosin (CARDURA) 4 MG tablet Take 1 tablet by mouth daily 30 tablet 1    hydrALAZINE (APRESOLINE) 50 MG tablet Take 1 tablet by mouth every 8 hours 90 tablet 1    carvedilol (COREG) 12.5 MG tablet Take 1 tablet by mouth 2 times daily (with meals) 60 tablet 1    clopidogrel (PLAVIX) 75 MG tablet Take 1 tablet by mouth daily 30 tablet 1    isosorbide mononitrate (IMDUR) 30 MG extended release tablet Take 1 tablet by mouth daily 30 tablet 3    albuterol sulfate HFA (PROVENTIL HFA) 108 (90 Base) MCG/ACT inhaler Inhale 2 puffs into the lungs every 6 hours as needed for Wheezing (Cough) 18 g 3    benzocaine-menthol (CEPACOL SORE THROAT) 15-3.6 MG lozenge Take 1 lozenge by mouth every 2 hours as needed for Sore Throat or Pain 18 lozenge 0    aspirin 81 MG EC tablet Take 81 mg by mouth daily      potassium chloride (MICRO-K) 10 MEQ extended release capsule Take 10 mEq by mouth daily      Cholecalciferol (VITAMIN D3) 125 MCG (5000 UT) CAPS Take 2 capsules by mouth once a week      amLODIPine (NORVASC) 5 MG tablet Take 1 tablet by mouth daily (Patient taking differently: Take 5 mg by mouth every evening ) 30 tablet 3    L-METHYLFOLATE-B6-B12 PO Take 1 tablet by mouth daily       No current facility-administered medications for this visit. Review of Systems:  · Constitutional: no unanticipated weight loss. There's been no change in energy level, sleep pattern, or activity level. No fevers, chills. · Eyes: No visual changes or diplopia. No scleral icterus. · ENT: No Headaches, hearing loss or vertigo. No mouth sores or sore throat. · Cardiovascular: as reviewed in HPI  · Respiratory: No cough or wheezing, no sputum production. No hematemesis. · Gastrointestinal: No abdominal pain, appetite loss, blood in stools. No change in bowel or bladder habits. · Genitourinary: No dysuria, trouble voiding, or hematuria. · Musculoskeletal:  No gait disturbance, no joint complaints. · Integumentary: No rash or pruritis.   · Neurological: No headache, diplopia, change in muscle strength, numbness or tingling. · Psychiatric: No anxiety or depression. · Endocrine: No temperature intolerance. No excessive thirst, fluid intake, or urination. No tremor. · Hematologic/Lymphatic: No abnormal bruising or bleeding, blood clots or swollen lymph nodes. · Allergic/Immunologic: No nasal congestion or hives. Physical Exam:   /68   Pulse 82   Ht 5' (1.524 m)   Wt 163 lb (73.9 kg)   SpO2 97%   BMI 31.83 kg/m²   Wt Readings from Last 3 Encounters:   06/16/22 163 lb (73.9 kg)   05/20/22 186 lb 3.2 oz (84.5 kg)   05/06/22 178 lb 9.2 oz (81 kg)     Constitutional: She is oriented to person, place, and time. She appears well-developed and well-nourished. In no acute distress. Head: Normocephalic and atraumatic. Pupils equal and round. Neck: Neck supple. No JVP or carotid bruit appreciated. No mass and no thyromegaly present. No lymphadenopathy present. Cardiovascular: Normal rate. Normal heart sounds. Exam reveals no gallop and no friction rub. No murmur heard. Pulmonary/Chest: Effort normal and breath sounds normal. No respiratory distress. She has no wheezes, rhonchi or rales. Abdominal: Soft, non-tender. Bowel sounds are normal. She exhibits no organomegaly, mass or bruit. Extremities: No edema, cyanosis, or clubbing. Pulses are 2+ radial/dorsalis pedis/posterior tibial/carotid bilaterally. Neurological: No gross cranial nerve deficit. Coordination normal.   Skin: Skin is warm and dry. There is no rash or diaphoresis. Psychiatric: She has a normal mood and affect.  Her speech is normal and behavior is normal.     Lab Review:   Lab Results   Component Value Date    TRIG 69 05/09/2022    HDL 50 05/09/2022    LDLCALC 79 05/09/2022    LABVLDL 17 10/17/2018     Lab Results   Component Value Date    BUN 34 05/20/2022    CREATININE 1.6 05/20/2022       EKG Interpretation: 6/15/22: not completed     Image Review:     Echo: 4/27/22  Global left ventricular function is normal with ejection fraction estimated   from 55 % to 60 %. Moderate concentric left ventricular hypertrophy. Grade II diastolic dysfunction with elevated LV filling pressures. Mild mitral regurgitation is present. The left atrium is mildly dilated. The right ventricle is normal in size and function. Systolic pulmonary artery pressure (SPAP) is estimated at 56 mmHg consistent   with moderate pulmonary hypertension (RA pressure 3 mmHg). Mild tricuspid   regurgitation. RHC: 5/4/22  OVERALL IMPRESSION:  1. Significantly elevated right atrial pressure. 2.  Mild to moderate pulmonary hypertension. 3.  Normal pulmonary capillary wedge pressure at 8 mmHg. 4.  Low normal cardiac output and indices.     In view of the above findings, we will continue to hold the patient's Lasix and consider getting a cardiac MRI at a later stage to rule out any evidence of infiltrative heart disease.     Limited echo: 5/10/22  Normal left ventricular size and systolic function. There were no regional wall motion abnormalities. Wall thickness was within normal limits. Ejection fraction was estimated at 55-60%. Bubble study does not show evidence if interatrial shunt, however   sensitivity is limited, consider VEE if clinically indicated. IVC size is within normal limits with normal respiratory phasic changes. Assessment/Plan:     CHF/pulmonary HTN    -Admitted 4/26-5/6/22 with worsening SOB, BLE edema and NP-cough. CHLOE on CKD with acute hypoxic respiratory failure, influenza A.   -Today, she reports that her breathing has improved but reports \"sometimes\" gets winded.   -she will remain on current medical therapy   -will discontinue potassium supplement, not on diuretic therapy   -work on low salt diet, slowly increase walking program   -BMP, BNP prior to next f/u in 6-8 weeks.    -May consider Cardiac MRI if she has recurrent heart failure    Acute CVA with dysphagia   She was then admitted 5/7-5/20/22 for acute ischemic stroke with facial droop, RLE weakness, slurred speech, dysphagia, symptomatic bradycardia, CHLOE on CKD, uncontrolled HTN presents today in follow up. -denies any recurrence and reports right slurred, drooping has resolved  -using cane for walking aid. Symptomatic bradycardia   -normal rate, rhythm on physical exam today  -denies any s/s of bradycardia  -will monitor. Uncontrolled HTN   -BP stable today  -remain on medical therapy, space out. -work on low salt diet  -will continue to monitor    Hyperlipidemia  -on lipitor  -no reported myalgias   -5/9/22 LDLc 79    DM-II    We will plan follow up between 6-8 weeks     Thank you very much for allowing me to participate in the care of your patient. Please do not hesitate to contact me if you have any questions. Sincerely,  Qamar Sevilla MD      [place] [address]  Ph: [phone]  Fax: [phone]    This note was scribed in the presence of Dr. Berna Pineda MD by Shaun Peraza RN.

## 2022-06-16 ENCOUNTER — OFFICE VISIT (OUTPATIENT)
Dept: CARDIOLOGY CLINIC | Age: 67
End: 2022-06-16
Payer: COMMERCIAL

## 2022-06-16 VITALS
HEART RATE: 82 BPM | DIASTOLIC BLOOD PRESSURE: 68 MMHG | HEIGHT: 60 IN | OXYGEN SATURATION: 97 % | SYSTOLIC BLOOD PRESSURE: 138 MMHG | WEIGHT: 163 LBS | BODY MASS INDEX: 32 KG/M2

## 2022-06-16 DIAGNOSIS — I50.32 CHRONIC DIASTOLIC HEART FAILURE (HCC): Primary | ICD-10-CM

## 2022-06-16 DIAGNOSIS — R00.1 SYMPTOMATIC BRADYCARDIA: ICD-10-CM

## 2022-06-16 DIAGNOSIS — I10 UNCONTROLLED HYPERTENSION: ICD-10-CM

## 2022-06-16 DIAGNOSIS — E78.5 HYPERLIPIDEMIA, UNSPECIFIED HYPERLIPIDEMIA TYPE: ICD-10-CM

## 2022-06-16 DIAGNOSIS — I63.9 ACUTE CVA (CEREBROVASCULAR ACCIDENT) (HCC): ICD-10-CM

## 2022-06-16 DIAGNOSIS — R06.09 DOE (DYSPNEA ON EXERTION): ICD-10-CM

## 2022-06-16 DIAGNOSIS — R13.10 DYSPHAGIA, UNSPECIFIED TYPE: ICD-10-CM

## 2022-06-16 PROCEDURE — 99214 OFFICE O/P EST MOD 30 MIN: CPT | Performed by: INTERNAL MEDICINE

## 2022-06-16 RX ORDER — ISOSORBIDE MONONITRATE 30 MG/1
30 TABLET, EXTENDED RELEASE ORAL DAILY
Qty: 30 TABLET | Refills: 11 | Status: SHIPPED | OUTPATIENT
Start: 2022-06-16 | End: 2022-08-02 | Stop reason: DRUGHIGH

## 2022-06-16 RX ORDER — CARVEDILOL 12.5 MG/1
12.5 TABLET ORAL 2 TIMES DAILY WITH MEALS
Qty: 60 TABLET | Refills: 11 | Status: SHIPPED
Start: 2022-06-16 | End: 2022-08-02 | Stop reason: DRUGHIGH

## 2022-06-16 RX ORDER — ASPIRIN 81 MG/1
81 TABLET ORAL DAILY
Qty: 30 TABLET | Refills: 11 | Status: SHIPPED | OUTPATIENT
Start: 2022-06-16

## 2022-06-16 RX ORDER — ATORVASTATIN CALCIUM 40 MG/1
40 TABLET, FILM COATED ORAL DAILY
Qty: 30 TABLET | Refills: 11 | Status: SHIPPED | OUTPATIENT
Start: 2022-06-16

## 2022-06-16 RX ORDER — AMLODIPINE BESYLATE 5 MG/1
5 TABLET ORAL DAILY
Qty: 30 TABLET | Refills: 11 | Status: SHIPPED
Start: 2022-06-16 | End: 2022-08-02 | Stop reason: DRUGHIGH

## 2022-06-16 RX ORDER — CLOPIDOGREL BISULFATE 75 MG/1
75 TABLET ORAL DAILY
Qty: 30 TABLET | Refills: 11 | Status: SHIPPED | OUTPATIENT
Start: 2022-06-16

## 2022-06-16 NOTE — PATIENT INSTRUCTIONS
Patient Education        How to Read a Food Label to Limit Sodium: Care Instructions  Overview  Limiting sodium can be an important part of managing some health problems. Processed foods, fast food, and restaurant foods are the major sources of dietary sodium. The most common name for sodium is salt. Most packaged foods have a Nutrition Facts label. This will tell you how much sodium is in oneserving of food. Follow-up care is a key part of your treatment and safety. Be sure to make and go to all appointments, and call your doctor if you are having problems. It's also a good idea to know your test results and keep alist of the medicines you take. How can you care for yourself at home? Read ingredient lists on food labels   Read the list of ingredients on food labels to help you find how much sodium is in a food. The label lists the ingredients in a food in descending order (from the most to the least). If salt or sodium is high on the list, there may be a lot of sodium in the food.  Know that sodium has different names. Sodium is also called monosodium glutamate (MSG), sodium citrate, sodium alginate, and sodium phosphate. Read Nutrition Facts labels   On most foods, there is a Nutrition Facts label. This will tell you how much sodium is in one serving of food. Look at both the serving size and the sodium amount. The serving size is located at the top of the label, usually right under the \"Nutrition Facts\" title. The amount of sodium is given in the list under the title. It is given in milligrams (mg). ? Check the serving size carefully. A single serving is often very small, and you may eat more than one serving. If this is the case, you will eat more sodium than listed on the label. For example, if the serving size for a canned soup is 1 cup and the sodium amount is 470 mg, if you have 2 cups you will eat 940 mg of sodium.    The nutrition facts for fresh fruits and vegetables are not listed on the food. They may be listed somewhere in the store. These foods usually have no sodium or low sodium.  The Nutrition Facts label also gives you the Percent Daily Value for sodium. This is how much of the recommended amount of sodium a serving contains. The daily value for sodium is 2,300 mg. So if the Percent Daily Value says 50%, this means one serving is giving you half of this, or 1,150 mg. Buy low-sodium foods   Look for foods that are made with less sodium. Watch for the following words on the label. ? \"Unsalted\" means there is no sodium added to the food. But there may be sodium already in the food naturally. ? \"Sodium-free\" means a serving has less than 5 mg of sodium. ? \"Very low sodium\" means a serving has 35 mg or less of sodium. ? \"Low-sodium\" means a serving has 140 mg or less of sodium.  \"Reduced-sodium\" means that there is 25% less sodium than what the food normally has. This is still usually too much sodium.  Buy fresh vegetables, or frozen vegetables without added sauces. Buy low-sodium versions of canned vegetables, soups, and other canned goods. Where can you learn more? Go to https://[employer].Cree. org and sign in to your Pixonic account. Enter 26 [id number] in the [facility] box to learn more about \"How to Read a Food Label to Limit Sodium: Care Instructions. \"     If you do not have an account, please click on the \"Sign Up Now\" link. Current as of: September 8, 2021               Content Version: 13.2  © 8653-0458 Healthwise, Incorporated. Care instructions adapted under license by [facility] ([facility]). If you have questions about a medical condition or this instruction, always ask your healthcare professional. [place] [address] any warranty or liability for your use of this information.          Patient Education        Walking for Exercise: Care Instructions  Your Care Instructions     Walking is one of the easiest ways to get the exercise you need for good health. A brisk, 30-minute walk each day can help you feel better and have more energy. It can help you lower your risk of disease. Walking can help you keepyour bones strong and your heart healthy. Check with your doctor before you start a walking plan if you have heart problems, other health issues, or you have not been active in a long time. Follow your doctor's instructions for safe levels of exercise. Follow-up care is a key part of your treatment and safety. Be sure to make and go to all appointments, and call your doctor if you are having problems. It's also a good idea to know your test results and keep alist of the medicines you take. How can you care for yourself at home? Getting started  [facility] slowly and set a short-term goal. For example, walk for 5 or 10 minutes every day.  Bit by bit, increase the amount you walk every day. Try for at least 30 minutes on most days of the week. You also may want to swim, bike, or do other activities.  If finding enough time is a problem, it's fine to be active in shorter periods of time throughout your day.  To get the heart-healthy benefits of walking, you need to walk briskly enough to increase your heart rate and breathing, but not so fast that you can't talk comfortably.  Wear comfortable shoes that fit well and provide good support for your feet and ankles. Staying with your plan   After you've made walking a habit, set a longer-term goal. You may want to set a goal of walking briskly for longer or walking farther. Experts say to do 2½ hours (150 minutes) of moderate activity a week. A faster heartbeat is what defines moderate-level activity.  To stay motivated, walk with friends, coworkers, or pets.  Use a phone liza or pedometer to track your steps each day. Set a goal to increase your steps. When you reach that goal, set a higher goal.   If the weather keeps you from walking outside, go for walks at the mall with a friend.  Local [employer] and churches may have indoor gyms where you can walk. Fitting a walk into your workday  [facility] several blocks away from work, or get off the bus a few stops early.  Use the stairs instead of the elevator, at least for a few floors.  Suggest holding meetings with colleagues during a walk inside or outside the building.  Use the restroom that is the farthest from your desk or workstation.  Use your morning and afternoon breaks to take quick 15 minutes walks. Staying safe   Know your surroundings. Walk in a well-lighted, safe place. If it's dark, walk with a partner. Wear light-colored clothing. If you can, buy a vest or jacket that reflects light.  Carry a cell phone for emergencies.  Drink plenty of water. Take a water bottle with you when you walk. This is very important if it is hot out.  Be careful not to slip on wet or icy ground. You can buy \"grippers\" for your shoes to help keep you from slipping.  Pay attention to your walking surface. Use sidewalks and paths.  If you have health issues such as asthma, COPD, or heart problems, or if you haven't been active for a long time, check with your doctor before you start a new activity. Where can you learn more? Go to https://[employer]. org and sign in to your Viyet account. Enter R159 in the [facility] box to learn more about \"Walking for Exercise: Care Instructions. \"     If you do not have an account, please click on the \"Sign Up Now\" link. Current as of: May 12, 2021               Content Version: 13.2  © 8231-5004 Healthwise, Incorporated. Care instructions adapted under license by [facility] ([facility]). If you have questions about a medical condition or this instruction, always ask your healthcare professional. [address]  any warranty or liability for your use of this information.

## 2022-06-21 ENCOUNTER — TELEPHONE (OUTPATIENT)
Dept: PHARMACY | Age: 67
End: 2022-06-21

## 2022-06-23 RX ORDER — ATORVASTATIN CALCIUM 40 MG/1
TABLET, FILM COATED ORAL
Qty: 30 TABLET | Refills: 1 | OUTPATIENT
Start: 2022-06-23

## 2022-06-23 RX ORDER — CLOPIDOGREL BISULFATE 75 MG/1
TABLET ORAL
Qty: 30 TABLET | Refills: 1 | OUTPATIENT
Start: 2022-06-23

## 2022-06-23 RX ORDER — CARVEDILOL 12.5 MG/1
TABLET ORAL
Qty: 60 TABLET | Refills: 1 | OUTPATIENT
Start: 2022-06-23

## 2022-06-23 RX ORDER — DOXAZOSIN MESYLATE 4 MG/1
TABLET ORAL
Qty: 30 TABLET | Refills: 1 | OUTPATIENT
Start: 2022-06-23

## 2022-06-23 RX ORDER — HYDRALAZINE HYDROCHLORIDE 50 MG/1
50 TABLET, FILM COATED ORAL EVERY 8 HOURS SCHEDULED
Qty: 90 TABLET | Refills: 1 | OUTPATIENT
Start: 2022-06-23

## 2022-07-14 NOTE — PROGRESS NOTES
[address]      Cardiology Progress Note    Niya Servin  1955 August 2, 2022    CC: \"I have no heart symptoms. \"     HPI:  The patient is 74 y.o. female with a past medical history significant for HTN, HLD, DM-II, CHF. Admitted 4/26-5/6/22 with worsening SOB, BLE edema and NP-cough. CHLOE on CKD with acute hypoxic respiratory failure, influenza A. She was then admitted 5/7-5/20/22 for acute ischemic stroke with facial droop, RLE weakness, slurred speech, dysphagia, symptomatic bradycardia, CHLOE on CKD, uncontrolled HTN presents today in follow up.     6/16/22 follow up, she had reported breathing has improved but sometimes gets winded. Use cane for walking aid. She does not feel the right side of her face is slurred anymore. Today, she currently denies any new or recurrent cardiac sounding complaints. Patient denies exertional chest pain/pressure, dyspnea at rest, COBB, PND, orthopnea, palpitations, lightheadedness, weight changes, changes in LE edema, and syncope. She admits to medical therapy compliance and tolerating. Continued use of cane for walking aid. Past Medical History:   Diagnosis Date    Ankle fracture, left 01/17/2014    CHF (congestive heart failure) ([facility])     CKD (chronic kidney disease) stage 4, GFR 15-29 ml/min ([facility])     Clotting disorder ([facility])     Diabetes mellitus ([facility])     HTN (hypertension), benign 01/17/2014    Hypertension      Past Surgical History:   Procedure Laterality Date    FRACTURE SURGERY Left 01/17/2014    orif tibula/fibula fixation    HYSTERECTOMY (CERVIX STATUS UNKNOWN)       No family history on file. Social History     Tobacco Use    Smoking status: Never    Smokeless tobacco: Never   Substance Use Topics    Alcohol use: No    Drug use: No       No Known Allergies  Current Outpatient Medications   Medication Sig Dispense Refill    carvedilol (COREG) 25 MG tablet Take 1 tablet by mouth in the morning and 1 tablet in the evening.  Take with meals. 60 tablet 3    amLODIPine (NORVASC) 10 MG tablet Take 1 tablet by mouth in the morning. 30 tablet 3    hydroCHLOROthiazide (HYDRODIURIL) 25 MG tablet Take 25 mg by mouth in the morning. torsemide (DEMADEX) 10 MG tablet Take 1 tablet by mouth in the morning. 30 tablet 3    atorvastatin (LIPITOR) 40 MG tablet Take 1 tablet by mouth daily 30 tablet 11    clopidogrel (PLAVIX) 75 MG tablet Take 1 tablet by mouth daily 30 tablet 11    isosorbide mononitrate (IMDUR) 30 MG extended release tablet Take 1 tablet by mouth daily 30 tablet 11    aspirin 81 MG EC tablet Take 1 tablet by mouth daily 30 tablet 11    doxazosin (CARDURA) 4 MG tablet Take 1 tablet by mouth daily 30 tablet 1    hydrALAZINE (APRESOLINE) 50 MG tablet Take 1 tablet by mouth every 8 hours 90 tablet 1    Cholecalciferol (VITAMIN D3) 125 MCG (5000 UT) CAPS Take 2 capsules by mouth once a week      L-METHYLFOLATE-B6-B12 PO Take 1 tablet by mouth daily       No current facility-administered medications for this visit. Review of Systems:  Constitutional: no unanticipated weight loss. There's been no change in energy level, sleep pattern, or activity level. No fevers, chills. Eyes: No visual changes or diplopia. No scleral icterus. ENT: No Headaches, hearing loss or vertigo. No mouth sores or sore throat. Cardiovascular: as reviewed in HPI  Respiratory: No cough or wheezing, no sputum production. No hematemesis. Gastrointestinal: No abdominal pain, appetite loss, blood in stools. No change in bowel or bladder habits. Genitourinary: No dysuria, trouble voiding, or hematuria. Musculoskeletal:  No gait disturbance, no joint complaints. Integumentary: No rash or pruritis. Neurological: No headache, diplopia, change in muscle strength, numbness or tingling. Psychiatric: No anxiety or depression. Endocrine: No temperature intolerance. No excessive thirst, fluid intake, or urination. No tremor.   Hematologic/Lymphatic: No abnormal bruising or bleeding, blood clots or swollen lymph nodes. Allergic/Immunologic: No nasal congestion or hives. Physical Exam:   BP (!) 152/74 Comment: recheck  Pulse 74   Ht 5' (1.524 m)   Wt 157 lb (71.2 kg)   SpO2 98%   BMI 30.66 kg/m²   Wt Readings from Last 3 Encounters:   08/02/22 157 lb (71.2 kg)   07/30/22 160 lb 4.4 oz (72.7 kg)   07/26/22 182 lb 12.2 oz (82.9 kg)     Constitutional: She is oriented to person, place, and time. She appears well-developed and well-nourished. In no acute distress. Head: Normocephalic and atraumatic. Pupils equal and round. Neck: Neck supple. No JVP or carotid bruit appreciated. No mass and no thyromegaly present. No lymphadenopathy present. Cardiovascular: Normal rate. Normal heart sounds. Exam reveals no gallop and no friction rub. No murmur heard. Pulmonary/Chest: Effort normal and breath sounds normal. No respiratory distress. She has no wheezes, rhonchi or rales. Abdominal: Soft, non-tender. Bowel sounds are normal. She exhibits no organomegaly, mass or bruit. Extremities: No edema, cyanosis, or clubbing. Pulses are 2+ radial/dorsalis pedis/posterior tibial/carotid bilaterally. Neurological: No gross cranial nerve deficit. Coordination normal.   Skin: Skin is warm and dry. There is no rash or diaphoresis. Psychiatric: She has a normal mood and affect. Her speech is normal and behavior is normal.     Lab Review:   Lab Results   Component Value Date/Time    TRIG 69 05/09/2022 04:00 AM    HDL 50 05/09/2022 04:00 AM    LDLCALC 79 05/09/2022 04:00 AM    LABVLDL 17 10/17/2018 06:40 AM     Lab Results   Component Value Date/Time    BUN 27 07/30/2022 06:08 AM    CREATININE 1.7 07/30/2022 06:08 AM       EKG Interpretation: 8/2/22: not completed       Image Review:     Echo: 4/27/22  Global left ventricular function is normal with ejection fraction estimated   from 55 % to 60 %. Moderate concentric left ventricular hypertrophy.    Grade II diastolic dysfunction with acute hypoxic respiratory failure, influenza A.   -admitted 7/27-7/30/22 SOB, acute on chronic heart failure, HTN.   -Today, she denies any s/s of CHF  -she will remain on current medical therapy as instructed above.   -working on low salt diet, monitoring fluids and daily weights. -May consider Cardiac MRI if she has recurrent heart failure    Acute CVA with dysphagia   She was then admitted 5/7-5/20/22 for acute ischemic stroke with facial droop, RLE weakness, slurred speech, dysphagia, symptomatic bradycardia, CHLOE on CKD, uncontrolled HTN presents today in follow up. -denies any recurrence and reports right slurred, drooping has resolved  -using cane for walking aid.   -5/10/22 CBC improved but remains abnormal     Symptomatic bradycardia   -normal rate, rhythm on physical exam today  -denies any s/s of bradycardia  -will monitor. Hyperlipidemia  -on lipitor  -no reported myalgias   -5/9/22 LDLc 79    DM-II    Encouraged influenza and COVID vaccinations per yearly guidelines. We will plan follow up between 4-5 weeks. Thank you very much for allowing me to participate in the care of your patient. Please do not hesitate to contact me if you have any questions. Sincerely,  Olinda Lopez MD      [facility], [address] [place] [address]  Ph: [phone]  Fax: [phone]    This note was scribed in the presence of Dr. Helene Parekh MD by Evette Modi RN.

## 2022-07-22 ENCOUNTER — APPOINTMENT (OUTPATIENT)
Dept: GENERAL RADIOLOGY | Age: 67
DRG: 286 | End: 2022-07-22
Payer: COMMERCIAL

## 2022-07-22 ENCOUNTER — HOSPITAL ENCOUNTER (INPATIENT)
Age: 67
LOS: 4 days | Discharge: HOME | DRG: 286 | End: 2022-07-26
Attending: EMERGENCY MEDICINE | Admitting: INTERNAL MEDICINE
Payer: COMMERCIAL

## 2022-07-22 DIAGNOSIS — J81.0 ACUTE PULMONARY EDEMA (HCC): ICD-10-CM

## 2022-07-22 DIAGNOSIS — R09.02 HYPOXIA: Primary | ICD-10-CM

## 2022-07-22 PROBLEM — R79.89 ELEVATED TROPONIN: Status: ACTIVE | Noted: 2022-07-22

## 2022-07-22 PROBLEM — R77.8 ELEVATED TROPONIN: Status: ACTIVE | Noted: 2022-07-22

## 2022-07-22 PROBLEM — N18.30 CHRONIC RENAL FAILURE, STAGE 3 (MODERATE) (HCC): Status: ACTIVE | Noted: 2022-07-22

## 2022-07-22 LAB
ANION GAP SERPL CALCULATED.3IONS-SCNC: 9 MMOL/L (ref 3–16)
BASE EXCESS VENOUS: 3.3 MMOL/L
BASOPHILS ABSOLUTE: 0.1 K/UL (ref 0–0.2)
BASOPHILS RELATIVE PERCENT: 0.9 %
BUN BLDV-MCNC: 25 MG/DL (ref 7–20)
CALCIUM SERPL-MCNC: 8.5 MG/DL (ref 8.3–10.6)
CARBOXYHEMOGLOBIN: 1.5 %
CHLORIDE BLD-SCNC: 105 MMOL/L (ref 99–110)
CO2: 27 MMOL/L (ref 21–32)
CREAT SERPL-MCNC: 1.6 MG/DL (ref 0.6–1.2)
EKG ATRIAL RATE: 92 BPM
EKG DIAGNOSIS: NORMAL
EKG P AXIS: 61 DEGREES
EKG P-R INTERVAL: 146 MS
EKG Q-T INTERVAL: 386 MS
EKG QRS DURATION: 86 MS
EKG QTC CALCULATION (BAZETT): 477 MS
EKG R AXIS: 26 DEGREES
EKG T AXIS: 25 DEGREES
EKG VENTRICULAR RATE: 92 BPM
EOSINOPHILS ABSOLUTE: 0.1 K/UL (ref 0–0.6)
EOSINOPHILS RELATIVE PERCENT: 1.7 %
GFR AFRICAN AMERICAN: 39
GFR NON-AFRICAN AMERICAN: 32
GLUCOSE BLD-MCNC: 173 MG/DL (ref 70–99)
GLUCOSE BLD-MCNC: 181 MG/DL (ref 70–99)
GLUCOSE BLD-MCNC: 183 MG/DL (ref 70–99)
GLUCOSE BLD-MCNC: 214 MG/DL (ref 70–99)
GLUCOSE BLD-MCNC: 215 MG/DL (ref 70–99)
HCO3 VENOUS: 30 MMOL/L (ref 23–29)
HCT VFR BLD CALC: 30 % (ref 36–48)
HEMOGLOBIN: 9.9 G/DL (ref 12–16)
LYMPHOCYTES ABSOLUTE: 1.6 K/UL (ref 1–5.1)
LYMPHOCYTES RELATIVE PERCENT: 25.6 %
MCH RBC QN AUTO: 28.7 PG (ref 26–34)
MCHC RBC AUTO-ENTMCNC: 33 G/DL (ref 31–36)
MCV RBC AUTO: 86.9 FL (ref 80–100)
METHEMOGLOBIN VENOUS: 0.9 %
MONOCYTES ABSOLUTE: 0.4 K/UL (ref 0–1.3)
MONOCYTES RELATIVE PERCENT: 6.2 %
NEUTROPHILS ABSOLUTE: 4.1 K/UL (ref 1.7–7.7)
NEUTROPHILS RELATIVE PERCENT: 65.6 %
O2 SAT, VEN: 49 %
O2 THERAPY: ABNORMAL
PCO2, VEN: 57.7 MMHG (ref 40–50)
PDW BLD-RTO: 15.9 % (ref 12.4–15.4)
PERFORMED ON: ABNORMAL
PH VENOUS: 7.33 (ref 7.35–7.45)
PLATELET # BLD: 246 K/UL (ref 135–450)
PMV BLD AUTO: 7.8 FL (ref 5–10.5)
PO2, VEN: 31 MMHG
POTASSIUM REFLEX MAGNESIUM: 4 MMOL/L (ref 3.5–5.1)
PRO-BNP: 3879 PG/ML (ref 0–124)
RAPID INFLUENZA  B AGN: NEGATIVE
RAPID INFLUENZA A AGN: NEGATIVE
RBC # BLD: 3.45 M/UL (ref 4–5.2)
SARS-COV-2, NAAT: NOT DETECTED
SODIUM BLD-SCNC: 141 MMOL/L (ref 136–145)
TCO2 CALC VENOUS: 32 MMOL/L
TROPONIN: 0.03 NG/ML
WBC # BLD: 6.2 K/UL (ref 4–11)

## 2022-07-22 PROCEDURE — 2700000000 HC OXYGEN THERAPY PER DAY

## 2022-07-22 PROCEDURE — 93010 ELECTROCARDIOGRAM REPORT: CPT | Performed by: INTERNAL MEDICINE

## 2022-07-22 PROCEDURE — 94640 AIRWAY INHALATION TREATMENT: CPT

## 2022-07-22 PROCEDURE — 6360000002 HC RX W HCPCS: Performed by: INTERNAL MEDICINE

## 2022-07-22 PROCEDURE — 6370000000 HC RX 637 (ALT 250 FOR IP): Performed by: EMERGENCY MEDICINE

## 2022-07-22 PROCEDURE — 99285 EMERGENCY DEPT VISIT HI MDM: CPT

## 2022-07-22 PROCEDURE — 94761 N-INVAS EAR/PLS OXIMETRY MLT: CPT

## 2022-07-22 PROCEDURE — 84484 ASSAY OF TROPONIN QUANT: CPT

## 2022-07-22 PROCEDURE — 96374 THER/PROPH/DIAG INJ IV PUSH: CPT

## 2022-07-22 PROCEDURE — 80048 BASIC METABOLIC PNL TOTAL CA: CPT

## 2022-07-22 PROCEDURE — 94760 N-INVAS EAR/PLS OXIMETRY 1: CPT

## 2022-07-22 PROCEDURE — 87635 SARS-COV-2 COVID-19 AMP PRB: CPT

## 2022-07-22 PROCEDURE — 6370000000 HC RX 637 (ALT 250 FOR IP): Performed by: INTERNAL MEDICINE

## 2022-07-22 PROCEDURE — 87804 INFLUENZA ASSAY W/OPTIC: CPT

## 2022-07-22 PROCEDURE — 85025 COMPLETE CBC W/AUTO DIFF WBC: CPT

## 2022-07-22 PROCEDURE — 71045 X-RAY EXAM CHEST 1 VIEW: CPT

## 2022-07-22 PROCEDURE — 83880 ASSAY OF NATRIURETIC PEPTIDE: CPT

## 2022-07-22 PROCEDURE — 82803 BLOOD GASES ANY COMBINATION: CPT

## 2022-07-22 PROCEDURE — 1200000000 HC SEMI PRIVATE

## 2022-07-22 PROCEDURE — 6360000002 HC RX W HCPCS: Performed by: EMERGENCY MEDICINE

## 2022-07-22 PROCEDURE — 36415 COLL VENOUS BLD VENIPUNCTURE: CPT

## 2022-07-22 PROCEDURE — 2580000003 HC RX 258: Performed by: INTERNAL MEDICINE

## 2022-07-22 PROCEDURE — 93005 ELECTROCARDIOGRAM TRACING: CPT | Performed by: EMERGENCY MEDICINE

## 2022-07-22 RX ORDER — ATORVASTATIN CALCIUM 40 MG/1
40 TABLET, FILM COATED ORAL DAILY
Status: DISCONTINUED | OUTPATIENT
Start: 2022-07-22 | End: 2022-07-26 | Stop reason: HOSPADM

## 2022-07-22 RX ORDER — DEXTROSE MONOHYDRATE 100 MG/ML
INJECTION, SOLUTION INTRAVENOUS CONTINUOUS PRN
Status: DISCONTINUED | OUTPATIENT
Start: 2022-07-22 | End: 2022-07-22 | Stop reason: SDUPTHER

## 2022-07-22 RX ORDER — SODIUM CHLORIDE 9 MG/ML
INJECTION, SOLUTION INTRAVENOUS PRN
Status: DISCONTINUED | OUTPATIENT
Start: 2022-07-22 | End: 2022-07-26 | Stop reason: HOSPADM

## 2022-07-22 RX ORDER — ACETAMINOPHEN 325 MG/1
650 TABLET ORAL EVERY 4 HOURS PRN
Status: DISCONTINUED | OUTPATIENT
Start: 2022-07-22 | End: 2022-07-26 | Stop reason: HOSPADM

## 2022-07-22 RX ORDER — DOXAZOSIN MESYLATE 4 MG/1
4 TABLET ORAL DAILY
Status: DISCONTINUED | OUTPATIENT
Start: 2022-07-22 | End: 2022-07-26 | Stop reason: HOSPADM

## 2022-07-22 RX ORDER — SODIUM CHLORIDE 0.9 % (FLUSH) 0.9 %
10 SYRINGE (ML) INJECTION EVERY 12 HOURS SCHEDULED
Status: DISCONTINUED | OUTPATIENT
Start: 2022-07-22 | End: 2022-07-26 | Stop reason: HOSPADM

## 2022-07-22 RX ORDER — HYDRALAZINE HYDROCHLORIDE 50 MG/1
50 TABLET, FILM COATED ORAL EVERY 8 HOURS SCHEDULED
Status: DISCONTINUED | OUTPATIENT
Start: 2022-07-22 | End: 2022-07-26 | Stop reason: HOSPADM

## 2022-07-22 RX ORDER — INSULIN LISPRO 100 [IU]/ML
0-8 INJECTION, SOLUTION INTRAVENOUS; SUBCUTANEOUS
Status: DISCONTINUED | OUTPATIENT
Start: 2022-07-22 | End: 2022-07-26 | Stop reason: HOSPADM

## 2022-07-22 RX ORDER — ENOXAPARIN SODIUM 100 MG/ML
40 INJECTION SUBCUTANEOUS DAILY
Status: DISCONTINUED | OUTPATIENT
Start: 2022-07-22 | End: 2022-07-26 | Stop reason: HOSPADM

## 2022-07-22 RX ORDER — CLOPIDOGREL BISULFATE 75 MG/1
75 TABLET ORAL DAILY
Status: DISCONTINUED | OUTPATIENT
Start: 2022-07-22 | End: 2022-07-26 | Stop reason: HOSPADM

## 2022-07-22 RX ORDER — ISOSORBIDE MONONITRATE 30 MG/1
30 TABLET, EXTENDED RELEASE ORAL DAILY
Status: DISCONTINUED | OUTPATIENT
Start: 2022-07-22 | End: 2022-07-26 | Stop reason: HOSPADM

## 2022-07-22 RX ORDER — ASPIRIN 81 MG/1
81 TABLET ORAL DAILY
Status: DISCONTINUED | OUTPATIENT
Start: 2022-07-22 | End: 2022-07-26 | Stop reason: HOSPADM

## 2022-07-22 RX ORDER — ALBUTEROL SULFATE 90 UG/1
2 AEROSOL, METERED RESPIRATORY (INHALATION) EVERY 6 HOURS PRN
Status: DISCONTINUED | OUTPATIENT
Start: 2022-07-22 | End: 2022-07-26 | Stop reason: HOSPADM

## 2022-07-22 RX ORDER — FUROSEMIDE 10 MG/ML
40 INJECTION INTRAMUSCULAR; INTRAVENOUS 2 TIMES DAILY
Status: DISCONTINUED | OUTPATIENT
Start: 2022-07-22 | End: 2022-07-24

## 2022-07-22 RX ORDER — IPRATROPIUM BROMIDE AND ALBUTEROL SULFATE 2.5; .5 MG/3ML; MG/3ML
1 SOLUTION RESPIRATORY (INHALATION) ONCE
Status: COMPLETED | OUTPATIENT
Start: 2022-07-22 | End: 2022-07-22

## 2022-07-22 RX ORDER — ACETAMINOPHEN 650 MG/1
650 SUPPOSITORY RECTAL EVERY 4 HOURS PRN
Status: DISCONTINUED | OUTPATIENT
Start: 2022-07-22 | End: 2022-07-26 | Stop reason: HOSPADM

## 2022-07-22 RX ORDER — AMLODIPINE BESYLATE 5 MG/1
5 TABLET ORAL DAILY
Status: DISCONTINUED | OUTPATIENT
Start: 2022-07-22 | End: 2022-07-26

## 2022-07-22 RX ORDER — ONDANSETRON 2 MG/ML
4 INJECTION INTRAMUSCULAR; INTRAVENOUS EVERY 4 HOURS PRN
Status: DISCONTINUED | OUTPATIENT
Start: 2022-07-22 | End: 2022-07-26 | Stop reason: HOSPADM

## 2022-07-22 RX ORDER — INSULIN LISPRO 100 [IU]/ML
0-4 INJECTION, SOLUTION INTRAVENOUS; SUBCUTANEOUS NIGHTLY
Status: DISCONTINUED | OUTPATIENT
Start: 2022-07-22 | End: 2022-07-26 | Stop reason: HOSPADM

## 2022-07-22 RX ORDER — IPRATROPIUM BROMIDE AND ALBUTEROL SULFATE 2.5; .5 MG/3ML; MG/3ML
1 SOLUTION RESPIRATORY (INHALATION)
Status: DISCONTINUED | OUTPATIENT
Start: 2022-07-22 | End: 2022-07-22

## 2022-07-22 RX ORDER — FUROSEMIDE 10 MG/ML
40 INJECTION INTRAMUSCULAR; INTRAVENOUS ONCE
Status: COMPLETED | OUTPATIENT
Start: 2022-07-22 | End: 2022-07-22

## 2022-07-22 RX ORDER — DEXTROSE MONOHYDRATE 100 MG/ML
INJECTION, SOLUTION INTRAVENOUS CONTINUOUS PRN
Status: DISCONTINUED | OUTPATIENT
Start: 2022-07-22 | End: 2022-07-26 | Stop reason: HOSPADM

## 2022-07-22 RX ORDER — SODIUM CHLORIDE 0.9 % (FLUSH) 0.9 %
10 SYRINGE (ML) INJECTION PRN
Status: DISCONTINUED | OUTPATIENT
Start: 2022-07-22 | End: 2022-07-26 | Stop reason: HOSPADM

## 2022-07-22 RX ORDER — POLYETHYLENE GLYCOL 3350 17 G/17G
17 POWDER, FOR SOLUTION ORAL DAILY PRN
Status: DISCONTINUED | OUTPATIENT
Start: 2022-07-22 | End: 2022-07-26 | Stop reason: HOSPADM

## 2022-07-22 RX ORDER — CARVEDILOL 12.5 MG/1
12.5 TABLET ORAL 2 TIMES DAILY WITH MEALS
Status: DISCONTINUED | OUTPATIENT
Start: 2022-07-22 | End: 2022-07-26 | Stop reason: HOSPADM

## 2022-07-22 RX ADMIN — FUROSEMIDE 40 MG: 10 INJECTION, SOLUTION INTRAMUSCULAR; INTRAVENOUS at 04:31

## 2022-07-22 RX ADMIN — ACETAMINOPHEN 650 MG: 325 TABLET ORAL at 08:11

## 2022-07-22 RX ADMIN — CLOPIDOGREL BISULFATE 75 MG: 75 TABLET ORAL at 08:11

## 2022-07-22 RX ADMIN — ASPIRIN 81 MG: 81 TABLET, COATED ORAL at 08:11

## 2022-07-22 RX ADMIN — IPRATROPIUM BROMIDE AND ALBUTEROL SULFATE 1 AMPULE: .5; 3 SOLUTION RESPIRATORY (INHALATION) at 04:45

## 2022-07-22 RX ADMIN — ATORVASTATIN CALCIUM 40 MG: 40 TABLET, FILM COATED ORAL at 08:11

## 2022-07-22 RX ADMIN — FUROSEMIDE 40 MG: 10 INJECTION, SOLUTION INTRAMUSCULAR; INTRAVENOUS at 08:11

## 2022-07-22 RX ADMIN — HYDRALAZINE HYDROCHLORIDE 50 MG: 50 TABLET, FILM COATED ORAL at 13:03

## 2022-07-22 RX ADMIN — HYDRALAZINE HYDROCHLORIDE 50 MG: 50 TABLET, FILM COATED ORAL at 20:18

## 2022-07-22 RX ADMIN — IPRATROPIUM BROMIDE AND ALBUTEROL SULFATE 1 AMPULE: .5; 3 SOLUTION RESPIRATORY (INHALATION) at 04:36

## 2022-07-22 RX ADMIN — FUROSEMIDE 40 MG: 10 INJECTION, SOLUTION INTRAMUSCULAR; INTRAVENOUS at 18:11

## 2022-07-22 RX ADMIN — ISOSORBIDE MONONITRATE 30 MG: 30 TABLET, EXTENDED RELEASE ORAL at 08:11

## 2022-07-22 RX ADMIN — ACETAMINOPHEN 650 MG: 325 TABLET ORAL at 20:18

## 2022-07-22 RX ADMIN — ENOXAPARIN SODIUM 40 MG: 100 INJECTION SUBCUTANEOUS at 08:12

## 2022-07-22 RX ADMIN — AMLODIPINE BESYLATE 5 MG: 5 TABLET ORAL at 08:11

## 2022-07-22 RX ADMIN — ACETAMINOPHEN 650 MG: 325 TABLET ORAL at 14:04

## 2022-07-22 RX ADMIN — CARVEDILOL 12.5 MG: 12.5 TABLET, FILM COATED ORAL at 08:11

## 2022-07-22 RX ADMIN — SODIUM CHLORIDE, PRESERVATIVE FREE 10 ML: 5 INJECTION INTRAVENOUS at 20:18

## 2022-07-22 RX ADMIN — SODIUM CHLORIDE, PRESERVATIVE FREE 10 ML: 5 INJECTION INTRAVENOUS at 10:26

## 2022-07-22 RX ADMIN — CARVEDILOL 12.5 MG: 12.5 TABLET, FILM COATED ORAL at 17:14

## 2022-07-22 ASSESSMENT — PAIN DESCRIPTION - FREQUENCY
FREQUENCY: INTERMITTENT
FREQUENCY: INTERMITTENT

## 2022-07-22 ASSESSMENT — ENCOUNTER SYMPTOMS
VOMITING: 0
CONSTIPATION: 0
ABDOMINAL PAIN: 0
COLOR CHANGE: 0
SHORTNESS OF BREATH: 1
EYE ITCHING: 0
EYE DISCHARGE: 0

## 2022-07-22 ASSESSMENT — PAIN DESCRIPTION - LOCATION
LOCATION: HEAD

## 2022-07-22 ASSESSMENT — PAIN DESCRIPTION - ORIENTATION
ORIENTATION: MID

## 2022-07-22 ASSESSMENT — PAIN SCALES - GENERAL
PAINLEVEL_OUTOF10: 5
PAINLEVEL_OUTOF10: 10
PAINLEVEL_OUTOF10: 0
PAINLEVEL_OUTOF10: 0
PAINLEVEL_OUTOF10: 4
PAINLEVEL_OUTOF10: 8
PAINLEVEL_OUTOF10: 0
PAINLEVEL_OUTOF10: 10
PAINLEVEL_OUTOF10: 5
PAINLEVEL_OUTOF10: 3
PAINLEVEL_OUTOF10: 5
PAINLEVEL_OUTOF10: 8

## 2022-07-22 ASSESSMENT — PAIN DESCRIPTION - DESCRIPTORS
DESCRIPTORS: ACHING

## 2022-07-22 ASSESSMENT — PAIN DESCRIPTION - PAIN TYPE: TYPE: ACUTE PAIN

## 2022-07-22 ASSESSMENT — PAIN DESCRIPTION - ONSET
ONSET: GRADUAL
ONSET: ON-GOING

## 2022-07-22 NOTE — CONSULTS
[facility]  HEART FAILURE PROGRAM      NAME:  Ana Montpelier  MEDICAL RECORD NUMBER:  [id number]  AGE: 74 y.o.    GENDER: female  : 1955  TODAY'S DATE:  2022    Subjective:     VISIT TYPE: evaluation     ADMITTING PHYSICIAN:  Kevin Russell MD    PAST MEDICAL HISTORY        Diagnosis Date    Ankle fracture, left 2014    CHF (congestive heart failure) (HCC)     Diabetes mellitus (HCC)     HTN (hypertension), benign 2014    Hypertension        SOCIAL HISTORY    Social History     Tobacco Use    Smoking status: Never    Smokeless tobacco: Never   Substance Use Topics    Alcohol use: No    Drug use: No       ALLERGIES    No Known Allergies    MEDICATIONS  Scheduled Meds:   isosorbide mononitrate  30 mg Oral Daily    hydrALAZINE  50 mg Oral 3 times per day    doxazosin  4 mg Oral Daily    clopidogrel  75 mg Oral Daily    carvedilol  12.5 mg Oral BID WC    atorvastatin  40 mg Oral Daily    aspirin  81 mg Oral Daily    amLODIPine  5 mg Oral Daily    furosemide  40 mg IntraVENous BID    sodium chloride flush  10 mL IntraVENous 2 times per day    enoxaparin  40 mg SubCUTAneous Daily       ADMIT DATE: 2022      Objective:     ADMISSION DIAGNOSIS:   Acute pulmonary edema (HCC) [J81.0]  Hypoxia [R09.02]  Acute on chronic diastolic (congestive) heart failure (HCC) [I50.33]     BP (!) 157/78   Pulse 86   Temp 98.2 °F (36.8 °C) (Oral)   Resp 19   Ht 5' (1.524 m)   Wt 186 lb 4.6 oz (84.5 kg)   SpO2 96%   BMI 36.38 kg/m²     ADMIT:  Weight: 187 lb 2.7 oz (84.9 kg)    TODAY: Weight: 186 lb 4.6 oz (84.5 kg)    Wt Readings from Last 10 Encounters:   22 186 lb 4.6 oz (84.5 kg)   22 163 lb (73.9 kg)   22 186 lb 3.2 oz (84.5 kg)   22 178 lb 9.2 oz (81 kg)   10/17/18 175 lb 0.7 oz (79.4 kg)   18 160 lb (72.6 kg)   01/16/15 170 lb (77.1 kg)   07/15/14 166 lb (75.3 kg)   14 170 lb (77.1 kg)   14 170 lb (77.1 kg)          Intake/Output Summary (Last 24 hours) at 7/22/2022 1638  Last data filed at 7/22/2022 1408  Gross per 24 hour   Intake 600 ml   Output 900 ml   Net -300 ml       LABS  BMP:   Lab Results   Component Value Date/Time     07/22/2022 03:39 AM    K 4.0 07/22/2022 03:39 AM     07/22/2022 03:39 AM    CO2 27 07/22/2022 03:39 AM    BUN 25 07/22/2022 03:39 AM    LABALBU 2.9 05/09/2022 04:00 AM    CREATININE 1.6 07/22/2022 03:39 AM    CALCIUM 8.5 07/22/2022 03:39 AM    GFRAA 39 07/22/2022 03:39 AM    LABGLOM 32 07/22/2022 03:39 AM    LABGLOM 39 05/20/2022 04:12 AM    GLUCOSE 215 07/22/2022 03:39 AM     CBC:   Recent Labs     07/22/22  0339   WBC 6.2   HGB 9.9*   HCT 30.0*   MCV 86.9        BNP: No results found for: BNP    ECHOCARDIOGRAM:     Conclusions      Summary   Normal left ventricular size and systolic function. There were no regional wall motion abnormalities. Wall thickness was within normal limits. Ejection fraction was estimated at 55-60%. Bubble study does not show evidence if interatrial shunt, however   sensitivity is limited, consider VEE if clinically indicated. IVC size is within normal limits with normal respiratory phasic changes. Signature      ----------------------------------------------------------------   Electronically signed by Carina Forde MD (Interpreting   physician) on 05/10/2022 at 03:19 PM    Assessment:     CONSULTS:   [address]    Patient has a CARDIOLOGY CONSULT: No      Patient taking an ACEI/ARB:  No: ef 50%       Patient taking a BETA BLOCKER:  Yes    SCALE AVAILABLE:  Yes     EDUCATION STATUS: Patient   [x]  Provided both written and verbal education on Heart Failure signs/symptoms. [x]  Provided instructions on daily medications. [x]  Provided instructions to monitor and record weight daily. [x]  Provided instructions to call if weight increases 3 lbs in one day or 5 lbs in one week.   [x]  Received verbal acknowledgment/understanding of Heart Failure related causes. [x]  Provided instructions on how to maintain a low sodium diet. []  Provided recommendations for smoking cessation programs  [x]  Provided recommendations on activity and exercise    [x]  Other:    CURRENT DIET: ADULT DIET; Regular; Low Sodium (2 gm); 1500 ml    EDUCATIONAL PACKETS PROVIDED- [facility] Heart Failure Booklet  Titles and material given:  Yes   [x]  What is Heart Failure? [x]  Heart Failure: Warning Signs of a Flare-Up  [x]  Heart Failure: Making Changes to Your Diet  [x]  Heart Failure: Medications to Help Your Heart   []  Other:   Ms. Chow came to the emergency room for evaluation following a 10-day history of worsening shortness of breath which has become acutely worse in the last 2 to 3 days. Consult received on admission. She routinely follows with Dr. Dariusz Kim, and saw him in June. Her weight at that time was 163 lbs. I found Ms. Chow sitting up in bed, bedside RN in the room, who shared how much better Mike Dickey is now compared to this morning. She has had a good response tot he IV lasix. I introduced myself to Mike Krissut and she was happy to talk with me, to review information about her chronic diastolic heart failure. She had not been feeling well for a number of days, and know her weight was creeping up. She said she became acutely ill last night--it scared her and she called 911. I asked her why she didn't call the MD when her weight was going up, and she was becoming sob. She said her ankles weren't swollen, and she forgot what she was supposed to do. We reviewed in detail the 3/5 lb rule and the importance of the daily weight. And to CALL the MD.  She was able to repeat this back after our discussion. She also shared [place] [facility] [place] [facility] [place]. She has poor insight into the sodium in foods--she routinely has rodriguez sandwiches for lunch.   We talked about the relationship between eating high sodium foods and weight gain. She is committed to weighing herself each day. I also reminded her that needing extra lasix is hard on her already fragile kidneys, so if she can recognize the early s/s, she can avoid needing so much lasix. She is going to consider a healthier diet moving forward. She lives at home with her 80year old mother who is in very good health. Should she be discharged over the weekend, she knows she has an appointment with her PCP on Monday. PATIENT/CAREGIVER TEACHING:    Level of patient/caregiver understanding able to:   [x] Verbalize understanding   [x] Demonstrate understanding       [x] Teach back        [x] Needs reinforcement     []  Other:      TEACHING TIME:  30 minutes       Plan:       DISCHARGE PLAN:  Placement for patient upon discharge: home with support   Hospice Care:  no  Code Status: DNR-CCA  Discharge appointment scheduled: Yes     RECOMMENDATIONS:   [x]  Encourage to call Heart Failure Resource Line with any questions or concerns. [x]  Educate further Ms. Wright Son on fluid restriction 48 oz- 64 oz during inpatient stay so she can understand how to measure intake at home. [x]  Continue to educate on S/S of Heart Failure.   [x]  Emphasize daily weights, diet, and if changes, to call Heart Failure Resource Line  []  Other:            Electronically signed by Catrachita Cooper RN, BSN  on 7/22/2022 at 4:38 PM

## 2022-07-22 NOTE — H&P
Hospital Medicine  History and Physical    PCP: Jeannette Ray MD  Patient Name: Tj Kruse    Date of Service: Pt seen/examined on 7/22/22 and admitted to Inpatient with expected LOS greater than two midnights due to medical therapy    CHIEF COMPLAINT:  Pt c/o worsening shortness of breath, lower extremity edema  HISTORY OF PRESENT ILLNESS: Pt is an 74y.o. year-old female with a history of hypertension, hyperlipidemia, type II diabetes mellitus, CKD3, CHF, a chronically elevated troponin and morbid obesity. She presents to the emergency room for evaluation following a 10-day history of worsening shortness of breath which has become acutely worse in the last 2 to 3 days. She states that her shortness of breath is severe, worse with minimal exertion and improved with rest.  She states that she becomes very short of breath when she lays down and has to sit up to catch her breath. She reports that she has increasing edema of both of her legs. She denies current or recent chest pain. Associated signs and symptoms do not include lightheaded, dizziness, diaphoresis, paroxysmal nocturnal dyspnea, fever or chills. No recent medication changes. Past Medical History:        Diagnosis Date    Ankle fracture, left 1/17/2014    CHF (congestive heart failure) (HCC)     Diabetes mellitus (HCC)     HTN (hypertension), benign 1/17/2014    Hypertension        Past Surgical History:        Procedure Laterality Date    FRACTURE SURGERY Left 01/17/2014    orif tibula/fibula fixation    HYSTERECTOMY (CERVIX STATUS UNKNOWN)         Allergies:  Patient has no known allergies. Medications Prior to Admission:    Prior to Admission medications    Medication Sig Start Date End Date Taking?  Authorizing Provider   atorvastatin (LIPITOR) 40 MG tablet Take 1 tablet by mouth daily 6/16/22   Len Chowdhury MD   carvedilol (COREG) 12.5 MG tablet Take 1 tablet by mouth 2 times daily (with meals) 6/16/22   Len Chowdhury MD clopidogrel (PLAVIX) 75 MG tablet Take 1 tablet by mouth daily 6/16/22   Ivis Suh MD   isosorbide mononitrate (IMDUR) 30 MG extended release tablet Take 1 tablet by mouth daily 6/16/22   Ivis Suh MD   aspirin 81 MG EC tablet Take 1 tablet by mouth daily 6/16/22   Ivis Suh MD   amLODIPine (NORVASC) 5 MG tablet Take 1 tablet by mouth daily 6/16/22   Ivis Suh MD   pregabalin (LYRICA) 75 MG capsule Take 1 capsule by mouth every evening for 30 days. 5/20/22 6/19/22  Dang Benjamin DO   doxazosin (CARDURA) 4 MG tablet Take 1 tablet by mouth daily 5/21/22   Dang Benjamin DO   hydrALAZINE (APRESOLINE) 50 MG tablet Take 1 tablet by mouth every 8 hours 5/20/22   Dang Benjamin DO   albuterol sulfate HFA (PROVENTIL HFA) 108 (90 Base) MCG/ACT inhaler Inhale 2 puffs into the lungs every 6 hours as needed for Wheezing (Cough) 5/6/22   Elva Cranker, MD   benzocaine-menthol (CEPACOL SORE THROAT) 15-3.6 MG lozenge Take 1 lozenge by mouth every 2 hours as needed for Sore Throat or Pain 5/6/22   Elva Cranker, MD   Cholecalciferol (VITAMIN D3) 125 MCG (5000 UT) CAPS Take 2 capsules by mouth once a week    Historical Provider, MD   L-METHYLFOLATE-B6-B12 PO Take 1 tablet by mouth daily    Historical Provider, MD       Family History:   Family history is negative for accelerated coronary artery disease, diabetes or malignancies. Social History:   TOBACCO:   reports that she has never smoked. She has never used smokeless tobacco.  ETOH:   reports no history of alcohol use.   OCCUPATION:      REVIEW OF SYSTEMS:  A full review of systems was performed and is negative except for that which appears in the HPI    Physical Exam:    Vitals: BP (!) 174/86   Pulse 85   Temp 97.6 °F (36.4 °C) (Oral)   Resp 23   Wt 187 lb 2.7 oz (84.9 kg)   SpO2 93%   BMI 36.55 kg/m²   General appearance: WD/WN 74y.o. year-old female who is alert, appears stated age and is cooperative  HEENT: Head: Normocephalic, no lesions, without obvious abnormality. Eye: Normal external eye, conjunctiva, lids cornea, PEERL. Ears: Normal external ears. Non-tender. Nose: Normal external nose, mucus membranes and septum. Pharynx: Dental Hygiene adequate. Normal buccal mucosa. Normal pharynx. Neck: no adenopathy, no carotid bruit, no JVD, supple, symmetrical, trachea midline and thyroid not enlarged, symmetric, no tenderness/mass/nodules  Lungs: scattered rales on auscultation bilaterally and no use of accessory muscles  Heart: regular rate and rhythm, S1, S2 normal, no murmur, click, rub or gallop and normal apical impulse  Abdomen: soft, non-tender; bowel sounds normal; no masses, no organomegaly  Extremities: extremities atraumatic, no cyanosis , 3+ edema bilateral lower extremities and Homans sign is negative, no sign of DVT. Capillary Refill: Acceptable < 3 seconds   Peripheral Pulses: +3 easily felt, not easily obliterated with pressures   Skin: Skin color, texture, turgor normal. No rashes or lesions on exposed skin  Neurologic: Neurovascularly intact without any focal sensory/motor deficits. Cranial nerves: II-XII intact, grossly non-focal. Gait was not tested.   Mental Status: Alert and oriented, thought content appropriate, normal insight        CBC:   Recent Labs     07/22/22  0339   WBC 6.2   HGB 9.9*        BMP:    Recent Labs     07/22/22  0339      K 4.0      CO2 27   BUN 25*   CREATININE 1.6*   GLUCOSE 215*     Troponin:   Recent Labs     07/22/22 0339   TROPONINI 0.03*     PT/INR:  No results found for: PTINR  U/A:    Lab Results   Component Value Date/Time    LEUKOCYTESUR NEGATIVE 05/09/2022 10:30 AM    RBCUA 0-2 05/09/2022 10:30 AM    SPECGRAV 1.016 05/09/2022 10:30 AM    UROBILINOGEN 0.2 05/09/2022 10:30 AM    BILIRUBINUR NEGATIVE 05/09/2022 10:30 AM    BLOODU NEGATIVE 05/09/2022 10:30 AM    GLUCOSEU Negative 05/02/2022 11:58 PM    PROTEINU 100 05/09/2022 10:30 AM         RAD:   I have independently reviewed and interpreted the imaging studies below and based my recommendations to the patient on those findings. XR CHEST PORTABLE    Result Date: 7/22/2022  EXAMINATION: ONE XRAY VIEW OF THE CHEST 7/22/2022 3:34 am COMPARISON: Chest radiograph 04/26/2022 HISTORY: ORDERING SYSTEM PROVIDED HISTORY: SOB TECHNOLOGIST PROVIDED HISTORY: Reason for exam:->SOB Reason for Exam: SOB FINDINGS: Bilateral perihilar and basilar airspace opacities. Linear opacity in the mid left lung likely due to scarring or subsegmental atelectasis. Diffuse interstitial prominence with indistinct pulmonary vasculature and suspected interlobular septal thickening. No definite findings of pneumothorax. At least trace to small left and trace right pleural effusions. Prominence of the pulmonary trunk that can be seen with pulmonary hypertension. Mildly prominent hilar and cardiac contours. No acute fracture. 1. Central predominant interstitial and alveolar edema suggestive of congestive heart failure given mild cardiomegaly and trace to small left and trace right pleural effusions. Pneumonia could appear similar. 2. Bibasilar airspace opacities most likely due to passive atelectasis. Pneumonia and aspiration could appear similar. Assessment:   Principal Problem:    Acute on chronic diastolic (congestive) heart failure (HCC)  Active Problems:    Acute respiratory failure with hypoxia (HCC)    Acute pulmonary edema (HCC)    Hypertensive urgency    Hyperlipidemia    Morbid obesity due to excess calories (HCC)    Elevated troponin (chronically elevated)    Chronic renal failure, stage 3 (moderate) (HCC)    DMII (diabetes mellitus, type 2) ([facility] [address]    Essential hypertension  Resolved Problems:    * No resolved hospital problems.  *      Plan:       Acute on chronic diastolic (congestive) heart failure (HCC) with acute pulmonary edema - A 2D Echocardiogram on 04/27/2022 shows an EF of 55-60%  and grade II diastolic dysfunction. Diurese with IV Lasix. Monitor strict I&Os and daily weights. A low sodium fluid restricted diet has been ordered. Acute respiratory failure with hypoxia (HCC) - due to CHF and acute pulmonary edema - provide oxygen as needed to maintain an oxygen saturation of 92% or greater      Hypertensive urgency - initial /85. Will monitor for improvement with diuresis. Elevated troponin (chronically elevated) - patient denies current or recent chest pain. Her troponin is in the normal range for her. We will monitor    Chronic Renal Failure stage III - Renal function is at/near baseline and is stable; Monitor renal function and avoid Nephrotoxic agents as able     Diabetes mellitus II - Controlled without need for aggressive monitoring or intervention    Essential (primary) hypertension - continue home meds and monitor blood pressure    Hyperlipidemia - No current evidence of Rhabdomyolysis or other adverse effects. Continue statin therapy while in the hospital    Morbid obesity due to excess calories (Body mass index is 36.55 kg/m². ) - Complicating assessment and treatment. Placing patient at risk for multiple co-morbidities as well as early death and contributing to the patient's presentation.  on weight loss when appropriate. Code Status: DNR-CCA  Diet: ADULT DIET; Regular; Low Sodium (2 gm); 1500 ml  DVT Prophylaxis: Lovenox     (Advanced care planning has been discussed with patient and/or responsible family member and is reflected in the code status.  Further orders associated with this have been entered if appropriate)    Disposition: Anticipate that patient will remain in the hospital for 2 or more midnights depending on further evaluation and clinical course     Please note that over 50 minutes was spent in evaluating the patient, review of records and results, discussion with staff/family, etc.      Velma Ramos MD

## 2022-07-22 NOTE — PROGRESS NOTES
Pt S/E. Pt was admitted overnight with acute on chronic hypoxic rf, chf exacerbation, htn urgency.   H&P noted and agree with the plan  Labs noted    Acute hypoxic respiratory failure, POA  - with criteria: < 90% on RA, RR> 18, due to volume overload  - supplemental oxygen as necessary to keep SaO2 > 90%; not on o2 at baseline    Acute on chronic diastolic CHF exacerbation   - last ECHO:  - bnp  - lasix 40 mg IV BID  - cont BB, ACEi/ARB/arni  - daily wts  - I/Os  - consult CHF RN  - consult cardiology

## 2022-07-22 NOTE — ED PROVIDER NOTES
[address] [facility]      Pt Name: Dorothea Herbert  MRN: [id number]  [place] 1955  Date of evaluation: 7/22/2022  Provider: Lucy English MD    CHIEF COMPLAINT       Chief Complaint   Patient presents with    Shortness of Breath       HISTORY OF PRESENT ILLNESS    Dorothea Herbert is a 74 y.o. female who presents to the emergency department with shortness of breath. Patient presents with shortness of breath and chest discomfort. Positive for leg swelling. Positive for orthopnea. Started within the last few weeks but worsening. Is not on any water pills. Never happened before. No other associated symptoms. Nursing Notes were reviewed. Including nursing noted for FM, Surgical History, Past Medical History, Social History, vitals, and allergies; agree with all. REVIEW OF SYSTEMS       Review of Systems   Constitutional:  Negative for diaphoresis and unexpected weight change. HENT:  Negative for congestion and dental problem. Eyes:  Negative for discharge and itching. Respiratory:  Positive for shortness of breath. Cardiovascular:  Positive for leg swelling. Negative for chest pain. Gastrointestinal:  Negative for abdominal pain, constipation and vomiting. Endocrine: Negative for cold intolerance and heat intolerance. Genitourinary:  Negative for vaginal bleeding, vaginal discharge and vaginal pain. Musculoskeletal:  Negative for neck pain and neck stiffness. Skin:  Negative for color change and pallor. Neurological:  Negative for tremors and weakness. Psychiatric/Behavioral:  Negative for agitation and behavioral problems. Except as noted above the remainder of the review of systems was reviewed and negative.      PAST MEDICAL HISTORY     Past Medical History:   Diagnosis Date    Ankle fracture, left 1/17/2014    CHF (congestive heart failure) (HCC)     Diabetes mellitus (HCC)     HTN (hypertension), benign 1/17/2014    Hypertension        SURGICAL HISTORY       Past Surgical History:   Procedure Laterality Date    FRACTURE SURGERY Left 01/17/2014    orif tibula/fibula fixation    HYSTERECTOMY (CERVIX STATUS UNKNOWN)         CURRENT MEDICATIONS       Previous Medications    ALBUTEROL SULFATE HFA (PROVENTIL HFA) 108 (90 BASE) MCG/ACT INHALER    Inhale 2 puffs into the lungs every 6 hours as needed for Wheezing (Cough)    AMLODIPINE (NORVASC) 5 MG TABLET    Take 1 tablet by mouth daily    ASPIRIN 81 MG EC TABLET    Take 1 tablet by mouth daily    ATORVASTATIN (LIPITOR) 40 MG TABLET    Take 1 tablet by mouth daily    BENZOCAINE-MENTHOL (CEPACOL SORE THROAT) 15-3.6 MG LOZENGE    Take 1 lozenge by mouth every 2 hours as needed for Sore Throat or Pain    CARVEDILOL (COREG) 12.5 MG TABLET    Take 1 tablet by mouth 2 times daily (with meals)    CHOLECALCIFEROL (VITAMIN D3) 125 MCG (5000 UT) CAPS    Take 2 capsules by mouth once a week    CLOPIDOGREL (PLAVIX) 75 MG TABLET    Take 1 tablet by mouth daily    DOXAZOSIN (CARDURA) 4 MG TABLET    Take 1 tablet by mouth daily    HYDRALAZINE (APRESOLINE) 50 MG TABLET    Take 1 tablet by mouth every 8 hours    ISOSORBIDE MONONITRATE (IMDUR) 30 MG EXTENDED RELEASE TABLET    Take 1 tablet by mouth daily    L-METHYLFOLATE-B6-B12 PO    Take 1 tablet by mouth daily    PREGABALIN (LYRICA) 75 MG CAPSULE    Take 1 capsule by mouth every evening for 30 days. ALLERGIES     Patient has no known allergies. FAMILY HISTORY      History reviewed. No pertinent family history.     SOCIAL HISTORY       Social History     Socioeconomic History    Marital status:      Spouse name: None    Number of children: None    Years of education: None    Highest education level: None   Tobacco Use    Smoking status: Never    Smokeless tobacco: Never   Substance and Sexual Activity    Alcohol use: No    Drug use: No       PHYSICAL EXAM       ED Triage Vitals [07/22/22 0330]   BP Temp Temp Source Heart Rate Resp SpO2 Height Weight   (!) 204/85 97.6 °F (36.4 °C) Oral 90 16 94 % -- 187 lb 2.7 oz (84.9 kg)       Physical Exam  Vitals and nursing note reviewed. Constitutional:       General: She is not in acute distress. Appearance: She is well-developed. She is ill-appearing. She is not toxic-appearing or diaphoretic. HENT:      Head: Normocephalic and atraumatic. Right Ear: External ear normal.      Left Ear: External ear normal.   Eyes:      General:         Right eye: No discharge. Left eye: No discharge. Conjunctiva/sclera: Conjunctivae normal.      Pupils: Pupils are equal, round, and reactive to light. Cardiovascular:      Rate and Rhythm: Normal rate and regular rhythm. Heart sounds: No murmur heard. Pulmonary:      Effort: Pulmonary effort is normal. No respiratory distress. Breath sounds: Rales present. No wheezing. Abdominal:      General: Bowel sounds are normal. There is no distension. Palpations: Abdomen is soft. There is no mass. Tenderness: There is no abdominal tenderness. There is no guarding or rebound. Genitourinary:     Comments: Deferred  Musculoskeletal:         General: No deformity. Normal range of motion. Cervical back: Normal range of motion and neck supple. Skin:     General: Skin is warm. Findings: No erythema or rash. Neurological:      Mental Status: She is alert and oriented to person, place, and time. She is not disoriented. Cranial Nerves: No cranial nerve deficit. Motor: No atrophy or abnormal muscle tone. Coordination: Coordination normal.   Psychiatric:         Behavior: Behavior normal.         Thought Content:  Thought content normal.       DIAGNOSTIC RESULTS     RADIOLOGY:   Non-plain film images such as CT, Ultrasoundand MRI are read by the radiologist. Plain radiographic images are visualized and preliminarily interpreted by the emergency physician with the below findings:    Checks x-ray shows acute pulmonary edema    ED BEDSIDE ULTRASOUND:   Performed by ED Physician - none    LABS:  Labs Reviewed   CBC WITH AUTO DIFFERENTIAL - Abnormal; Notable for the following components:       Result Value    RBC 3.45 (*)     Hemoglobin 9.9 (*)     Hematocrit 30.0 (*)     RDW 15.9 (*)     All other components within normal limits   BASIC METABOLIC PANEL W/ REFLEX TO MG FOR LOW K - Abnormal; Notable for the following components:    Glucose 215 (*)     BUN 25 (*)     Creatinine 1.6 (*)     GFR Non- 32 (*)     GFR  39 (*)     All other components within normal limits   TROPONIN - Abnormal; Notable for the following components:    Troponin 0.03 (*)     All other components within normal limits   BRAIN NATRIURETIC PEPTIDE - Abnormal; Notable for the following components:    Pro-BNP 3,879 (*)     All other components within normal limits   BLOOD GAS, VENOUS - Abnormal; Notable for the following components:    pH, Valdez 7.329 (*)     pCO2, Valdez 57.7 (*)     HCO3, Venous 30 (*)     All other components within normal limits   COVID-19, RAPID   RAPID INFLUENZA A/B ANTIGENS       All other labs were withinnormal range or not returned as of this dictation. EMERGENCY DEPARTMENT COURSE and DIFFERENTIAL DIAGNOSIS/MDM:     PMH, Surgical Hx, FH, Social Hx reviewed by myself (ETOH usage, Tobacco usage, Drug usage reviewed by myself, no pertinent Hx)- No Pertinent Hx     Old records were reviewed by me     72-year-old with CHF exacerbation. Positive for fluid overload and hypoxia. Severely hypervolemic. Lasix initiated. Admission for hypoxia and acute pulmonary edema. CRITICAL CARE TIME   Total Critical Caretime was 39 minutes, excluding separately reportable procedures. There was a high probability of clinically significant/life threatening deterioration in the patient's condition which required my urgent intervention.         PROCEDURES:  Unlessotherwise noted below, none    FINAL IMPRESSION 1. Hypoxia    2. Acute pulmonary edema ([facility] [address]          DISPOSITION/PLAN   DISPOSITION Admitted 07/22/2022 04:55:54 AM    PATIENT REFERRED TO:  No follow-up provider specified.     DISCHARGE MEDICATIONS:  New Prescriptions    No medications on file          (Please note that portions ofthis note were completed with a voice recognition program.  Efforts were made to edit the dictations but occasionally words are mis-transcribed.)    Antoinette Bernal MD(electronically signed)  Attending Emergency Physician        Antoinette Bernal MD  07/22/22 [id number]

## 2022-07-22 NOTE — PROGRESS NOTES
Pharmacy Medication Reconciliation Note     List of medications patient is currently taking is complete. Source of information:   1. Disp history  2. MD office visits  3. Patient interview    Notes regarding home medications:   1.  Patient not fully aware of medications but states still taking Hydralazine      Denies taking any other OTC or herbal medications    ANASTACIA Dela Cruz [facility], [address] 7/22/2022 10:30 AM

## 2022-07-23 LAB
ANION GAP SERPL CALCULATED.3IONS-SCNC: 9 MMOL/L (ref 3–16)
BASOPHILS ABSOLUTE: 0.1 K/UL (ref 0–0.2)
BASOPHILS RELATIVE PERCENT: 1.1 %
BUN BLDV-MCNC: 29 MG/DL (ref 7–20)
CALCIUM SERPL-MCNC: 8.5 MG/DL (ref 8.3–10.6)
CHLORIDE BLD-SCNC: 106 MMOL/L (ref 99–110)
CO2: 29 MMOL/L (ref 21–32)
CREAT SERPL-MCNC: 1.6 MG/DL (ref 0.6–1.2)
EOSINOPHILS ABSOLUTE: 0.1 K/UL (ref 0–0.6)
EOSINOPHILS RELATIVE PERCENT: 2.4 %
ESTIMATED AVERAGE GLUCOSE: 165.7 MG/DL
GFR AFRICAN AMERICAN: 39
GFR NON-AFRICAN AMERICAN: 32
GLUCOSE BLD-MCNC: 142 MG/DL (ref 70–99)
GLUCOSE BLD-MCNC: 149 MG/DL (ref 70–99)
GLUCOSE BLD-MCNC: 152 MG/DL (ref 70–99)
GLUCOSE BLD-MCNC: 160 MG/DL (ref 70–99)
GLUCOSE BLD-MCNC: 171 MG/DL (ref 70–99)
HBA1C MFR BLD: 7.4 %
HCT VFR BLD CALC: 27.9 % (ref 36–48)
HEMOGLOBIN: 9.3 G/DL (ref 12–16)
LYMPHOCYTES ABSOLUTE: 1.8 K/UL (ref 1–5.1)
LYMPHOCYTES RELATIVE PERCENT: 33.6 %
MCH RBC QN AUTO: 28.6 PG (ref 26–34)
MCHC RBC AUTO-ENTMCNC: 33.2 G/DL (ref 31–36)
MCV RBC AUTO: 86 FL (ref 80–100)
MONOCYTES ABSOLUTE: 0.5 K/UL (ref 0–1.3)
MONOCYTES RELATIVE PERCENT: 9.5 %
NEUTROPHILS ABSOLUTE: 2.9 K/UL (ref 1.7–7.7)
NEUTROPHILS RELATIVE PERCENT: 53.4 %
PDW BLD-RTO: 15.5 % (ref 12.4–15.4)
PERFORMED ON: ABNORMAL
PLATELET # BLD: 227 K/UL (ref 135–450)
PMV BLD AUTO: 7.6 FL (ref 5–10.5)
POTASSIUM REFLEX MAGNESIUM: 3.9 MMOL/L (ref 3.5–5.1)
PRO-BNP: 4648 PG/ML (ref 0–124)
RBC # BLD: 3.24 M/UL (ref 4–5.2)
SODIUM BLD-SCNC: 144 MMOL/L (ref 136–145)
WBC # BLD: 5.3 K/UL (ref 4–11)

## 2022-07-23 PROCEDURE — 83036 HEMOGLOBIN GLYCOSYLATED A1C: CPT

## 2022-07-23 PROCEDURE — 1200000000 HC SEMI PRIVATE

## 2022-07-23 PROCEDURE — 36415 COLL VENOUS BLD VENIPUNCTURE: CPT

## 2022-07-23 PROCEDURE — 83880 ASSAY OF NATRIURETIC PEPTIDE: CPT

## 2022-07-23 PROCEDURE — 2700000000 HC OXYGEN THERAPY PER DAY

## 2022-07-23 PROCEDURE — 94760 N-INVAS EAR/PLS OXIMETRY 1: CPT

## 2022-07-23 PROCEDURE — 2580000003 HC RX 258: Performed by: INTERNAL MEDICINE

## 2022-07-23 PROCEDURE — 6360000002 HC RX W HCPCS: Performed by: INTERNAL MEDICINE

## 2022-07-23 PROCEDURE — 85025 COMPLETE CBC W/AUTO DIFF WBC: CPT

## 2022-07-23 PROCEDURE — 6370000000 HC RX 637 (ALT 250 FOR IP): Performed by: INTERNAL MEDICINE

## 2022-07-23 PROCEDURE — 80048 BASIC METABOLIC PNL TOTAL CA: CPT

## 2022-07-23 RX ADMIN — ASPIRIN 81 MG: 81 TABLET, COATED ORAL at 09:14

## 2022-07-23 RX ADMIN — CARVEDILOL 12.5 MG: 12.5 TABLET, FILM COATED ORAL at 18:18

## 2022-07-23 RX ADMIN — HYDRALAZINE HYDROCHLORIDE 50 MG: 50 TABLET, FILM COATED ORAL at 20:15

## 2022-07-23 RX ADMIN — SODIUM CHLORIDE, PRESERVATIVE FREE 10 ML: 5 INJECTION INTRAVENOUS at 20:16

## 2022-07-23 RX ADMIN — ENOXAPARIN SODIUM 40 MG: 100 INJECTION SUBCUTANEOUS at 09:14

## 2022-07-23 RX ADMIN — HYDRALAZINE HYDROCHLORIDE 50 MG: 50 TABLET, FILM COATED ORAL at 13:37

## 2022-07-23 RX ADMIN — AMLODIPINE BESYLATE 5 MG: 5 TABLET ORAL at 09:14

## 2022-07-23 RX ADMIN — FUROSEMIDE 40 MG: 10 INJECTION, SOLUTION INTRAMUSCULAR; INTRAVENOUS at 18:18

## 2022-07-23 RX ADMIN — ATORVASTATIN CALCIUM 40 MG: 40 TABLET, FILM COATED ORAL at 09:14

## 2022-07-23 RX ADMIN — SODIUM CHLORIDE, PRESERVATIVE FREE 10 ML: 5 INJECTION INTRAVENOUS at 10:00

## 2022-07-23 RX ADMIN — ISOSORBIDE MONONITRATE 30 MG: 30 TABLET, EXTENDED RELEASE ORAL at 09:14

## 2022-07-23 RX ADMIN — FUROSEMIDE 40 MG: 10 INJECTION, SOLUTION INTRAMUSCULAR; INTRAVENOUS at 09:14

## 2022-07-23 RX ADMIN — HYDRALAZINE HYDROCHLORIDE 50 MG: 50 TABLET, FILM COATED ORAL at 05:20

## 2022-07-23 RX ADMIN — DOXAZOSIN 4 MG: 4 TABLET ORAL at 09:14

## 2022-07-23 RX ADMIN — CARVEDILOL 12.5 MG: 12.5 TABLET, FILM COATED ORAL at 09:14

## 2022-07-23 ASSESSMENT — PAIN SCALES - GENERAL: PAINLEVEL_OUTOF10: 0

## 2022-07-23 NOTE — PROGRESS NOTES
Hospitalist Progress Note      PCP: Vivi Fox MD    Chief Complaint. Presented to hospital for SOB    Date of Admission: 7/22/2022    Subjective: Mentions her shortness of breath has improved, denies chest pain, nausea, vomiting, fever or chills. mention feels overall better    Medications:  Reviewed    Infusion Medications    sodium chloride      dextrose       Scheduled Medications    isosorbide mononitrate  30 mg Oral Daily    hydrALAZINE  50 mg Oral 3 times per day    doxazosin  4 mg Oral Daily    clopidogrel  75 mg Oral Daily    carvedilol  12.5 mg Oral BID WC    atorvastatin  40 mg Oral Daily    aspirin  81 mg Oral Daily    amLODIPine  5 mg Oral Daily    furosemide  40 mg IntraVENous BID    sodium chloride flush  10 mL IntraVENous 2 times per day    enoxaparin  40 mg SubCUTAneous Daily    insulin lispro  0-8 Units SubCUTAneous TID WC    insulin lispro  0-4 Units SubCUTAneous Nightly     PRN Meds: albuterol sulfate HFA, sodium chloride flush, sodium chloride, ondansetron, polyethylene glycol, acetaminophen **OR** acetaminophen, glucose, dextrose bolus **OR** dextrose bolus, glucagon (rDNA), dextrose      Intake/Output Summary (Last 24 hours) at 7/23/2022 1753  Last data filed at 7/23/2022 1418  Gross per 24 hour   Intake 720 ml   Output 1850 ml   Net -1130 ml       Physical Exam Performed:    /76   Pulse 86   Temp 98.1 °F (36.7 °C) (Oral)   Resp 13   Ht 5' (1.524 m)   Wt 183 lb 3.2 oz (83.1 kg)   SpO2 95%   BMI 35.78 kg/m²     General appearance: No apparent distress, on 2 L nasal cannula  HEENT:  Conjunctivae/corneas clear. Neck: Supple, with full range of motion. Respiratory:  Normal respiratory effort. Clear to auscultation, bilaterally without Rales/Wheezes/Rhonchi. Cardiovascular: Regular rate and rhythm with normal S1/S2 without murmurs or rubs  Abdomen: Soft, non-tender, non-distended, normal bowel sounds.   Musculoskeletal: +2 edema bilaterally  Neurologic:  without any focal sensory/motor deficits. grossly non-focal.  Psychiatric: Alert and oriented, Normal mood  Peripheral Pulses: +2 palpable, equal bilaterally       Labs:   Recent Labs     07/22/22  0339 07/23/22  0531   WBC 6.2 5.3   HGB 9.9* 9.3*   HCT 30.0* 27.9*    227     Recent Labs     07/22/22  0339 07/23/22  0531    144   K 4.0 3.9    106   CO2 27 29   BUN 25* 29*   CREATININE 1.6* 1.6*   CALCIUM 8.5 8.5     No results for input(s): AST, ALT, BILIDIR, BILITOT, ALKPHOS in the last 72 hours. No results for input(s): INR in the last 72 hours. Recent Labs     07/22/22 0339   TROPONINI 0.03*       Urinalysis:      Lab Results   Component Value Date/Time    NITRU NEGATIVE 05/09/2022 10:30 AM    WBCUA 0-2 05/09/2022 10:30 AM    BACTERIA NONE SEEN 05/09/2022 10:30 AM    RBCUA 0-2 05/09/2022 10:30 AM    BLOODU NEGATIVE 05/09/2022 10:30 AM    SPECGRAV 1.016 05/09/2022 10:30 AM    GLUCOSEU Negative 05/02/2022 11:58 PM       Radiology:  XR CHEST PORTABLE   Final Result   1. Central predominant interstitial and alveolar edema suggestive of   congestive heart failure given mild cardiomegaly and trace to small left and   trace right pleural effusions. Pneumonia could appear similar. 2. Bibasilar airspace opacities most likely due to passive atelectasis. Pneumonia and aspiration could appear similar.                Assessment/Plan:    Active Hospital Problems    Diagnosis     Elevated troponin (chronically elevated) [R77.8]      Priority: Medium    Chronic renal failure, stage 3 (moderate) (HCC) [N18.30]      Priority: Medium    Morbid obesity due to excess calories (HCC) [E66.01]      Priority: Medium    Hyperlipidemia [E78.5]      Priority: Medium    Acute pulmonary edema (HCC) [J81.0]      Priority: Medium    Acute respiratory failure with hypoxia (HCC) [J96.01]      Priority: Medium    Hypertensive urgency [I16.0]      Priority: Medium    Acute on chronic diastolic (congestive) heart failure ([facility] [address] [I50.33] Priority: Medium    Essential hypertension [I10]     DMII (diabetes mellitus, type 2) (HCC) [E11.9]      Acute on chronic diastolic (congestive) heart failure ([facility] [address] with acute pulmonary edema - A 2D Echocardiogram on 04/27/2022 shows an EF of 55-60%  and grade II diastolic dysfunction. Diurese with IV Lasix. Monitor strict I&Os and daily weights. A low sodium fluid restricted diet has been ordered. Continue IV Lasix  Consult cardiology  Monitor on cardiac telemetry     Acute respiratory failure with hypoxia (HCC) - due to CHF and acute pulmonary edema - provide oxygen as needed to maintain an oxygen saturation of 92% or greater, continue to wean oxygen as tolerated      Hypertensive urgency - initial /85. Will monitor for improvement with diuresis. BPs are stable     Elevated troponin (chronically elevated) - patient denies current or recent chest pain. Her troponin is in the normal range for her. We will monitor     Chronic Renal Failure stage III - Renal function is at/near baseline and is stable; Monitor renal function and avoid Nephrotoxic agents as able      Diabetes mellitus II - Controlled without need for aggressive monitoring or intervention     Essential (primary) hypertension - continue home meds and monitor blood pressure     Hyperlipidemia - No current evidence of Rhabdomyolysis or other adverse effects. Continue statin therapy while in the hospital     Morbid obesity due to excess calories (Body mass index is 36.55 kg/m². ) - Complicating assessment and treatment. Placing patient at risk for multiple co-morbidities as well as early death and contributing to the patient's presentation.  on weight loss when appropriate. Diet: ADULT DIET; Regular;  Low Sodium (2 gm); 1500 ml  Code Status: DNR-CCA    PT/OT Eval Status: ordered    Dispo/Plan of care -continue IV diuresis, follow-up with cardiology    Olimpia Greco MD

## 2022-07-23 NOTE — CONSULTS
Heart Failure Diet Education:    Per hospital protocol or consult, patient being seen for Heart Failure diet guidelines. Learners: [x]Patient []Family []Caregiver [] Other: ______________  Methods: [x]Verbal Education  [x]Handouts  []Teachback     Patient's Lifestyle Questions:   Is HF a new Diagnosis? []Yes [x]No    Has patient had prior education? []Yes [x]No    Who does Grocery shopping and cooking? Mother does most the shopping and cooking    Frequency of eating out? Pt stated rarely eating out. Typical Eating Habits:Breakfast is grits, oatmeal, rodriguez with milk. Pt doesn't eat lunch. Dinner is pork chops, fruit and vegetables. Drinks sweet tea, water and milk. Pt doesn't add salt to meals. Instructed the Patient on:   [x] High/Low Sodium foods    [x] Moderation/portion control  [x] Eating out  [x] Fluid Restriction    [x] Label reading  [] Carb Counting   [] Other:      Educational Materials Provided:   [x] Nutrition Care Manual (NCM) Heart Failure Nutrition Therapy   [x] NCM Sodium (Salt) Content of Foods  [] NCM Sodium Free Flavoring Tips    [x] Heart Healthy Eating Label Reading Tips   [] Healthy Eating when Eating Out  [] Controlling Your Fluids   [x] Fast Food Guide (from [employer])  [] NCM Carbohydrate Counting for People with Diabetes   [] Managing Your Diabetes Plate Method     -Diet Recommendations: 2000 mg Sodium/day, 64 oz Fluids/day         Monitoring/Evaluation:   -Barriers: None  -Evaluation of education: Indicates understanding.  -Expected compliance: good. Additional Comments: Discussed reducing high sodium meats. Discussed fluid content of all foods. Discussed pt talking with mother about buying low sodium foods. Discussed monitoring fluid intake.     Total time involved in patient education: 15 minutes        Electronically signed by Margarita Montoya on 7/23/2022 at 9:52 AM

## 2022-07-24 LAB
ANION GAP SERPL CALCULATED.3IONS-SCNC: 10 MMOL/L (ref 3–16)
BACTERIA: ABNORMAL /HPF
BASOPHILS ABSOLUTE: 0 K/UL (ref 0–0.2)
BASOPHILS RELATIVE PERCENT: 0.6 %
BILIRUBIN URINE: NEGATIVE
BLOOD, URINE: NEGATIVE
BUN BLDV-MCNC: 30 MG/DL (ref 7–20)
CALCIUM SERPL-MCNC: 8.5 MG/DL (ref 8.3–10.6)
CHLORIDE BLD-SCNC: 106 MMOL/L (ref 99–110)
CLARITY: CLEAR
CO2: 28 MMOL/L (ref 21–32)
COLOR: YELLOW
CREAT SERPL-MCNC: 1.9 MG/DL (ref 0.6–1.2)
CREATININE URINE: 97.9 MG/DL (ref 28–259)
EOSINOPHILS ABSOLUTE: 0.1 K/UL (ref 0–0.6)
EOSINOPHILS RELATIVE PERCENT: 2.3 %
EPITHELIAL CELLS, UA: 0 /HPF (ref 0–5)
GFR AFRICAN AMERICAN: 32
GFR NON-AFRICAN AMERICAN: 27
GLUCOSE BLD-MCNC: 129 MG/DL (ref 70–99)
GLUCOSE BLD-MCNC: 135 MG/DL (ref 70–99)
GLUCOSE BLD-MCNC: 182 MG/DL (ref 70–99)
GLUCOSE BLD-MCNC: 190 MG/DL (ref 70–99)
GLUCOSE BLD-MCNC: 196 MG/DL (ref 70–99)
GLUCOSE URINE: NEGATIVE MG/DL
HCT VFR BLD CALC: 27 % (ref 36–48)
HEMOGLOBIN: 8.9 G/DL (ref 12–16)
HYALINE CASTS: 2 /LPF (ref 0–8)
KETONES, URINE: NEGATIVE MG/DL
LEUKOCYTE ESTERASE, URINE: NEGATIVE
LYMPHOCYTES ABSOLUTE: 1.6 K/UL (ref 1–5.1)
LYMPHOCYTES RELATIVE PERCENT: 30.1 %
MCH RBC QN AUTO: 28.5 PG (ref 26–34)
MCHC RBC AUTO-ENTMCNC: 32.8 G/DL (ref 31–36)
MCV RBC AUTO: 86.7 FL (ref 80–100)
MICROSCOPIC EXAMINATION: YES
MONOCYTES ABSOLUTE: 0.6 K/UL (ref 0–1.3)
MONOCYTES RELATIVE PERCENT: 10.6 %
NEUTROPHILS ABSOLUTE: 3.1 K/UL (ref 1.7–7.7)
NEUTROPHILS RELATIVE PERCENT: 56.4 %
NITRITE, URINE: NEGATIVE
PARATHYROID HORMONE INTACT: 63.8 PG/ML (ref 14–72)
PDW BLD-RTO: 15.7 % (ref 12.4–15.4)
PERFORMED ON: ABNORMAL
PH UA: 5 (ref 5–8)
PLATELET # BLD: 230 K/UL (ref 135–450)
PMV BLD AUTO: 7.8 FL (ref 5–10.5)
POTASSIUM REFLEX MAGNESIUM: 3.8 MMOL/L (ref 3.5–5.1)
PROTEIN PROTEIN: 108 MG/DL
PROTEIN UA: 100 MG/DL
PROTEIN/CREAT RATIO: 1.1 MG/DL
RBC # BLD: 3.12 M/UL (ref 4–5.2)
RBC UA: 0 /HPF (ref 0–4)
SODIUM BLD-SCNC: 144 MMOL/L (ref 136–145)
SPECIFIC GRAVITY UA: 1.01 (ref 1–1.03)
URINE TYPE: ABNORMAL
UROBILINOGEN, URINE: 0.2 E.U./DL
WBC # BLD: 5.5 K/UL (ref 4–11)
WBC UA: 1 /HPF (ref 0–5)

## 2022-07-24 PROCEDURE — 36415 COLL VENOUS BLD VENIPUNCTURE: CPT

## 2022-07-24 PROCEDURE — 1200000000 HC SEMI PRIVATE

## 2022-07-24 PROCEDURE — 6370000000 HC RX 637 (ALT 250 FOR IP): Performed by: INTERNAL MEDICINE

## 2022-07-24 PROCEDURE — 84156 ASSAY OF PROTEIN URINE: CPT

## 2022-07-24 PROCEDURE — 85025 COMPLETE CBC W/AUTO DIFF WBC: CPT

## 2022-07-24 PROCEDURE — 6360000002 HC RX W HCPCS: Performed by: INTERNAL MEDICINE

## 2022-07-24 PROCEDURE — 99253 IP/OBS CNSLTJ NEW/EST LOW 45: CPT | Performed by: INTERNAL MEDICINE

## 2022-07-24 PROCEDURE — 2580000003 HC RX 258: Performed by: INTERNAL MEDICINE

## 2022-07-24 PROCEDURE — 82570 ASSAY OF URINE CREATININE: CPT

## 2022-07-24 PROCEDURE — 81001 URINALYSIS AUTO W/SCOPE: CPT

## 2022-07-24 PROCEDURE — 83970 ASSAY OF PARATHORMONE: CPT

## 2022-07-24 PROCEDURE — 80048 BASIC METABOLIC PNL TOTAL CA: CPT

## 2022-07-24 PROCEDURE — 99221 1ST HOSP IP/OBS SF/LOW 40: CPT | Performed by: INTERNAL MEDICINE

## 2022-07-24 RX ORDER — FUROSEMIDE 10 MG/ML
40 INJECTION INTRAMUSCULAR; INTRAVENOUS DAILY
Status: DISCONTINUED | OUTPATIENT
Start: 2022-07-25 | End: 2022-07-24

## 2022-07-24 RX ORDER — FUROSEMIDE 10 MG/ML
40 INJECTION INTRAMUSCULAR; INTRAVENOUS 2 TIMES DAILY
Status: DISCONTINUED | OUTPATIENT
Start: 2022-07-24 | End: 2022-07-26

## 2022-07-24 RX ADMIN — SODIUM CHLORIDE, PRESERVATIVE FREE 10 ML: 5 INJECTION INTRAVENOUS at 11:02

## 2022-07-24 RX ADMIN — FUROSEMIDE 40 MG: 10 INJECTION, SOLUTION INTRAMUSCULAR; INTRAVENOUS at 08:57

## 2022-07-24 RX ADMIN — CARVEDILOL 12.5 MG: 12.5 TABLET, FILM COATED ORAL at 08:56

## 2022-07-24 RX ADMIN — ENOXAPARIN SODIUM 40 MG: 100 INJECTION SUBCUTANEOUS at 08:57

## 2022-07-24 RX ADMIN — ASPIRIN 81 MG: 81 TABLET, COATED ORAL at 08:56

## 2022-07-24 RX ADMIN — FUROSEMIDE 40 MG: 10 INJECTION, SOLUTION INTRAMUSCULAR; INTRAVENOUS at 18:04

## 2022-07-24 RX ADMIN — CARVEDILOL 12.5 MG: 12.5 TABLET, FILM COATED ORAL at 18:04

## 2022-07-24 RX ADMIN — ACETAMINOPHEN 650 MG: 325 TABLET ORAL at 22:12

## 2022-07-24 RX ADMIN — ATORVASTATIN CALCIUM 40 MG: 40 TABLET, FILM COATED ORAL at 08:56

## 2022-07-24 RX ADMIN — AMLODIPINE BESYLATE 5 MG: 5 TABLET ORAL at 08:56

## 2022-07-24 RX ADMIN — SODIUM CHLORIDE, PRESERVATIVE FREE 10 ML: 5 INJECTION INTRAVENOUS at 20:04

## 2022-07-24 RX ADMIN — HYDRALAZINE HYDROCHLORIDE 50 MG: 50 TABLET, FILM COATED ORAL at 05:12

## 2022-07-24 RX ADMIN — ACETAMINOPHEN 650 MG: 325 TABLET ORAL at 03:07

## 2022-07-24 RX ADMIN — CLOPIDOGREL BISULFATE 75 MG: 75 TABLET ORAL at 08:56

## 2022-07-24 RX ADMIN — DOXAZOSIN 4 MG: 4 TABLET ORAL at 08:56

## 2022-07-24 RX ADMIN — HYDRALAZINE HYDROCHLORIDE 50 MG: 50 TABLET, FILM COATED ORAL at 14:57

## 2022-07-24 RX ADMIN — HYDRALAZINE HYDROCHLORIDE 50 MG: 50 TABLET, FILM COATED ORAL at 20:03

## 2022-07-24 RX ADMIN — ISOSORBIDE MONONITRATE 30 MG: 30 TABLET, EXTENDED RELEASE ORAL at 08:56

## 2022-07-24 ASSESSMENT — PAIN DESCRIPTION - DESCRIPTORS
DESCRIPTORS: ACHING
DESCRIPTORS: ACHING

## 2022-07-24 ASSESSMENT — PAIN DESCRIPTION - LOCATION
LOCATION: HEAD
LOCATION: HEAD

## 2022-07-24 ASSESSMENT — PAIN SCALES - GENERAL
PAINLEVEL_OUTOF10: 7
PAINLEVEL_OUTOF10: 8

## 2022-07-24 NOTE — PROGRESS NOTES
Hospitalist Progress Note      PCP: Tigist Zabala MD    Chief Complaint. Presented to hospital for SOB    Date of Admission: 7/22/2022    Subjective: Mentions her shortness of breath has improved, no acute events overnight, denies any complaints today. Denies chest pain, nausea, vomiting, fever or chills. mention feels overall better    Medications:  Reviewed    Infusion Medications    sodium chloride      dextrose       Scheduled Medications    furosemide  40 mg IntraVENous BID    isosorbide mononitrate  30 mg Oral Daily    hydrALAZINE  50 mg Oral 3 times per day    doxazosin  4 mg Oral Daily    clopidogrel  75 mg Oral Daily    carvedilol  12.5 mg Oral BID WC    atorvastatin  40 mg Oral Daily    aspirin  81 mg Oral Daily    amLODIPine  5 mg Oral Daily    sodium chloride flush  10 mL IntraVENous 2 times per day    enoxaparin  40 mg SubCUTAneous Daily    insulin lispro  0-8 Units SubCUTAneous TID WC    insulin lispro  0-4 Units SubCUTAneous Nightly     PRN Meds: albuterol sulfate HFA, sodium chloride flush, sodium chloride, ondansetron, polyethylene glycol, acetaminophen **OR** acetaminophen, glucose, dextrose bolus **OR** dextrose bolus, glucagon (rDNA), dextrose      Intake/Output Summary (Last 24 hours) at 7/24/2022 1850  Last data filed at 7/24/2022 1327  Gross per 24 hour   Intake 720 ml   Output 1200 ml   Net -480 ml         Physical Exam Performed:    /68   Pulse 82   Temp 98.2 °F (36.8 °C) (Oral)   Resp 16   Ht 5' (1.524 m)   Wt 185 lb 6.5 oz (84.1 kg)   SpO2 92%   BMI 36.21 kg/m²     General appearance: NAD, on 3 L nasal cannula,  HEENT:  Conjunctivae/corneas clear. Neck: Supple, with full range of motion. Respiratory:  Normal respiratory effort. Clear to auscultation, bilaterally without Rales/Wheezes/Rhonchi. Cardiovascular: Regular rate and rhythm with normal S1/S2 without murmurs or rubs  Abdomen: Soft, non-tender, non-distended, normal bowel sounds.   Musculoskeletal: +2 edema bilaterally  Neurologic:  without any focal sensory/motor deficits. grossly non-focal.  Psychiatric: Alert and oriented, Normal mood  Peripheral Pulses: +2 palpable, equal bilaterally       Labs:   Recent Labs     07/22/22  0339 07/23/22  0531 07/24/22  0615   WBC 6.2 5.3 5.5   HGB 9.9* 9.3* 8.9*   HCT 30.0* 27.9* 27.0*    227 230       Recent Labs     07/22/22  0339 07/23/22  0531 07/24/22  0615    144 144   K 4.0 3.9 3.8    106 106   CO2 27 29 28   BUN 25* 29* 30*   CREATININE 1.6* 1.6* 1.9*   CALCIUM 8.5 8.5 8.5       No results for input(s): AST, ALT, BILIDIR, BILITOT, ALKPHOS in the last 72 hours. No results for input(s): INR in the last 72 hours. Recent Labs     07/22/22 0339   TROPONINI 0.03*         Urinalysis:      Lab Results   Component Value Date/Time    NITRU Negative 07/24/2022 02:45 PM    WBCUA 1 07/24/2022 02:45 PM    BACTERIA None Seen 07/24/2022 02:45 PM    RBCUA 0 07/24/2022 02:45 PM    RBCUA 0-2 05/09/2022 10:30 AM    BLOODU Negative 07/24/2022 02:45 PM    SPECGRAV 1.011 07/24/2022 02:45 PM    GLUCOSEU Negative 07/24/2022 02:45 PM       Radiology:  XR CHEST PORTABLE   Final Result   1. Central predominant interstitial and alveolar edema suggestive of   congestive heart failure given mild cardiomegaly and trace to small left and   trace right pleural effusions. Pneumonia could appear similar. 2. Bibasilar airspace opacities most likely due to passive atelectasis. Pneumonia and aspiration could appear similar.                Assessment/Plan:    Active Hospital Problems    Diagnosis     Elevated troponin (chronically elevated) [R77.8]      Priority: Medium    Chronic renal failure, stage 3 (moderate) (HCC) [N18.30]      Priority: Medium    Morbid obesity due to excess calories (HCC) [E66.01]      Priority: Medium    Hyperlipidemia [E78.5]      Priority: Medium    Acute pulmonary edema (HCC) [J81.0]      Priority: Medium    Acute respiratory failure with hypoxia ([facility] [address] [J96.01]      Priority: Medium    Hypertensive urgency [I16.0]      Priority: Medium    Acute on chronic diastolic (congestive) heart failure ([facility]) [I50.33]      Priority: Medium    Essential hypertension [I10]     DMII (diabetes mellitus, type 2) ([facility]) [E11.9]      Acute on chronic diastolic (congestive) heart failure ([facility] [address] with acute pulmonary edema - A 2D Echocardiogram on 04/27/2022 shows an EF of 55-60%  and grade II diastolic dysfunction. Diurese with IV Lasix. Monitor strict I&Os and daily weights. A low sodium fluid restricted diet has been ordered. Continue IV Lasix  Consult cardiology  Monitor on cardiac telemetry     Acute respiratory failure with hypoxia ([facility]) - due to CHF and acute pulmonary edema - provide oxygen as needed to maintain an oxygen saturation of 92% or greater, continue to wean oxygen as tolerated      Hypertensive urgency - initial /85. Will monitor for improvement with diuresis. BPs are stable     Elevated troponin (chronically elevated) - patient denies current or recent chest pain. Her troponin is in the normal range for her. We will monitor     Chronic Renal Failure stage III - Renal function is at/near baseline and is stable; Monitor renal function and avoid Nephrotoxic agents as able    Acute kidney injury-nephrology consulted, monitor creatinine, continue IV diuresis per nephrology     Diabetes mellitus II - Controlled without need for aggressive monitoring or intervention     Essential (primary) hypertension - continue home meds and monitor blood pressure     Hyperlipidemia - No current evidence of Rhabdomyolysis or other adverse effects. Continue statin therapy while in the hospital     Morbid obesity due to excess calories (Body mass index is 36.55 kg/m². ) - Complicating assessment and treatment. Placing patient at risk for multiple co-morbidities as well as early death and contributing to the patient's presentation.  on weight loss when appropriate.     Diet: ADULT DIET; Regular; Low Sodium (2 gm); 1500 ml  Code Status: DNR-CCA    PT/OT Eval Status: ordered    Dispo/Plan of care - continue IV diuresis, follow-up with cardiology, continue IV diuresis per nephrology, okay to switch to p.o.  Lasix tomorrow per cardiology    Rito Malik MD

## 2022-07-24 NOTE — CONSULTS
Cardiology Consultation     Luis Nate  1955    PCP: Africa Nuñez MD  Referring Physician: Dr. Nuris Parry   Reason for Referral: CHF   Chief Complaint:   Chief Complaint   Patient presents with    Shortness of Breath       Subjective:     History of Present Illness: The patient is 74 y.o. female with a past medical history significant for HTN, HLD, DM-II, HFpEF who presents with the above complaint. Admits to a few days of increasing and increased salt intake. Received IV lasix in the ER and feels better. Currently she denies chest pain, PND, orthopnea, dyspnea at rest, palpitations, syncope or edema. Past Medical History:   Diagnosis Date    Ankle fracture, left 1/17/2014    CHF (congestive heart failure) (HCC)     Diabetes mellitus (HCC)     HTN (hypertension), benign 1/17/2014    Hypertension      Past Surgical History:   Procedure Laterality Date    FRACTURE SURGERY Left 01/17/2014    orif tibula/fibula fixation    HYSTERECTOMY (CERVIX STATUS UNKNOWN)       History reviewed. No pertinent family history.   Social History     Tobacco Use    Smoking status: Never    Smokeless tobacco: Never   Substance Use Topics    Alcohol use: No    Drug use: No       No Known Allergies  Current Facility-Administered Medications   Medication Dose Route Frequency Provider Last Rate Last Admin    furosemide (LASIX) injection 40 mg  40 mg IntraVENous BID Daphene Holstein, MD        isosorbide mononitrate (IMDUR) extended release tablet 30 mg  30 mg Oral Daily Deisy Nazario MD   30 mg at 07/24/22 0856    hydrALAZINE (APRESOLINE) tablet 50 mg  50 mg Oral 3 times per day Deisy Nazario MD   50 mg at 07/24/22 1457    doxazosin (CARDURA) tablet 4 mg  4 mg Oral Daily Deisy Nazario MD   4 mg at 07/24/22 0856    clopidogrel (PLAVIX) tablet 75 mg  75 mg Oral Daily Deisy Nazario MD   75 mg at 07/24/22 0856    carvedilol (COREG) tablet 12.5 mg  12.5 mg Oral BID WC Deisy Nazario MD   12.5 mg at 07/24/22 0856    atorvastatin (LIPITOR) tablet 40 mg  40 mg Oral Daily Felicia Quiñones MD   40 mg at 07/24/22 0856    aspirin EC tablet 81 mg  81 mg Oral Daily Felicia Quiñones MD   81 mg at 07/24/22 0856    amLODIPine (NORVASC) tablet 5 mg  5 mg Oral Daily Felicia Quiñones MD   5 mg at 07/24/22 0856    albuterol sulfate HFA (PROVENTIL;VENTOLIN;PROAIR) 108 (90 Base) MCG/ACT inhaler 2 puff  2 puff Inhalation Q6H PRN Felicia Quiñones MD        sodium chloride flush 0.9 % injection 10 mL  10 mL IntraVENous 2 times per day Felicia Quiñones MD   10 mL at 07/24/22 1102    sodium chloride flush 0.9 % injection 10 mL  10 mL IntraVENous PRN Felicia Quiñones MD        0.9 % sodium chloride infusion   IntraVENous PRN Felicia Quiñones MD        enoxaparin (LOVENOX) injection 40 mg  40 mg SubCUTAneous Daily Felicia Quiñones MD   40 mg at 07/24/22 0857    ondansetron (ZOFRAN) injection 4 mg  4 mg IntraVENous Q4H PRN Felicia Quiñones MD        polyethylene glycol [facility]) packet 17 g  17 g Oral Daily PRN Felicia Quiñones MD        acetaminophen (TYLENOL) tablet 650 mg  650 mg Oral Q4H PRN Felicia Quiñones MD   650 mg at 07/24/22 5986    Or    acetaminophen (TYLENOL) suppository 650 mg  650 mg Rectal Q4H PRN Felicia Quiñones MD        glucose chewable tablet 16 g  4 tablet Oral PRN Katherin R Tarikst, DO        dextrose bolus 10% 125 mL  125 mL IntraVENous PRN Katherin R Hurst, DO        Or    dextrose bolus 10% 250 mL  250 mL IntraVENous PRN Katherin R Hurst, DO        glucagon (rDNA) injection 1 mg  1 mg SubCUTAneous PRN Katherin R Hurst, DO        dextrose 10 % infusion   IntraVENous Continuous PRN Katherin R Hurst, DO        insulin lispro (HUMALOG) injection vial 0-8 Units  0-8 Units SubCUTAneous TID WC Katherin R Hurst, DO        insulin lispro (HUMALOG) injection vial 0-4 Units  0-4 Units SubCUTAneous Nightly Katherin R Tarikst, DO           Review of Systems:  Constitutional: No unanticipated weight loss.  There's been no change in energy level, sleep pattern, or activity level. No fevers, chills. Eyes: No visual changes or diplopia. No scleral icterus. ENT: No Headaches, hearing loss or vertigo. No mouth sores or sore throat. Cardiovascular: as reviewed in HPI  Respiratory: No cough or wheezing, no sputum production. No hemoptysis. Gastrointestinal: No abdominal pain, appetite loss, blood in stools. No change in bowel or bladder habits. Genitourinary: No dysuria, trouble voiding, or hematuria. Musculoskeletal:  No gait disturbance, no joint complaints. Integumentary: No rash or pruritis. Neurological: No headache, diplopia, change in muscle strength, numbness or tingling. Psychiatric: No anxiety or depression. Endocrine: No temperature intolerance. No excessive thirst, fluid intake, or urination. No tremor. Hematologic/Lymphatic: No abnormal bruising or bleeding, blood clots or swollen lymph nodes. Allergic/Immunologic: No nasal congestion or hives. Physical Exam:   /68   Pulse 82   Temp 98.2 °F (36.8 °C) (Oral)   Resp 16   Ht 5' (1.524 m)   Wt 185 lb 6.5 oz (84.1 kg)   SpO2 92%   BMI 36.21 kg/m²   Wt Readings from Last 3 Encounters:   07/24/22 185 lb 6.5 oz (84.1 kg)   06/16/22 163 lb (73.9 kg)   05/20/22 186 lb 3.2 oz (84.5 kg)     Constitutional: She is oriented to person, place, and time. She appears well-developed and well-nourished. In no acute distress. Head: Normocephalic and atraumatic. Pupils equal and round. Neck: Neck supple. No JVP or carotid bruit appreciated. No mass and no thyromegaly present. No lymphadenopathy present. Cardiovascular: Normal rate. Normal heart sounds. Exam reveals no gallop and no friction rub. No murmur heard. Pulmonary/Chest: Effort normal and breath sounds normal. No respiratory distress. She has no wheezes, rhonchi or rales. Abdominal: Soft, non-tender. Bowel sounds are normal. She exhibits no organomegaly, mass or bruit. Extremities: No edema. No cyanosis or clubbing.  Pulses are 2+ radial/carotid bilaterally. Neurological: No gross cranial nerve deficit. Coordination normal.   Skin: Skin is warm and dry. There is no rash or diaphoresis. Psychiatric: She has a normal mood and affect. Her speech is normal and behavior is normal.     Lab Review:   FLP:    Lab Results   Component Value Date/Time    TRIG 69 05/09/2022 04:00 AM    HDL 50 05/09/2022 04:00 AM    LDLCALC 79 05/09/2022 04:00 AM    LABVLDL 17 10/17/2018 06:40 AM     BUN/Creatinine:    Lab Results   Component Value Date/Time    BUN 30 07/24/2022 06:15 AM    CREATININE 1.9 07/24/2022 06:15 AM     PT/INR, TNI, HGB A1C:   Lab Results   Component Value Date/Time    TROPONINI 0.03 (H) 07/22/2022 03:39 AM    LABA1C 7.4 07/23/2022 05:31 AM      No results found for: CBCAUTODIF    EKG Interpretation: NSR    Echo: 4/27/22  Global left ventricular function is normal with ejection fraction estimated   from 55 % to 60 %. Moderate concentric left ventricular hypertrophy. Grade II diastolic dysfunction with elevated LV filling pressures. Mild mitral regurgitation is present. The left atrium is mildly dilated. The right ventricle is normal in size and function. Systolic pulmonary artery pressure (SPAP) is estimated at 56 mmHg consistent   with moderate pulmonary hypertension (RA pressure 3 mmHg). Mild tricuspid   regurgitation. RHC: 5/4/22  OVERALL IMPRESSION:  1. Significantly elevated right atrial pressure. 2.  Mild to moderate pulmonary hypertension. 3.  Normal pulmonary capillary wedge pressure at 8 mmHg. 4.  Low normal cardiac output and indices. In view of the above findings, we will continue to hold the patient's Lasix and consider getting a cardiac MRI at a later stage to rule out any evidence of infiltrative heart disease. Limited echo: 5/10/22  Normal left ventricular size and systolic function. There were no regional wall motion abnormalities. Wall thickness was within normal limits.    Ejection fraction was estimated at 55-60%. Bubble study does not show evidence if interatrial shunt, however   sensitivity is limited, consider VEE if clinically indicated. IVC size is within normal limits with normal respiratory phasic changes. All above diagnostic testing and laboratory data was independently visualized and reviewed by me (not simply review of report)       Assessment and Plan   1) HFpEF   - volume status has improved  -lying flat in bed  - can transition to PO lasix tomorrow     2) essential HTN  - controlled , c/w current therapy     3) mixed hyperlipidemia   - statin     4) CKD  - nephrology evaluation       Cardiology will sign off, reconsult PRN     Thank you very much for allowing me to participate in the care of your patient. Please do not hesitate to contact me if you have any questions.       Mitesh Vázquez MD [id number] [facility] [address] [place], Interventional Cardiology, and Peripheral Vascular Disease   [facility]   Ph: [phone]  Fax: [phone]

## 2022-07-24 NOTE — CONSULTS
[address] [place] [address] Nephrology   [facility] [place]  [phone]  Nephrology Consult Note          Patient ID: Ana Sumner  Referring/ Physician: Mode Guillen MD      HPI/Summary:   Ana Sumner is being seen by nephrology for CHLOE. This is a 29-year-old lady with past medical history significant for CHF, diabetes, hypertension, CKD. She presented to the hospital with worsening shortness of breath and some lower extremity edema. She does not weigh her self at home. She lives with her mother at home. She has been drinking a lot of iced tea. She does not have any chest pain no orthopnea. She is feeling okay today wants to go home. She has a pure wick in place. She says she has not seen any kidney doctor in the office. Looks like she was post follow-up with us from an admission in May but did not. No hematuria, no foamy frothy urine. No trouble with urinating. She does not use any NSAIDs. She does not know her medications but takes them she says. Currently on Lasix 40 mg IV daily  Weight on admission 187 now 185  Blood pressure 134/68  Satting 98% on 1 L  Urine output 350 cc yesterday 1200 cc today. Does not appear she is on any diuretic at home. Chest x-ray suggestive of pulmonary edema mild cardiomegaly, bibasilar airspace opacities    Plan:   BNP elevated, has edema, chest x-ray suggestive of pulmonary edema  Agree with diuresis, increase Lasix to 40 mg IV twice daily  Check urinalysis and protein creatinine ratio  She has CKD stage III  She will need follow-up as an outpatient, wants to come to the [place] office. Acute kidney injury  She has CKD stage III  Baseline creatinine appears to be around 1.5-2  Had an CHLOE in May 20 creatinine peaked at 4  Creatinine 1.9 o at time of consult  No urine studies here  Check bladder scan    Electrolytes   No acute issues    Heart failure  Last EF was 55 to 29%  Diastolic function not mention.   BNP elevated at 4648  Albumin 2.9    Type 2 diabetes  Last A1c 7.4    Anemia  Hemoglobin 8.9, consult  No thrombocytopenia no leukocytosis. Last iron studies showed a ferritin of 185 TSAT 21%  B12 and folate were okay    [facility] would like to thank you for the opportunity to serve this patient. Please call with any questions or concerns. Eloy Austin MD  [facility]  [address]  [place], [address]  Fax: [phone]  Office: [phone]         CC/Reason for consult:   Reason for consult: CKD  Chief Complaint   Patient presents with    Shortness of Breath           Review of Systems:   [address] All other remaining systems are negative. Constitutional:  fever, chills, weakness, weight change, fatigue,      Skin:  rash, pruritus, hair loss, bruising, dry skin, petechiae. Head, Face, Neck   headaches, swelling,  cervical adenopathy. Respiratory: shortness of breath, cough, or wheezing  Cardiovascular: chest pain, palpitations, dizzy, edema  Gastrointestinal: nausea, vomiting, diarrhea, constipation,belly pain    Yellow skin, blood in stool  Musculoskeletal:  back pain, muscle weakness, gait problems,       joint pain or swelling. Genitourinary:  dysuria, poor urine flow, flank pain, blood in urine  Neurologic:  vertigo, TIA'S, syncope, seizures, focal weakness  Psychosocial:  insomnia, anxiety, or depression. Additional positive findings: -     PMH/SH/FH:    Medical Hx: reviewed and updated as appropriate  Past Medical History:   Diagnosis Date    Ankle fracture, left 1/17/2014    CHF (congestive heart failure) (HCC)     Diabetes mellitus (HCC)     HTN (hypertension), benign 1/17/2014    Hypertension          Surgical Hx: reviewed and updated as appropriate   has a past surgical history that includes Hysterectomy and fracture surgery (Left, 01/17/2014).      Social Hx: reviewed and updated as appropriate  Social History     Tobacco Use    Smoking status: Never    Smokeless tobacco: Never   Substance Use Topics    Alcohol use: No        Family hx: reviewed and updated as appropriate  family history is not on file. Medications:   [START ON 7/25/2022] furosemide, 40 mg, Daily  isosorbide mononitrate, 30 mg, Daily  hydrALAZINE, 50 mg, 3 times per day  doxazosin, 4 mg, Daily  clopidogrel, 75 mg, Daily  carvedilol, 12.5 mg, BID WC  atorvastatin, 40 mg, Daily  aspirin, 81 mg, Daily  amLODIPine, 5 mg, Daily  sodium chloride flush, 10 mL, 2 times per day  enoxaparin, 40 mg, Daily  insulin lispro, 0-8 Units, TID WC  insulin lispro, 0-4 Units, Nightly       Patient has no known allergies. Allergies:   No Known Allergies      Physical Exam/Objective:   Vitals:    07/24/22 1229   BP: 134/68   Pulse: 82   Resp: 12   Temp: 98.2 °F (36.8 °C)   SpO2:        Intake/Output Summary (Last 24 hours) at 7/24/2022 1330  Last data filed at 7/24/2022 1327  Gross per 24 hour   Intake 960 ml   Output 1550 ml   Net -590 ml         General appearance:  in no acute distress, comfortable, communicative, awake and alert. HEENT: no icterus, EOM intact, trachea midline. Neck : no masses, appears symmetrical and no JVD appreciated. Respiratory: Respiratory effort normal, bilateral equal chest rise. No wheeze, no crackles   Cardiovascular: Ausculation shows RRR and  + edema   Abdomen: abdomen is soft, non distended, no masses, no pain with palpation. Musculoskeletal:  no joint swelling, no deformity, strength grossly normal.   Skin: no rashes, no induration, no tightening, no jaundice   Neuro:   Follows commands, moves all extremities spontaneously       Data:   CBC:   Recent Labs     07/22/22  0339 07/23/22  0531 07/24/22  0615   WBC 6.2 5.3 5.5   HGB 9.9* 9.3* 8.9*   HCT 30.0* 27.9* 27.0*    227 230     BMP:    Recent Labs     07/22/22  0339 07/23/22  0531 07/24/22  0615    144 144   K 4.0 3.9 3.8    106 106   CO2 27 29 28   BUN 25* 29* 30*   CREATININE 1.6* 1.6* 1.9*   GLUCOSE 215* 152* 129*

## 2022-07-25 LAB
ANION GAP SERPL CALCULATED.3IONS-SCNC: 11 MMOL/L (ref 3–16)
BASOPHILS ABSOLUTE: 0.1 K/UL (ref 0–0.2)
BASOPHILS RELATIVE PERCENT: 1.1 %
BUN BLDV-MCNC: 31 MG/DL (ref 7–20)
CALCIUM SERPL-MCNC: 8.7 MG/DL (ref 8.3–10.6)
CHLORIDE BLD-SCNC: 103 MMOL/L (ref 99–110)
CO2: 29 MMOL/L (ref 21–32)
CREAT SERPL-MCNC: 2 MG/DL (ref 0.6–1.2)
EOSINOPHILS ABSOLUTE: 0.1 K/UL (ref 0–0.6)
EOSINOPHILS RELATIVE PERCENT: 2.1 %
GFR AFRICAN AMERICAN: 30
GFR NON-AFRICAN AMERICAN: 25
GLUCOSE BLD-MCNC: 139 MG/DL (ref 70–99)
GLUCOSE BLD-MCNC: 155 MG/DL (ref 70–99)
GLUCOSE BLD-MCNC: 170 MG/DL (ref 70–99)
GLUCOSE BLD-MCNC: 228 MG/DL (ref 70–99)
GLUCOSE BLD-MCNC: 286 MG/DL (ref 70–99)
GLUCOSE BLD-MCNC: 293 MG/DL (ref 70–99)
HCT VFR BLD CALC: 27.2 % (ref 36–48)
HEMOGLOBIN: 9.1 G/DL (ref 12–16)
LYMPHOCYTES ABSOLUTE: 1.6 K/UL (ref 1–5.1)
LYMPHOCYTES RELATIVE PERCENT: 33.7 %
MAGNESIUM: 1.8 MG/DL (ref 1.8–2.4)
MCH RBC QN AUTO: 28.4 PG (ref 26–34)
MCHC RBC AUTO-ENTMCNC: 33.4 G/DL (ref 31–36)
MCV RBC AUTO: 85.1 FL (ref 80–100)
MONOCYTES ABSOLUTE: 0.4 K/UL (ref 0–1.3)
MONOCYTES RELATIVE PERCENT: 9.1 %
NEUTROPHILS ABSOLUTE: 2.6 K/UL (ref 1.7–7.7)
NEUTROPHILS RELATIVE PERCENT: 54 %
PDW BLD-RTO: 15.5 % (ref 12.4–15.4)
PERFORMED ON: ABNORMAL
PLATELET # BLD: 229 K/UL (ref 135–450)
PMV BLD AUTO: 7.8 FL (ref 5–10.5)
POTASSIUM REFLEX MAGNESIUM: 3.5 MMOL/L (ref 3.5–5.1)
RBC # BLD: 3.2 M/UL (ref 4–5.2)
SODIUM BLD-SCNC: 143 MMOL/L (ref 136–145)
WBC # BLD: 4.9 K/UL (ref 4–11)

## 2022-07-25 PROCEDURE — 2580000003 HC RX 258: Performed by: INTERNAL MEDICINE

## 2022-07-25 PROCEDURE — 80048 BASIC METABOLIC PNL TOTAL CA: CPT

## 2022-07-25 PROCEDURE — 6370000000 HC RX 637 (ALT 250 FOR IP): Performed by: INTERNAL MEDICINE

## 2022-07-25 PROCEDURE — 6370000000 HC RX 637 (ALT 250 FOR IP): Performed by: FAMILY MEDICINE

## 2022-07-25 PROCEDURE — 1200000000 HC SEMI PRIVATE

## 2022-07-25 PROCEDURE — 36415 COLL VENOUS BLD VENIPUNCTURE: CPT

## 2022-07-25 PROCEDURE — 85025 COMPLETE CBC W/AUTO DIFF WBC: CPT

## 2022-07-25 PROCEDURE — 83735 ASSAY OF MAGNESIUM: CPT

## 2022-07-25 PROCEDURE — 6360000002 HC RX W HCPCS: Performed by: INTERNAL MEDICINE

## 2022-07-25 RX ORDER — POLYETHYLENE GLYCOL 3350 17 G/17G
17 POWDER, FOR SOLUTION ORAL 2 TIMES DAILY
Status: DISCONTINUED | OUTPATIENT
Start: 2022-07-25 | End: 2022-07-26 | Stop reason: HOSPADM

## 2022-07-25 RX ORDER — SENNA AND DOCUSATE SODIUM 50; 8.6 MG/1; MG/1
2 TABLET, FILM COATED ORAL DAILY PRN
Status: DISCONTINUED | OUTPATIENT
Start: 2022-07-25 | End: 2022-07-26 | Stop reason: HOSPADM

## 2022-07-25 RX ADMIN — SODIUM CHLORIDE, PRESERVATIVE FREE 10 ML: 5 INJECTION INTRAVENOUS at 21:40

## 2022-07-25 RX ADMIN — CARVEDILOL 12.5 MG: 12.5 TABLET, FILM COATED ORAL at 09:49

## 2022-07-25 RX ADMIN — ISOSORBIDE MONONITRATE 30 MG: 30 TABLET, EXTENDED RELEASE ORAL at 09:50

## 2022-07-25 RX ADMIN — CARVEDILOL 12.5 MG: 12.5 TABLET, FILM COATED ORAL at 17:55

## 2022-07-25 RX ADMIN — ENOXAPARIN SODIUM 40 MG: 100 INJECTION SUBCUTANEOUS at 09:55

## 2022-07-25 RX ADMIN — CLOPIDOGREL BISULFATE 75 MG: 75 TABLET ORAL at 09:50

## 2022-07-25 RX ADMIN — DOXAZOSIN 4 MG: 4 TABLET ORAL at 09:49

## 2022-07-25 RX ADMIN — ACETAMINOPHEN 650 MG: 325 TABLET ORAL at 18:31

## 2022-07-25 RX ADMIN — ASPIRIN 81 MG: 81 TABLET, COATED ORAL at 09:49

## 2022-07-25 RX ADMIN — ATORVASTATIN CALCIUM 40 MG: 40 TABLET, FILM COATED ORAL at 09:50

## 2022-07-25 RX ADMIN — HYDRALAZINE HYDROCHLORIDE 50 MG: 50 TABLET, FILM COATED ORAL at 21:40

## 2022-07-25 RX ADMIN — AMLODIPINE BESYLATE 5 MG: 5 TABLET ORAL at 09:50

## 2022-07-25 RX ADMIN — SENNOSIDES AND DOCUSATE SODIUM 2 TABLET: 50; 8.6 TABLET ORAL at 14:16

## 2022-07-25 RX ADMIN — ACETAMINOPHEN 650 MG: 325 TABLET ORAL at 09:49

## 2022-07-25 RX ADMIN — HYDRALAZINE HYDROCHLORIDE 50 MG: 50 TABLET, FILM COATED ORAL at 14:16

## 2022-07-25 RX ADMIN — FUROSEMIDE 40 MG: 10 INJECTION, SOLUTION INTRAMUSCULAR; INTRAVENOUS at 09:56

## 2022-07-25 RX ADMIN — HYDRALAZINE HYDROCHLORIDE 50 MG: 50 TABLET, FILM COATED ORAL at 05:35

## 2022-07-25 RX ADMIN — SODIUM CHLORIDE, PRESERVATIVE FREE 10 ML: 5 INJECTION INTRAVENOUS at 12:28

## 2022-07-25 RX ADMIN — INSULIN LISPRO 4 UNITS: 100 INJECTION, SOLUTION INTRAVENOUS; SUBCUTANEOUS at 12:56

## 2022-07-25 RX ADMIN — POLYETHYLENE GLYCOL 3350 17 G: 17 POWDER, FOR SOLUTION ORAL at 12:40

## 2022-07-25 RX ADMIN — FUROSEMIDE 40 MG: 10 INJECTION, SOLUTION INTRAMUSCULAR; INTRAVENOUS at 17:55

## 2022-07-25 ASSESSMENT — PAIN SCALES - GENERAL: PAINLEVEL_OUTOF10: 6

## 2022-07-25 ASSESSMENT — PAIN DESCRIPTION - LOCATION: LOCATION: HEAD

## 2022-07-25 ASSESSMENT — PAIN DESCRIPTION - DESCRIPTORS: DESCRIPTORS: ACHING

## 2022-07-25 NOTE — CARE COORDINATION
Advance Care Planning     Advance Care Planning Activator (Inpatient)  Conversation Note      Date of ACP Conversation: 7/25/2022     Conversation Conducted with: Patient with Decision Making Capacity    ACP Activator: Dana Fu RN    Health Care Decision Maker:     Current Designated Health Care Decision Maker:     Primary Decision Maker: Ana Green - Child - [phone]    Care Preferences    Ventilation: \"If you were in your present state of health and suddenly became very ill and were unable to breathe on your own, what would your preference be about the use of a ventilator (breathing machine) if it were available to you? \"      Would the patient desire the use of ventilator (breathing machine)?: no    \"If your health worsens and it becomes clear that your chance of recovery is unlikely, what would your preference be about the use of a ventilator (breathing machine) if it were available to you? \"     Would the patient desire the use of ventilator (breathing machine)?: No      Resuscitation  \"CPR works best to restart the heart when there is a sudden event, like a heart attack, in someone who is otherwise healthy. Unfortunately, CPR does not typically restart the heart for people who have serious health conditions or who are very sick. \"    \"In the event your heart stopped as a result of an underlying serious health condition, would you want attempts to be made to restart your heart (answer \"yes\" for attempt to resuscitate) or would you prefer a natural death (answer \"no\" for do not attempt to resuscitate)? \" no       [] Yes   [] No   Educated Patient / Neha Williamson regarding differences between Advance Directives and portable DNR orders.     Length of ACP Conversation in minutes:  3    Conversation Outcomes:  [x] ACP discussion completed  [] Existing advance directive reviewed with patient; no changes to patient's previously recorded wishes  [] New Advance Directive completed  [] Portable Do Not Rescitate prepared for Provider review and signature  [] POLST/POST/MOLST/MOST prepared for Provider review and signature      Follow-up plan:    [] Schedule follow-up conversation to continue planning  [] Referred individual to Provider for additional questions/concerns   [] Advised patient/agent/surrogate to review completed ACP document and update if needed with changes in condition, patient preferences or care setting    [x] This note routed to one or more involved healthcare providers    Electronically signed by Ajith Quiñones RN on 7/25/2022 at 4:48 PM

## 2022-07-25 NOTE — PLAN OF CARE
Problem: Discharge Planning  Goal: Discharge to home or other facility with appropriate resources  Outcome: Progressing  Flowsheets (Taken 7/24/2022 0900 by Mirta Daley RN)  Discharge to home or other facility with appropriate resources: Identify barriers to discharge with patient and caregiver     Problem: Pain  Goal: Verbalizes/displays adequate comfort level or baseline comfort level  Outcome: Progressing     Problem: Safety - Adult  Goal: Free from fall injury  Outcome: Progressing     Problem: ABCDS Injury Assessment  Goal: Absence of physical injury  Outcome: Progressing     Problem: Respiratory - Adult  Goal: Achieves optimal ventilation and oxygenation  Outcome: Progressing

## 2022-07-25 NOTE — CARE COORDINATION
07/25/22 1642   Service Assessment   Patient Orientation Alert and Oriented   Cognition Alert   History Provided By Patient   Primary Caregiver Self   PCP Verified by CM Yes   Prior Functional Level Independent in ADLs/IADLs   Can patient return to prior living arrangement Unknown at present   Ability to make needs known: Fair   Family able to assist with home care needs: Yes   Social/Functional History   Lives With Parent   Type of [address]   Transfer Assistance Independent   Active  Yes   Mode of Transportation Car   Discharge Planning   Type of [facility] Family Members   Current Services Prior To Admission None   Services At/After Discharge   [address] Provided? No   Mode of Transport at Discharge Other (see comment)  (family to transport)   Confirm Follow Up Transport Self   Condition of Participation: Discharge Planning   The Plan for Transition of Care is related to the following treatment goals: home; PT/OT eval pending   Patient desires to return home at time of discharge. PT/OT evals pending, so will follow for updates/recommendations. Patient on room air at time of assessment, but wears O2 at night, per chart review. Will follow for home O2 recommendations. Patient states she has some concerns about affording medications and is interested in [facility]. Will also provide [address] information to patient as well.     Electronically signed by Yue Easley RN on 7/25/2022 at 4:47 PM

## 2022-07-25 NOTE — ADT AUTH CERT
Last H&P Note      H&P by Alexa Bean MD at 7/22/2022  4:40 AM    Author: Alexa Bean MD Specialty: Internal Medicine, Hospitalist Author Type: Physician   Filed: 7/22/2022  6:36 AM Date of Service: 7/22/2022  4:40 AM Status: Signed   : Alexa Bean MD (Physician)   [facility]  History and Physical     PCP: Marlon Maravilla MD  Patient Name: Rodrigue Cruz     Date of Service: Pt seen/examined on 7/22/22 and admitted to Inpatient with expected LOS greater than two midnights due to medical therapy     CHIEF COMPLAINT:  Pt c/o worsening shortness of breath, lower extremity edema  HISTORY OF PRESENT ILLNESS: Pt is an 74y.o. year-old female with a history of hypertension, hyperlipidemia, type II diabetes mellitus, CKD3, CHF, a chronically elevated troponin and morbid obesity. She presents to the emergency room for evaluation following a 10-day history of worsening shortness of breath which has become acutely worse in the last 2 to 3 days. She states that her shortness of breath is severe, worse with minimal exertion and improved with rest.  She states that she becomes very short of breath when she lays down and has to sit up to catch her breath. She reports that she has increasing edema of both of her legs. She denies current or recent chest pain. Associated signs and symptoms do not include lightheaded, dizziness, diaphoresis, paroxysmal nocturnal dyspnea, fever or chills. No recent medication changes.         Past Medical History:    Past Medical History             Diagnosis Date    Ankle fracture, left 1/17/2014    CHF (congestive heart failure) (HCC)      Diabetes mellitus (HCC)      HTN (hypertension), benign 1/17/2014    Hypertension              Past Surgical History:    Past Surgical History             Procedure Laterality Date    FRACTURE SURGERY Left 01/17/2014     orif tibula/fibula fixation    HYSTERECTOMY (CERVIX STATUS UNKNOWN)                Allergies:  Patient has no known allergies. Medications Prior to Admission:    Home Medications           Prior to Admission medications   Medication Sig Start Date End Date Taking? Authorizing Provider   atorvastatin (LIPITOR) 40 MG tablet Take 1 tablet by mouth daily 6/16/22     Lillie Drake MD   carvedilol (COREG) 12.5 MG tablet Take 1 tablet by mouth 2 times daily (with meals) 6/16/22     Lillie Drake MD   clopidogrel (PLAVIX) 75 MG tablet Take 1 tablet by mouth daily 6/16/22     Lillie Drake MD   isosorbide mononitrate (IMDUR) 30 MG extended release tablet Take 1 tablet by mouth daily 6/16/22     Lillie Drake MD   aspirin 81 MG EC tablet Take 1 tablet by mouth daily 6/16/22     Lillie Drake MD   amLODIPine (NORVASC) 5 MG tablet Take 1 tablet by mouth daily 6/16/22     Lillie Drake MD   pregabalin (LYRICA) 75 MG capsule Take 1 capsule by mouth every evening for 30 days. 5/20/22 6/19/22   Senait Mejia DO   doxazosin (CARDURA) 4 MG tablet Take 1 tablet by mouth daily 5/21/22     Senait Mejia DO   hydrALAZINE (APRESOLINE) 50 MG tablet Take 1 tablet by mouth every 8 hours 5/20/22     Senait Mejia,    albuterol sulfate HFA (PROVENTIL HFA) 108 (90 Base) MCG/ACT inhaler Inhale 2 puffs into the lungs every 6 hours as needed for Wheezing (Cough) 5/6/22     Roseline Bentley MD   benzocaine-menthol (CEPACOL SORE THROAT) 15-3.6 MG lozenge Take 1 lozenge by mouth every 2 hours as needed for Sore Throat or Pain 5/6/22     Roseline Bentley MD   Cholecalciferol (VITAMIN D3) 125 MCG (5000 UT) CAPS Take 2 capsules by mouth once a week       Historical Provider, MD   L-METHYLFOLATE-B6-B12 PO Take 1 tablet by mouth daily       Historical Provider, MD            Family History:   Family history is negative for accelerated coronary artery disease, diabetes or malignancies. Social History:   TOBACCO:   reports that she has never smoked.  She has never used smokeless tobacco.  ETOH:   reports no history of alcohol use. OCCUPATION:       REVIEW OF SYSTEMS:  A full review of systems was performed and is negative except for that which appears in the HPI     Physical Exam:   Vitals: BP (!) 174/86   Pulse 85   Temp 97.6 °F (36.4 °C) (Oral)   Resp 23   Wt 187 lb 2.7 oz (84.9 kg)   SpO2 93%   BMI 36.55 kg/m²   General appearance: WD/WN 74y.o. year-old female who is alert, appears stated age and is cooperative  HEENT: Head: Normocephalic, no lesions, without obvious abnormality. Eye: Normal external eye, conjunctiva, lids cornea, PEERL. Ears: Normal external ears. Non-tender. Nose: Normal external nose, mucus membranes and septum. Pharynx: Dental Hygiene adequate. Normal buccal mucosa. Normal pharynx. Neck: no adenopathy, no carotid bruit, no JVD, supple, symmetrical, trachea midline and thyroid not enlarged, symmetric, no tenderness/mass/nodules  Lungs: scattered rales on auscultation bilaterally and no use of accessory muscles  Heart: regular rate and rhythm, S1, S2 normal, no murmur, click, rub or gallop and normal apical impulse  Abdomen: soft, non-tender; bowel sounds normal; no masses, no organomegaly  Extremities: extremities atraumatic, no cyanosis , 3+ edema bilateral lower extremities and Homans sign is negative, no sign of DVT. Capillary Refill: Acceptable < 3 seconds   Peripheral Pulses: +3 easily felt, not easily obliterated with pressures   Skin: Skin color, texture, turgor normal. No rashes or lesions on exposed skin  Neurologic: Neurovascularly intact without any focal sensory/motor deficits. Cranial nerves: II-XII intact, grossly non-focal. Gait was not tested.   Mental Status: Alert and oriented, thought content appropriate, normal insight           CBC:       Recent Labs     07/22/22  0339   WBC 6.2   HGB 9.9*         BMP:        Recent Labs     07/22/22  0339      K 4.0      CO2 27   BUN 25*   CREATININE 1.6*   GLUCOSE 215* Troponin:       Recent Labs     07/22/22  0339   TROPONINI 0.03*      PT/INR:  No results found for: PTINR  U/A:          Lab Results   Component Value Date/Time     LEUKOCYTESUR NEGATIVE 05/09/2022 10:30 AM     RBCUA 0-2 05/09/2022 10:30 AM     SPECGRAV 1.016 05/09/2022 10:30 AM     UROBILINOGEN 0.2 05/09/2022 10:30 AM     BILIRUBINUR NEGATIVE 05/09/2022 10:30 AM     BLOODU NEGATIVE 05/09/2022 10:30 AM     GLUCOSEU Negative 05/02/2022 11:58 PM     PROTEINU 100 05/09/2022 10:30 AM            RAD:   I have independently reviewed and interpreted the imaging studies below and based my recommendations to the patient on those findings. XR CHEST PORTABLE     Result Date: 7/22/2022  EXAMINATION: ONE XRAY VIEW OF THE CHEST 7/22/2022 3:34 am COMPARISON: Chest radiograph 04/26/2022 HISTORY: ORDERING SYSTEM PROVIDED HISTORY: SOB TECHNOLOGIST PROVIDED HISTORY: Reason for exam:->SOB Reason for Exam: SOB FINDINGS: Bilateral perihilar and basilar airspace opacities. Linear opacity in the mid left lung likely due to scarring or subsegmental atelectasis. Diffuse interstitial prominence with indistinct pulmonary vasculature and suspected interlobular septal thickening. No definite findings of pneumothorax. At least trace to small left and trace right pleural effusions. Prominence of the pulmonary trunk that can be seen with pulmonary hypertension. Mildly prominent hilar and cardiac contours. No acute fracture. 1. Central predominant interstitial and alveolar edema suggestive of congestive heart failure given mild cardiomegaly and trace to small left and trace right pleural effusions. Pneumonia could appear similar. 2. Bibasilar airspace opacities most likely due to passive atelectasis. Pneumonia and aspiration could appear similar.         Assessment:   Principal Problem:    Acute on chronic diastolic (congestive) heart failure (HCC)  Active Problems:    Acute respiratory failure with hypoxia (HCC)    Acute pulmonary edema (HCC)    Hypertensive urgency    Hyperlipidemia    Morbid obesity due to excess calories (HCC)    Elevated troponin (chronically elevated)    Chronic renal failure, stage 3 (moderate) (HCC)    DMII (diabetes mellitus, type 2) ([facility] [address]    Essential hypertension  Resolved Problems:    * No resolved hospital problems. *        Plan:         Acute on chronic diastolic (congestive) heart failure (HCC) with acute pulmonary edema - A 2D Echocardiogram on 04/27/2022 shows an EF of 55-60%  and grade II diastolic dysfunction. Diurese with IV Lasix. Monitor strict I&Os and daily weights. A low sodium fluid restricted diet has been ordered. Acute respiratory failure with hypoxia (HCC) - due to CHF and acute pulmonary edema - provide oxygen as needed to maintain an oxygen saturation of 92% or greater      Hypertensive urgency - initial /85. Will monitor for improvement with diuresis. Elevated troponin (chronically elevated) - patient denies current or recent chest pain. Her troponin is in the normal range for her. We will monitor     Chronic Renal Failure stage III - Renal function is at/near baseline and is stable; Monitor renal function and avoid Nephrotoxic agents as able      Diabetes mellitus II - Controlled without need for aggressive monitoring or intervention     Essential (primary) hypertension - continue home meds and monitor blood pressure     Hyperlipidemia - No current evidence of Rhabdomyolysis or other adverse effects. Continue statin therapy while in the hospital     Morbid obesity due to excess calories (Body mass index is 36.55 kg/m². ) - Complicating assessment and treatment. Placing patient at risk for multiple co-morbidities as well as early death and contributing to the patient's presentation.  on weight loss when appropriate. Code Status: DNR-CCA  Diet: ADULT DIET; Regular;  Low Sodium (2 gm); 1500 ml  DVT Prophylaxis: Lovenox     (Advanced care planning has been discussed with patient and/or responsible family member and is reflected in the code status.  Further orders associated with this have been entered if appropriate)     Disposition: Anticipate that patient will remain in the hospital for 2 or more midnights depending on further evaluation and clinical course     Please note that over 50 minutes was spent in evaluating the patient, review of records and results, discussion with staff/family, etc.        Supriya Shepherd MD

## 2022-07-25 NOTE — PROGRESS NOTES
[facility]                                               Progress note    Summary:   Rodrigue Cruz is being seen by nephrology for CHLOE on CKD. Admitted with SOB. Interval History  Patient seen and examined at bedside  She is feeling constipated has not a bowel movement 3 days  She has still some edema in her lower extremities  She has some crackles in the left base  Still on oxygen. She snores at night, she might have some sleep apnea as well. She desatted at night. Blood pressure 170/80  Satting 97% on 1 L  Heart rate 88 and afebrile  No weight today last weight was 185      Labs reviewed  Sodium 143 potassium 3.5 bicarb 29 BUN 31 creatinine 2 hemoglobin 9.1  Last BNP 4648  Urine output 1200 cc yesterday  She has a pure wick in place      Plan:   -Continue Lasix 40 mg IV twice daily  -Creatinine trended up slightly but her BNP is elevated she has edema and some crackles so she needs diuresis. Still on oxygen  -Scheduled MiraLAX  -Strict I/O's Daily weights  -Check SPEP and light chains. She has proteinuria likely secondary to diabetes. Elier Beltre MD  [facility]  Office: [phone]    Assessment:   Acute kidney injury  She has CKD stage III  Baseline creatinine appears to be around 1.5-2  Had an CHLOE in May 20 creatinine peaked at 4  Creatinine 1.9 o at time of consult  No urine studies here  Check bladder scan  Urinalysis showed 100 mg/dL of protein 2 hyaline cast 1 WBC no RBCs. Protein creatinine ratio 1.1 g    Electrolytes   No acute issues    Heart failure  Last EF was 55 to 53%  Diastolic function not mention. BNP elevated at 4648  Albumin 2.9    Type 2 diabetes  Last A1c 7.4    Anemia  Hemoglobin 8.9, at time of consult  No thrombocytopenia no leukocytosis. Last iron studies showed a ferritin of 185 TSAT 21%  B12 and folate were okay    PTH 63.8    ROS:     Positives Listed Bold. All other remaining systems are negative.     Constitutional:  fever, chills, weakness, weight change, fatigue,      Skin:  rash, pruritus, hair loss, bruising, dry skin, petechiae. Head, Face, Neck   headaches, swelling,  cervical adenopathy. Respiratory: shortness of breath, cough, or wheezing  Cardiovascular: chest pain, palpitations, dizzy, edema  Gastrointestinal: nausea, vomiting, diarrhea, constipation,belly pain    Yellow skin, blood in stool  Musculoskeletal:  back pain, muscle weakness, gait problems,       joint pain or swelling. Genitourinary:  dysuria, poor urine flow, flank pain, blood in urine  Neurologic:  vertigo, TIA'S, syncope, seizures, focal weakness  Psychosocial:  insomnia, anxiety, or depression. Additional positive findings: -     PMH:   Past medical history, surgical history, social history, family history are reviewed and updated as appropriate. Reviewed current medication list.   Allergies reviewed and updated as needed. PE:   Vitals:    07/25/22 1000   BP:    Pulse: 88   Resp: 16   Temp:    SpO2: 97%       General appearance:  in NAD, fully alert and oriented. Comfortable. HEENT: EOM intact, no icterus. Trachea is midline. Neck : No masses, appears symmetrical, no JVD  Respiratory: Respiratory effort appears normal, bilateral equal chest rise, no wheeze, + left crackles at base  Cardiovascular: Ausculation shows RRR + edema  Abdomen: No visible mass or tenderness, non distended. Musculoskeletal:  Joints with no swelling or deformity. Skin:no rashes, ulcers, induration, no jaundice. Neuro: face symmetric, no focal deficits. Appropriate responses.        Lab Results   Component Value Date    CREATININE 2.0 (H) 07/25/2022    BUN 31 (H) 07/25/2022     07/25/2022    K 3.5 07/25/2022     07/25/2022    CO2 29 07/25/2022      Lab Results   Component Value Date    WBC 4.9 07/25/2022    HGB 9.1 (L) 07/25/2022    HCT 27.2 (L) 07/25/2022    MCV 85.1 07/25/2022     07/25/2022     Lab Results   Component Value Date    PTH 63.8 07/24/2022 CALCIUM 8.7 07/25/2022    CAION 1.18 05/09/2022    PHOS 7.1 (H) 05/09/2022

## 2022-07-26 VITALS
BODY MASS INDEX: 35.88 KG/M2 | DIASTOLIC BLOOD PRESSURE: 71 MMHG | HEIGHT: 60 IN | SYSTOLIC BLOOD PRESSURE: 143 MMHG | TEMPERATURE: 98.2 F | OXYGEN SATURATION: 96 % | RESPIRATION RATE: 18 BRPM | HEART RATE: 91 BPM | WEIGHT: 182.76 LBS

## 2022-07-26 LAB
ALBUMIN SERPL-MCNC: 2.9 G/DL (ref 3.4–5)
ANION GAP SERPL CALCULATED.3IONS-SCNC: 12 MMOL/L (ref 3–16)
BUN BLDV-MCNC: 27 MG/DL (ref 7–20)
CALCIUM SERPL-MCNC: 9 MG/DL (ref 8.3–10.6)
CHLORIDE BLD-SCNC: 101 MMOL/L (ref 99–110)
CO2: 27 MMOL/L (ref 21–32)
CREAT SERPL-MCNC: 1.7 MG/DL (ref 0.6–1.2)
GFR AFRICAN AMERICAN: 36
GFR NON-AFRICAN AMERICAN: 30
GLUCOSE BLD-MCNC: 145 MG/DL (ref 70–99)
GLUCOSE BLD-MCNC: 158 MG/DL (ref 70–99)
GLUCOSE BLD-MCNC: 237 MG/DL (ref 70–99)
KAPPA, FREE LIGHT CHAINS, SERUM: 84.7 MG/L (ref 3.3–19.4)
KAPPA/LAMBDA RATIO: 1.47 (ref 0.26–1.65)
KAPPA/LAMBDA TEST COMMENT: ABNORMAL
LAMBDA, FREE LIGHT CHAINS, SERUM: 57.66 MG/L (ref 5.71–26.3)
PERFORMED ON: ABNORMAL
PERFORMED ON: ABNORMAL
PHOSPHORUS: 4.2 MG/DL (ref 2.5–4.9)
POTASSIUM SERPL-SCNC: 3.7 MMOL/L (ref 3.5–5.1)
SODIUM BLD-SCNC: 140 MMOL/L (ref 136–145)

## 2022-07-26 PROCEDURE — 6370000000 HC RX 637 (ALT 250 FOR IP): Performed by: INTERNAL MEDICINE

## 2022-07-26 PROCEDURE — 0054A: CPT | Performed by: INTERNAL MEDICINE

## 2022-07-26 PROCEDURE — 84155 ASSAY OF PROTEIN SERUM: CPT

## 2022-07-26 PROCEDURE — 80069 RENAL FUNCTION PANEL: CPT

## 2022-07-26 PROCEDURE — 83883 ASSAY NEPHELOMETRY NOT SPEC: CPT

## 2022-07-26 PROCEDURE — 84165 PROTEIN E-PHORESIS SERUM: CPT

## 2022-07-26 PROCEDURE — 97165 OT EVAL LOW COMPLEX 30 MIN: CPT

## 2022-07-26 PROCEDURE — 97116 GAIT TRAINING THERAPY: CPT

## 2022-07-26 PROCEDURE — 97535 SELF CARE MNGMENT TRAINING: CPT

## 2022-07-26 PROCEDURE — 6370000000 HC RX 637 (ALT 250 FOR IP): Performed by: FAMILY MEDICINE

## 2022-07-26 PROCEDURE — 97530 THERAPEUTIC ACTIVITIES: CPT

## 2022-07-26 PROCEDURE — 91305: CPT | Performed by: INTERNAL MEDICINE

## 2022-07-26 PROCEDURE — 1800000000 HC LEAVE OF ABSENCE

## 2022-07-26 PROCEDURE — 6360000002 HC RX W HCPCS: Performed by: INTERNAL MEDICINE

## 2022-07-26 PROCEDURE — 91305 HC RX W HCPCS: CPT | Performed by: INTERNAL MEDICINE

## 2022-07-26 PROCEDURE — 97161 PT EVAL LOW COMPLEX 20 MIN: CPT

## 2022-07-26 PROCEDURE — 36415 COLL VENOUS BLD VENIPUNCTURE: CPT

## 2022-07-26 PROCEDURE — 2580000003 HC RX 258: Performed by: INTERNAL MEDICINE

## 2022-07-26 RX ORDER — AMLODIPINE BESYLATE 10 MG/1
10 TABLET ORAL DAILY
Status: DISCONTINUED | OUTPATIENT
Start: 2022-07-26 | End: 2022-07-26 | Stop reason: HOSPADM

## 2022-07-26 RX ORDER — TORSEMIDE 10 MG/1
10 TABLET ORAL DAILY
Qty: 30 TABLET | Refills: 3 | Status: SHIPPED | OUTPATIENT
Start: 2022-07-26 | End: 2023-04-20 | Stop reason: ALTCHOICE

## 2022-07-26 RX ORDER — SPIRONOLACTONE 25 MG/1
25 TABLET ORAL DAILY
Status: DISCONTINUED | OUTPATIENT
Start: 2022-07-26 | End: 2022-07-26 | Stop reason: HOSPADM

## 2022-07-26 RX ORDER — SPIRONOLACTONE 25 MG/1
25 TABLET ORAL DAILY
Qty: 30 TABLET | Refills: 3 | Status: ON HOLD | OUTPATIENT
Start: 2022-07-26 | End: 2022-07-30 | Stop reason: HOSPADM

## 2022-07-26 RX ORDER — TORSEMIDE 20 MG/1
10 TABLET ORAL DAILY
Status: DISCONTINUED | OUTPATIENT
Start: 2022-07-26 | End: 2022-07-26 | Stop reason: HOSPADM

## 2022-07-26 RX ADMIN — CLOPIDOGREL BISULFATE 75 MG: 75 TABLET ORAL at 08:04

## 2022-07-26 RX ADMIN — ASPIRIN 81 MG: 81 TABLET, COATED ORAL at 08:04

## 2022-07-26 RX ADMIN — TORSEMIDE 10 MG: 20 TABLET ORAL at 08:08

## 2022-07-26 RX ADMIN — SPIRONOLACTONE 25 MG: 25 TABLET ORAL at 08:07

## 2022-07-26 RX ADMIN — CARVEDILOL 12.5 MG: 12.5 TABLET, FILM COATED ORAL at 08:04

## 2022-07-26 RX ADMIN — ENOXAPARIN SODIUM 40 MG: 100 INJECTION SUBCUTANEOUS at 08:04

## 2022-07-26 RX ADMIN — ACETAMINOPHEN 650 MG: 325 TABLET ORAL at 05:52

## 2022-07-26 RX ADMIN — HYDRALAZINE HYDROCHLORIDE 50 MG: 50 TABLET, FILM COATED ORAL at 05:52

## 2022-07-26 RX ADMIN — BNT162B2 0.3 ML: 0.23 INJECTION, SUSPENSION INTRAMUSCULAR at 14:31

## 2022-07-26 RX ADMIN — INSULIN LISPRO 4 UNITS: 100 INJECTION, SOLUTION INTRAVENOUS; SUBCUTANEOUS at 13:36

## 2022-07-26 RX ADMIN — ISOSORBIDE MONONITRATE 30 MG: 30 TABLET, EXTENDED RELEASE ORAL at 08:04

## 2022-07-26 RX ADMIN — DOXAZOSIN 4 MG: 4 TABLET ORAL at 08:04

## 2022-07-26 RX ADMIN — ATORVASTATIN CALCIUM 40 MG: 40 TABLET, FILM COATED ORAL at 08:04

## 2022-07-26 RX ADMIN — HYDRALAZINE HYDROCHLORIDE 50 MG: 50 TABLET, FILM COATED ORAL at 13:36

## 2022-07-26 RX ADMIN — SODIUM CHLORIDE, PRESERVATIVE FREE 10 ML: 5 INJECTION INTRAVENOUS at 08:07

## 2022-07-26 RX ADMIN — AMLODIPINE BESYLATE 10 MG: 10 TABLET ORAL at 08:07

## 2022-07-26 ASSESSMENT — PAIN SCALES - GENERAL: PAINLEVEL_OUTOF10: 2

## 2022-07-26 ASSESSMENT — PAIN SCALES - WONG BAKER: WONGBAKER_NUMERICALRESPONSE: 0

## 2022-07-26 ASSESSMENT — PAIN DESCRIPTION - LOCATION: LOCATION: HEAD

## 2022-07-26 NOTE — PROGRESS NOTES
Occupational Therapy  Facility/Department: [address] PROGRESSIVE CARE  Occupational Therapy Initial Assessment and Tentative D/C      Name: Rangel Escamilla  : 1955  MRN: [id number]  Date of Service: 2022    Discharge Recommendations: Rangel Escamilla scored a 19/24 on the AM-PAC ADL Inpatient form. Current research shows that an AM-PAC score of 18 or greater is typically associated with a discharge to the patient's home setting. Based on the patient's AM-PAC score, and their current ADL deficits, it is recommended that the patient have 2-3 sessions per week of Occupational Therapy at d/c to increase the patient's independence. At this time, this patient demonstrates the endurance and safety to discharge home with [address] [place] [address] and a follow up treatment frequency of 2-3x/wk. Please see assessment section for further patient specific details. If patient discharges prior to next session this note will serve as a discharge summary. Please see below for the latest assessment towards goals. Continue to assess pending progress, 2-3 sessions per week, Home with assist PRN, Patient would benefit from continued therapy after discharge  OT Equipment Recommendations  Equipment Needed: No       Patient Diagnosis(es): The primary encounter diagnosis was Hypoxia. A diagnosis of Acute pulmonary edema (HCC) was also pertinent to this visit. Past Medical History:  has a past medical history of Ankle fracture, left, CHF (congestive heart failure) ([address] Diabetes mellitus ([address] HTN (hypertension), benign, and Hypertension. Past Surgical History:  has a past surgical history that includes Hysterectomy and fracture surgery (Left, 2014). Assessment   Performance deficits / Impairments: Decreased functional mobility ; Decreased balance;Decreased ADL status; Decreased endurance;Decreased strength;Decreased high-level IADLs  Assessment: Rangel Escamilla is a 74 y.o. female who presents to the emergency department with shortness of breath. Patient presents with shortness of breath and chest discomfort. Positive for leg swelling. Positive for orthopnea. Started within the last few weeks but worsening. Is not on any water pills. Never happened before. No other associated symptoms. PTA pt from home with mother where pt was Ind with mobility and ADLs with use of cane. Pt currently requires CGA for bed mobility. Pt completes functional mobility and transfers with CGA and use of RW. Anticipate pt needing up to Min/Mod A for ADLs. Pt will benefit from skilled OT services at this time. Anticipate pt safe to return home at time of D/C. Prognosis: Good  Decision Making: Low Complexity  Exam: see above  Assistance / Modification: RW  REQUIRES OT FOLLOW-UP: Yes  Activity Tolerance  Activity Tolerance: Patient Tolerated treatment well        Plan   Plan  Times per Week: 3-5x  Current Treatment Recommendations: Strengthening, Balance training, Functional mobility training, Endurance training, Home management training, Self-Care / ADL, Patient/Caregiver education & training, Safety education & training     Restrictions  Restrictions/Precautions  Restrictions/Precautions: Fall Risk    Subjective   General  Chart Reviewed: Yes  Patient assessed for rehabilitation services?: Yes  Additional Pertinent Hx: per ED note, Rajeev Steel is a 74 y.o. female who presents to the emergency department with shortness of breath. Patient presents with shortness of breath and chest discomfort. Positive for leg swelling. Positive for orthopnea. Started within the last few weeks but worsening. Is not on any water pills. Never happened before. No other associated symptoms. Family / Caregiver Present: No  Referring Practitioner: Marcos Jones MD  Subjective  Subjective: Pt agreeable to OT evaluation. Pt with no reports of pain. Pt on .5 L throughout session.      Social/Functional History  Social/Functional History  Lives With: Parent (mother)  Type of Home: House  Home Layout: One level, Laundry in basement  Home Access: Level entry  Bathroom Shower/Tub: Tub/Shower unit, Shower chair with back  Bathroom Toilet: Standard  Bathroom Equipment: Shower chair  Home Equipment: [employer], [employer], Walker, rolling  Has the patient had two or more falls in the past year or any fall with injury in the past year?: No  Receives Help From: Family  ADL Assistance: Independent  Homemaking Assistance: Independent  Homemaking Responsibilities: Yes  Ambulation Assistance: Independent (cane)  Transfer Assistance: Independent  Active : Yes  Mode of Transportation: Car       Objective   Heart Rate: 90  Heart Rate Source: Monitor  BP: 132/65  BP Location: Right upper arm  BP Method: Automatic  Patient Position: Turns self  MAP (Calculated): 87.33  Resp: 13  SpO2: 96 %  O2 Device: Nasal cannula             Safety Devices  Type of Devices: Call light within reach;Nurse notified;Gait belt;Left in chair;Chair alarm in place  Restraints  Restraints Initially in Place: No  Bed Mobility Training  Bed Mobility Training: Yes  Overall Level of Assistance: Contact-guard assistance  Supine to Sit: Contact-guard assistance  Balance  Sitting: Intact  Standing: Impaired (RW)  Standing - Static: Good;Fair  Standing - Dynamic: Fair  Transfer Training  Transfer Training: Yes  Overall Level of Assistance: Contact-guard assistance (RW)  Sit to Stand: Contact-guard assistance (RW)  Stand to Sit: Contact-guard assistance  Bed to Chair: Contact-guard assistance (RW)  Toilet Transfer: Contact-guard assistance (RW; grab bar)  Gait  Overall Level of Assistance: Contact-guard assistance (RW; no overt LOB; no noted SOB; SpO2 >90% throughout)  Distance (ft): 25 Feet (25ft x2)  Assistive Device: Walker, rolling     AROM: Within functional limits  PROM: Within functional limits  Strength:  Within functional limits  Coordination: Within functional limits  Tone: Normal  ADL  Feeding: Independent  Grooming: Setup (seated to wash hands)  LE Dressing: Moderate assistance (assist to thread B LEs; able to manage over hips; CGA for balance)  Toileting: Contact guard assistance (CGA for balance; pt able to manage brief and complete pericare in stance)  Additional Comments: Anticipate pt needing up to Min/Mod A for ADLs based on ROM, strength, and balance              Vision  Vision: Within Functional Limits  Hearing  Hearing: Exceptions to [facility]  Hearing Exceptions: Hard of hearing/hearing concerns  Cognition  Overall Cognitive Status: Exceptions  Arousal/Alertness: Delayed responses to stimuli  Following Commands: Follows one step commands with increased time; Follows one step commands with repetition  Initiation: Requires cues for some  Cognition Comment: lethargic  Orientation  Overall Orientation Status: Within Functional Limits                  Education Given To: Patient  Education Provided: Role of Therapy;Plan of Care;Transfer Training;ADL Adaptive Strategies  Education Method: Demonstration;Verbal  Barriers to Learning: None  Education Outcome: Verbalized understanding;Demonstrated understanding          AM-PAC Score        AM-PAC Inpatient Daily Activity Raw Score: 19 (07/26/22 1019)  AM-PAC Inpatient ADL T-Scale Score : 40.22 (07/26/22 1019)  ADL Inpatient CMS 0-100% Score: 42.8 (07/26/22 1019)  ADL Inpatient CMS G-Code Modifier : CK (07/26/22 1019)    Tinneti Score       Goals  Short Term Goals  Time Frame for Short term goals: prior to D/C  Short Term Goal 1: complete functional mobility and transfers Mod Ind  Short Term Goal 2: complete bathing and dressing Mod Ind  Short Term Goal 3: complete toileting Mod Ind  Short Term Goal 4: complete grooming in stance at sink Mod Ind  Long Term Goals  Time Frame for Long term goals : STG=LTG  Patient Goals   Patient goals : return home       Therapy Time   Individual Concurrent Group Co-treatment   Time In 0926         Time Out 1020         Minutes 54 Timed Code Treatment Minutes: 39 Minutes (15 minute eval)       Tiffany Cali, OTR/L

## 2022-07-26 NOTE — PROGRESS NOTES
Home O2 eval complete. Pt O2 saturation remained 90% and above with ambulation. Pt does not qualify for home O2. Pt now on room air, spo2=97%.

## 2022-07-26 NOTE — PROGRESS NOTES
[facility]                                               Progress note    Summary:   Bruna Sifuentes is being seen by nephrology for CHLOE on CKD. Admitted with SOB. Interval History  Patient seen and examined at bedside  Had a bowel movement   Feeling better    Blood pressure 163/69  Satting 94% on 1 L  Weight 182       Labs reviewed  CR 2 > 1.7         Plan:   - ok for discharge from renal aspect  Renal function ar baseline. Edema better    Dc med rec  - add aldactone 25 mg daily   - added torsemide 10 mg daily     Will follow up outpatient in 2 weeks. Kin Florentino MD  [facility]  Office: [phone]    Assessment:   Acute kidney injury  She has CKD stage III  Baseline creatinine appears to be around 1.5-2  Had an CHLOE in May 20 creatinine peaked at 4  Creatinine 1.9 o at time of consult  No urine studies here  Check bladder scan  Urinalysis showed 100 mg/dL of protein 2 hyaline cast 1 WBC no RBCs. Protein creatinine ratio 1.1 g    Electrolytes   No acute issues    Heart failure  Last EF was 55 to 38%  Diastolic function not mention. BNP elevated at 4648  Albumin 2.9    Type 2 diabetes  Last A1c 7.4    Anemia  Hemoglobin 8.9, at time of consult  No thrombocytopenia no leukocytosis. Last iron studies showed a ferritin of 185 TSAT 21%  B12 and folate were okay    PTH 63.8    ROS:     Positives Listed Bold. All other remaining systems are negative. Constitutional:  fever, chills, weakness, weight change, fatigue,      Skin:  rash, pruritus, hair loss, bruising, dry skin, petechiae. Head, Face, Neck   headaches, swelling,  cervical adenopathy. Respiratory: shortness of breath, cough, or wheezing  Cardiovascular: chest pain, palpitations, dizzy, edema  Gastrointestinal: nausea, vomiting, diarrhea, constipation,belly pain    Yellow skin, blood in stool  Musculoskeletal:  back pain, muscle weakness, gait problems,       joint pain or swelling.   Genitourinary:  dysuria, poor urine flow, flank pain, blood in urine  Neurologic:  vertigo, TIA'S, syncope, seizures, focal weakness  Psychosocial:  insomnia, anxiety, or depression. Additional positive findings: -     PMH:   Past medical history, surgical history, social history, family history are reviewed and updated as appropriate. Reviewed current medication list.   Allergies reviewed and updated as needed. PE:   Vitals:    07/26/22 0543   BP: (!) 163/69   Pulse: 91   Resp: 14   Temp: 98.3 °F (36.8 °C)   SpO2: 93%       General appearance:  in NAD, fully alert and oriented. Comfortable. HEENT: EOM intact, no icterus. Trachea is midline. Neck : No masses, appears symmetrical, no JVD  Respiratory: Respiratory effort appears normal, bilateral equal chest rise, no wheeze, + left crackles at base  Cardiovascular: Ausculation shows RRR + edema  Abdomen: No visible mass or tenderness, non distended. Musculoskeletal:  Joints with no swelling or deformity. Skin:no rashes, ulcers, induration, no jaundice. Neuro: face symmetric, no focal deficits. Appropriate responses.        Lab Results   Component Value Date    CREATININE 1.7 (H) 07/26/2022    BUN 27 (H) 07/26/2022     07/26/2022    K 3.7 07/26/2022     07/26/2022    CO2 27 07/26/2022      Lab Results   Component Value Date    WBC 4.9 07/25/2022    HGB 9.1 (L) 07/25/2022    HCT 27.2 (L) 07/25/2022    MCV 85.1 07/25/2022     07/25/2022     Lab Results   Component Value Date    PTH 63.8 07/24/2022    CALCIUM 9.0 07/26/2022    CAION 1.18 05/09/2022    PHOS 4.2 07/26/2022

## 2022-07-26 NOTE — PROGRESS NOTES
Physical Therapy  Facility/Department: [facility] PROGRESSIVE CARE  Physical Therapy Initial Assessment    Name: Janneth Stanley  : 1955  MRN: [id number]  Date of Service: 2022    Discharge Recommendations:  Continue to assess pending progress, Home with assist PRN   PT Equipment Recommendations  Other: Will monitor for potential equipt needs. Janneth tSanley scored a 19/24 on the AM-PAC short mobility form. At this time, no further PT is recommended upon discharge . Assessment   Body Structures, Functions, Activity Limitations Requiring Skilled Therapeutic Intervention: Decreased functional mobility ; Decreased endurance;Decreased balance  Assessment: 73 y/o female admit 2022 with CHF, Acute Respiratory, Hypertensive Urgency. PMH as noted including CVA (2022), CKD, CHF, L Ankle Fx. PTA pt at home with mother in house with level entry and 1st floor bed/bath; independent daily care and functional mobility (with cane). At this time, anticipate adequate progress for d/c home. Do not anticipate need cont PT upon d/c. Will cont to monitor pt's progress. Therapy Prognosis: Good  Decision Making: Low Complexity  History: 73 y/o female admit 2022 with CHF, Acute Respiratory, Hypertensive Urgency. PMH as noted including CVA (2022), CKD, CHF, L Ankle Fx. Exam: See above. Clinical Presentation: See above. Barriers to Learning: None. Requires PT Follow-Up: Yes  Activity Tolerance  Activity Tolerance: Patient tolerated treatment well  Activity Tolerance Comments: Pt stu oob, amb within hospital room with Jw Luu CGA. O2 1L upon PT arrival; sats remain mid/high 90's during PT Session.      Plan   Plan  Plan: 3-5 times per week  Current Treatment Recommendations: Strengthening, Functional mobility training, Transfer training, Gait training, Safety education & training, Patient/Caregiver education & training  Safety Devices  Type of Devices: Call light within reach, Chair alarm in place, Left in chair, Nurse notified  Restraints  Restraints Initially in Place: No     Restrictions  Restrictions/Precautions  Restrictions/Precautions: Fall Risk     Subjective   General  Chart Reviewed: Yes  Patient assessed for rehabilitation services?: Yes  Additional Pertinent Hx: 73 y/o female admit 7/22/2022 with CHF, Acute Respiratory, Hypertensive Urgency. PMH as noted including CVA (5/2022), CKD, CHF, L Ankle Fx. Family / Caregiver Present: No  Referring Practitioner: Dr. Radha Odonnell: Within Functional Limits  Subjective  Subjective: Pt agreeable to PT Eval/Rx. Social/Functional History  Social/Functional History  Lives With: Parent (Pt's mother.)  Type of Home: House  Home Layout: Two level, Able to Live on Main level with bedroom/bathroom, Laundry in basement (1 story with basement.)  Home Access: Level entry  Bathroom Shower/Tub: Tub/Shower unit, Shower chair with back  Bathroom Toilet: Standard  Bathroom Equipment: Shower chair  Bathroom Accessibility: Accessible  Home Equipment: [address], [employer], Walker, rolling  Has the patient had two or more falls in the past year or any fall with injury in the past year?: No  Receives Help From: Family  ADL Assistance: [address]: Independent  Homemaking Responsibilities: Yes  Ambulation Assistance: Independent (With [address] [facility] [address] [facility]  Transfer Assistance: Independent  Active : Yes  Mode of Transportation: Car  Vision/Hearing  Vision  Vision: Within Functional Limits  Hearing  Hearing: Exceptions to [facility]  Hearing Exceptions: Hard of hearing/hearing concerns    Cognition   Orientation  Overall Orientation Status: Within Functional Limits  Cognition  Overall Cognitive Status: Exceptions  Arousal/Alertness: Delayed responses to stimuli  Following Commands: Follows one step commands with increased time; Follows one step commands with repetition  Initiation: Requires cues for some  Cognition Comment: Cues for optimal pt participation. Objective           Gross Assessment  AROM: Within functional limits  Strength: Generally decreased, functional                 Bed mobility  Supine to Sit: Contact guard assistance  Sit to Supine: Contact guard assistance  Transfers  Sit to Stand: Contact guard assistance (With [address] Cues for safe hand placement.)  Stand to sit: Contact guard assistance (With [address] Cues for safe hand placement.)  Ambulation  Surface: level tile  Device: Rolling Walker  Assistance: Contact guard assistance  Distance: Pt amb 30' x 2 with Walker CGA. Diminished step length/clearance although no LE buckling/giving way. Cues for safe transitional mvts. Balance  Sitting - Static: Good  Sitting - Dynamic: Good  Standing - Static: Fair;+ (With Walker.)  Standing - Dynamic: Fair;+ (With Walker.)               AM-PAC Score  AM-PAC Inpatient Mobility Raw Score : 19 (07/26/22 1304)  AM-PAC Inpatient T-Scale Score : 45.44 (07/26/22 1304)  Mobility Inpatient CMS 0-100% Score: 41.77 (07/26/22 1304)  Mobility Inpatient CMS G-Code Modifier : CK (07/26/22 1304)         Goals  Short Term Goals  Time Frame for Short term goals: Upon d/c acute care setting. Short term goal 1: Bed Mob Supervision. Short term goal 2: Transefers with assist device Supervision. Short term goal 3: Amb with assist device 50' SBA/Supervision. Patient Goals   Patient goals : Return home. Education  Patient Education  Education Given To: Patient  Education Provided Comments: Role of PT, POC, Need to call for assist, Safe use of Walker.   Education Method: Verbal;Demonstration  Barriers to Learning: None  Education Outcome: Verbalized understanding;Continued education needed      Therapy Time   Individual Concurrent Group Co-treatment   Time In 56         Time Out [address] [place] [address],

## 2022-07-26 NOTE — DISCHARGE INSTR - COC
Continuity of Care Form    Patient Name: Charu Walton   :  1955  MRN:  [id number]    Admit date:  2022  Discharge date:  ***    Code Status Order: DNR-CCA   Advance Directives:     Admitting Physician:  Felicia Quiñones MD  PCP: Delaney Wynne MD    Discharging Nurse: [facility] Unit/Room#: A5C-4424/2020-57  Discharging Unit Phone Number: ***    Emergency Contact:   Extended Emergency Contact Information  Primary Emergency Contact: Kai Vergara, [address] Phone: [phone]  Relation: Child    Past Surgical History:  Past Surgical History:   Procedure Laterality Date    FRACTURE SURGERY Left 2014    orif tibula/fibula fixation    HYSTERECTOMY (CERVIX STATUS UNKNOWN)         Immunization History:   Immunization History   Administered Date(s) Administered    COVID-19, MODERNA BLUE border, Primary or Immunocompromised, (age 12y+), IM, 100 mcg/0.5mL 2021       Active Problems:  Patient Active Problem List   Diagnosis Code    Ankle fracture, left S82.892A    DMII (diabetes mellitus, type 2) ([facility] E11.9    Essential hypertension I10    Chest pain R07.9    Acute on chronic diastolic (congestive) heart failure (HCC) I50.33    Acute respiratory failure with hypoxia ([facility] J96.01    Acute pulmonary edema ([facility]) J81.0    Acute kidney injury superimposed on CKD ([address] N17.9, N18.9    Hypertensive urgency I16.0    Hyperlipidemia E78.5    Morbid obesity due to excess calories ([facility]) E66.01    Acute congestive heart failure ([facility] I50.9    Diabetic nephropathy with proteinuria ([facility] E11.21    Ischemic cerebral stroke due to small artery occlusion ([facility]) I63.81    Elevated troponin (chronically elevated) R77.8    Chronic renal failure, stage 3 (moderate) ([facility]) N18.30       Isolation/Infection:   Isolation            No Isolation          Patient Infection Status       Infection Onset Added Last Indicated Last Indicated By Review Planned Expiration Resolved Resolved By    None active    Resolved COVID-19 (Rule Out) 22 COVID-19, Rapid (Ordered)   22 Rule-Out Test Resulted    COVID-19 22 COVID-19, Rapid   22     +  ? Symptoms  fever 100.4  Stroke     COVID-19 (Rule Out) 22 Respiratory Panel, Molecular, with COVID-19 (Restricted: peds pts or suitable admitted adults) (Ordered)   22 Rule-Out Test Resulted    INFLUENZA 22 Luis Xiong RN   22 Luis Xiong RN    Added from external infection. Nurse Assessment:  Last Vital Signs: BP (!) 143/71   Pulse 91   Temp 98.2 °F (36.8 °C) (Oral)   Resp 18   Ht 5' (1.524 m)   Wt 182 lb 12.2 oz (82.9 kg)   SpO2 96%   BMI 35.69 kg/m²     Last documented pain score (0-10 scale): Pain Level: 2  Last Weight:   Wt Readings from Last 1 Encounters:   22 182 lb 12.2 oz (82.9 kg)     Mental Status:  {IP PT MENTAL STATUS:70707}    IV Access:  { JEFF IV ACCESS:200259841}    Nursing Mobility/ADLs:  Walking   {CHP DME SXNA:070418168}  Transfer  {CHP DME HBEB:628827103}  Bathing  {CHP DME TGZC:119540770}  Dressing  {CHP DME GFAR:250950240}  Toileting  {CHP DME QDXL:771281100}  Feeding  {P DME DXZQ:560068953}  Med Admin  {CHP DME QNUZ:734652957}  Med Delivery   { JEFF MED Delivery:507506888}    Wound Care Documentation and Therapy:  Incision 14 Ankle Left (Active)   Number of days: 3111        Elimination:  Continence: Bowel: {YES / J}  Bladder: {YES / TY:83161}  Urinary Catheter: {Urinary Catheter:887196540}   Colostomy/Ileostomy/Ileal Conduit: {YES / TM:80761}       Date of Last BM: ***    Intake/Output Summary (Last 24 hours) at 2022 1332  Last data filed at 2022 0800  Gross per 24 hour   Intake 360 ml   Output 300 ml   Net 60 ml     I/O last 3 completed shifts:   In: 120 [P.O.:120]  Out: 300 [Urine:300]    Safety Concerns:     Amada8 Angie AGUILAR Safety Concerns:104336214}    Impairments/Disabilities: [address] [facility] Impairments/Disabilities:254299548}    Nutrition Therapy:  Current Nutrition Therapy:   [address] [facility] Diet List:289473820}    Routes of Feeding: {CHP DME Other Feedings:439163732}  Liquids: {Slp liquid thickness:27185}  Daily Fluid Restriction: {CHP DME Yes amt example:319813601}  Last Modified Barium Swallow with Video (Video Swallowing Test): {Done Not Done SJNS:346566971}    Treatments at the Time of Hospital Discharge:   Respiratory Treatments: ***  Oxygen Therapy:  {Therapy; copd oxygen:23500}  Ventilator:    {[facility] Vent XLTS:140088890}    Rehab Therapies: {THERAPEUTIC INTERVENTION:6557516527}  Weight Bearing Status/Restrictions: [facility] Weight Bearin}  Other Medical Equipment (for information only, NOT a DME order):  {EQUIPMENT:934440272}  Other Treatments: ***    Patient's personal belongings (please select all that are sent with patient):  {[facility] DME Belongings:063149125}    RN SIGNATURE:  {Esignature:818778393}    CASE MANAGEMENT/SOCIAL WORK SECTION    Inpatient Status Date: ***    Readmission Risk Assessment Score:  Readmission Risk              Risk of Unplanned Readmission:  [id number]           Discharging to Facility/ Agency   Name:   Address:  Phone:  Fax:    Dialysis Facility (if applicable)   Name:  Address:  Dialysis Schedule:  Phone:  Fax:    / signature: {Esignature:847117915}    PHYSICIAN SECTION    Prognosis: {Prognosis:6091561855}    Condition at Discharge: [address] Patient Condition:692337082}    Rehab Potential (if transferring to Rehab): {Prognosis:6585724358}    Recommended Labs or Other Treatments After Discharge: ***    Physician Certification: I certify the above information and transfer of Siddhartha Plata  is necessary for the continuing treatment of the diagnosis listed and that she requires {Admit to Appropriate Level of Care:04133} for {GREATER/LESS:478929242} 30 days.      Update Admission H&P: {CHP DME Changes in [facility]:956050571}    PHYSICIAN SIGNATURE:  {Esignature:005527744}

## 2022-07-26 NOTE — PLAN OF CARE
Problem: Discharge Planning  Goal: Discharge to home or other facility with appropriate resources  7/26/2022 1358 by Aim Romero RN  Outcome: Resolved/Met  Flowsheets (Taken 7/26/2022 0800)  Discharge to home or other facility with appropriate resources: Identify barriers to discharge with patient and caregiver  7/26/2022 0504 by Gabriel Ferraro RN  Outcome: Progressing Towards Goal     Problem: Pain  Goal: Verbalizes/displays adequate comfort level or baseline comfort level  7/26/2022 1358 by Ami Romero RN  Outcome: Resolved/Met  7/26/2022 0504 by Gabriel Ferraro RN  Outcome: Progressing Towards Goal     Problem: Safety - Adult  Goal: Free from fall injury  7/26/2022 1358 by Ami Romero RN  Outcome: Resolved/Met  7/26/2022 0504 by Gabriel Ferraro RN  Outcome: Progressing Towards Goal     Problem: ABCDS Injury Assessment  Goal: Absence of physical injury  7/26/2022 1358 by Ami Romero RN  Outcome: Resolved/Met  7/26/2022 0504 by Gabriel Ferraro RN  Outcome: Progressing Towards Goal  Flowsheets (Taken 7/26/2022 0503)  Absence of Physical Injury: Implement safety measures based on patient assessment     Problem: Respiratory - Adult  Goal: Achieves optimal ventilation and oxygenation  7/26/2022 1358 by Ami Romero RN  Outcome: Resolved/Met  7/26/2022 0504 by Gabriel Ferraro RN  Outcome: Progressing Towards Goal     Problem: Cardiovascular - Adult  Goal: Maintains optimal cardiac output and hemodynamic stability  7/26/2022 1358 by Ami Romero RN  Outcome: Resolved/Met  Flowsheets (Taken 7/26/2022 0800)  Maintains optimal cardiac output and hemodynamic stability: Monitor blood pressure and heart rate  7/26/2022 0504 by Gabriel Ferraro RN  Outcome: Progressing Towards Goal  Goal: Absence of cardiac dysrhythmias or at baseline  7/26/2022 1358 by Ami Romero RN  Outcome: Resolved/Met  Flowsheets (Taken 7/26/2022 0800)  Absence of cardiac dysrhythmias or at baseline: Monitor cardiac rate and rhythm  7/26/2022 0504 by Betty Brambila RN  Outcome: Progressing Towards Goal     Problem: Metabolic/Fluid and Electrolytes - Adult  Goal: Electrolytes maintained within normal limits  7/26/2022 1358 by Kareem Jensen RN  Outcome: Resolved/Met  Flowsheets (Taken 7/26/2022 0800)  Electrolytes maintained within normal limits: Monitor labs and assess patient for signs and symptoms of electrolyte imbalances  7/26/2022 0504 by Betty Brambila RN  Outcome: Progressing Towards Goal  Goal: Hemodynamic stability and optimal renal function maintained  7/26/2022 1358 by Kareem Jensen RN  Outcome: Resolved/Met  Flowsheets (Taken 7/26/2022 0800)  Hemodynamic stability and optimal renal function maintained: Monitor labs and assess for signs and symptoms of volume excess or deficit  7/26/2022 0504 by Betty Brambila RN  Outcome: Progressing Towards Goal  Goal: Glucose maintained within prescribed range  7/26/2022 1358 by Kareem Jensen RN  Outcome: Resolved/Met  Flowsheets (Taken 7/26/2022 0800)  Glucose maintained within prescribed range: Assess for signs and symptoms of hyperglycemia and hypoglycemia  7/26/2022 0504 by Betty Brambila RN  Outcome: Progressing Towards Goal     Problem: Hematologic - Adult  Goal: Maintains hematologic stability  7/26/2022 1358 by Kareem Jensen RN  Outcome: Resolved/Met  Flowsheets (Taken 7/26/2022 0800)  Maintains hematologic stability: Assess for signs and symptoms of bleeding or hemorrhage  7/26/2022 0504 by Betty Brambila RN  Outcome: Progressing Towards Goal     Problem: Chronic Conditions and Co-morbidities  Goal: Patient's chronic conditions and co-morbidity symptoms are monitored and maintained or improved  7/26/2022 1358 by Kareem Jensen RN  Outcome: Resolved/Met  Flowsheets (Taken 7/26/2022 0800)  Care Plan - Patient's Chronic Conditions and Co-Morbidity Symptoms are Monitored and Maintained or Improved: Monitor and assess patient's chronic conditions and comorbid symptoms for stability, deterioration, or improvement  7/26/2022 0504 by Ada Rodriguez RN  Outcome: Progressing Towards Goal

## 2022-07-26 NOTE — PLAN OF CARE
Problem: Discharge Planning  Goal: Discharge to home or other facility with appropriate resources  Outcome: Progressing Towards Goal     Problem: Pain  Goal: Verbalizes/displays adequate comfort level or baseline comfort level  Outcome: Progressing Towards Goal     Problem: Safety - Adult  Goal: Free from fall injury  Outcome: Progressing Towards Goal     Problem: ABCDS Injury Assessment  Goal: Absence of physical injury  Outcome: Progressing Towards Goal  Flowsheets (Taken 7/26/2022 0503)  Absence of Physical Injury: Implement safety measures based on patient assessment     Problem: Respiratory - Adult  Goal: Achieves optimal ventilation and oxygenation  Outcome: Progressing Towards Goal     Problem: Cardiovascular - Adult  Goal: Maintains optimal cardiac output and hemodynamic stability  Outcome: Progressing Towards Goal  Goal: Absence of cardiac dysrhythmias or at baseline  Outcome: Progressing Towards Goal     Problem: Metabolic/Fluid and Electrolytes - Adult  Goal: Electrolytes maintained within normal limits  Outcome: Progressing Towards Goal  Goal: Hemodynamic stability and optimal renal function maintained  Outcome: Progressing Towards Goal  Goal: Glucose maintained within prescribed range  Outcome: Progressing Towards Goal     Problem: Hematologic - Adult  Goal: Maintains hematologic stability  Outcome: Progressing Towards Goal     Problem: Chronic Conditions and Co-morbidities  Goal: Patient's chronic conditions and co-morbidity symptoms are monitored and maintained or improved  Outcome: Progressing Towards Goal

## 2022-07-26 NOTE — DISCHARGE INSTRUCTIONS
Communication from Nephrology: You were seen by the kidney doctor for chronic kidney disease. Add two new medications. They are water pills. Torsemide and spironolactone. You should weight yourself daily and call if weight goes up more than 5 pounds in a few days. The water pill may need to be adjusted. Please call to make an appointment with Natasha Barajas MD in 2 weeks.      [facility]  [address] [address]  Phone: [phone]  Fax: [phone]

## 2022-07-26 NOTE — PROGRESS NOTES
Hospitalist Progress Note      PCP: Kati Miner MD    Chief Complaint. Presented to hospital for SOB    Date of Admission: 7/22/2022    Subjective: has no complaints today, Denies chest pain, nausea, vomiting, fever or chills. mention feels overall better    Medications:  Reviewed    Infusion Medications    sodium chloride      dextrose       Scheduled Medications    polyethylene glycol  17 g Oral BID    furosemide  40 mg IntraVENous BID    isosorbide mononitrate  30 mg Oral Daily    hydrALAZINE  50 mg Oral 3 times per day    doxazosin  4 mg Oral Daily    clopidogrel  75 mg Oral Daily    carvedilol  12.5 mg Oral BID WC    atorvastatin  40 mg Oral Daily    aspirin  81 mg Oral Daily    amLODIPine  5 mg Oral Daily    sodium chloride flush  10 mL IntraVENous 2 times per day    enoxaparin  40 mg SubCUTAneous Daily    insulin lispro  0-8 Units SubCUTAneous TID WC    insulin lispro  0-4 Units SubCUTAneous Nightly     PRN Meds: sennosides-docusate sodium, albuterol sulfate HFA, sodium chloride flush, sodium chloride, ondansetron, polyethylene glycol, acetaminophen **OR** acetaminophen, glucose, dextrose bolus **OR** dextrose bolus, glucagon (rDNA), dextrose    No intake or output data in the 24 hours ending 07/25/22 2008      Physical Exam Performed:    BP (!) 148/79   Pulse 86   Temp 98 °F (36.7 °C) (Oral)   Resp 18   Ht 5' (1.524 m)   Wt 185 lb 6.5 oz (84.1 kg)   SpO2 91%   BMI 36.21 kg/m²     General appearance: NAD, on 3 L nasal cannula,  HEENT:  Conjunctivae/corneas clear. Neck: Supple, with full range of motion. Respiratory:  Normal respiratory effort. Clear to auscultation, bilaterally without Rales/Wheezes/Rhonchi. Cardiovascular: Regular rate and rhythm with normal S1/S2 without murmurs or rubs  Abdomen: Soft, non-tender, non-distended, normal bowel sounds. Musculoskeletal: +2 edema bilaterally  Neurologic:  without any focal sensory/motor deficits.  grossly non-focal.  Psychiatric: Alert and oriented, Normal mood  Peripheral Pulses: +2 palpable, equal bilaterally       Labs:   Recent Labs     07/23/22  0531 07/24/22  0615 07/25/22  0545   WBC 5.3 5.5 4.9   HGB 9.3* 8.9* 9.1*   HCT 27.9* 27.0* 27.2*    230 229       Recent Labs     07/23/22  0531 07/24/22  0615 07/25/22  0545    144 143   K 3.9 3.8 3.5    106 103   CO2 29 28 29   BUN 29* 30* 31*   CREATININE 1.6* 1.9* 2.0*   CALCIUM 8.5 8.5 8.7       No results for input(s): AST, ALT, BILIDIR, BILITOT, ALKPHOS in the last 72 hours. No results for input(s): INR in the last 72 hours. No results for input(s): Marco Shorts in the last 72 hours. Urinalysis:      Lab Results   Component Value Date/Time    NITRU Negative 07/24/2022 02:45 PM    WBCUA 1 07/24/2022 02:45 PM    BACTERIA None Seen 07/24/2022 02:45 PM    RBCUA 0 07/24/2022 02:45 PM    RBCUA 0-2 05/09/2022 10:30 AM    BLOODU Negative 07/24/2022 02:45 PM    SPECGRAV 1.011 07/24/2022 02:45 PM    GLUCOSEU Negative 07/24/2022 02:45 PM       Radiology:  XR CHEST PORTABLE   Final Result   1. Central predominant interstitial and alveolar edema suggestive of   congestive heart failure given mild cardiomegaly and trace to small left and   trace right pleural effusions. Pneumonia could appear similar. 2. Bibasilar airspace opacities most likely due to passive atelectasis. Pneumonia and aspiration could appear similar.                Assessment/Plan:    Active Hospital Problems    Diagnosis     Elevated troponin (chronically elevated) [R77.8]      Priority: Medium    Chronic renal failure, stage 3 (moderate) (HCC) [N18.30]      Priority: Medium    Morbid obesity due to excess calories (HCC) [E66.01]      Priority: Medium    Hyperlipidemia [E78.5]      Priority: Medium    Acute pulmonary edema (HCC) [J81.0]      Priority: Medium    Acute respiratory failure with hypoxia (HCC) [J96.01]      Priority: Medium    Hypertensive urgency [I16.0]      Priority: Medium    Acute on chronic diastolic (congestive) heart failure ([facility]) [I50.33]      Priority: Medium    Essential hypertension [I10]     DMII (diabetes mellitus, type 2) ([facility]) [E11.9]      Acute on chronic diastolic (congestive) heart failure ([facility] [address] with acute pulmonary edema - A 2D Echocardiogram on 04/27/2022 shows an EF of 55-60%  and grade II diastolic dysfunction. Diurese with IV Lasix. Monitor strict I&Os and daily weights. A low sodium fluid restricted diet has been ordered. Continue IV Lasix  Consult cardiology  Monitor on cardiac telemetry     Acute respiratory failure with hypoxia ([facility]) - due to CHF and acute pulmonary edema - provide oxygen as needed to maintain an oxygen saturation of 92% or greater, continue to wean oxygen as tolerated      Hypertensive urgency - initial /85. Will monitor for improvement with diuresis. BPs are stable     Elevated troponin (chronically elevated) - patient denies current or recent chest pain. Her troponin is in the normal range for her. We will monitor     Chronic Renal Failure stage III - Renal function is at/near baseline and is stable; Monitor renal function and avoid Nephrotoxic agents as able    Acute kidney injury-nephrology consulted, monitor creatinine, continue IV diuresis per nephrology     Diabetes mellitus II - Controlled without need for aggressive monitoring or intervention     Essential (primary) hypertension - continue home meds and monitor blood pressure     Hyperlipidemia - No current evidence of Rhabdomyolysis or other adverse effects. Continue statin therapy while in the hospital     Morbid obesity due to excess calories (Body mass index is 36.55 kg/m². ) - Complicating assessment and treatment. Placing patient at risk for multiple co-morbidities as well as early death and contributing to the patient's presentation.  on weight loss when appropriate. Diet: ADULT DIET; Regular;  Low Sodium (2 gm); 1500 ml  Code Status: DNR-CCA    PT/OT Eval Status: ordered    Dispo/Plan of care - continue IV diuresis per nephrology, wean O2 as tolerated, likely dc 1-2 days    Marely Valdovinos MD

## 2022-07-26 NOTE — PROGRESS NOTES
Discharge instructions reviewed with patient. All questions answered. IV removed. Patient transported to family members car.

## 2022-07-26 NOTE — CARE COORDINATION
CASE MANAGEMENT DISCHARGE SUMMARY:    DISCHARGE DATE: 7/26/2022    DISCHARGED TO HOME     TRANSPORTATION: via family             TIME: TBD by patient and bedside RN              COMMENTS: Patient was discharged before seen by case management today. No needs identified/expressed at time of discharge.     Electronically signed by Fariba Mckenna RN on 7/26/2022 at 3:05 PM

## 2022-07-26 NOTE — PROGRESS NOTES
Discharge order noted. Ms. Bryanna Renteria has received over 60 minutes of heart failure education during this visit, including a consult at the bedside from me. She has a scale, and she has a follow up appointment in place on her AVS.  She will be seeing Dr. Gypsy Palacios on 8/2/22 @ 3pm.  Her weight today is 182 lbs. She was admitted at 186 lbs. Her Bun/Creat are 27/1.7 today.

## 2022-07-27 ENCOUNTER — APPOINTMENT (OUTPATIENT)
Dept: GENERAL RADIOLOGY | Age: 67
DRG: 286 | End: 2022-07-27
Payer: COMMERCIAL

## 2022-07-27 ENCOUNTER — HOSPITAL ENCOUNTER (INPATIENT)
Age: 67
LOS: 3 days | Discharge: HOME HEALTH | DRG: 286 | End: 2022-07-30
Attending: EMERGENCY MEDICINE | Admitting: INTERNAL MEDICINE
Payer: COMMERCIAL

## 2022-07-27 DIAGNOSIS — R07.9 CHEST PAIN, UNSPECIFIED TYPE: ICD-10-CM

## 2022-07-27 DIAGNOSIS — I50.9 ACUTE ON CHRONIC CONGESTIVE HEART FAILURE, UNSPECIFIED HEART FAILURE TYPE (HCC): Primary | ICD-10-CM

## 2022-07-27 DIAGNOSIS — J81.0 ACUTE PULMONARY EDEMA (HCC): ICD-10-CM

## 2022-07-27 PROBLEM — R06.02 SOB (SHORTNESS OF BREATH): Status: ACTIVE | Noted: 2022-07-27

## 2022-07-27 LAB
A/G RATIO: 1.1 (ref 1.1–2.2)
ALBUMIN SERPL-MCNC: 2.4 G/DL (ref 3.1–4.9)
ALBUMIN SERPL-MCNC: 3.6 G/DL (ref 3.4–5)
ALP BLD-CCNC: 113 U/L (ref 40–129)
ALPHA-1-GLOBULIN: 0.3 G/DL (ref 0.2–0.4)
ALPHA-2-GLOBULIN: 0.9 G/DL (ref 0.4–1.1)
ALT SERPL-CCNC: 19 U/L (ref 10–40)
ANION GAP SERPL CALCULATED.3IONS-SCNC: 11 MMOL/L (ref 3–16)
ANION GAP SERPL CALCULATED.3IONS-SCNC: 9 MMOL/L (ref 3–16)
AST SERPL-CCNC: 19 U/L (ref 15–37)
BASOPHILS ABSOLUTE: 0.1 K/UL (ref 0–0.2)
BASOPHILS RELATIVE PERCENT: 0.9 %
BETA GLOBULIN: 1 G/DL (ref 0.9–1.6)
BILIRUB SERPL-MCNC: 0.5 MG/DL (ref 0–1)
BUN BLDV-MCNC: 25 MG/DL (ref 7–20)
BUN BLDV-MCNC: 29 MG/DL (ref 7–20)
CALCIUM SERPL-MCNC: 9.2 MG/DL (ref 8.3–10.6)
CALCIUM SERPL-MCNC: 9.4 MG/DL (ref 8.3–10.6)
CHLORIDE BLD-SCNC: 100 MMOL/L (ref 99–110)
CHLORIDE BLD-SCNC: 102 MMOL/L (ref 99–110)
CO2: 29 MMOL/L (ref 21–32)
CO2: 31 MMOL/L (ref 21–32)
CREAT SERPL-MCNC: 1.6 MG/DL (ref 0.6–1.2)
CREAT SERPL-MCNC: 1.9 MG/DL (ref 0.6–1.2)
EOSINOPHILS ABSOLUTE: 0.1 K/UL (ref 0–0.6)
EOSINOPHILS RELATIVE PERCENT: 1.6 %
GAMMA GLOBULIN: 1.8 G/DL (ref 0.6–1.8)
GFR AFRICAN AMERICAN: 32
GFR AFRICAN AMERICAN: 39
GFR NON-AFRICAN AMERICAN: 27
GFR NON-AFRICAN AMERICAN: 32
GLUCOSE BLD-MCNC: 141 MG/DL (ref 70–99)
GLUCOSE BLD-MCNC: 167 MG/DL (ref 70–99)
GLUCOSE BLD-MCNC: 183 MG/DL (ref 70–99)
HCT VFR BLD CALC: 30.2 % (ref 36–48)
HEMOGLOBIN: 10.1 G/DL (ref 12–16)
LYMPHOCYTES ABSOLUTE: 1.4 K/UL (ref 1–5.1)
LYMPHOCYTES RELATIVE PERCENT: 23.6 %
MCH RBC QN AUTO: 28 PG (ref 26–34)
MCHC RBC AUTO-ENTMCNC: 33.4 G/DL (ref 31–36)
MCV RBC AUTO: 83.9 FL (ref 80–100)
MONOCYTES ABSOLUTE: 0.4 K/UL (ref 0–1.3)
MONOCYTES RELATIVE PERCENT: 7.5 %
NEUTROPHILS ABSOLUTE: 3.9 K/UL (ref 1.7–7.7)
NEUTROPHILS RELATIVE PERCENT: 66.4 %
PDW BLD-RTO: 15.7 % (ref 12.4–15.4)
PERFORMED ON: ABNORMAL
PLATELET # BLD: 250 K/UL (ref 135–450)
PMV BLD AUTO: 8 FL (ref 5–10.5)
POTASSIUM REFLEX MAGNESIUM: 3.8 MMOL/L (ref 3.5–5.1)
POTASSIUM SERPL-SCNC: 3.7 MMOL/L (ref 3.5–5.1)
PRO-BNP: 2790 PG/ML (ref 0–124)
RBC # BLD: 3.6 M/UL (ref 4–5.2)
SODIUM BLD-SCNC: 140 MMOL/L (ref 136–145)
SODIUM BLD-SCNC: 142 MMOL/L (ref 136–145)
SPE/IFE INTERPRETATION: NORMAL
TOTAL PROTEIN: 6.3 G/DL (ref 6.4–8.2)
TOTAL PROTEIN: 7 G/DL (ref 6.4–8.2)
TROPONIN: 0.03 NG/ML
WBC # BLD: 5.9 K/UL (ref 4–11)

## 2022-07-27 PROCEDURE — 93005 ELECTROCARDIOGRAM TRACING: CPT | Performed by: EMERGENCY MEDICINE

## 2022-07-27 PROCEDURE — 96374 THER/PROPH/DIAG INJ IV PUSH: CPT

## 2022-07-27 PROCEDURE — 6370000000 HC RX 637 (ALT 250 FOR IP): Performed by: INTERNAL MEDICINE

## 2022-07-27 PROCEDURE — 83880 ASSAY OF NATRIURETIC PEPTIDE: CPT

## 2022-07-27 PROCEDURE — 2580000003 HC RX 258: Performed by: INTERNAL MEDICINE

## 2022-07-27 PROCEDURE — 6370000000 HC RX 637 (ALT 250 FOR IP): Performed by: EMERGENCY MEDICINE

## 2022-07-27 PROCEDURE — 71046 X-RAY EXAM CHEST 2 VIEWS: CPT

## 2022-07-27 PROCEDURE — 6360000002 HC RX W HCPCS: Performed by: EMERGENCY MEDICINE

## 2022-07-27 PROCEDURE — 85025 COMPLETE CBC W/AUTO DIFF WBC: CPT

## 2022-07-27 PROCEDURE — 1200000000 HC SEMI PRIVATE

## 2022-07-27 PROCEDURE — 84484 ASSAY OF TROPONIN QUANT: CPT

## 2022-07-27 PROCEDURE — 6360000002 HC RX W HCPCS: Performed by: INTERNAL MEDICINE

## 2022-07-27 PROCEDURE — 99285 EMERGENCY DEPT VISIT HI MDM: CPT

## 2022-07-27 PROCEDURE — 80053 COMPREHEN METABOLIC PANEL: CPT

## 2022-07-27 PROCEDURE — 36415 COLL VENOUS BLD VENIPUNCTURE: CPT

## 2022-07-27 RX ORDER — POTASSIUM CHLORIDE 20 MEQ/1
40 TABLET, EXTENDED RELEASE ORAL PRN
Status: DISCONTINUED | OUTPATIENT
Start: 2022-07-27 | End: 2022-07-29

## 2022-07-27 RX ORDER — SPIRONOLACTONE 25 MG/1
25 TABLET ORAL DAILY
Status: DISCONTINUED | OUTPATIENT
Start: 2022-07-28 | End: 2022-07-30 | Stop reason: HOSPADM

## 2022-07-27 RX ORDER — INSULIN LISPRO 100 [IU]/ML
0-8 INJECTION, SOLUTION INTRAVENOUS; SUBCUTANEOUS
Status: DISCONTINUED | OUTPATIENT
Start: 2022-07-28 | End: 2022-07-27 | Stop reason: SDUPTHER

## 2022-07-27 RX ORDER — HYDROCHLOROTHIAZIDE 25 MG/1
25 TABLET ORAL DAILY
Status: DISCONTINUED | OUTPATIENT
Start: 2022-07-28 | End: 2022-07-28

## 2022-07-27 RX ORDER — HYDRALAZINE HYDROCHLORIDE 20 MG/ML
10 INJECTION INTRAMUSCULAR; INTRAVENOUS EVERY 6 HOURS PRN
Status: DISCONTINUED | OUTPATIENT
Start: 2022-07-27 | End: 2022-07-30 | Stop reason: HOSPADM

## 2022-07-27 RX ORDER — CLOPIDOGREL BISULFATE 75 MG/1
75 TABLET ORAL DAILY
Status: DISCONTINUED | OUTPATIENT
Start: 2022-07-28 | End: 2022-07-30 | Stop reason: HOSPADM

## 2022-07-27 RX ORDER — DOXAZOSIN MESYLATE 4 MG/1
4 TABLET ORAL DAILY
Status: DISCONTINUED | OUTPATIENT
Start: 2022-07-28 | End: 2022-07-30 | Stop reason: HOSPADM

## 2022-07-27 RX ORDER — ISOSORBIDE MONONITRATE 30 MG/1
30 TABLET, EXTENDED RELEASE ORAL DAILY
Status: DISCONTINUED | OUTPATIENT
Start: 2022-07-28 | End: 2022-07-29

## 2022-07-27 RX ORDER — PROMETHAZINE HYDROCHLORIDE 25 MG/1
12.5 TABLET ORAL EVERY 6 HOURS PRN
Status: DISCONTINUED | OUTPATIENT
Start: 2022-07-27 | End: 2022-07-30 | Stop reason: HOSPADM

## 2022-07-27 RX ORDER — SODIUM CHLORIDE 0.9 % (FLUSH) 0.9 %
10 SYRINGE (ML) INJECTION EVERY 12 HOURS SCHEDULED
Status: DISCONTINUED | OUTPATIENT
Start: 2022-07-27 | End: 2022-07-30 | Stop reason: HOSPADM

## 2022-07-27 RX ORDER — ONDANSETRON 2 MG/ML
4 INJECTION INTRAMUSCULAR; INTRAVENOUS EVERY 6 HOURS PRN
Status: DISCONTINUED | OUTPATIENT
Start: 2022-07-27 | End: 2022-07-30 | Stop reason: HOSPADM

## 2022-07-27 RX ORDER — MAGNESIUM SULFATE IN WATER 40 MG/ML
2000 INJECTION, SOLUTION INTRAVENOUS PRN
Status: DISCONTINUED | OUTPATIENT
Start: 2022-07-27 | End: 2022-07-29

## 2022-07-27 RX ORDER — HYDRALAZINE HYDROCHLORIDE 50 MG/1
50 TABLET, FILM COATED ORAL EVERY 8 HOURS SCHEDULED
Status: DISCONTINUED | OUTPATIENT
Start: 2022-07-27 | End: 2022-07-30 | Stop reason: HOSPADM

## 2022-07-27 RX ORDER — SODIUM CHLORIDE 0.9 % (FLUSH) 0.9 %
10 SYRINGE (ML) INJECTION PRN
Status: DISCONTINUED | OUTPATIENT
Start: 2022-07-27 | End: 2022-07-30 | Stop reason: HOSPADM

## 2022-07-27 RX ORDER — FUROSEMIDE 10 MG/ML
20 INJECTION INTRAMUSCULAR; INTRAVENOUS ONCE
Status: COMPLETED | OUTPATIENT
Start: 2022-07-27 | End: 2022-07-27

## 2022-07-27 RX ORDER — INSULIN LISPRO 100 [IU]/ML
0-4 INJECTION, SOLUTION INTRAVENOUS; SUBCUTANEOUS NIGHTLY
Status: DISCONTINUED | OUTPATIENT
Start: 2022-07-27 | End: 2022-07-30 | Stop reason: HOSPADM

## 2022-07-27 RX ORDER — ENOXAPARIN SODIUM 100 MG/ML
30 INJECTION SUBCUTANEOUS EVERY 24 HOURS
Status: DISCONTINUED | OUTPATIENT
Start: 2022-07-27 | End: 2022-07-30 | Stop reason: HOSPADM

## 2022-07-27 RX ORDER — DEXTROSE MONOHYDRATE 100 MG/ML
INJECTION, SOLUTION INTRAVENOUS CONTINUOUS PRN
Status: DISCONTINUED | OUTPATIENT
Start: 2022-07-27 | End: 2022-07-30 | Stop reason: HOSPADM

## 2022-07-27 RX ORDER — ALBUTEROL SULFATE 90 UG/1
2 AEROSOL, METERED RESPIRATORY (INHALATION) EVERY 6 HOURS PRN
Status: DISCONTINUED | OUTPATIENT
Start: 2022-07-27 | End: 2022-07-30 | Stop reason: HOSPADM

## 2022-07-27 RX ORDER — SODIUM CHLORIDE 9 MG/ML
INJECTION, SOLUTION INTRAVENOUS PRN
Status: DISCONTINUED | OUTPATIENT
Start: 2022-07-27 | End: 2022-07-30 | Stop reason: HOSPADM

## 2022-07-27 RX ORDER — INSULIN LISPRO 100 [IU]/ML
0-8 INJECTION, SOLUTION INTRAVENOUS; SUBCUTANEOUS
Status: DISCONTINUED | OUTPATIENT
Start: 2022-07-28 | End: 2022-07-30 | Stop reason: HOSPADM

## 2022-07-27 RX ORDER — FUROSEMIDE 10 MG/ML
40 INJECTION INTRAMUSCULAR; INTRAVENOUS 2 TIMES DAILY
Status: DISCONTINUED | OUTPATIENT
Start: 2022-07-27 | End: 2022-07-28

## 2022-07-27 RX ORDER — ATORVASTATIN CALCIUM 40 MG/1
40 TABLET, FILM COATED ORAL DAILY
Status: DISCONTINUED | OUTPATIENT
Start: 2022-07-28 | End: 2022-07-30 | Stop reason: HOSPADM

## 2022-07-27 RX ORDER — POTASSIUM CHLORIDE 7.45 MG/ML
10 INJECTION INTRAVENOUS PRN
Status: DISCONTINUED | OUTPATIENT
Start: 2022-07-27 | End: 2022-07-29

## 2022-07-27 RX ORDER — ASPIRIN 81 MG/1
81 TABLET ORAL DAILY
Status: DISCONTINUED | OUTPATIENT
Start: 2022-07-28 | End: 2022-07-30 | Stop reason: HOSPADM

## 2022-07-27 RX ORDER — CARVEDILOL 12.5 MG/1
12.5 TABLET ORAL 2 TIMES DAILY WITH MEALS
Status: DISCONTINUED | OUTPATIENT
Start: 2022-07-28 | End: 2022-07-28

## 2022-07-27 RX ORDER — INSULIN LISPRO 100 [IU]/ML
0-4 INJECTION, SOLUTION INTRAVENOUS; SUBCUTANEOUS NIGHTLY
Status: DISCONTINUED | OUTPATIENT
Start: 2022-07-27 | End: 2022-07-29 | Stop reason: SDUPTHER

## 2022-07-27 RX ORDER — AMLODIPINE BESYLATE 5 MG/1
5 TABLET ORAL DAILY
Status: DISCONTINUED | OUTPATIENT
Start: 2022-07-28 | End: 2022-07-30

## 2022-07-27 RX ORDER — LABETALOL HYDROCHLORIDE 5 MG/ML
10 INJECTION, SOLUTION INTRAVENOUS EVERY 4 HOURS PRN
Status: DISCONTINUED | OUTPATIENT
Start: 2022-07-27 | End: 2022-07-30 | Stop reason: HOSPADM

## 2022-07-27 RX ORDER — ACETAMINOPHEN 650 MG/1
650 SUPPOSITORY RECTAL EVERY 6 HOURS PRN
Status: DISCONTINUED | OUTPATIENT
Start: 2022-07-27 | End: 2022-07-30 | Stop reason: HOSPADM

## 2022-07-27 RX ORDER — ACETAMINOPHEN 325 MG/1
650 TABLET ORAL EVERY 6 HOURS PRN
Status: DISCONTINUED | OUTPATIENT
Start: 2022-07-27 | End: 2022-07-30 | Stop reason: HOSPADM

## 2022-07-27 RX ORDER — HYDROCHLOROTHIAZIDE 25 MG/1
25 TABLET ORAL DAILY
COMMUNITY
End: 2022-08-02 | Stop reason: DRUGHIGH

## 2022-07-27 RX ADMIN — FUROSEMIDE 20 MG: 10 INJECTION, SOLUTION INTRAMUSCULAR; INTRAVENOUS at 14:30

## 2022-07-27 RX ADMIN — FUROSEMIDE 40 MG: 10 INJECTION, SOLUTION INTRAMUSCULAR; INTRAVENOUS at 21:30

## 2022-07-27 RX ADMIN — ACETAMINOPHEN 650 MG: 325 TABLET ORAL at 19:23

## 2022-07-27 RX ADMIN — NITROGLYCERIN 1 INCH: 20 OINTMENT TOPICAL at 14:30

## 2022-07-27 RX ADMIN — ENOXAPARIN SODIUM 30 MG: 100 INJECTION SUBCUTANEOUS at 21:33

## 2022-07-27 RX ADMIN — SODIUM CHLORIDE, PRESERVATIVE FREE 10 ML: 5 INJECTION INTRAVENOUS at 21:30

## 2022-07-27 RX ADMIN — HYDRALAZINE HYDROCHLORIDE 50 MG: 50 TABLET, FILM COATED ORAL at 21:28

## 2022-07-27 ASSESSMENT — PAIN DESCRIPTION - LOCATION
LOCATION: HEAD
LOCATION: CHEST
LOCATION: CHEST

## 2022-07-27 ASSESSMENT — PAIN DESCRIPTION - DESCRIPTORS: DESCRIPTORS: PRESSURE

## 2022-07-27 ASSESSMENT — HEART SCORE: ECG: 1

## 2022-07-27 ASSESSMENT — PAIN SCALES - GENERAL
PAINLEVEL_OUTOF10: 10
PAINLEVEL_OUTOF10: 10

## 2022-07-27 ASSESSMENT — LIFESTYLE VARIABLES: HOW OFTEN DO YOU HAVE A DRINK CONTAINING ALCOHOL: NEVER

## 2022-07-27 ASSESSMENT — PAIN - FUNCTIONAL ASSESSMENT: PAIN_FUNCTIONAL_ASSESSMENT: 0-10

## 2022-07-27 NOTE — ED PROVIDER NOTES
[facility]      Pt Name: Jimy Perez  MRN: [id number]  [place] 1955  Date of evaluation: 7/27/2022  Provider: Katherine Hu, [address] [place] [address]  Chief Complaint   Patient presents with    Shortness of Breath     SOB since 9am . C/o chest pain and dizziness. I have fully participated in the care of Jimy Perez and have had a face-to-face evaluation. I have reviewed and agree with all pertinent clinical information, and midlevel provider's history, and physical exam. I have also reviewed the labs and imaging studies and treatment plan. I have also reviewed and agree with the medications, allergies and past medical history section for this Jimy Perez. I agree with the diagnosis, and I concur. I wore personal protective equipment when I was in the room the entire time. This includes gloves, N95 mask, face shield, and a glove over my stethoscope for protection. Past Medical History:   Diagnosis Date    Ankle fracture, left 01/17/2014    CHF (congestive heart failure) ([facility])     CKD (chronic kidney disease) stage 4, GFR 15-29 ml/min ([facility])     Diabetes mellitus ([facility] [address]     HTN (hypertension), benign 01/17/2014    Hypertension        MDM:  Jimy Perez is a 74 y.o. female who presents with shortness of breath. She was discharged from the hospital in the past few days. She states that she was restricted her fluids in the hospital but does not restrict them at home. She has not had time to set that up at home. She has a history of congestive heart failure. She denies a history of COPD. She is also having chest pain and describes as a sharp sensation that increases with a deep breath. She denies any fevers or chills. She denies any coughing. Nothing makes it better or worse.   Physical exam reveals the patient be dyspneic at rest.  She is speaking in 8-10 word sentences at rest.  Heart is regular rate and rhythm without murmurs clicks or rubs. Lungs are clear to auscultation and equal bilaterally. She does have occasional end expiratory wheezing. There are no rales auscultated. Abdomen soft and nontender. Extremities there is 3+ edema of her lower extremities. Pulse ox was 86% on arrival to the emergency department. Due to patient's hypoxia I feel patient needs admitted to the hospital for further evaluation and treatment. Her COVID test was negative. The patient's hypoxia and dyspnea at rest I feel patient needs admitted to the hospital for further evaluation and treatment. Hospitalist was notified by the physician assistant.     Vitals:    07/27/22 1600   BP: (!) 170/83   Pulse: 91   Resp: 18   Temp:    SpO2: 96%       Lab results  Labs Reviewed   CBC WITH AUTO DIFFERENTIAL - Abnormal; Notable for the following components:       Result Value    RBC 3.60 (*)     Hemoglobin 10.1 (*)     Hematocrit 30.2 (*)     RDW 15.7 (*)     All other components within normal limits   BASIC METABOLIC PANEL W/ REFLEX TO MG FOR LOW K - Abnormal; Notable for the following components:    Glucose 183 (*)     BUN 29 (*)     Creatinine 1.9 (*)     GFR Non- 27 (*)     GFR  32 (*)     All other components within normal limits   BRAIN NATRIURETIC PEPTIDE - Abnormal; Notable for the following components:    Pro-BNP 2,790 (*)     All other components within normal limits   TROPONIN - Abnormal; Notable for the following components:    Troponin 0.03 (*)     All other components within normal limits   COMPREHENSIVE METABOLIC PANEL       Radiology results  XR CHEST (2 VW)   Final Result   Findings again suggest congestive heart failure             Medications   nitroglycerin (NITRO-BID) 2 % ointment 1 inch (1 inch Topical Given 7/27/22 1430)   furosemide (LASIX) injection 20 mg (20 mg IntraVENous Given 7/27/22 1430)       New Prescriptions    No medications on file       The patient's blood pressure was found to be elevated according to CMS/Medicare and the Affordable Care Act/ObamaCare criteria. Elevated blood pressure could occur because of pain or anxiety or other reasons and does not mean that they need to have their blood pressure treated or medications otherwise adjusted. However, this could also be a sign that they will need to have their blood pressure treated or medications changed. The patient was instructed to follow up closely with their personal physician to have their blood pressure rechecked. The patient was instructed to take a list of recent blood pressure readings to their next visit with their personal physician. IMPRESSIONS:  1. Acute on chronic congestive heart failure, unspecified heart failure type ([facility] [address]    2.  Acute pulmonary edema (HCC)    3. Chest pain, unspecified type               Eyal Mackenzie, DO  07/27/22 [address] [place] [address],   07/27/22 1917

## 2022-07-27 NOTE — PROGRESS NOTES
Medication Reconciliation     List of medications patient is currently taking is complete. Source of information: 1. Conversation with patient at bedside                                      2. EPIC records      Allergies  Patient has no known allergies. Notes regarding home medications:   1. Patient received all of her morning home medications prior to arrival to the emergency department today.      2. Seen in ED last week and started on torsemide and spironolactone; also takes HCTZ daily    Vielka Rehman, Pharmacy Intern  7/27/2022 4:19 PM'

## 2022-07-27 NOTE — ED NOTES
Unable to find patients home medications that patient brought with her to hospital. Staff from triage reports seeing medications with patient in room E and Charge nurse notified     Mode Raygoza RN  07/27/22 [phone]

## 2022-07-27 NOTE — ED TRIAGE NOTES
Pt arrived to dept via private vehicle with self. Pt c/o SOB, chest pain 8/10, and dizziness. Pt awake, alert and oriented x 3. Skin warm and dry/normal color for ethnicity. Resp easy and unlabored. EKG complete. Pt placed in gown and on cardiac monitor. Call light in reach. Will continue to monitor.

## 2022-07-27 NOTE — ED PROVIDER NOTES
EMERGENCY DEPARTMENT ENCOUNTER      Pt Name: Jose A Dickerson  MRN: [id number]  [place] 1955  Date of evaluation: 7/27/2022  Provider: WAI Pizarro       I have seen and evaluated this patient with my supervising physician Dr. Rand Priest. CHIEF COMPLAINT     SOB      HISTORY OF PRESENT ILLNESS  (Location/Symptom, Timing/Onset, Context/Setting, Quality, Duration, Modifying Factors, Severity.)   Jose A Dickerson is a 74 y.o. female who presents to the emergency department shortness of breath that started at 9 AM.  She woke up with it. She was just discharged yesterday for CHF exacerbation. She reports symptoms are similar to when she was admitted but not quite as bad. Also ports a left sided and substernal chest pain and has been intermittent since 9 AM.  No alleviating or aggravating factors. Reports that was also similar to the last admission. Denies fever, chills, cough, nausea, vomiting,, abdominal pain. She has history of CHF, CHLOE, CKD, diabetes, hyperlipidemia. Denies personal history of MI or CAD    Nursing Notes were reviewed and I agree. REVIEW OF SYSTEMS    (2-9 systems for level 4, 10 or more for level 5)     Review of Systems    All other systems reviewed and are negative except as noted    PAST MEDICAL HISTORY         Diagnosis Date    Ankle fracture, left 01/17/2014    CHF (congestive heart failure) (HCC)     CKD (chronic kidney disease) stage 4, GFR 15-29 ml/min (HCC)     Diabetes mellitus ([facility] [address]     HTN (hypertension), benign 01/17/2014    Hypertension        SURGICAL HISTORY           Procedure Laterality Date    FRACTURE SURGERY Left 01/17/2014    orif tibula/fibula fixation    HYSTERECTOMY (CERVIX STATUS UNKNOWN)         CURRENT MEDICATIONS       Current Discharge Medication List        CONTINUE these medications which have NOT CHANGED    Details   hydroCHLOROthiazide (HYDRODIURIL) 25 MG tablet Take 25 mg by mouth in the morning.       spironolactone (ALDACTONE) 25 MG tablet Take 1 tablet by mouth in the morning. Qty: 30 tablet, Refills: 3      torsemide (DEMADEX) 10 MG tablet Take 1 tablet by mouth in the morning. Qty: 30 tablet, Refills: 3      atorvastatin (LIPITOR) 40 MG tablet Take 1 tablet by mouth daily  Qty: 30 tablet, Refills: 11      carvedilol (COREG) 12.5 MG tablet Take 1 tablet by mouth 2 times daily (with meals)  Qty: 60 tablet, Refills: 11      clopidogrel (PLAVIX) 75 MG tablet Take 1 tablet by mouth daily  Qty: 30 tablet, Refills: 11      isosorbide mononitrate (IMDUR) 30 MG extended release tablet Take 1 tablet by mouth daily  Qty: 30 tablet, Refills: 11      aspirin 81 MG EC tablet Take 1 tablet by mouth daily  Qty: 30 tablet, Refills: 11      amLODIPine (NORVASC) 5 MG tablet Take 1 tablet by mouth daily  Qty: 30 tablet, Refills: 11      doxazosin (CARDURA) 4 MG tablet Take 1 tablet by mouth daily  Qty: 30 tablet, Refills: 1      hydrALAZINE (APRESOLINE) 50 MG tablet Take 1 tablet by mouth every 8 hours  Qty: 90 tablet, Refills: 1      albuterol sulfate HFA (PROVENTIL HFA) 108 (90 Base) MCG/ACT inhaler Inhale 2 puffs into the lungs every 6 hours as needed for Wheezing (Cough)  Qty: 18 g, Refills: 3      Cholecalciferol (VITAMIN D3) 125 MCG (5000 UT) CAPS Take 2 capsules by mouth once a week      L-METHYLFOLATE-B6-B12 PO Take 1 tablet by mouth daily             ALLERGIES     Patient has no known allergies. FAMILY HISTORY     History reviewed. No pertinent family history. No family status information on file. SOCIAL HISTORY      reports that she has never smoked. She has never used smokeless tobacco. She reports that she does not drink alcohol and does not use drugs.     PHYSICAL EXAM    (up to 7 for level 4, 8 or more for level 5)     ED Triage Vitals [07/27/22 1109]   BP Temp Temp src Pulse Resp SpO2 Height Weight   -- -- -- -- -- -- 5' (1.524 m) 174 lb 6.1 oz (79.1 kg)       Physical Exam  Constitutional:       General: She is not in acute distress. Appearance: Normal appearance. She is well-developed. She is not ill-appearing, toxic-appearing or diaphoretic. HENT:      Head: Normocephalic and atraumatic. Cardiovascular:      Rate and Rhythm: Normal rate and regular rhythm. Heart sounds: Normal heart sounds. Pulmonary:      Effort: Pulmonary effort is normal. No respiratory distress. Breath sounds: Normal breath sounds. Abdominal:      General: There is no distension. Palpations: Abdomen is soft. There is no mass. Tenderness: There is no abdominal tenderness. There is no guarding or rebound. Hernia: No hernia is present. Musculoskeletal:         General: Normal range of motion. Cervical back: Normal range of motion and neck supple. Right lower leg: Edema present. Left lower leg: Edema present. Comments: 1+ pitting edema bilateral lower extremities that is symmetic   Skin:     General: Skin is warm. Neurological:      Mental Status: She is alert. Psychiatric:         Mood and Affect: Mood normal.         Behavior: Behavior normal.         Thought Content:  Thought content normal.         Judgment: Judgment normal.       DIFFERENTIAL DIAGNOSIS   COPD/Asthma Exacerbation, Pneumothorax, CHF, Pneumonia, Pulmonary Embolism, Anemia  Acute Myocardial Infarction, Acute Coronary Syndrome, Pneumothorax, Aortic Dissection, Pulmonary Embolism, Pneumonia      DIAGNOSTICRESULTS         RADIOLOGY:   Non-plain film images such as CT, Ultrasound and MRI are read by the radiologist. Plain radiographic images are visualized and preliminarily interpreted by WAI Bishop with the below findings:      Interpretation per the Radiologist below, if available at the time of this note:    XR CHEST (2 VW)   Final Result   Findings again suggest congestive heart failure               LABS:  Results for orders placed or performed during the hospital encounter of 07/27/22   CBC with Auto Differential   Result Value Ref Range    WBC 5.9 4.0 - 11.0 K/uL    RBC 3.60 (L) 4.00 - 5.20 M/uL    Hemoglobin 10.1 (L) 12.0 - 16.0 g/dL    Hematocrit 30.2 (L) 36.0 - 48.0 %    MCV 83.9 80.0 - 100.0 fL    MCH 28.0 26.0 - 34.0 pg    MCHC 33.4 31.0 - 36.0 g/dL    RDW 15.7 (H) 12.4 - 15.4 %    Platelets 298 626 - 401 K/uL    MPV 8.0 5.0 - 10.5 fL    Neutrophils % 66.4 %    Lymphocytes % 23.6 %    Monocytes % 7.5 %    Eosinophils % 1.6 %    Basophils % 0.9 %    Neutrophils Absolute 3.9 1.7 - 7.7 K/uL    Lymphocytes Absolute 1.4 1.0 - 5.1 K/uL    Monocytes Absolute 0.4 0.0 - 1.3 K/uL    Eosinophils Absolute 0.1 0.0 - 0.6 K/uL    Basophils Absolute 0.1 0.0 - 0.2 K/uL   Basic Metabolic Panel w/ Reflex to MG   Result Value Ref Range    Sodium 140 136 - 145 mmol/L    Potassium reflex Magnesium 3.8 3.5 - 5.1 mmol/L    Chloride 100 99 - 110 mmol/L    CO2 29 21 - 32 mmol/L    Anion Gap 11 3 - 16    Glucose 183 (H) 70 - 99 mg/dL    BUN 29 (H) 7 - 20 mg/dL    Creatinine 1.9 (H) 0.6 - 1.2 mg/dL    GFR Non-African American 27 (A) >60    GFR  32 (A) >60    Calcium 9.4 8.3 - 10.6 mg/dL   Brain Natriuretic Peptide   Result Value Ref Range    Pro-BNP 2,790 (H) 0 - 124 pg/mL   Troponin   Result Value Ref Range    Troponin 0.03 (H) <0.01 ng/mL   Comprehensive Metabolic Panel   Result Value Ref Range    Sodium 142 136 - 145 mmol/L    Potassium 3.7 3.5 - 5.1 mmol/L    Chloride 102 99 - 110 mmol/L    CO2 31 21 - 32 mmol/L    Anion Gap 9 3 - 16    Glucose 141 (H) 70 - 99 mg/dL    BUN 25 (H) 7 - 20 mg/dL    Creatinine 1.6 (H) 0.6 - 1.2 mg/dL    GFR Non- 32 (A) >60    GFR  39 (A) >60    Calcium 9.2 8.3 - 10.6 mg/dL    Total Protein 7.0 6.4 - 8.2 g/dL    Albumin 3.6 3.4 - 5.0 g/dL    Albumin/Globulin Ratio 1.1 1.1 - 2.2    Total Bilirubin 0.5 0.0 - 1.0 mg/dL    Alkaline Phosphatase 113 40 - 129 U/L    ALT 19 10 - 40 U/L    AST 19 15 - 37 U/L   POCT Glucose   Result Value Ref Range    POC Glucose 167 (H) 70 - 99 mg/dl Performed on [employer]    EKG 12 Lead   Result Value Ref Range    Ventricular Rate 93 BPM    Atrial Rate 93 BPM    P-R Interval 142 ms    QRS Duration 86 ms    Q-T Interval 384 ms    QTc Calculation (Bazett) 477 ms    P Axis 57 degrees    R Axis 20 degrees    T Axis 97 degrees    Diagnosis       Normal sinus rhythmCannot rule out Anterior infarct (cited on or before 27-JUL-2022)Abnormal ECGWhen compared with ECG of 22-JUL-2022 04:26,No significant change was found       All other labs were withinnormal range or not returned as of this dictation. EMERGENCY DEPARTMENT COURSE and DIFFERENTIAL DIAGNOSIS/MDM:   Vitals:    Vitals:    07/27/22 1700 07/27/22 1813 07/27/22 1959 07/27/22 2126   BP: (!) 158/74 (!) 196/80 (!) 172/65 (!) 149/75   Pulse: 89 96 89 86   Resp: 18  16    Temp:  97.7 °F (36.5 °C) 98.7 °F (37.1 °C)    TempSrc:  Oral Oral    SpO2: 98% 100% 99%    Weight:       Height:           Patient was nontoxic, well appearing, afebrile with normal vital signs. With the exception of HTN Saturating well on room air. Patient was admitted 5 days ago for shortness of breath and was discharged yesterday. Reviewed hospitalist note from 7/25/2022. Had acute on chronic CHF with acute pulmonary edema with hypoxia. Was diuresed with Lasix. Also had hypertensive urgency and chronically elevated trop. Also has CHLOE on CKD. Today, metabolic panel remarkable for creatinine 1.9 consistent with prior. BNP 2700. Trop 0.03, consistent with prior. CXR shows CHF. Believe she requires admission. She was given nitropaste and lasix.,        Is this patient to be included in the SEP-1 Core Measure due to severe sepsis or septic shock? No   Exclusion criteria - the patient is NOT to be included for SEP-1 Core Measure due to: Infection is not suspected           PROCEDURES:  None    FINAL IMPRESSION      1. Acute on chronic congestive heart failure, unspecified heart failure type ([facility] [address]    2.  Acute pulmonary edema (HCC) 3. Chest pain, unspecified type          DISPOSITION/PLAN   DISPOSITION Admitted 07/27/2022 04:38:30 PM      PATIENT REFERRED TO:  No follow-up provider specified.     DISCHARGE MEDICATIONS:  Current Discharge Medication List          (Please note that portions of this note werecompleted with a voice recognition program.  Efforts were made to edit the dictations but occasionally words are mis-transcribed.)    Valencia Greene, [address] [address]  07/28/22 7907

## 2022-07-27 NOTE — ED NOTES
Report called to [facility] RN on 4w and patient taken to floor per er staff     Mitul Perez RN  07/27/22 [id number]

## 2022-07-27 NOTE — ED TRIAGE NOTES
Sob for last few hours hx of chf having increased swelling noted to have periorbital edema at this time.

## 2022-07-28 LAB
ANION GAP SERPL CALCULATED.3IONS-SCNC: 10 MMOL/L (ref 3–16)
BASOPHILS ABSOLUTE: 0.1 K/UL (ref 0–0.2)
BASOPHILS RELATIVE PERCENT: 1.1 %
BUN BLDV-MCNC: 26 MG/DL (ref 7–20)
CALCIUM SERPL-MCNC: 8.7 MG/DL (ref 8.3–10.6)
CHLORIDE BLD-SCNC: 101 MMOL/L (ref 99–110)
CO2: 31 MMOL/L (ref 21–32)
CREAT SERPL-MCNC: 1.7 MG/DL (ref 0.6–1.2)
EKG ATRIAL RATE: 93 BPM
EKG DIAGNOSIS: NORMAL
EKG P AXIS: 57 DEGREES
EKG P-R INTERVAL: 142 MS
EKG Q-T INTERVAL: 384 MS
EKG QRS DURATION: 86 MS
EKG QTC CALCULATION (BAZETT): 477 MS
EKG R AXIS: 20 DEGREES
EKG T AXIS: 97 DEGREES
EKG VENTRICULAR RATE: 93 BPM
EOSINOPHILS ABSOLUTE: 0.2 K/UL (ref 0–0.6)
EOSINOPHILS RELATIVE PERCENT: 3.2 %
ESTIMATED AVERAGE GLUCOSE: 162.8 MG/DL
GFR AFRICAN AMERICAN: 36
GFR NON-AFRICAN AMERICAN: 30
GLUCOSE BLD-MCNC: 124 MG/DL (ref 70–99)
GLUCOSE BLD-MCNC: 139 MG/DL (ref 70–99)
GLUCOSE BLD-MCNC: 149 MG/DL (ref 70–99)
GLUCOSE BLD-MCNC: 154 MG/DL (ref 70–99)
GLUCOSE BLD-MCNC: 202 MG/DL (ref 70–99)
HBA1C MFR BLD: 7.3 %
HCT VFR BLD CALC: 28 % (ref 36–48)
HEMOGLOBIN: 9.2 G/DL (ref 12–16)
LYMPHOCYTES ABSOLUTE: 1.7 K/UL (ref 1–5.1)
LYMPHOCYTES RELATIVE PERCENT: 33.7 %
MAGNESIUM: 1.6 MG/DL (ref 1.8–2.4)
MCH RBC QN AUTO: 28.2 PG (ref 26–34)
MCHC RBC AUTO-ENTMCNC: 32.9 G/DL (ref 31–36)
MCV RBC AUTO: 85.7 FL (ref 80–100)
MONOCYTES ABSOLUTE: 0.5 K/UL (ref 0–1.3)
MONOCYTES RELATIVE PERCENT: 9.5 %
NEUTROPHILS ABSOLUTE: 2.7 K/UL (ref 1.7–7.7)
NEUTROPHILS RELATIVE PERCENT: 52.5 %
PDW BLD-RTO: 15.9 % (ref 12.4–15.4)
PERFORMED ON: ABNORMAL
PLATELET # BLD: 229 K/UL (ref 135–450)
PMV BLD AUTO: 8 FL (ref 5–10.5)
POTASSIUM REFLEX MAGNESIUM: 3.4 MMOL/L (ref 3.5–5.1)
RBC # BLD: 3.27 M/UL (ref 4–5.2)
SODIUM BLD-SCNC: 142 MMOL/L (ref 136–145)
WBC # BLD: 5.1 K/UL (ref 4–11)

## 2022-07-28 PROCEDURE — 36415 COLL VENOUS BLD VENIPUNCTURE: CPT

## 2022-07-28 PROCEDURE — 83036 HEMOGLOBIN GLYCOSYLATED A1C: CPT

## 2022-07-28 PROCEDURE — 99255 IP/OBS CONSLTJ NEW/EST HI 80: CPT | Performed by: INTERNAL MEDICINE

## 2022-07-28 PROCEDURE — 1200000000 HC SEMI PRIVATE

## 2022-07-28 PROCEDURE — 6360000002 HC RX W HCPCS: Performed by: INTERNAL MEDICINE

## 2022-07-28 PROCEDURE — 2580000003 HC RX 258: Performed by: INTERNAL MEDICINE

## 2022-07-28 PROCEDURE — 97116 GAIT TRAINING THERAPY: CPT

## 2022-07-28 PROCEDURE — 93010 ELECTROCARDIOGRAM REPORT: CPT | Performed by: INTERNAL MEDICINE

## 2022-07-28 PROCEDURE — 6370000000 HC RX 637 (ALT 250 FOR IP): Performed by: INTERNAL MEDICINE

## 2022-07-28 PROCEDURE — 94761 N-INVAS EAR/PLS OXIMETRY MLT: CPT

## 2022-07-28 PROCEDURE — 97162 PT EVAL MOD COMPLEX 30 MIN: CPT

## 2022-07-28 PROCEDURE — 97530 THERAPEUTIC ACTIVITIES: CPT

## 2022-07-28 PROCEDURE — 97535 SELF CARE MNGMENT TRAINING: CPT

## 2022-07-28 PROCEDURE — 83735 ASSAY OF MAGNESIUM: CPT

## 2022-07-28 PROCEDURE — 80048 BASIC METABOLIC PNL TOTAL CA: CPT

## 2022-07-28 PROCEDURE — 97166 OT EVAL MOD COMPLEX 45 MIN: CPT

## 2022-07-28 PROCEDURE — 2700000000 HC OXYGEN THERAPY PER DAY

## 2022-07-28 PROCEDURE — 85025 COMPLETE CBC W/AUTO DIFF WBC: CPT

## 2022-07-28 RX ORDER — FUROSEMIDE 10 MG/ML
80 INJECTION INTRAMUSCULAR; INTRAVENOUS 2 TIMES DAILY
Status: DISCONTINUED | OUTPATIENT
Start: 2022-07-28 | End: 2022-07-30 | Stop reason: HOSPADM

## 2022-07-28 RX ORDER — CARVEDILOL 25 MG/1
25 TABLET ORAL 2 TIMES DAILY WITH MEALS
Status: DISCONTINUED | OUTPATIENT
Start: 2022-07-28 | End: 2022-07-30 | Stop reason: HOSPADM

## 2022-07-28 RX ORDER — POTASSIUM CHLORIDE 20 MEQ/1
20 TABLET, EXTENDED RELEASE ORAL 2 TIMES DAILY WITH MEALS
Status: DISCONTINUED | OUTPATIENT
Start: 2022-07-28 | End: 2022-07-30 | Stop reason: HOSPADM

## 2022-07-28 RX ORDER — LANOLIN ALCOHOL/MO/W.PET/CERES
400 CREAM (GRAM) TOPICAL 2 TIMES DAILY
Status: DISCONTINUED | OUTPATIENT
Start: 2022-07-28 | End: 2022-07-30 | Stop reason: HOSPADM

## 2022-07-28 RX ADMIN — INSULIN LISPRO 2 UNITS: 100 INJECTION, SOLUTION INTRAVENOUS; SUBCUTANEOUS at 12:28

## 2022-07-28 RX ADMIN — CARVEDILOL 25 MG: 25 TABLET, FILM COATED ORAL at 17:43

## 2022-07-28 RX ADMIN — CLOPIDOGREL BISULFATE 75 MG: 75 TABLET ORAL at 09:11

## 2022-07-28 RX ADMIN — ENOXAPARIN SODIUM 30 MG: 100 INJECTION SUBCUTANEOUS at 21:21

## 2022-07-28 RX ADMIN — ATORVASTATIN CALCIUM 40 MG: 40 TABLET, FILM COATED ORAL at 09:11

## 2022-07-28 RX ADMIN — DOXAZOSIN 4 MG: 4 TABLET ORAL at 09:11

## 2022-07-28 RX ADMIN — FUROSEMIDE 40 MG: 10 INJECTION, SOLUTION INTRAMUSCULAR; INTRAVENOUS at 09:12

## 2022-07-28 RX ADMIN — POTASSIUM CHLORIDE 20 MEQ: 1500 TABLET, EXTENDED RELEASE ORAL at 17:43

## 2022-07-28 RX ADMIN — FUROSEMIDE 80 MG: 10 INJECTION, SOLUTION INTRAMUSCULAR; INTRAVENOUS at 17:43

## 2022-07-28 RX ADMIN — HYDROCHLOROTHIAZIDE 25 MG: 25 TABLET ORAL at 09:10

## 2022-07-28 RX ADMIN — HYDRALAZINE HYDROCHLORIDE 50 MG: 50 TABLET, FILM COATED ORAL at 15:02

## 2022-07-28 RX ADMIN — SODIUM CHLORIDE, PRESERVATIVE FREE 10 ML: 5 INJECTION INTRAVENOUS at 09:13

## 2022-07-28 RX ADMIN — AMLODIPINE BESYLATE 5 MG: 5 TABLET ORAL at 09:11

## 2022-07-28 RX ADMIN — ACETAMINOPHEN 650 MG: 325 TABLET ORAL at 04:22

## 2022-07-28 RX ADMIN — ISOSORBIDE MONONITRATE 30 MG: 30 TABLET, EXTENDED RELEASE ORAL at 09:11

## 2022-07-28 RX ADMIN — Medication 400 MG: at 21:21

## 2022-07-28 RX ADMIN — SODIUM CHLORIDE, PRESERVATIVE FREE 10 ML: 5 INJECTION INTRAVENOUS at 21:31

## 2022-07-28 RX ADMIN — HYDRALAZINE HYDROCHLORIDE 50 MG: 50 TABLET, FILM COATED ORAL at 21:21

## 2022-07-28 RX ADMIN — ACETAMINOPHEN 650 MG: 325 TABLET ORAL at 21:35

## 2022-07-28 RX ADMIN — ASPIRIN 81 MG: 81 TABLET, COATED ORAL at 09:11

## 2022-07-28 RX ADMIN — CARVEDILOL 12.5 MG: 12.5 TABLET, FILM COATED ORAL at 09:11

## 2022-07-28 RX ADMIN — HYDRALAZINE HYDROCHLORIDE 50 MG: 50 TABLET, FILM COATED ORAL at 04:23

## 2022-07-28 RX ADMIN — SPIRONOLACTONE 25 MG: 25 TABLET ORAL at 09:11

## 2022-07-28 ASSESSMENT — PAIN DESCRIPTION - ORIENTATION: ORIENTATION: RIGHT;LEFT

## 2022-07-28 ASSESSMENT — PAIN DESCRIPTION - PAIN TYPE: TYPE: ACUTE PAIN

## 2022-07-28 ASSESSMENT — PAIN DESCRIPTION - DESCRIPTORS: DESCRIPTORS: ACHING;THROBBING

## 2022-07-28 ASSESSMENT — PAIN SCALES - GENERAL
PAINLEVEL_OUTOF10: 8
PAINLEVEL_OUTOF10: 0
PAINLEVEL_OUTOF10: 0

## 2022-07-28 ASSESSMENT — PAIN - FUNCTIONAL ASSESSMENT: PAIN_FUNCTIONAL_ASSESSMENT: ACTIVITIES ARE NOT PREVENTED

## 2022-07-28 ASSESSMENT — PAIN DESCRIPTION - LOCATION: LOCATION: HEAD

## 2022-07-28 ASSESSMENT — PAIN DESCRIPTION - ONSET: ONSET: ON-GOING

## 2022-07-28 ASSESSMENT — PAIN DESCRIPTION - FREQUENCY: FREQUENCY: CONTINUOUS

## 2022-07-28 NOTE — H&P
Hospital Medicine History & Physical      PCP: Ashlyn Cabrera MD    Date of Admission: 7/27/2022    Chief Complaint:  SOB    History Of Present Illness:    Patient is a 82-year-old female with past medical history of diabetes mellitus who presents to the hospital due to shortness of breath. According to patient she was recently admitted and discharged from the hospital regarding same complaint of shortness of breath, she woke up this morning with shortness of breath, she also felt some chest pain as well. Patient currently on at time of my evaluation mentioned she was brought to the hospital by her son. Patient denied fevers chills nausea vomiting diarrhea constipation dysuria. Past Medical History:          Diagnosis Date    Ankle fracture, left 01/17/2014    CHF (congestive heart failure) ([facility])     CKD (chronic kidney disease) stage 4, GFR 15-29 ml/min ([facility])     Diabetes mellitus ([address]     HTN (hypertension), benign 01/17/2014    Hypertension        Past Surgical History:          Procedure Laterality Date    FRACTURE SURGERY Left 01/17/2014    orif tibula/fibula fixation    HYSTERECTOMY (CERVIX STATUS UNKNOWN)         Medications Prior to Admission:      Prior to Admission medications    Medication Sig Start Date End Date Taking? Authorizing Provider   hydroCHLOROthiazide (HYDRODIURIL) 25 MG tablet Take 25 mg by mouth in the morning. Yes Historical Provider, MD   spironolactone (ALDACTONE) 25 MG tablet Take 1 tablet by mouth in the morning. 7/26/22   Vita Bourgeois MD   torsemide (DEMADEX) 10 MG tablet Take 1 tablet by mouth in the morning.  7/26/22   Vita Bourgeois MD   atorvastatin (LIPITOR) 40 MG tablet Take 1 tablet by mouth daily 6/16/22   Jackelin Fernandez MD   carvedilol (COREG) 12.5 MG tablet Take 1 tablet by mouth 2 times daily (with meals) 6/16/22   Jackelin Fernandez MD   clopidogrel (PLAVIX) 75 MG tablet Take 1 tablet by mouth daily 6/16/22   Jackelin Fernandez MD   isosorbide mononitrate (IMDUR) 30 MG extended release tablet Take 1 tablet by mouth daily 6/16/22   Len Chowdhury MD   aspirin 81 MG EC tablet Take 1 tablet by mouth daily 6/16/22   Len Chowdhury MD   amLODIPine (NORVASC) 5 MG tablet Take 1 tablet by mouth daily 6/16/22   Len Chowdhury MD   doxazosin (CARDURA) 4 MG tablet Take 1 tablet by mouth daily 5/21/22   Bernie Philmont, DO   hydrALAZINE (APRESOLINE) 50 MG tablet Take 1 tablet by mouth every 8 hours 5/20/22   Bernei Philmont, DO   albuterol sulfate HFA (PROVENTIL HFA) 108 (90 Base) MCG/ACT inhaler Inhale 2 puffs into the lungs every 6 hours as needed for Wheezing (Cough) 5/6/22   Jose Boyd MD   Cholecalciferol (VITAMIN D3) 125 MCG (5000 UT) CAPS Take 2 capsules by mouth once a week    Historical Provider, MD   L-METHYLFOLATE-B6-B12 PO Take 1 tablet by mouth daily    Historical Provider, MD       Allergies:  Patient has no known allergies. Social History:      TOBACCO:   reports that she has never smoked. She has never used smokeless tobacco.  ETOH:   reports no history of alcohol use. Family History:       Reviewed in detail and non contributory      History reviewed. No pertinent family history. REVIEW OF SYSTEMS:   Pertinent positives as noted in the HPI. All other systems reviewed and negative. PHYSICAL EXAM PERFORMED:    BP (!) 172/65   Pulse 89   Temp 98.7 °F (37.1 °C) (Oral)   Resp 16   Ht 5' (1.524 m)   Wt 174 lb 6.1 oz (79.1 kg)   SpO2 99%   BMI 34.06 kg/m²     General appearance:  No apparent distress, cooperative. HEENT:  Normal cephalic, atraumatic without obvious deformity. Conjunctivae/corneas clear. Neck: Supple, with full range of motion. No cervical lymphadenopathy  Respiratory:  Normal respiratory effort. Clear to auscultation, bilaterally without Rales/Wheezes/Rhonchi. Cardiovascular:  Regular rate and rhythm with normal S1/S2 without murmurs, rubs or gallops.   Abdomen: Soft, non-tender, non-distended, normal bowel sounds. Musculoskeletal:  +1 edema noted bilaterally. No tenderness on palpation   Skin: no rash visible  Neurologic:  Neurologically intact without any focal sensory/motor deficits. grossly non-focal.  Psychiatric:  Alert and oriented, normal mood  Peripheral Pulses: +2 palpable, equal bilaterally       Labs:     Recent Labs     07/25/22  0545 07/27/22  1115   WBC 4.9 5.9   HGB 9.1* 10.1*   HCT 27.2* 30.2*    250     Recent Labs     07/26/22  0603 07/27/22  1115 07/27/22  1839    140 142   K 3.7 3.8 3.7    100 102   CO2 27 29 31   BUN 27* 29* 25*   CREATININE 1.7* 1.9* 1.6*   CALCIUM 9.0 9.4 9.2   PHOS 4.2  --   --      Recent Labs     07/27/22  1839   AST 19   ALT 19   BILITOT 0.5   ALKPHOS 113     No results for input(s): INR in the last 72 hours. Recent Labs     07/27/22  1115   TROPONINI 0.03*       Urinalysis:      Lab Results   Component Value Date/Time    NITRU Negative 07/24/2022 02:45 PM    WBCUA 1 07/24/2022 02:45 PM    BACTERIA None Seen 07/24/2022 02:45 PM    RBCUA 0 07/24/2022 02:45 PM    RBCUA 0-2 05/09/2022 10:30 AM    BLOODU Negative 07/24/2022 02:45 PM    SPECGRAV 1.011 07/24/2022 02:45 PM    GLUCOSEU Negative 07/24/2022 02:45 PM       Radiology:       XR CHEST (2 VW)   Final Result   Findings again suggest congestive heart failure                 Active Hospital Problems    Diagnosis Date Noted    SOB (shortness of breath) [R06.02] 07/27/2022     Priority: Medium         Patient is a 68-year-old female with past medical history of diabetes mellitus who presents to the hospital due to shortness of breath. According to patient she was recently admitted and discharged from the hospital regarding same complaint of shortness of breath, she woke up this morning with shortness of breath, she also felt some chest pain as well. Patient currently on at time of my evaluation mentioned she was brought to the hospital by her son.   Patient denied fevers chills nausea vomiting diarrhea constipation dysuria. Assessment  Acute on chronic diastolic CHF  Elevated troponin, chronically elevated-patient is chest pain-free  CKD  Diabetes mellitus  Hypertension      Plan  Started on 40 IV twice daily Lasix, chest x-ray performed in ED does show findings suggestive of CHF  Consult nephrology  Consult cardiology  Monitor on cardiac telemetry  Monitor and replace electrolytes  Insulin sliding scale  Resume home medications    Limited echo 5/2022  Wall thickness was within normal limits. Ejection fraction was estimated at 55-60%. DVT Prophylaxis: Lovenox  Diet: ADULT DIET; Regular; Low Sodium (2 gm); 1500 ml  Code Status: Full Code    PT/OT Eval Status: ordered    Dispo - pending clinical improvement       Marcos Jones MD    The note was completed using EMR and Dragon dictation system. Every effort was made to ensure accuracy; however, inadvertent computerized transcription errors may be present. Thank you Montez Short MD for the opportunity to be involved in this patient's care. If you have any questions or concerns please feel free to contact me at [phone].     Marcos Jones MD

## 2022-07-28 NOTE — CARE COORDINATION
07/28/22 1110   Readmission Assessment   Number of Days since last admission? 1-7 days   Previous Disposition Home with Family   Who is being Interviewed Patient   What was the patient's/caregiver's perception as to why they think they needed to return back to the hospital? Other (Comment)  (Shortness of breath)   Did you visit your Primary Care Physician after you left the hospital, before you returned this time? No   Why weren't you able to visit your PCP? Did not have an appointment   Who advised the patient to return to the hospital? Self-referral   Does the patient report anything that got in the way of taking their medications? No   In our efforts to provide the best possible care to you and others like you, can you think of anything that we could have done to help you after you left the hospital the first time, so that you might not have needed to return so soon? Other (Comment)  (\"Maybe I needed oxygen at home. \")   RANDELL Kay \[facility]\""  [phone]  Electronically signed by Herlinda Le on 7/28/2022 at 11:12 AM

## 2022-07-28 NOTE — PROGRESS NOTES
Hospitalist Progress Note      PCP: Rachel Dhillon MD    Date of Admission: 7/27/2022    Chief Complaint:   SOB    Hospital Course:    Patient is a 72-year-old female with past medical history of diabetes mellitus who presents to the hospital due to shortness of breath. According to patient she was recently admitted and discharged from the hospital regarding same complaint of shortness of breath, she woke up this morning with shortness of breath, she also felt some chest pain as well. Patient currently on at time of my evaluation mentioned she was brought to the hospital by her son. Patient denied fevers chills nausea vomiting diarrhea constipation dysuria.       Subjective:    SOBOE  Orthopnea  SOA      Medications:  Reviewed    Infusion Medications    sodium chloride      dextrose      dextrose       Scheduled Medications    sodium chloride flush  10 mL IntraVENous 2 times per day    enoxaparin  30 mg SubCUTAneous Q24H    furosemide  40 mg IntraVENous BID    insulin lispro  0-4 Units SubCUTAneous Nightly    amLODIPine  5 mg Oral Daily    aspirin  81 mg Oral Daily    atorvastatin  40 mg Oral Daily    carvedilol  12.5 mg Oral BID WC    [START ON 8/1/2022] vitamin D  10,000 Units Oral Weekly    clopidogrel  75 mg Oral Daily    doxazosin  4 mg Oral Daily    hydrALAZINE  50 mg Oral 3 times per day    isosorbide mononitrate  30 mg Oral Daily    hydroCHLOROthiazide  25 mg Oral Daily    spironolactone  25 mg Oral Daily    insulin lispro  0-8 Units SubCUTAneous TID WC    insulin lispro  0-4 Units SubCUTAneous Nightly     PRN Meds: sodium chloride flush, sodium chloride, potassium chloride **OR** potassium alternative oral replacement **OR** potassium chloride, potassium chloride, magnesium sulfate, promethazine **OR** ondansetron, magnesium hydroxide, acetaminophen **OR** acetaminophen, dextrose, hydrALAZINE, labetalol, albuterol sulfate HFA, glucose, dextrose bolus **OR** dextrose bolus, glucagon (rDNA), dextrose      Intake/Output Summary (Last 24 hours) at 7/28/2022 0918  Last data filed at 7/28/2022 0916  Gross per 24 hour   Intake 120 ml   Output 475 ml   Net -355 ml       Physical Exam Performed:    BP (!) 157/69   Pulse 89   Temp 98.5 °F (36.9 °C) (Oral)   Resp 18   Ht 5' (1.524 m)   Wt 177 lb 7.5 oz (80.5 kg)   SpO2 98%   BMI 34.66 kg/m²     General appearance: No apparent distress, appears stated age and cooperative. HEENT: Pupils equal, round, and reactive to light. Conjunctivae/corneas clear. Neck: Supple, with full range of motion. No jugular venous distention. Trachea midline. Respiratory:  Normal respiratory effort. Bibasal crackles. Cardiovascular: Regular rate and rhythm with normal S1/S2 without murmurs, rubs or gallops. Abdomen: Soft, non-tender, non-distended with normal bowel sounds. Musculoskeletal: No clubbing, cyanosis. Bipedal edema. Skin: Skin color, texture, turgor normal.  No rashes or lesions. Neurologic:  Neurovascularly intact without any focal sensory/motor deficits. Cranial nerves: II-XII intact, grossly non-focal.  Psychiatric: Alert and oriented, thought content appropriate, normal insight  Capillary Refill: Brisk,3 seconds, normal   Peripheral Pulses: +2 palpable, equal bilaterally       Labs:   Recent Labs     07/27/22  1115 07/28/22  0420   WBC 5.9 5.1   HGB 10.1* 9.2*   HCT 30.2* 28.0*    229     Recent Labs     07/26/22  0603 07/27/22  1115 07/27/22  1839 07/28/22  0420    140 142 142   K 3.7 3.8 3.7 3.4*    100 102 101   CO2 27 29 31 31   BUN 27* 29* 25* 26*   CREATININE 1.7* 1.9* 1.6* 1.7*   CALCIUM 9.0 9.4 9.2 8.7   PHOS 4.2  --   --   --      Recent Labs     07/27/22  1839   AST 19   ALT 19   BILITOT 0.5   ALKPHOS 113     No results for input(s): INR in the last 72 hours.   Recent Labs     07/27/22  1115   TROPONINI 0.03*       Urinalysis:      Lab Results   Component Value Date/Time    NITRU Negative 07/24/2022 02:45 PM    WBCUA 1 07/24/2022 02:45 PM    BACTERIA None Seen 07/24/2022 02:45 PM    RBCUA 0 07/24/2022 02:45 PM    RBCUA 0-2 05/09/2022 10:30 AM    BLOODU Negative 07/24/2022 02:45 PM    SPECGRAV 1.011 07/24/2022 02:45 PM    GLUCOSEU Negative 07/24/2022 02:45 PM       Radiology:  XR CHEST (2 VW)   Final Result   Findings again suggest congestive heart failure                 Assessment/Plan:    Active Hospital Problems    Diagnosis     SOB (shortness of breath) [R06.02]      Priority: Medium     Assessment  Acute on chronic diastolic CHF (chronic BNP elevation)  CKD III  Diabetes mellitus  Hypertension        Plan  Started on 40 IV twice daily Lasix, chest x-ray performed in ED does show findings suggestive of CHF  Consult nephrology  Consult cardiology  Monitor on cardiac telemetry  Monitor and replace electrolytes  Insulin sliding scale  Resume home medications     Limited echo 5/2022  Wall thickness was within normal limits. Ejection fraction was estimated at 55-60%. DVT Prophylaxis: Lovenox  Diet: ADULT DIET; Regular;  Low Sodium (2 gm); 1500 ml  Code Status: Full Code     PT/OT Eval Status: ordered     Dispo - pending clinical improvement      Simone Membreno MD

## 2022-07-28 NOTE — PROGRESS NOTES
Physical Therapy  Facility/Department: [address] MED SURG  Physical Therapy Initial Assessment  If patient discharges prior to next session this note will serve as a discharge summary. Please see below for the latest assessment towards goals. Name: Erick Caputo  : 1955  MRN: [id number]  Date of Service: 2022    Discharge Recommendations:  Home with assist PRN, Home with Home health PT, Patient would benefit from continued therapy after discharge   Erick Caputo scored a 20/24 on the AM-PAC short mobility form. Current research shows that an AM-PAC score of 18 or greater is typically associated with a discharge to the patient's home setting. Based on the patient's AM-PAC score and their current functional mobility deficits, it is recommended that the patient have 2-3 sessions per week of Physical Therapy at d/c to increase the patient's independence. At this time, this patient demonstrates the endurance and safety to discharge home with prn assist and home PT (home vs OP services) and a follow up treatment frequency of 2-3x/wk. Please see assessment section for further patient specific details. PT Equipment Recommendations  Equipment Needed: No      Patient Diagnosis(es): The primary encounter diagnosis was Acute on chronic congestive heart failure, unspecified heart failure type ([address] Diagnoses of Acute pulmonary edema (HCC) and Chest pain, unspecified type were also pertinent to this visit. Past Medical History:  has a past medical history of Ankle fracture, left, CHF (congestive heart failure) ([address] CKD (chronic kidney disease) stage 4, GFR 15-29 ml/min ([address] Diabetes mellitus ([address] HTN (hypertension), benign, and Hypertension. Past Surgical History:  has a past surgical history that includes Hysterectomy and fracture surgery (Left, 2014). Assessment   Assessment: The pt is a 73 yo female who presented to the ED with SOB associated with chest pain.  The pt recently d/c from this facility with SOB. CXR: Findings again suggest congestive heart failure and troponins are elevated. The pt reports she lives with her mother and PTA, she was indep in mobility with a SPC; indep in self-care and does most of the homemaking. PMHx: L ramirez fx, CHF, CKD, DM, HTN, chronically elevated troponins       Today, the pt demonstrated that she is functioning just slightly below her baseline. She appears limited today by her decreased activity tolerance and her poor motivation. The pt has functionally good strength and B LEs and demonstrated that she is able to ambulate with both the walker and the cane in the room setting. Anticipate that the pt will be safe for home with prn assist and home PT. Will con't to follow the pt while she is on the acute care floor. Therapy Prognosis: Good  Decision Making: Medium Complexity  Barriers to Learning: Motivation for therapy? Requires PT Follow-Up: Yes  Activity Tolerance  Activity Tolerance: Patient tolerated evaluation without incident     Plan   Plan  Plan: 2-3 times per week  Current Treatment Recommendations: Functional mobility training, Transfer training, Gait training, Therapeutic activities, Safety education & training, Patient/Caregiver education & training  Safety Devices  Type of Devices: Call light within reach, Gait belt, Left in bed, Patient at risk for falls     Restrictions  Restrictions/Precautions  Restrictions/Precautions: Fall Risk  Position Activity Restriction  Other position/activity restrictions: 2L O2     Subjective   General  Chart Reviewed: Yes  Additional Pertinent Hx: Per Marely Valdovinos MD H&P on 7-: The pt is a 73 yo female who presented to the ED with SOB associated with chest pain. The pt recently d/c from this facility with SOB.  CXR: Findings again suggest congestive heart failure and troponins are elevated     PMHx: MARIA DOLORES guzmán fx, CHF, CKD, DM, HTN, chronically elevated troponins  Response To Previous Treatment: Not applicable  Family / Caregiver Present: No  Referring Practitioner: Lu Kehr, MD  Referral Date : 07/27/22  Diagnosis: Acute on chronic diastolic CHF  Follows Commands: Within Functional Limits  General Comment  Comments: the pt was found to have a soiled depends which the pt was unaware  Subjective  Subjective: the pt was found to be in the bed; she has a flat affect but able to follow directions         Social/Functional History  Social/Functional History  Lives With: Parent (mother)  Type of Home: House  Home Layout: One level, Laundry in basement (pt doesn't go down to basement, takes laundry to [facility])  Home Access: Level entry  Bathroom Shower/Tub: Tub/Shower unit, Shower chair with back  Bathroom Toilet: Standard ([facility]  nearby)  Bathroom Equipment: Shower chair  Bathroom Accessibility: Walker accessible  Home Equipment: Jones Crow, Wheelchair-manual, Walker, rolling (adjustable bed)  Has the patient had two or more falls in the past year or any fall with injury in the past year?: No  ADL Assistance: Independent  Homemaking Assistance: Independent  Ambulation Assistance: Independent (with cane most the time)  Transfer Assistance: Independent  Active : Yes  Mode of Transportation: Car  Occupation: Retired  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  IADL Comments: sleeps in an adjustable bed  Vision/Hearing  Vision  Vision: Impaired  Vision Exceptions: Wears glasses at all times  Hearing  Hearing: Exceptions to [facility]  Hearing Exceptions: Hard of hearing/hearing concerns    Cognition   Orientation  Overall Orientation Status: Within Functional Limits  Orientation Level: Oriented to person;Oriented to time;Oriented to place;Oriented to situation  Cognition  Overall Cognitive Status: Exceptions  Arousal/Alertness: Delayed responses to stimuli  Following Commands:  Follows one step commands with increased time  Problem Solving: Decreased awareness of errors  Insights: Decreased awareness of deficits  Initiation: Requires cues for some  Sequencing: Does not require cues     Objective           Gross Assessment  AROM: Within functional limits  Strength: Generally decreased, functional  Tone: Normal  Sensation: Impaired (reports she has neuropathy in B feet)     Bed mobility  Supine to Sit: Stand by assistance (HOB elevated)  Sit to Supine: Stand by assistance  Scooting: Stand by assistance  Transfers  Sit to Stand: Contact guard assistance  Stand to sit: Contact guard assistance  Ambulation  Surface: level tile  Device: Rolling Walker  Assistance: Contact guard assistance;Stand by assistance  Quality of Gait: initially slow and guarded but progressed with time to SBA; no LOB  Distance: 10 feet x 1, 20 feet x 1  More Ambulation?: Yes  Ambulation 2  Surface - 2: level tile  Device 2: Single point cane  Assistance 2: Stand by assistance  Quality of Gait 2: increased pace from using the cane, no LOB  Distance: 40 feet x 1  Comments: the pt used the commode during session     Balance  Sitting - Static: Good  Sitting - Dynamic: Good  Standing - Static: Good;-  Standing - Dynamic: Good;-           AM-PAC Score  AM-PAC Inpatient Mobility Raw Score : 20 (07/28/22 1026)  AM-PAC Inpatient T-Scale Score : 47.67 (07/28/22 1026)  Mobility Inpatient CMS 0-100% Score: 35.83 (07/28/22 1026)  Mobility Inpatient CMS G-Code Modifier : CJ (07/28/22 1026)          Goals  Short Term Goals  Time Frame for Short term goals: upon d/c  Short term goal 1: Bed mobility with mod I. Short term goal 2: Transfers sit <> stand with mod I. Short term goal 3: Ambulate with  feet with SBA.   Patient Goals   Patient goals : to go home       Education  Patient Education  Education Given To: Patient  Education Provided: Role of Therapy;Plan of Care  Education Method: Demonstration;Verbal  Barriers to Learning: Other (Comment) (motivation)  Education Outcome: Continued education needed;Demonstrated understanding      Therapy Time Individual Concurrent Group Co-treatment   Time In 0945         Time Out 1025         Minutes 40         Timed Code Treatment Minutes: 25 Minutes     Electronically signed by [facility], PT [id number] on 7/28/2022 at 10:41 AM

## 2022-07-28 NOTE — CONSULTS
[address]                                  CONSULTATION    PATIENT NAME: Trevon Christy                    :        1955  MED REC NO:   [id number]                          ROOM:       [id number]  ACCOUNT NO:   [de-identified]                           ADMIT DATE: 2022  PROVIDER:     Adrienne Saucedo MD    CARDIOLOGY CONSULTATION    CONSULT DATE:  2022    HISTORY OF PRESENT ILLNESS:  This is a 77-year-old female with a history  of diabetes, hypertension, recurrent heart failure, came back to the  hospital with a sudden onset of shortness of breath and was admitted for  recurrent CHF. She was very recently in the hospital and only  discharged a few days ago. She said she was feeling fine until she  started getting sudden shortness of breath. She denied any chest  tightness, pressure, or heaviness, or jaw pain. The workup again showed  evidence of CHF decompensation and she was admitted. She had no fever,  chills, or rigors. No cough or sputum production. REVIEW OF SYSTEMS:  Please see HPI. All other systems are reviewed and  they are negative. PAST MEDICAL HISTORY:  1. History of recurrent CHF. She had a right heart catheterization in  May which showed elevated pulmonary pressure with normal pulmonary  capillary wedge pressure. 2.  Diabetes mellitus. 3.  Hypertension. 4.  History of CVA. PAST SURGICAL HISTORY:  She had a tibia-fibula fixation, hysterectomy. SOCIAL HISTORY:  She denies any smoking or alcohol abuse. FAMILY HISTORY:  No family history is currently available on file. MEDICINES/ALLERGIES:  Have been reviewed. PHYSICAL EXAMINATION:  VITAL SIGNS:  Her pulse is 81, blood pressure 125/71. Oxygen saturation  97%. Weight is 177 pounds. CONSTITUTIONAL:  She is alert and oriented. HEENT EXAMINATION:  Neck is supple. No JVD. No thyromegaly. No  lymphadenopathy. No carotid bruits heard. Eyes shows no pale  conjunctivae or icterus. CARDIAC EXAMINATION:  Reveals normal S1 and S2. I could not appreciate  any gallop, murmur, or rub. LUNG EXAMINATION:  Reveals some bronchial breathing on the left side. ABDOMEN:  Soft, nontender. Bowel sounds are present. EXTREMITIES:  Show 1+ edema. NEUROLOGICAL EXAMINATION:  Alert, oriented. Cranial nerves II through  XII intact. No focal deficit appreciated. SKIN:  Shows no rashes. LABORATORY DATA:  Sodium is 142, potassium 3.4, chloride 101, bicarb 21,  BUN is 26, creatinine 1.7. Hemoglobin is 9.2, hematocrit is 28. EKG shows sinus rhythm, poor R-wave progression, minor ST and T-wave  abnormalities. IMPRESSION:  1. This is a 73-year-old female who has come in with recurrent  congestive heart failure with acute onset and with her multiple risk  factors, underlying ischemic heart disease is strongly suspected. 2.  Hypertension currently stable. 3.  Chronic kidney disease going to be seen by Nephrology. 4.  Diabetes mellitus. RECOMMENDATIONS:  We will proceed with left and right heart study after  being seen by Nephrology. We will get the opinion about whether the  patient should be considered for a Mucomyst therapy prior to undergoing  coronary angiography. Benefits and risks of the procedure have been  explained to the patient and the patient's code status will have to be  reversed for the procedure. I appreciate the opportunity to participate in the care of this pleasant  female.         Chad Joiner MD    D: 07/28/2022 14:52:55       T: 07/28/2022 18:08:20     [email or web address]  Job#: [id number]     Doc#: [id number]    CC:

## 2022-07-28 NOTE — ADT AUTH CERT
Last H&P Note      H&P by Miller Herrera MD at 7/27/2022  8:47 PM    Author: Miller Herrera MD Specialty: Internal Medicine Author Type: Physician   Filed: 7/27/2022  8:49 PM Date of Service: 7/27/2022  8:47 PM Status: Signed   : Miller Herrera MD (Physician)   [facility] [id number] [facility]       PCP: Zhao Nicole MD     Date of Admission: 7/27/2022     Chief Complaint:  SOB     History Of Present Illness:    Patient is a 46-year-old female with past medical history of diabetes mellitus who presents to the hospital due to shortness of breath. According to patient she was recently admitted and discharged from the hospital regarding same complaint of shortness of breath, she woke up this morning with shortness of breath, she also felt some chest pain as well. Patient currently on at time of my evaluation mentioned she was brought to the hospital by her son. Patient denied fevers chills nausea vomiting diarrhea constipation dysuria. Past Medical History:       Past Medical History             Diagnosis Date    Ankle fracture, left 01/17/2014    CHF (congestive heart failure) (HCC)      CKD (chronic kidney disease) stage 4, GFR 15-29 ml/min (HCC)      Diabetes mellitus (HCC)      HTN (hypertension), benign 01/17/2014    Hypertension              Past Surgical History:       Past Surgical History             Procedure Laterality Date    FRACTURE SURGERY Left 01/17/2014     orif tibula/fibula fixation    HYSTERECTOMY (CERVIX STATUS UNKNOWN)                Medications Prior to Admission:      Home Medications           Prior to Admission medications   Medication Sig Start Date End Date Taking? Authorizing Provider   hydroCHLOROthiazide (HYDRODIURIL) 25 MG tablet Take 25 mg by mouth in the morning. Yes Historical Provider, MD   spironolactone (ALDACTONE) 25 MG tablet Take 1 tablet by mouth in the morning.  7/26/22     Suki MD Mirtha   torsemide (DEMADEX) 10 MG tablet Take 1 tablet by mouth in the morning. 7/26/22     John Hanna MD   atorvastatin (LIPITOR) 40 MG tablet Take 1 tablet by mouth daily 6/16/22     Bradley Moore MD   carvedilol (COREG) 12.5 MG tablet Take 1 tablet by mouth 2 times daily (with meals) 6/16/22     Bradley Moore MD   clopidogrel (PLAVIX) 75 MG tablet Take 1 tablet by mouth daily 6/16/22     Bradley Moore MD   isosorbide mononitrate (IMDUR) 30 MG extended release tablet Take 1 tablet by mouth daily 6/16/22     Bradley Moore MD   aspirin 81 MG EC tablet Take 1 tablet by mouth daily 6/16/22     Bradley Moore MD   amLODIPine (NORVASC) 5 MG tablet Take 1 tablet by mouth daily 6/16/22     Bradley Moore MD   doxazosin (CARDURA) 4 MG tablet Take 1 tablet by mouth daily 5/21/22     Thalia Joseph DO   hydrALAZINE (APRESOLINE) 50 MG tablet Take 1 tablet by mouth every 8 hours 5/20/22     Thalia Joseph DO   albuterol sulfate HFA (PROVENTIL HFA) 108 (90 Base) MCG/ACT inhaler Inhale 2 puffs into the lungs every 6 hours as needed for Wheezing (Cough) 5/6/22     Romy Donato MD   Cholecalciferol (VITAMIN D3) 125 MCG (5000 UT) CAPS Take 2 capsules by mouth once a week       Historical Provider, MD   L-METHYLFOLATE-B6-B12 PO Take 1 tablet by mouth daily       Historical Provider, MD            Allergies:  Patient has no known allergies. Social History:       TOBACCO:   reports that she has never smoked. She has never used smokeless tobacco.  ETOH:   reports no history of alcohol use. Family History:        Reviewed in detail and non contributory        Family History   History reviewed. No pertinent family history. REVIEW OF SYSTEMS:   Pertinent positives as noted in the HPI. All other systems reviewed and negative.      PHYSICAL EXAM PERFORMED:     BP (!) 172/65   Pulse 89   Temp 98.7 °F (37.1 °C) (Oral)   Resp 16   Ht 5' (1.524 m)   Wt 174 lb 6.1 oz (79.1 kg)   SpO2 99% BMI 34.06 kg/m²      General appearance:  No apparent distress, cooperative. HEENT:  Normal cephalic, atraumatic without obvious deformity. Conjunctivae/corneas clear. Neck: Supple, with full range of motion. No cervical lymphadenopathy  Respiratory:  Normal respiratory effort. Clear to auscultation, bilaterally without Rales/Wheezes/Rhonchi. Cardiovascular:  Regular rate and rhythm with normal S1/S2 without murmurs, rubs or gallops. Abdomen: Soft, non-tender, non-distended, normal bowel sounds. Musculoskeletal:  +1 edema noted bilaterally. No tenderness on palpation  Skin: no rash visible  Neurologic:  Neurologically intact without any focal sensory/motor deficits. grossly non-focal.  Psychiatric:  Alert and oriented, normal mood  Peripheral Pulses: +2 palpable, equal bilaterally        Labs:           Recent Labs     07/25/22  0545 07/27/22  1115   WBC 4.9 5.9   HGB 9.1* 10.1*   HCT 27.2* 30.2*    250            Recent Labs     07/26/22  0603 07/27/22  1115 07/27/22  1839    140 142   K 3.7 3.8 3.7    100 102   CO2 27 29 31   BUN 27* 29* 25*   CREATININE 1.7* 1.9* 1.6*   CALCIUM 9.0 9.4 9.2   PHOS 4.2  --  --          Recent Labs     07/27/22  1839   AST 19   ALT 19   BILITOT 0.5   ALKPHOS 113      No results for input(s): INR in the last 72 hours.       Recent Labs     07/27/22  1115   TROPONINI 0.03*         Urinalysis:            Lab Results   Component Value Date/Time     NITRU Negative 07/24/2022 02:45 PM     WBCUA 1 07/24/2022 02:45 PM     BACTERIA None Seen 07/24/2022 02:45 PM     RBCUA 0 07/24/2022 02:45 PM     RBCUA 0-2 05/09/2022 10:30 AM     BLOODU Negative 07/24/2022 02:45 PM     SPECGRAV 1.011 07/24/2022 02:45 PM     GLUCOSEU Negative 07/24/2022 02:45 PM         Radiology:         XR CHEST (2 VW)   Final Result   Findings again suggest congestive heart failure                             Active Hospital Problems     Diagnosis Date Noted    SOB (shortness of breath) [R06.02] 07/27/2022       Priority: Medium            Patient is a 44-year-old female with past medical history of diabetes mellitus who presents to the hospital due to shortness of breath. According to patient she was recently admitted and discharged from the hospital regarding same complaint of shortness of breath, she woke up this morning with shortness of breath, she also felt some chest pain as well. Patient currently on at time of my evaluation mentioned she was brought to the hospital by her son. Patient denied fevers chills nausea vomiting diarrhea constipation dysuria. Assessment  Acute on chronic diastolic CHF  Elevated troponin, chronically elevated-patient is chest pain-free  CKD  Diabetes mellitus  Hypertension        Plan  Started on 40 IV twice daily Lasix, chest x-ray performed in ED does show findings suggestive of CHF  Consult nephrology  Consult cardiology  Monitor on cardiac telemetry  Monitor and replace electrolytes  Insulin sliding scale  Resume home medications     Limited echo 5/2022  Wall thickness was within normal limits. Ejection fraction was estimated at 55-60%. DVT Prophylaxis: Lovenox  Diet: ADULT DIET; Regular; Low Sodium (2 gm); 1500 ml  Code Status: Full Code     PT/OT Eval Status: ordered     Dispo - pending clinical improvement         Bishop Paniagua MD     The note was completed using EMR and Dragon dictation system. Every effort was made to ensure accuracy; however, inadvertent computerized transcription errors may be present. Thank you Tabitha Longoria MD for the opportunity to be involved in this patient's care. If you have any questions or concerns please feel free to contact me at [phone].      Bishop Paniagua MD

## 2022-07-28 NOTE — PLAN OF CARE
Problem: Discharge Planning  Goal: Discharge to home or other facility with appropriate resources  Outcome: Progressing  Flowsheets (Taken 7/27/2022 2144)  Discharge to home or other facility with appropriate resources:   Identify barriers to discharge with patient and caregiver   Identify discharge learning needs (meds, wound care, etc)     Problem: Pain  Goal: Verbalizes/displays adequate comfort level or baseline comfort level  Outcome: Progressing     Problem: ABCDS Injury Assessment  Goal: Absence of physical injury  Outcome: Progressing  Flowsheets (Taken 7/27/2022 2141)  Absence of Physical Injury: Implement safety measures based on patient assessment     Problem: Safety - Adult  Goal: Free from fall injury  Outcome: Progressing

## 2022-07-28 NOTE — ACP (ADVANCE CARE PLANNING)
Advance Care Planning     Advance Care Planning Activator (Inpatient)  Conversation Note      Date of ACP Conversation: 7/28/2022     Conversation Conducted with: Patient with Decision Making Capacity    ACP Activator[facility] [facility] Decision Maker:     Current Designated Health Care Decision Maker:     Primary Decision Maker: Brandi Anastaciochen - Child - [phone]    Care Preferences    Ventilation: \"If you were in your present state of health and suddenly became very ill and were unable to breathe on your own, what would your preference be about the use of a ventilator (breathing machine) if it were available to you? \"      Would the patient desire the use of ventilator (breathing machine)?: no    \"If your health worsens and it becomes clear that your chance of recovery is unlikely, what would your preference be about the use of a ventilator (breathing machine) if it were available to you? \"     Would the patient desire the use of ventilator (breathing machine)?: No      Resuscitation  \"CPR works best to restart the heart when there is a sudden event, like a heart attack, in someone who is otherwise healthy. Unfortunately, CPR does not typically restart the heart for people who have serious health conditions or who are very sick. \"    \"In the event your heart stopped as a result of an underlying serious health condition, would you want attempts to be made to restart your heart (answer \"yes\" for attempt to resuscitate) or would you prefer a natural death (answer \"no\" for do not attempt to resuscitate)? \" no       [] Yes   [x] No   Educated Patient / Shirly Osgood regarding differences between Advance Directives and portable DNR orders.     Length of ACP Conversation in minutes:  5    Conversation Outcomes:  [x] ACP discussion completed  [] Existing advance directive reviewed with patient; no changes to patient's previously recorded wishes  [] New Advance Directive completed  [] Portable Do Not Rescitate prepared for Provider review and signature  [] POLST/POST/MOLST/MOST prepared for Provider review and signature      Follow-up plan:    [x] Schedule follow-up conversation to continue planning  [x] Referred individual to Provider for additional questions/concerns   [] Advised patient/agent/surrogate to review completed ACP document and update if needed with changes in condition, patient preferences or care setting    [x] This note routed to one or more involved healthcare providers    Dr. Tone Kumar notified of pts request.    RANDELL Canela  [phone]  Electronically signed by Sofia Regalado on 7/28/2022 at 11:07 AM

## 2022-07-28 NOTE — PROGRESS NOTES
Occupational Therapy  Facility/Department: [facility]  Occupational Therapy Initial Assessment    Name: Jose A Dickerson  : 1955  MRN: [id number]  Date of Service: 2022    Discharge Recommendations:  Home with Home health OT, 24 hour supervision or assist      Jose A Dickerson scored a 18/24 on the AM-PAC ADL Inpatient form. Current research shows that an AM-PAC score of 18 or greater is typically associated with a discharge to the patient's home setting. Based on the patient's AM-PAC score, and their current ADL deficits, it is recommended that the patient have 2-3 sessions per week of Occupational Therapy at d/c to increase the patient's independence. At this time, this patient demonstrates the endurance and safety to discharge home with 24 hour assist and a home OT follow up treatment frequency of 2-3x/wk. Please see assessment section for further patient specific details. If patient discharges prior to next session this note will serve as a discharge summary. Please see below for the latest assessment towards goals. Patient Diagnosis(es): The primary encounter diagnosis was Acute on chronic congestive heart failure, unspecified heart failure type ([address] Diagnoses of Acute pulmonary edema (HCC) and Chest pain, unspecified type were also pertinent to this visit. Past Medical History:  has a past medical history of Ankle fracture, left, CHF (congestive heart failure) ([address] CKD (chronic kidney disease) stage 4, GFR 15-29 ml/min ([address] Diabetes mellitus ([address] HTN (hypertension), benign, and Hypertension. Past Surgical History:  has a past surgical history that includes Hysterectomy and fracture surgery (Left, 2014). Assessment   Performance deficits / Impairments: Decreased functional mobility ; Decreased ADL status; Decreased endurance;Decreased balance;Decreased cognition;Decreased safe awareness  Assessment: Pt is a 74 y.o. female admitted with Acute on chronic diastolic CHF. PTA, pt living with mother and independent ADLs, IADLs, and fxl mobility with cane. Pt currently functioning below baseline d/t the above deficits, today requiring SBA/CGA fxl transfers/mobility with RW and cane, Min A toileting, SBA grooming, mod A LB dressing. Pt denies SOB with activity. Pt does need encouragement to increase OOB activity and to ambulate to BR for toileting over using the purewick. Will cont to see on acute to address the above limitations and maximize pt's safety and independence. Anticipate pt will be safe to return home with mother's assist and home OT. Prognosis: Good  Decision Making: Medium Complexity  History: see above  REQUIRES OT FOLLOW-UP: Yes  Activity Tolerance  Activity Tolerance: Patient Tolerated treatment well        Plan   Plan  Times per Week: 3-5  Current Treatment Recommendations: Balance training, Functional mobility training, Strengthening, Endurance training, Equipment evaluation, education, & procurement, Self-Care / ADL     Restrictions  Restrictions/Precautions  Restrictions/Precautions: Fall Risk  Position Activity Restriction  Other position/activity restrictions: 2L O2    Subjective   General  Chart Reviewed: Yes  Additional Pertinent Hx: Per Marely Valdovinos MD's H&P 7/27/2022: \"Patient is a 79-year-old female with past medical history of diabetes mellitus who presents to the hospital due to shortness of breath. According to patient she was recently admitted and discharged from the hospital regarding same complaint of shortness of breath, she woke up this morning with shortness of breath, she also felt some chest pain as well. \"  Family / Caregiver Present: No  Referring Practitioner: Marely Valdovinos MD  Diagnosis: Acute on chronic diastolic CHF  Subjective  Subjective: Pt met b/s for OT eval/tx. Pt in bed on arrival, agreeable to participate in therapy. Pt reports 10/10 headache. Pt with flat affect.      Social/Functional History  Social/Functional History  Lives With: Parent (mother)  Type of Home: House  Home Layout: One level, Laundry in basement (pt doesn't go down to basement, takes laundry to [facility])  Home Access: Level entry  Bathroom Shower/Tub: Tub/Shower unit, Shower chair with back  Bathroom Toilet: Standard (vanity  nearby)  Bathroom Equipment: Shower chair  Bathroom Accessibility: Walker accessible  Home Equipment: Merlyn Bugler, Wheelchair-manual, Walker, rolling (adjustable bed)  Has the patient had two or more falls in the past year or any fall with injury in the past year?: No  ADL Assistance: Independent  Homemaking Assistance: Independent  Ambulation Assistance: Independent (with cane most the time)  Transfer Assistance: Independent  Active : Yes  Mode of Transportation: Car  Occupation: Retired  Type of Occupation: worked for the [employer]  IADL Comments: sleeps in an adjustable bed       Objective     Safety Devices  Type of Devices: Call light within reach;Gait belt;Left in bed;Patient at risk for falls    Balance  Sitting: Intact  Standing: With support (SBA/CGA at [address] [place] [address])  Gait  Overall Level of Assistance: Contact-guard assistance;Stand-by assistance (Pt completed fxl mobility ~10 ft, ~20 ft with RW and CGA initially progressing to SBA, pt then ambulated ~40 ft with SPC and SBA.)  Assistive Device: Cane, straight      AROM: Generally decreased, functional  PROM: Within functional limits  Strength: Generally decreased, functional  Sensation: Impaired (pt reports neuropathy in andriy feet, denies N/T in hands)    ADL  Feeding: Independent  Grooming: Stand by assistance  Grooming Skilled Clinical Factors: standing at sink to wash hands  LE Dressing: Moderate assistance  LE Dressing Skilled Clinical Factors: to doff soiled depends and don pull up briefs. Assist to thread B LEs and CGA to manage over hips  Toileting: Minimal assistance  Toileting Skilled Clinical Factors: external purewick catheter removed for OOB. Pt's depends soiled with urine/BM. Pt voided additional urine at commode. Additional Comments: Anticipate pt is min/mod A bathing and dressing based on ROM/strength, balance, endurance. Activity Tolerance  Activity Tolerance: Patient tolerated evaluation without incident    Bed mobility  Supine to Sit: Stand by assistance (HOB elevated)  Sit to Supine: Stand by assistance  Scooting: Stand by assistance    Transfers  Sit to stand: Contact guard assistance  Stand to sit: Contact guard assistance  Transfer Comments: to/from [facility] - Technique: Ambulating  Equipment Used: Grab bars  Toilet Transfer: Contact guard assistance    Vision  Vision: Impaired  Vision Exceptions: Wears glasses at all times  Hearing  Hearing: Exceptions to [facility]  Hearing Exceptions: Hard of hearing/hearing concerns    Cognition  Overall Cognitive Status: Exceptions  Arousal/Alertness: Delayed responses to stimuli  Following Commands: Follows one step commands with increased time  Problem Solving: Decreased awareness of errors  Insights: Decreased awareness of deficits  Initiation: Requires cues for some  Sequencing: Does not require cues  Orientation  Overall Orientation Status: Within Functional Limits  Orientation Level: Oriented to person;Oriented to time;Oriented to place;Oriented to situation    Education Given To: Patient  Education Provided: Role of Therapy;Plan of Care;ADL Adaptive Strategies;Transfer Training  Barriers to Learning: Cognition  Education Outcome: Continued education needed                              AM-PAC Score        AM-PAC Inpatient Daily Activity Raw Score: 18 (07/28/22 1029)  AM-PAC Inpatient ADL T-Scale Score : 38.66 (07/28/22 1029)  ADL Inpatient CMS 0-100% Score: 46.65 (07/28/22 1029)  ADL Inpatient CMS G-Code Modifier : CK (07/28/22 1029)        Goals  Short Term Goals  Time Frame for Short term goals: Prior to d/c:  Short Term Goal 1: Pt will bathe with supervision/mod I.   Short Term Goal 2: Pt will toilet with supervision/mod I. Short Term Goal 3: Pt will dress with supervision/mod I using A/E prn. Short Term Goal 4: Pt will complete fxl mobility and fxl transfers to/from ADL surfaces with supervision/mod I. Short Term Goal 5: Pt will tolerate standing >5 minutes for functional task with supervision/mod I to improve standing tolerance for ADL routine. Long Term Goals  Time Frame for Long term goals : STGs=LTGs  Patient Goals   Patient goals : to return home.        Therapy Time   Individual Concurrent Group Co-treatment   Time In 0945         Time Out 1025         Minutes 40         Timed Code Treatment Minutes: [address] [facility], OTR/L 1109

## 2022-07-28 NOTE — CONSULTS
[address]                                  CONSULTATION    PATIENT NAME: Verner Grey                    :        1955  MED REC NO:   [id number]                          ROOM:       4105  ACCOUNT NO:   [de-identified]                           ADMIT DATE: 2022  PROVIDER:     Jeanette Lucas MD    CARDIOLOGY CONSULTATION    CONSULT DATE:  2022    HISTORY OF PRESENT ILLNESS:  This is a 46-year-old female known to me  from her previous admission, who was recently admitted for CHF  exacerbation and went home. She was feeling fine until yesterday  morning. She suddenly became short of breath and decided to come to the  hospital.  She was admitted for CHF exacerbation. She denied any chest  tightness or pressure, fever, chills or rigors, cough, or sputum  production. She denied any nausea or vomiting. REVIEW OF SYSTEMS:  All the systems are reviewed and they are negative  except as mentioned in the patient's HPI. PAST MEDICAL HISTORY:  1. History of diabetes mellitus. 2.  History of recurrent diastolic heart failure. 3.  She had a right heart catheterization in 2022 which showed  elevated right heart pressure with normal pulmonary capillary wedge  pressure. 4.  History of CKD. 5.  Hypertension. SOCIAL HISTORY:  She denies any smoking or alcohol abuse. PAST SURGICAL HISTORY:  She had a hysterectomy and tibia-fibula fixation  in the past.    FAMILY HISTORY:  Negative for any early or premature atherosclerosis. MEDICINES:  Have been reviewed. ALLERGIES:  Have been reviewed. PHYSICAL EXAMINATION:  VITAL SIGNS:  Her pulse is 81, blood pressure 125/71, respirations are  18, oxygen saturation 97%. CONSTITUTIONAL:  She is alert and oriented. HEENT:  I am unable to appreciate jugular venous pulse. No carotid  bruits heard. CARDIAC:  Regular rate and rhythm.   LUNGS: Bronchial breathing on the right side. ABDOMEN:  Soft, nontender. EXTREMITIES:  Show 1 to 2+ edema. NEUROLOGICAL:  Alert and oriented. Cranial nerves II through XII  intact. No focal deficits. SKIN:  No rashes. LABORATORY DATA:  Sodium is 142, potassium 3.4, chloride 101, bicarb 21,  BUN is 26, creatinine 1.7. ProBNP was 2790. Troponin 0.03. Her white  count 5.1, hemoglobin 9.2, hematocrit is 28, the platelet counts are  normal.    EKG shows sinus rhythm, no acute changes. Echocardiogram from 05/2022 showed normal LV function with evidence of  pulmonary hypertension. Chest x-ray reveals pulmonary vascular congestion and bilateral pleural  effusions. IMPRESSION:  1. This is a 71-year-old female who has presented to the hospital with  sudden onset of shortness of breath. Her symptoms are suggestive of  acute sudden-onset CHF decompensation. Etiology remains unclear. With  her recurrent admissions and sudden onset, underlying ischemic etiology  will have to be considered especially with her multiple risk factors. Renal artery stenosis though less likely also will have to be in the  differential diagnosis. 2.  Chronic kidney disease probably related to her longstanding diabetes  and hypertension. 3.  Hypertension. Blood pressure is currently stable. RECOMMENDATIONS:  1. Agree with IV Lasix. Follow I's and O's, daily weight, electrolytes  closely. 2.  We will consider the patient for a left and right heart study  tomorrow after the patient is seen by Nephrology. May need Mucomyst if  agreeable with Nephrology prior to the procedure. 3.  [place] fluid and sodium restriction. 4.  We will increase the patient's carvedilol to 25 b.i.d. due to  elevated blood pressure and/or maximize the management of her CHF. I appreciate the opportunity to participate in the care of this pleasant  female.         Giovanna Stark MD    D: 07/28/2022 11:58:58       T: 07/28/2022 14:47:47 [email or web address]  Job#: [id number]     Doc#: [id number]    CC:

## 2022-07-28 NOTE — PROGRESS NOTES
Pt stated she came into the ED with a blue bag containing her home medications but did not come up to the unit with it. Pharmacy does not have her home medications. I called ED registration who transferred me to security. Security stated they would call if they found pt medication bag. Security and ED nurse unable to locate pt bag of medication. Notified pt.

## 2022-07-28 NOTE — CONSULTS
[address] [place] [address] Nephrology   [facility]  [phone]  Nephrology Consult Note          Patient ID: Ana Sumner  Referring/ Physician: Taryn Rosales MD      HPI/Summary:   Ana Sumner is being seen by nephrology for CKD. This is a 44-year-old lady with past medical history significant for CHF, type 2 diabetes, hypertension and CKD stage III. She was recently admitted to the hospital with shortness of breath and edema, was diuresed for heart failure exacerbation. She was discharged and came back just day or so later with feeling of shortness of breath. She does not really have edema. She feels her breathing is better now than admission. Denies any urinary symptoms. She has a pure wick in place, urine is quite dark in the collection container. She is also reporting some dizziness. Apparently reported to the emergency room that she was having periorbital edema and increased lower extremity swelling though on exam I do not notice this. At discharge she was started on torsemide and Aldactone, she says she is taking her medications. She was satting 86% prior to arrival to the emergency room.   COVID was negative    Blood pressure 125/71  Satting 97% on 2 L  Afebrile and heart rate 81  Urine output 475 cc yesterday only 125 cc recorded today.,  She does not have a Hanson  Her weight on admission was 174, 177 today    Labs reviewed  Sodium 142 potassium 3.4 bicarb 31 BUN 26 creatinine 1.7 BUN, magnesium 1.6 calcium 8.7 hemoglobin A1c 7.3 hemoglobin 9.2  Chest x-ray showed possible congestive heart failure    Plan:   Stable CKD stage IIIb  Hypoxia and shortness of breath thought to be secondary to CHF  Her BNP has been elevated, chest x-ray consistent with congestive heart failure  Her urine output is looking dark and concentrated, will increase her diuretics to 80 mg IV twice daily, continue Aldactone, replace potassium and magnesium    May need to tolerate a higher creatinine level to keep her out of heart failure      CKD stage IIIb  Baseline creatinine appears to be around 1.5-2  Had an CHLOE in May 20 creatinine peaked at 4  Creatinine 1.9 during last hospital stay  No urine studies here  Check bladder scan  Urinalysis showed 100 mg/dL of protein 2 hyaline cast 1 WBC no RBCs. Protein creatinine ratio 1.1 g    Electrolytes   Hypokalemia, likely due to diuretics  Hypomagnesemia due to diuretics likely  Replacement as needed    Heart failure  Last EF was 55 to 63%  Diastolic function not mention. BNP elevated at 4648 > 2790  Albumin 2.9    Type 2 diabetes  Last A1c 7.4    Anemia  No thrombocytopenia no leukocytosis. Last iron studies showed a ferritin of 185 TSAT 21%  B12 and folate were okay    PTH 63.8          [facility] would like to thank you for the opportunity to serve this patient. Please call with any questions or concerns. Karla Osborne MD  [facility]  [address], [address]  Fax: [phone]  Office: [phone]         CC/Reason for consult:   Reason for consult: CKD and volume overload  Chief Complaint   Patient presents with    Shortness of Breath     SOB since 9am . C/o chest pain and dizziness. Review of Systems:   [address] All other remaining systems are negative. Constitutional:  fever, chills, weakness, weight change, fatigue,      Skin:  rash, pruritus, hair loss, bruising, dry skin, petechiae. Head, Face, Neck   headaches, swelling,  cervical adenopathy. Respiratory: shortness of breath, cough, or wheezing  Cardiovascular: chest pain, palpitations, dizzy, edema  Gastrointestinal: nausea, vomiting, diarrhea, constipation,belly pain    Yellow skin, blood in stool  Musculoskeletal:  back pain, muscle weakness, gait problems,       joint pain or swelling.   Genitourinary:  dysuria, poor urine flow, flank pain, blood in urine  Neurologic:  vertigo, TIA'S, syncope, seizures, focal weakness  Psychosocial: insomnia, anxiety, or depression. Additional positive findings: -     PMH/SH/FH:    Medical Hx: reviewed and updated as appropriate  Past Medical History:   Diagnosis Date    Ankle fracture, left 01/17/2014    CHF (congestive heart failure) (HCC)     CKD (chronic kidney disease) stage 4, GFR 15-29 ml/min (HCC)     Diabetes mellitus ([facility] [address]     HTN (hypertension), benign 01/17/2014    Hypertension          Surgical Hx: reviewed and updated as appropriate   has a past surgical history that includes Hysterectomy and fracture surgery (Left, 01/17/2014). Social Hx: reviewed and updated as appropriate  Social History     Tobacco Use    Smoking status: Never    Smokeless tobacco: Never   Substance Use Topics    Alcohol use: No        Family hx: reviewed and updated as appropriate  family history is not on file. Medications:   carvedilol, 25 mg, BID WC  sodium chloride flush, 10 mL, 2 times per day  enoxaparin, 30 mg, Q24H  furosemide, 40 mg, BID  insulin lispro, 0-4 Units, Nightly  amLODIPine, 5 mg, Daily  aspirin, 81 mg, Daily  atorvastatin, 40 mg, Daily  [START ON 8/1/2022] vitamin D, 10,000 Units, Weekly  clopidogrel, 75 mg, Daily  doxazosin, 4 mg, Daily  hydrALAZINE, 50 mg, 3 times per day  isosorbide mononitrate, 30 mg, Daily  spironolactone, 25 mg, Daily  insulin lispro, 0-8 Units, TID WC  insulin lispro, 0-4 Units, Nightly       Patient has no known allergies. Allergies:   No Known Allergies      Physical Exam/Objective:   Vitals:    07/28/22 1141   BP: 125/71   Pulse: 81   Resp:    Temp: 98.3 °F (36.8 °C)   SpO2: 97%       Intake/Output Summary (Last 24 hours) at 7/28/2022 1426  Last data filed at 7/28/2022 0950  Gross per 24 hour   Intake 480 ml   Output 600 ml   Net -120 ml         General appearance:  in no acute distress, comfortable, communicative, awake and alert. HEENT: no icterus, EOM intact, trachea midline. Neck : no masses, appears symmetrical and no JVD appreciated.    Respiratory: Respiratory effort normal, bilateral equal chest rise. No wheeze, no crackles   Cardiovascular: Ausculation shows RRR and  trace edema   Abdomen: abdomen is soft, non distended, no masses, no pain with palpation. Musculoskeletal:  no joint swelling, no deformity, strength grossly normal.   Skin: no rashes, no induration, no tightening, no jaundice   Neuro:   Follows commands, moves all extremities spontaneously       Data:   CBC:   Recent Labs     07/27/22  1115 07/28/22  0420   WBC 5.9 5.1   HGB 10.1* 9.2*   HCT 30.2* 28.0*    229     BMP:    Recent Labs     07/26/22  0603 07/27/22  1115 07/27/22  1839 07/28/22  0420    140 142 142   K 3.7 3.8 3.7 3.4*    100 102 101   CO2 27 29 31 31   BUN 27* 29* 25* 26*   CREATININE 1.7* 1.9* 1.6* 1.7*   GLUCOSE 145* 183* 141* 124*   MG  --   --   --  1.60*   PHOS 4.2  --   --   --

## 2022-07-28 NOTE — PLAN OF CARE
Problem: Discharge Planning  Goal: Discharge to home or other facility with appropriate resources  7/28/2022 0442 by Asher Stevenson RN  Outcome: Progressing  Flowsheets (Taken 7/27/2022 2144)  Discharge to home or other facility with appropriate resources:   Identify barriers to discharge with patient and caregiver   Identify discharge learning needs (meds, wound care, etc)     Problem: Pain  Goal: Verbalizes/displays adequate comfort level or baseline comfort level  7/28/2022 1342 by Marlon Salgado RN  Outcome: Progressing  7/28/2022 0442 by Asher Stevenson RN  Outcome: Progressing     Problem: ABCDS Injury Assessment  Goal: Absence of physical injury  7/28/2022 0442 by Asher Stevenson RN  Outcome: Progressing  Flowsheets (Taken 7/27/2022 2141)  Absence of Physical Injury: Implement safety measures based on patient assessment     Problem: Skin/Tissue Integrity  Goal: Absence of new skin breakdown  Description: 1. Monitor for areas of redness and/or skin breakdown  2. Assess vascular access sites hourly  3. Every 4-6 hours minimum:  Change oxygen saturation probe site  4. Every 4-6 hours:  If on nasal continuous positive airway pressure, respiratory therapy assess nares and determine need for appliance change or resting period.   Outcome: Progressing

## 2022-07-29 LAB
ANION GAP SERPL CALCULATED.3IONS-SCNC: 9 MMOL/L (ref 3–16)
BASOPHILS ABSOLUTE: 0.1 K/UL (ref 0–0.2)
BASOPHILS RELATIVE PERCENT: 1.3 %
BUN BLDV-MCNC: 25 MG/DL (ref 7–20)
CALCIUM SERPL-MCNC: 8.6 MG/DL (ref 8.3–10.6)
CHLORIDE BLD-SCNC: 100 MMOL/L (ref 99–110)
CO2: 32 MMOL/L (ref 21–32)
CREAT SERPL-MCNC: 1.8 MG/DL (ref 0.6–1.2)
EOSINOPHILS ABSOLUTE: 0.2 K/UL (ref 0–0.6)
EOSINOPHILS RELATIVE PERCENT: 4.2 %
GFR AFRICAN AMERICAN: 34
GFR NON-AFRICAN AMERICAN: 28
GLUCOSE BLD-MCNC: 115 MG/DL (ref 70–99)
GLUCOSE BLD-MCNC: 116 MG/DL (ref 70–99)
GLUCOSE BLD-MCNC: 132 MG/DL (ref 70–99)
GLUCOSE BLD-MCNC: 135 MG/DL (ref 70–99)
HCT VFR BLD CALC: 28.2 % (ref 36–48)
HEMOGLOBIN: 9.5 G/DL (ref 12–16)
LYMPHOCYTES ABSOLUTE: 1.7 K/UL (ref 1–5.1)
LYMPHOCYTES RELATIVE PERCENT: 38.5 %
MCH RBC QN AUTO: 28.7 PG (ref 26–34)
MCHC RBC AUTO-ENTMCNC: 33.6 G/DL (ref 31–36)
MCV RBC AUTO: 85.6 FL (ref 80–100)
MONOCYTES ABSOLUTE: 0.5 K/UL (ref 0–1.3)
MONOCYTES RELATIVE PERCENT: 11.1 %
NEUTROPHILS ABSOLUTE: 2 K/UL (ref 1.7–7.7)
NEUTROPHILS RELATIVE PERCENT: 44.9 %
PDW BLD-RTO: 16 % (ref 12.4–15.4)
PERFORMED ON: ABNORMAL
PLATELET # BLD: 217 K/UL (ref 135–450)
PMV BLD AUTO: 7.6 FL (ref 5–10.5)
POTASSIUM REFLEX MAGNESIUM: 3.7 MMOL/L (ref 3.5–5.1)
RBC # BLD: 3.3 M/UL (ref 4–5.2)
SODIUM BLD-SCNC: 141 MMOL/L (ref 136–145)
WBC # BLD: 4.5 K/UL (ref 4–11)

## 2022-07-29 PROCEDURE — 2580000003 HC RX 258: Performed by: INTERNAL MEDICINE

## 2022-07-29 PROCEDURE — 99233 SBSQ HOSP IP/OBS HIGH 50: CPT | Performed by: NURSE PRACTITIONER

## 2022-07-29 PROCEDURE — 4A023N8 MEASUREMENT OF CARDIAC SAMPLING AND PRESSURE, BILATERAL, PERCUTANEOUS APPROACH: ICD-10-PCS | Performed by: INTERNAL MEDICINE

## 2022-07-29 PROCEDURE — C1769 GUIDE WIRE: HCPCS

## 2022-07-29 PROCEDURE — 85025 COMPLETE CBC W/AUTO DIFF WBC: CPT

## 2022-07-29 PROCEDURE — 6360000002 HC RX W HCPCS: Performed by: INTERNAL MEDICINE

## 2022-07-29 PROCEDURE — 6370000000 HC RX 637 (ALT 250 FOR IP): Performed by: INTERNAL MEDICINE

## 2022-07-29 PROCEDURE — 2500000003 HC RX 250 WO HCPCS

## 2022-07-29 PROCEDURE — 6360000004 HC RX CONTRAST MEDICATION: Performed by: INTERNAL MEDICINE

## 2022-07-29 PROCEDURE — 94760 N-INVAS EAR/PLS OXIMETRY 1: CPT

## 2022-07-29 PROCEDURE — 4A023N6 MEASUREMENT OF CARDIAC SAMPLING AND PRESSURE, RIGHT HEART, PERCUTANEOUS APPROACH: ICD-10-PCS | Performed by: INTERNAL MEDICINE

## 2022-07-29 PROCEDURE — 2580000003 HC RX 258: Performed by: NURSE PRACTITIONER

## 2022-07-29 PROCEDURE — 1200000000 HC SEMI PRIVATE

## 2022-07-29 PROCEDURE — B2111ZZ FLUOROSCOPY OF MULTIPLE CORONARY ARTERIES USING LOW OSMOLAR CONTRAST: ICD-10-PCS | Performed by: INTERNAL MEDICINE

## 2022-07-29 PROCEDURE — 6360000002 HC RX W HCPCS

## 2022-07-29 PROCEDURE — 36415 COLL VENOUS BLD VENIPUNCTURE: CPT

## 2022-07-29 PROCEDURE — 99153 MOD SED SAME PHYS/QHP EA: CPT

## 2022-07-29 PROCEDURE — 80048 BASIC METABOLIC PNL TOTAL CA: CPT

## 2022-07-29 PROCEDURE — 2709999900 HC NON-CHARGEABLE SUPPLY

## 2022-07-29 PROCEDURE — 99152 MOD SED SAME PHYS/QHP 5/>YRS: CPT

## 2022-07-29 PROCEDURE — 2580000003 HC RX 258

## 2022-07-29 PROCEDURE — C1751 CATH, INF, PER/CENT/MIDLINE: HCPCS

## 2022-07-29 PROCEDURE — 93456 R HRT CORONARY ARTERY ANGIO: CPT

## 2022-07-29 PROCEDURE — C1894 INTRO/SHEATH, NON-LASER: HCPCS

## 2022-07-29 RX ORDER — SODIUM CHLORIDE 0.9 % (FLUSH) 0.9 %
5-40 SYRINGE (ML) INJECTION EVERY 12 HOURS SCHEDULED
Status: DISCONTINUED | OUTPATIENT
Start: 2022-07-29 | End: 2022-07-30 | Stop reason: HOSPADM

## 2022-07-29 RX ORDER — SODIUM CHLORIDE 0.9 % (FLUSH) 0.9 %
5-40 SYRINGE (ML) INJECTION PRN
Status: DISCONTINUED | OUTPATIENT
Start: 2022-07-29 | End: 2022-07-30 | Stop reason: HOSPADM

## 2022-07-29 RX ORDER — ACETAMINOPHEN 325 MG/1
650 TABLET ORAL EVERY 4 HOURS PRN
Status: DISCONTINUED | OUTPATIENT
Start: 2022-07-29 | End: 2022-07-30 | Stop reason: HOSPADM

## 2022-07-29 RX ORDER — SODIUM CHLORIDE 9 MG/ML
INJECTION, SOLUTION INTRAVENOUS PRN
Status: DISCONTINUED | OUTPATIENT
Start: 2022-07-29 | End: 2022-07-30 | Stop reason: HOSPADM

## 2022-07-29 RX ORDER — ISOSORBIDE MONONITRATE 60 MG/1
60 TABLET, EXTENDED RELEASE ORAL DAILY
Status: DISCONTINUED | OUTPATIENT
Start: 2022-07-30 | End: 2022-07-30 | Stop reason: HOSPADM

## 2022-07-29 RX ADMIN — ENOXAPARIN SODIUM 30 MG: 100 INJECTION SUBCUTANEOUS at 21:10

## 2022-07-29 RX ADMIN — SODIUM CHLORIDE, PRESERVATIVE FREE 10 ML: 5 INJECTION INTRAVENOUS at 21:14

## 2022-07-29 RX ADMIN — CARVEDILOL 25 MG: 25 TABLET, FILM COATED ORAL at 17:31

## 2022-07-29 RX ADMIN — CLOPIDOGREL BISULFATE 75 MG: 75 TABLET ORAL at 09:45

## 2022-07-29 RX ADMIN — SODIUM CHLORIDE, PRESERVATIVE FREE 10 ML: 5 INJECTION INTRAVENOUS at 09:45

## 2022-07-29 RX ADMIN — Medication 400 MG: at 21:10

## 2022-07-29 RX ADMIN — ACETAMINOPHEN 650 MG: 325 TABLET ORAL at 21:13

## 2022-07-29 RX ADMIN — HYDRALAZINE HYDROCHLORIDE 50 MG: 50 TABLET, FILM COATED ORAL at 21:10

## 2022-07-29 RX ADMIN — AMLODIPINE BESYLATE 5 MG: 5 TABLET ORAL at 09:45

## 2022-07-29 RX ADMIN — HYDRALAZINE HYDROCHLORIDE 50 MG: 50 TABLET, FILM COATED ORAL at 05:57

## 2022-07-29 RX ADMIN — ASPIRIN 81 MG: 81 TABLET, COATED ORAL at 09:45

## 2022-07-29 RX ADMIN — POTASSIUM CHLORIDE 20 MEQ: 1500 TABLET, EXTENDED RELEASE ORAL at 17:31

## 2022-07-29 RX ADMIN — Medication 400 MG: at 09:45

## 2022-07-29 RX ADMIN — FUROSEMIDE 80 MG: 10 INJECTION, SOLUTION INTRAMUSCULAR; INTRAVENOUS at 17:31

## 2022-07-29 RX ADMIN — SODIUM CHLORIDE, PRESERVATIVE FREE 10 ML: 5 INJECTION INTRAVENOUS at 09:49

## 2022-07-29 RX ADMIN — ISOSORBIDE MONONITRATE 30 MG: 30 TABLET, EXTENDED RELEASE ORAL at 09:45

## 2022-07-29 RX ADMIN — IOPAMIDOL 34 ML: 755 INJECTION, SOLUTION INTRAVENOUS at 14:45

## 2022-07-29 RX ADMIN — ATORVASTATIN CALCIUM 40 MG: 40 TABLET, FILM COATED ORAL at 09:45

## 2022-07-29 RX ADMIN — DOXAZOSIN 4 MG: 4 TABLET ORAL at 09:44

## 2022-07-29 ASSESSMENT — ENCOUNTER SYMPTOMS
RESPIRATORY NEGATIVE: 1
GASTROINTESTINAL NEGATIVE: 1
EYES NEGATIVE: 1

## 2022-07-29 ASSESSMENT — PAIN DESCRIPTION - LOCATION: LOCATION: HEAD

## 2022-07-29 ASSESSMENT — PAIN DESCRIPTION - DESCRIPTORS: DESCRIPTORS: PRESSURE

## 2022-07-29 ASSESSMENT — PAIN SCALES - GENERAL: PAINLEVEL_OUTOF10: 7

## 2022-07-29 ASSESSMENT — PAIN DESCRIPTION - ORIENTATION: ORIENTATION: MID

## 2022-07-29 NOTE — PROGRESS NOTES
Patient is back from cath lab, alert and orientated, VSS on room air, instructed and agreeable to lay flat until 1730. Patient has three sites, all clean, dry, intact. Lower extremities warm and pulse palatable (BLE). Will continue to monitor Q1 for 4 hours. Q4 for 24 hours.      Electronically signed by Alexandria Lam RN on 7/29/2022 at 4:46 PM

## 2022-07-29 NOTE — CONSULTS
PALLIATIVE MEDICINE CONSULTATION     Patient name:Karina Sanon   VTU:[id number]    FVB:2/56/1930  Room/Bed:Q5Y-5517/4105-01   LOS: 2 days         Date of consult:7/29/2022    Consult Information    Inpatient consult to Palliative Care  Consult performed by: SARA Davis CNP  Consult ordered by: Amol Maradiaga MD       Reason for Consult: Goals of Care, Code Status, 30 day readmit. ASSESSMENT/RECOMMENDATIONS     74 y.o. female with debility and swelling. Symptom Management:  Debility: Patient was lethargic during my visit today (she was also oriented x 4 today). Patient exhibited some bilateral extremity weakness during my visit. Patient appears to require some assistance with her ADL's. Swelling: Patient scheduled to take Lasix 80 mg BID. Goals of Care: Met patient at her beside. Patient was oriented x 4. Patient appeared to be decisional today. Reviewed patient's current health status, goals of care and code status. Patient indicated she would like to continue with all aggressive therapy at this time. Patient also indicated she would like to meet with [facility] post her hospital stay to assist with in home care. Code status was reviewed and the patient would like to remain a DNR-CCA (set by Dr. Raza Copeland on 7/29/2022). Patient/Family Goals of Care :    7/29 - Met patient at her beside. Patient was oriented x 4. Patient appeared to be decisional today. Reviewed patient's current health status, goals of care and code status. Patient indicated she would like to continue with all aggressive therapy at this time. Patient also indicated she would like to meet with [facility] post her hospital stay to assist with in home care. Code status was reviewed and the patient would like to remain a DNR-CCA (set by Dr. Raza Copeland on 7/29/2022).      Disposition/Discharge Plan:   An outpatient [facility] referral was ordered for post-discharge to followup with the patient once they return home. Advance Directives:  Surrogate Decision Maker: Per patient, her primary medical contact for decision making is Claudine (patient's daughter). Code status:  DNR-CCA    Case discussed with: patient, floor RN,   Thank you for allowing us to participate in the care of this patient. HISTORY     CC: Debility. HPI: The patient is a 74 y.o. female with a past medical history of CHF, CKD, diabetes mellitus and HTN who presented to [facility] with shortness of breath. Patient was admitted with acute on chronic congestive heart failure, acute pulmonary edema and chest pain. Palliative Medicine SymptomScreening/ROS:    Review of Systems   Constitutional:  Positive for fatigue. HENT: Negative. Eyes: Negative. Respiratory: Negative. Cardiovascular:  Positive for leg swelling. Gastrointestinal: Negative. Musculoskeletal: Negative. Skin: Negative. Neurological:  Positive for weakness. Psychiatric/Behavioral: Negative. All other systems reviewed and are negative. A complete 10 count ROS was obtained. Pertinent positives mentioned above in HPI/ROS. All others if not mentioned are negative. Palliative Performance Scale:     [] 60%  Amb reduced; Sig dz. Can't do hobbies/housework; Intake normal or reduced, Occasional assist; LOC full/confusion   [x] 50%  Mainly sit/lie; Extensive disease. Mainly assist, Intake normal or reduced; Occasional assist; LOC full/confusion   [] 40%  Mainly in bed; Extensive disease; Mainly assist; Intake normal or reduced; Occasional assist; LOC full/confusion   [] 30%  Bed bound, Extensive disease; Total care; Intake reduced; LOC full/confusion   [] 20%  Bed bound; Extensive disease; Total care; Intake minimal; Drowsy/coma   [] 10%  Bed bound; Extensive disease;  Total care; Mouth care only; Drowsy/coma   []  0%   Death     Home med list and hospital medications reviewed in chart as of 7/29/2022     EXAM     Vitals:    07/29/22 1115   BP: (!) 127/90   Pulse: 94   Resp: 18   Temp: 98.5 °F (36.9 °C)   SpO2: 95%       Physical Exam  Vitals reviewed. Constitutional:       Appearance: She is ill-appearing. HENT:      Head: Normocephalic and atraumatic. Nose: Nose normal.   Eyes:      Extraocular Movements: Extraocular movements intact. Pupils: Pupils are equal, round, and reactive to light. Cardiovascular:      Rate and Rhythm: Normal rate. Pulses: Normal pulses. Pulmonary:      Comments: Breath sounds clear but diminished bilaterally. Abdominal:      General: Bowel sounds are normal.      Palpations: Abdomen is soft. Musculoskeletal:      Cervical back: Neck supple. Right lower leg: Edema (2+) present. Left lower leg: Edema (2+) present. Skin:     General: Skin is warm and dry. Neurological:      Comments: Oriented x4   Psychiatric:         Behavior: Behavior normal.         Thought Content:  Thought content normal.         Judgment: Judgment normal.          Current labs in the epic chart reviewed as of 7/29/2022   Review of previous notes, admits, labs, radiology and testing relevant to this consult done in this chart today 7/29/2022      Total time: 80 minutes  >50% of time spent counseling patient at bedside or POA/family member if applicable , reviewing information and discussing care, coordinating with care team  Signed By: Electronically signed by SARA Peterson CNP on 7/29/2022 at 3:14 PM  Palliative Medicine   [phone]    July 29, 2022

## 2022-07-29 NOTE — PROGRESS NOTES
Cardiology - PROGRESS NOTE    Admit Date: 7/27/2022     Reason for follow up: CHF     72-year-old female with a history  of diabetes, hypertension, recurrent heart failure, came back to the  hospital with a sudden onset of shortness of breath and was admitted for  recurrent CHF. She was very recently in the hospital and only  discharged a few days ago. She said she was feeling fine until she  started getting sudden shortness of breath. She denied any chest  tightness, pressure, or heaviness, or jaw pain. The workup again showed  evidence of CHF decompensation and she was admitted. She had no fever,  chills, or rigors. No cough or sputum production. Social History:   reports that she has never smoked. She has never used smokeless tobacco. She reports that she does not drink alcohol and does not use drugs. Family History: family history is not on file. Interval History:   Patient seen and examined and notes reviewed     BP elevated   Wt 170 lb   -980 mL    Creatinine 1.8   Sitting in bed  NAD  Reports she is feeling well   All other systems reviewed and negative except as above.         Diet: Diet NPO Exceptions are: Sips of Water with Meds  Pain is:None  Nausea:None    In: 720 [P.O.:720]  Out: 1225    Wt Readings from Last 3 Encounters:   07/29/22 170 lb 13.7 oz (77.5 kg)   07/26/22 182 lb 12.2 oz (82.9 kg)   06/16/22 163 lb (73.9 kg)           Data:   Scheduled Meds:   Scheduled Meds:   sodium chloride flush  5-40 mL IntraVENous 2 times per day    carvedilol  25 mg Oral BID WC    furosemide  80 mg IntraVENous BID    potassium chloride  20 mEq Oral BID WC    magnesium oxide  400 mg Oral BID    sodium chloride flush  10 mL IntraVENous 2 times per day    enoxaparin  30 mg SubCUTAneous Q24H    insulin lispro  0-4 Units SubCUTAneous Nightly    amLODIPine  5 mg Oral Daily    aspirin  81 mg Oral Daily    atorvastatin  40 mg Oral Daily    [START ON 8/1/2022] vitamin D  10,000 Units Oral Weekly    clopidogrel  75 mg Oral Daily    doxazosin  4 mg Oral Daily    hydrALAZINE  50 mg Oral 3 times per day    isosorbide mononitrate  30 mg Oral Daily    spironolactone  25 mg Oral Daily    insulin lispro  0-8 Units SubCUTAneous TID     insulin lispro  0-4 Units SubCUTAneous Nightly     Continuous Infusions:   sodium chloride      sodium chloride      dextrose      dextrose       PRN Meds:.sodium chloride flush, sodium chloride, sodium chloride flush, sodium chloride, potassium chloride **OR** potassium alternative oral replacement **OR** potassium chloride, potassium chloride, magnesium sulfate, promethazine **OR** ondansetron, magnesium hydroxide, acetaminophen **OR** acetaminophen, dextrose, hydrALAZINE, labetalol, albuterol sulfate HFA, glucose, dextrose bolus **OR** dextrose bolus, glucagon (rDNA), dextrose  Continuous Infusions:   sodium chloride      sodium chloride      dextrose      dextrose         Intake/Output Summary (Last 24 hours) at 7/29/2022 0835  Last data filed at 7/29/2022 0555  Gross per 24 hour   Intake 720 ml   Output 1225 ml   Net -505 ml       CBC:   Recent Labs     07/29/22  0421   WBC 4.5   HGB 9.5*        BMP:  Recent Labs     07/29/22  0421      K 3.7      CO2 32   BUN 25*   CREATININE 1.8*   GLUCOSE 116*     ABGs: No results found for: PHART, PO2ART, OUF3ITY  INR: No results for input(s): INR in the last 72 hours. CARDIAC LABS     ENZYMES:  Recent Labs     07/27/22  1115   TROPONINI 0.03*     FASTING LIPID PANEL:  Lab Results   Component Value Date/Time    HDL 50 05/09/2022 04:00 AM    LDLCALC 79 05/09/2022 04:00 AM    TRIG 69 05/09/2022 04:00 AM     LIVER PROFILE:  Recent Labs     07/27/22  1839   AST 19   ALT 19       -----------------------------------------------------------------  Telemetry: personally reviewed     RAD:   7/28/2022     CXR:  FINDINGS:   Cardiomegaly. Pulmonary vascular congestion.   Pulmonary edema.  Bilateral   pleural effusions. No pneumothorax. Impression   Findings again suggest congestive heart failure         [id number]  Echo:   2022   Summary   Normal left ventricular size and systolic function. There were no regional wall motion abnormalities. Wall thickness was within normal limits. Ejection fraction was estimated at 55-60%. Bubble study does not show evidence if interatrial shunt, however   sensitivity is limited, consider VEE if clinically indicated. IVC size is within normal limits with normal respiratory phasic changes. Objective:   Vitals: BP (!) 163/79   Pulse 87   Temp 98.2 °F (36.8 °C) (Axillary)   Resp 18   Ht 5' (1.524 m)   Wt 170 lb 13.7 oz (77.5 kg)   SpO2 94%   BMI 33.37 kg/m²   General appearance: alert, appears stated age and cooperative, No acute distress   Skin: Skin color, texture, turgor normal. No rashes or ecchymosis. HEENT: Head: Normal, normocephalic, atraumatic. Neck: no carotid bruit, no JVD, supple, symmetrical, trachea midline, and thyroid not enlarged, symmetric, no tenderness/mass/nodules  Lungs: clear to auscultation bilaterally, no accessory muscle use, no respiratory distress, on RA  Heart: regular rate and rhythm, S1, S2 normal, no murmur, click, rub or gallop  Abdomen: soft, non-tender; bowel sounds normal; no masses,  no organomegaly  Extremities: extremities normal, atraumatic, no cyanosis or edema, pulses: DP +2/+2, PT +2/+2  Neurologic: Mental status: Alert, oriented, thought content appropriate, no tremors, no gross sensory motor deficit,   Psychiatric: normal insight and affect      Assessment & Plan:    Patient Active Problem List:                Plan:  1.  CHF    Acute, recurrent   Appears near compensated on exam    On beta-blocker, Imdur/hydralazine   No ACE/ARB/aldactone secondary to renal fx    Continue fluid and Na restrictions   Daily weights, strict I/O   HF RN consult   Plan for LHC/RHC to further assess      2. HTN    Remains elevated    On coreg, Cardura, hydralazine, and Imdur    Increase Imdur this AM   3. CKD   Cr 1.8    Baseline 1.7-2.0   4. DM    Per primary   5. Chest pain    Currently pain free    Plan for LHC to assess coronary anatomy    Continue beta-blocker, plavix, ASA     Code Status:   Plan for RHC/LHC today. Risks, benefits and alternatives to procedure discussed with patient and she is willing to proceed. Patient with be changed to full code for procedure and DNR-CCA re-instated post procedure. Patient verbalized understanding of full code status during procedure and is in agreement.          Wilner Oneal, SARA-CNP   [facility]  Cardiology   7/29/2022  8:35 AM

## 2022-07-29 NOTE — PROGRESS NOTES
Hospitalist Progress Note      PCP: Kristina Fitzpatrick MD    Date of Admission: 7/27/2022    Chief Complaint:   SOB    Hospital Course:    Patient is a 53-year-old female with a h/o diabetes mellitus, HTN. CKD adm for SOB. She was recently Dcd for the same symptoms. She has SOA/orhtopnea/PND. who presents to the hospital due to shortness of breath. According to patient she was recently admitted and discharged from the hospital regarding same complaint of shortness of breath, she woke up this morning with shortness of breath, she also felt some chest pain as well. Patient currently on at time of my evaluation mentioned she was brought to the hospital by her son. Patient denied fevers chills nausea vomiting diarrhea constipation dysuria.       Subjective:    SOBOE  Orthopnea  SOA      Medications:  Reviewed    Infusion Medications    sodium chloride      dextrose      dextrose       Scheduled Medications    carvedilol  25 mg Oral BID WC    furosemide  80 mg IntraVENous BID    potassium chloride  20 mEq Oral BID WC    magnesium oxide  400 mg Oral BID    sodium chloride flush  10 mL IntraVENous 2 times per day    enoxaparin  30 mg SubCUTAneous Q24H    insulin lispro  0-4 Units SubCUTAneous Nightly    amLODIPine  5 mg Oral Daily    aspirin  81 mg Oral Daily    atorvastatin  40 mg Oral Daily    [START ON 8/1/2022] vitamin D  10,000 Units Oral Weekly    clopidogrel  75 mg Oral Daily    doxazosin  4 mg Oral Daily    hydrALAZINE  50 mg Oral 3 times per day    isosorbide mononitrate  30 mg Oral Daily    spironolactone  25 mg Oral Daily    insulin lispro  0-8 Units SubCUTAneous TID WC    insulin lispro  0-4 Units SubCUTAneous Nightly     PRN Meds: sodium chloride flush, sodium chloride, potassium chloride **OR** potassium alternative oral replacement **OR** potassium chloride, potassium chloride, magnesium sulfate, promethazine **OR** ondansetron, magnesium hydroxide, acetaminophen **OR** acetaminophen, dextrose, hydrALAZINE, labetalol, albuterol sulfate HFA, glucose, dextrose bolus **OR** dextrose bolus, glucagon (rDNA), dextrose      Intake/Output Summary (Last 24 hours) at 7/29/2022 0820  Last data filed at 7/29/2022 0555  Gross per 24 hour   Intake 720 ml   Output 1225 ml   Net -505 ml         Physical Exam Performed:    BP (!) 163/79   Pulse 87   Temp 98.2 °F (36.8 °C) (Axillary)   Resp 18   Ht 5' (1.524 m)   Wt 170 lb 13.7 oz (77.5 kg)   SpO2 94%   BMI 33.37 kg/m²     General appearance: No apparent distress, appears stated age and cooperative. HEENT: Pupils equal, round, and reactive to light. Conjunctivae/corneas clear. Neck: Supple, with full range of motion. No jugular venous distention. Trachea midline. Respiratory:  Normal respiratory effort. Bibasal crackles. Cardiovascular: Regular rate and rhythm with normal S1/S2 without murmurs, rubs or gallops. Abdomen: Soft, non-tender, non-distended with normal bowel sounds. Musculoskeletal: No clubbing, cyanosis. Bipedal edema. Skin: Skin color, texture, turgor normal.  No rashes or lesions. Neurologic:  Neurovascularly intact without any focal sensory/motor deficits. Cranial nerves: II-XII intact, grossly non-focal.  Psychiatric: Alert and oriented, thought content appropriate, normal insight  Capillary Refill: Brisk,3 seconds, normal   Peripheral Pulses: +2 palpable, equal bilaterally       Labs:   Recent Labs     07/27/22  1115 07/28/22  0420 07/29/22  0421   WBC 5.9 5.1 4.5   HGB 10.1* 9.2* 9.5*   HCT 30.2* 28.0* 28.2*    229 217       Recent Labs     07/27/22  1839 07/28/22  0420 07/29/22  0421    142 141   K 3.7 3.4* 3.7    101 100   CO2 31 31 32   BUN 25* 26* 25*   CREATININE 1.6* 1.7* 1.8*   CALCIUM 9.2 8.7 8.6       Recent Labs     07/27/22  1839   AST 19   ALT 19   BILITOT 0.5   ALKPHOS 113       No results for input(s): INR in the last 72 hours.   Recent Labs     07/27/22  1115   TROPONINI 0.03*         Urinalysis:      Lab Results   Component Value Date/Time    NITRU Negative 07/24/2022 02:45 PM    WBCUA 1 07/24/2022 02:45 PM    BACTERIA None Seen 07/24/2022 02:45 PM    RBCUA 0 07/24/2022 02:45 PM    RBCUA 0-2 05/09/2022 10:30 AM    BLOODU Negative 07/24/2022 02:45 PM    SPECGRAV 1.011 07/24/2022 02:45 PM    GLUCOSEU Negative 07/24/2022 02:45 PM       Radiology:  XR CHEST (2 VW)   Final Result   Findings again suggest congestive heart failure                 Assessment/:    Active Hospital Problems    Diagnosis     SOB (shortness of breath) [R06.02]      Priority: Medium    Acute on chronic congestive heart failure ([address] [I50.9]      Priority: Medium     Acute on chronic diastolic CHF (chronic BNP elevation) likely from fluid overload  2. CKD III  3. Diabetes mellitus  4. Hypertension      Plan  CHF pathway;  -CXR  -cont 40 IV twice daily Lasix,  -I/O, daily weights. Low salt diet  -appreciate cardiol/ nephrol input  -possible L and RHC   -cont on telemetry  -replete electrolytes  -Insulin sliding scale  -Resume home medications     Limited echo 5/2022  Wall thickness was within normal limits. Ejection fraction was estimated at 55-60%. DVT Prophylaxis: Lovenox  Diet: ADULT DIET; Regular;  Low Sodium (2 gm); 1500 ml  Code Status: Full Code     PT/OT Eval Status: ordered     Dispo - pending clinical improvement      Salvador Hastings MD

## 2022-07-29 NOTE — PLAN OF CARE
Problem: Discharge Planning  Goal: Discharge to home or other facility with appropriate resources  Outcome: Progressing     Problem: Pain  Goal: Verbalizes/displays adequate comfort level or baseline comfort level  7/29/2022 0143 by Leonardo Back RN  Outcome: Progressing  7/28/2022 1342 by Cole Hendricks RN  Outcome: Progressing     Problem: ABCDS Injury Assessment  Goal: Absence of physical injury  Outcome: Progressing  Flowsheets (Taken 7/29/2022 0141)  Absence of Physical Injury: Implement safety measures based on patient assessment     Problem: Safety - Adult  Goal: Free from fall injury  7/29/2022 0143 by Leonardo Back RN  Outcome: Progressing  Flowsheets (Taken 7/29/2022 0141)  Free From Fall Injury: Instruct family/caregiver on patient safety  7/28/2022 1342 by Cole Hendricks RN  Outcome: Progressing     Problem: Skin/Tissue Integrity  Goal: Absence of new skin breakdown  Description: 1. Monitor for areas of redness and/or skin breakdown  2. Assess vascular access sites hourly  3. Every 4-6 hours minimum:  Change oxygen saturation probe site  4. Every 4-6 hours:  If on nasal continuous positive airway pressure, respiratory therapy assess nares and determine need for appliance change or resting period.   7/29/2022 0143 by Leonardo Back RN  Outcome: Progressing  7/28/2022 1342 by Cole Hendricks RN  Outcome: Progressing     Problem: Respiratory - Adult  Goal: Achieves optimal ventilation and oxygenation  Outcome: Progressing     Problem: Cardiovascular - Adult  Goal: Maintains optimal cardiac output and hemodynamic stability  Outcome: Progressing  Goal: Absence of cardiac dysrhythmias or at baseline  Outcome: Progressing     Problem: Genitourinary - Adult  Goal: Absence of urinary retention  Outcome: Progressing     Problem: Metabolic/Fluid and Electrolytes - Adult  Goal: Electrolytes maintained within normal limits  Outcome: Progressing  Goal: Hemodynamic stability and optimal renal function maintained  Outcome: Progressing     Problem: Chronic Conditions and Co-morbidities  Goal: Patient's chronic conditions and co-morbidity symptoms are monitored and maintained or improved  Outcome: Progressing

## 2022-07-29 NOTE — PLAN OF CARE
Problem: Discharge Planning  Goal: Discharge to home or other facility with appropriate resources  7/29/2022 0959 by Alexandria Lam RN  Outcome: Progressing     Problem: Pain  Goal: Verbalizes/displays adequate comfort level or baseline comfort level  7/29/2022 0959 by Alexandria Lam RN  Outcome: Progressing     Problem: ABCDS Injury Assessment  Goal: Absence of physical injury  7/29/2022 0959 by Alexandria Lam RN  Outcome: Progressing     Problem: Safety - Adult  Goal: Free from fall injury  7/29/2022 0959 by Alexandria Lam RN  Outcome: Progressing     Problem: Respiratory - Adult  Goal: Achieves optimal ventilation and oxygenation  7/29/2022 0959 by Alexandria Lam RN  Outcome: Progressing     Problem: Cardiovascular - Adult  Goal: Maintains optimal cardiac output and hemodynamic stability  7/29/2022 0959 by Alexandria Lam RN  Outcome: Progressing

## 2022-07-29 NOTE — CARE COORDINATION
Discharge Planning:  SW attempted to meet with pt to discuss [facility] choices. Pt was off the floor for a procedure. SW will attempt later as time permits.   RANDELL Garcia  [phone]  Electronically signed by Marko Landau on 7/29/2022 at 3:13 PM

## 2022-07-29 NOTE — CONSULTS
Consult received. I spent 30 minutes with Radha Saenz on 7/22/22 during  her admission. I stopped in last evening to ask her what had happened because whe had discussed at length what to do if she found herself to be acutely short of breath. She admits to panicking, which is understandable of course. She thinks she needs a \" nebulizer\". I asked her if she had a treatment in the ER --which she did not. So I explained she does not need a nebulizer for CHF. She returned home on 7/26/22. Her son fixed her a Bologna sandwich, and a Large Gatorade. She did not sleep well, and as the morning progressed , she became more short of breath. I reminded her about how eating high sodium food, and too much liquid would cause this shortness of breath. She seems to continue to have very poor insight into the effect of her dietary choices affect her ability to breath. She is feeling better now. She will be going for a Left/Right Heart cath 7/29/22. I will continue to follow along. From  my consult note 7/22/22   Ms. Arminda Russell came to the emergency room for evaluation following a 10-day history of worsening shortness of breath which has become acutely worse in the last 2 to 3 days. Consult received on admission. She routinely follows with Dr. Jeri Meyers, and saw him in June. Her weight at that time was 163 lbs. I found Ms. Arminda Russell sitting up in bed, bedside RN in the room, who shared how much better Radha Saenz is now compared to this morning. She has had a good response tot he IV lasix. I introduced myself to Radha Saenz and she was happy to talk with me, to review information about her chronic diastolic heart failure. She had not been feeling well for a number of days, and know her weight was creeping up. She said she became acutely ill last night--it scared her and she called 911. I asked her why she didn't call the MD when her weight was going up, and she was becoming sob.   She said her ankles weren't swollen, and she forgot what she was supposed to do. We reviewed in detail the 3/5 lb rule and the importance of the daily weight. And to CALL the MD.  She was able to repeat this back after our discussion. She also shared she had been in [place] [employer] [place]. She has poor insight into the sodium in foods--she routinely has rodriguez sandwiches for lunch. We talked about the relationship between eating high sodium foods and weight gain. She is committed to weighing herself each day. I also reminded her that needing extra lasix is hard on her already fragile kidneys, so if she can recognize the early s/s, she can avoid needing so much lasix. She is going to consider a healthier diet moving forward. She lives at home with her 80year old mother who is in very good health. Should she be discharged over the weekend, she knows she has an appointment with her PCP on Monday.

## 2022-07-29 NOTE — PROGRESS NOTES
[facility]                                               Progress note    Summary:   Hannah Thompson is being seen by nephrology for CHLOE on CKD. Admitted with SOB. Interval History  Came by multiple times to see the patient but has been in procedure this afternoon. Blood pressure 127/90  Satting 94% on 1 L  Weight 182 > 174 > 170  UO 1225 cc   Negative 500 cc past day     Labs reviewed  CR 2 >  > 1.7 > 1.8         Plan:   - agree, LHC and RHC will help clarify her volume status and assess for any ischemia that could be causing her SOB. - Cr has fluctuated in a range of 1.6-2 so stable. - continue lasix and aldactone for now. Harriett Huber MD  [facility]  Office: [phone]    Assessment:   Acute kidney injury  She has CKD stage III  Baseline creatinine appears to be around 1.5-2  Had an CHLOE in May 20 creatinine peaked at 4  Creatinine 1.9 o at time of consult  No urine studies here  Check bladder scan  Urinalysis showed 100 mg/dL of protein 2 hyaline cast 1 WBC no RBCs. Protein creatinine ratio 1.1 g    Electrolytes   No acute issues    Heart failure  Last EF was 55 to 13%  Diastolic function not mention. BNP elevated at 4648  Albumin 2.9    Type 2 diabetes  Last A1c 7.4    Anemia  Hemoglobin 8.9, at time of consult  No thrombocytopenia no leukocytosis. Last iron studies showed a ferritin of 185 TSAT 21%  B12 and folate were okay    PTH 63.8    ROS:   Not seen today     PMH:   Past medical history, surgical history, social history, family history are reviewed and updated as appropriate. Reviewed current medication list.   Allergies reviewed and updated as needed.      PE:   Vitals:    07/29/22 1115   BP: (!) 127/90   Pulse: 94   Resp: 18   Temp: 98.5 °F (36.9 °C)   SpO2: 95%       Not seen today       Lab Results   Component Value Date    CREATININE 1.8 (H) 07/29/2022    BUN 25 (H) 07/29/2022     07/29/2022    K 3.7 07/29/2022     07/29/2022    CO2 32 07/29/2022      Lab Results   Component Value Date    WBC 4.5 07/29/2022    HGB 9.5 (L) 07/29/2022    HCT 28.2 (L) 07/29/2022    MCV 85.6 07/29/2022     07/29/2022     Lab Results   Component Value Date    PTH 63.8 07/24/2022    CALCIUM 8.6 07/29/2022    CAION 1.18 05/09/2022    PHOS 4.2 07/26/2022

## 2022-07-30 VITALS
HEART RATE: 75 BPM | SYSTOLIC BLOOD PRESSURE: 152 MMHG | BODY MASS INDEX: 31.47 KG/M2 | HEIGHT: 60 IN | DIASTOLIC BLOOD PRESSURE: 68 MMHG | OXYGEN SATURATION: 96 % | TEMPERATURE: 98 F | RESPIRATION RATE: 18 BRPM | WEIGHT: 160.27 LBS

## 2022-07-30 LAB
ANION GAP SERPL CALCULATED.3IONS-SCNC: 10 MMOL/L (ref 3–16)
BASOPHILS ABSOLUTE: 0.1 K/UL (ref 0–0.2)
BASOPHILS RELATIVE PERCENT: 0.9 %
BUN BLDV-MCNC: 27 MG/DL (ref 7–20)
CALCIUM SERPL-MCNC: 8.9 MG/DL (ref 8.3–10.6)
CHLORIDE BLD-SCNC: 100 MMOL/L (ref 99–110)
CO2: 31 MMOL/L (ref 21–32)
CREAT SERPL-MCNC: 1.7 MG/DL (ref 0.6–1.2)
EOSINOPHILS ABSOLUTE: 0.2 K/UL (ref 0–0.6)
EOSINOPHILS RELATIVE PERCENT: 3.1 %
GFR AFRICAN AMERICAN: 36
GFR NON-AFRICAN AMERICAN: 30
GLUCOSE BLD-MCNC: 121 MG/DL (ref 70–99)
GLUCOSE BLD-MCNC: 130 MG/DL (ref 70–99)
GLUCOSE BLD-MCNC: 153 MG/DL (ref 70–99)
HCT VFR BLD CALC: 29.9 % (ref 36–48)
HEMOGLOBIN: 10 G/DL (ref 12–16)
LYMPHOCYTES ABSOLUTE: 1.6 K/UL (ref 1–5.1)
LYMPHOCYTES RELATIVE PERCENT: 28 %
MCH RBC QN AUTO: 28.3 PG (ref 26–34)
MCHC RBC AUTO-ENTMCNC: 33.4 G/DL (ref 31–36)
MCV RBC AUTO: 84.6 FL (ref 80–100)
MONOCYTES ABSOLUTE: 0.6 K/UL (ref 0–1.3)
MONOCYTES RELATIVE PERCENT: 11 %
NEUTROPHILS ABSOLUTE: 3.3 K/UL (ref 1.7–7.7)
NEUTROPHILS RELATIVE PERCENT: 57 %
PDW BLD-RTO: 15.6 % (ref 12.4–15.4)
PERFORMED ON: ABNORMAL
PERFORMED ON: ABNORMAL
PLATELET # BLD: 246 K/UL (ref 135–450)
PMV BLD AUTO: 7.7 FL (ref 5–10.5)
POTASSIUM REFLEX MAGNESIUM: 3.7 MMOL/L (ref 3.5–5.1)
RBC # BLD: 3.53 M/UL (ref 4–5.2)
SODIUM BLD-SCNC: 141 MMOL/L (ref 136–145)
WBC # BLD: 5.8 K/UL (ref 4–11)

## 2022-07-30 PROCEDURE — 99232 SBSQ HOSP IP/OBS MODERATE 35: CPT | Performed by: INTERNAL MEDICINE

## 2022-07-30 PROCEDURE — 94760 N-INVAS EAR/PLS OXIMETRY 1: CPT

## 2022-07-30 PROCEDURE — 6360000002 HC RX W HCPCS: Performed by: INTERNAL MEDICINE

## 2022-07-30 PROCEDURE — 80048 BASIC METABOLIC PNL TOTAL CA: CPT

## 2022-07-30 PROCEDURE — 6370000000 HC RX 637 (ALT 250 FOR IP): Performed by: INTERNAL MEDICINE

## 2022-07-30 PROCEDURE — 93460 R&L HRT ART/VENTRICLE ANGIO: CPT | Performed by: INTERNAL MEDICINE

## 2022-07-30 PROCEDURE — 85025 COMPLETE CBC W/AUTO DIFF WBC: CPT

## 2022-07-30 PROCEDURE — 36415 COLL VENOUS BLD VENIPUNCTURE: CPT

## 2022-07-30 PROCEDURE — 6370000000 HC RX 637 (ALT 250 FOR IP): Performed by: NURSE PRACTITIONER

## 2022-07-30 RX ORDER — AMLODIPINE BESYLATE 10 MG/1
10 TABLET ORAL DAILY
Qty: 30 TABLET | Refills: 3 | Status: SHIPPED | OUTPATIENT
Start: 2022-07-31 | End: 2022-08-02 | Stop reason: DRUGHIGH

## 2022-07-30 RX ORDER — CARVEDILOL 25 MG/1
25 TABLET ORAL 2 TIMES DAILY WITH MEALS
Qty: 60 TABLET | Refills: 3 | Status: SHIPPED | OUTPATIENT
Start: 2022-07-30 | End: 2022-08-02 | Stop reason: DRUGHIGH

## 2022-07-30 RX ORDER — AMLODIPINE BESYLATE 10 MG/1
10 TABLET ORAL DAILY
Status: DISCONTINUED | OUTPATIENT
Start: 2022-07-30 | End: 2022-07-30 | Stop reason: HOSPADM

## 2022-07-30 RX ADMIN — ISOSORBIDE MONONITRATE 60 MG: 60 TABLET, EXTENDED RELEASE ORAL at 08:14

## 2022-07-30 RX ADMIN — DOXAZOSIN 4 MG: 4 TABLET ORAL at 08:15

## 2022-07-30 RX ADMIN — ASPIRIN 81 MG: 81 TABLET, COATED ORAL at 08:12

## 2022-07-30 RX ADMIN — AMLODIPINE BESYLATE 10 MG: 10 TABLET ORAL at 08:14

## 2022-07-30 RX ADMIN — CARVEDILOL 25 MG: 25 TABLET, FILM COATED ORAL at 08:14

## 2022-07-30 RX ADMIN — ATORVASTATIN CALCIUM 40 MG: 40 TABLET, FILM COATED ORAL at 08:14

## 2022-07-30 RX ADMIN — FUROSEMIDE 80 MG: 10 INJECTION, SOLUTION INTRAMUSCULAR; INTRAVENOUS at 08:14

## 2022-07-30 RX ADMIN — CLOPIDOGREL BISULFATE 75 MG: 75 TABLET ORAL at 08:14

## 2022-07-30 RX ADMIN — POTASSIUM CHLORIDE 20 MEQ: 1500 TABLET, EXTENDED RELEASE ORAL at 08:12

## 2022-07-30 RX ADMIN — SPIRONOLACTONE 25 MG: 25 TABLET ORAL at 08:14

## 2022-07-30 RX ADMIN — Medication 400 MG: at 08:14

## 2022-07-30 NOTE — PLAN OF CARE
Problem: Discharge Planning  Goal: Discharge to home or other facility with appropriate resources  Outcome: Progressing     Problem: Pain  Goal: Verbalizes/displays adequate comfort level or baseline comfort level  Outcome: Progressing     Problem: ABCDS Injury Assessment  Goal: Absence of physical injury  Outcome: Progressing     Problem: Safety - Adult  Goal: Free from fall injury  Outcome: Progressing     Problem: Skin/Tissue Integrity  Goal: Absence of new skin breakdown  Description: 1. Monitor for areas of redness and/or skin breakdown  2. Assess vascular access sites hourly  3. Every 4-6 hours minimum:  Change oxygen saturation probe site  4. Every 4-6 hours:  If on nasal continuous positive airway pressure, respiratory therapy assess nares and determine need for appliance change or resting period.   Outcome: Progressing     Problem: Respiratory - Adult  Goal: Achieves optimal ventilation and oxygenation  Outcome: Progressing

## 2022-07-30 NOTE — DISCHARGE SUMMARY
Hospital Medicine Discharge Summary    Patient: Mani Nascimento     Gender: female  : 1955   Age: 74 y.o. MRN: [id number]    Admitting Physician: Rito Malik MD  Discharge Physician: Essie Mortimer, MD     Code Status: DNR-CCA     Admit Date: 2022   Discharge Date:   22    Disposition:  Home    Discharge Diagnoses: Active Hospital Problems    Diagnosis Date Noted    SOB (shortness of breath) [R06.02] 2022     Priority: Medium    Acute on chronic congestive heart failure (HCC) [I50.9]      Priority: Medium    Primary hypertension [I10] 2014       Acute on chronic diastolic CHF (chronic BNP elevation) likely from fluid overload  2. CKD III  3. Diabetes mellitus  4. Hypertension    Follow-up appointments:  with cardiology as arranged    Outpatient to do list: none    Condition at Discharge:  Stable    Hospital Course:     Patient is a 77-year-old female with a h/o diabetes mellitus, HTN. CKD adm for SOB. She was recently Dcd for the same symptoms. She has SOA/orhtopnea/PND. She is adm for shortness of breath. Cardiology saw and did a L/RHC with normal coronaries and LV function. She was euvolemic and was ok to be Dcd on BB, imdur/hydralazine. Not for ACEI/ARB II/Aldactome. Discharge Medications:   Current Discharge Medication List        Current Discharge Medication List        CONTINUE these medications which have CHANGED    Details   !! carvedilol (COREG) 25 MG tablet Take 1 tablet by mouth in the morning and 1 tablet in the evening. Take with meals. Qty: 60 tablet, Refills: 3      !! amLODIPine (NORVASC) 10 MG tablet Take 1 tablet by mouth in the morning. Qty: 30 tablet, Refills: 3       !! - Potential duplicate medications found. Please discuss with provider. Current Discharge Medication List        CONTINUE these medications which have NOT CHANGED    Details   hydroCHLOROthiazide (HYDRODIURIL) 25 MG tablet Take 25 mg by mouth in the morning. torsemide (DEMADEX) 10 MG tablet Take 1 tablet by mouth in the morning. Qty: 30 tablet, Refills: 3      atorvastatin (LIPITOR) 40 MG tablet Take 1 tablet by mouth daily  Qty: 30 tablet, Refills: 11      !! carvedilol (COREG) 12.5 MG tablet Take 1 tablet by mouth 2 times daily (with meals)  Qty: 60 tablet, Refills: 11      clopidogrel (PLAVIX) 75 MG tablet Take 1 tablet by mouth daily  Qty: 30 tablet, Refills: 11      isosorbide mononitrate (IMDUR) 30 MG extended release tablet Take 1 tablet by mouth daily  Qty: 30 tablet, Refills: 11      aspirin 81 MG EC tablet Take 1 tablet by mouth daily  Qty: 30 tablet, Refills: 11      !! amLODIPine (NORVASC) 5 MG tablet Take 1 tablet by mouth daily  Qty: 30 tablet, Refills: 11      doxazosin (CARDURA) 4 MG tablet Take 1 tablet by mouth daily  Qty: 30 tablet, Refills: 1      hydrALAZINE (APRESOLINE) 50 MG tablet Take 1 tablet by mouth every 8 hours  Qty: 90 tablet, Refills: 1      albuterol sulfate HFA (PROVENTIL HFA) 108 (90 Base) MCG/ACT inhaler Inhale 2 puffs into the lungs every 6 hours as needed for Wheezing (Cough)  Qty: 18 g, Refills: 3      Cholecalciferol (VITAMIN D3) 125 MCG (5000 UT) CAPS Take 2 capsules by mouth once a week      L-METHYLFOLATE-B6-B12 PO Take 1 tablet by mouth daily       ! ! - Potential duplicate medications found. Please discuss with provider.         Current Discharge Medication List        STOP taking these medications       spironolactone (ALDACTONE) 25 MG tablet Comments:   Reason for Stopping:         benzocaine-menthol (CEPACOL SORE THROAT) 15-3.6 MG lozenge Comments:   Reason for Stopping:               Discharge ROS:  A complete review of systems was asked and negative except for      Discharge Exam:    BP (!) 154/66   Pulse 87   Temp 98.3 °F (36.8 °C) (Oral)   Resp 18   Ht 5' (1.524 m)   Wt 160 lb 4.4 oz (72.7 kg)   SpO2 95%   BMI 31.30 kg/m²     General appearance: No apparent distress, appears stated age and cooperative. HEENT: Pupils equal, round, and reactive to light. Conjunctivae/corneas clear. Neck: Supple, with full range of motion. No jugular venous distention. Trachea midline. Respiratory:  Normal respiratory effort. Bibasal crackles. Cardiovascular: Regular rate and rhythm with normal S1/S2 without murmurs, rubs or gallops. Abdomen: Soft, non-tender, non-distended with normal bowel sounds. Musculoskeletal: No clubbing, cyanosis. Bipedal edema. Skin: Skin color, texture, turgor normal.  No rashes or lesions. Neurologic:  Neurovascularly intact without any focal sensory/motor deficits. Cranial nerves: II-XII intact, grossly non-focal.  Psychiatric: Alert and oriented, thought content appropriate, normal insight  Capillary Refill: Brisk,3 seconds, normal  Peripheral Pulses: +2 palpable, equal bilaterally    Labs:  For convenience and continuity at follow-up the following most recent labs are provided:    Lab Results   Component Value Date/Time    WBC 5.8 07/30/2022 06:08 AM    HGB 10.0 07/30/2022 06:08 AM    HCT 29.9 07/30/2022 06:08 AM    MCV 84.6 07/30/2022 06:08 AM     07/30/2022 06:08 AM     07/30/2022 06:08 AM    K 3.7 07/30/2022 06:08 AM     07/30/2022 06:08 AM    CO2 31 07/30/2022 06:08 AM    BUN 27 07/30/2022 06:08 AM    CREATININE 1.7 07/30/2022 06:08 AM    CALCIUM 8.9 07/30/2022 06:08 AM    PHOS 4.2 07/26/2022 06:03 AM    ALKPHOS 113 07/27/2022 06:39 PM    ALT 19 07/27/2022 06:39 PM    AST 19 07/27/2022 06:39 PM    BILITOT 0.5 07/27/2022 06:39 PM    LABALBU 3.6 07/27/2022 06:39 PM    LDLCALC 79 05/09/2022 04:00 AM    TRIG 69 05/09/2022 04:00 AM     Lab Results   Component Value Date    INR 1.02 01/17/2014       Radiology:  XR CHEST (2 VW)    Result Date: 7/27/2022  EXAMINATION: TWO XRAY VIEWS OF THE CHEST 7/27/2022 11:46 am COMPARISON: 07/22/2022 HISTORY: ORDERING SYSTEM PROVIDED HISTORY: Shortness of breath TECHNOLOGIST PROVIDED HISTORY: Reason for exam:->Shortness of breath Reason for Exam: Shortness of breath FINDINGS: Cardiomegaly. Pulmonary vascular congestion. Pulmonary edema. Bilateral pleural effusions. No pneumothorax. Findings again suggest congestive heart failure     XR CHEST PORTABLE    Result Date: 7/22/2022  EXAMINATION: ONE XRAY VIEW OF THE CHEST 7/22/2022 3:34 am COMPARISON: Chest radiograph 04/26/2022 HISTORY: ORDERING SYSTEM PROVIDED HISTORY: SOB TECHNOLOGIST PROVIDED HISTORY: Reason for exam:->SOB Reason for Exam: SOB FINDINGS: Bilateral perihilar and basilar airspace opacities. Linear opacity in the mid left lung likely due to scarring or subsegmental atelectasis. Diffuse interstitial prominence with indistinct pulmonary vasculature and suspected interlobular septal thickening. No definite findings of pneumothorax. At least trace to small left and trace right pleural effusions. Prominence of the pulmonary trunk that can be seen with pulmonary hypertension. Mildly prominent hilar and cardiac contours. No acute fracture. 1. Central predominant interstitial and alveolar edema suggestive of congestive heart failure given mild cardiomegaly and trace to small left and trace right pleural effusions. Pneumonia could appear similar. 2. Bibasilar airspace opacities most likely   General appearance: No apparent distress, appears stated age and cooperative. HEENT: Pupils equal, round, and reactive to light. Conjunctivae/corneas clear. Neck: Supple, with full range of motion. No jugular venous distention. Trachea midline. Respiratory:  Normal respiratory effort. Bibasal crackles. Cardiovascular: Regular rate and rhythm with normal S1/S2 without murmurs, rubs or gallops. Abdomen: Soft, non-tender, non-distended with normal bowel sounds. Musculoskeletal: No clubbing, cyanosis. Bipedal edema. Skin: Skin color, texture, turgor normal.  No rashes or lesions. Neurologic:  Neurovascularly intact without any focal sensory/motor deficits.  Cranial nerves: II-XII intact, grossly non-focal.  Psychiatric: Alert and oriented, thought content appropriate, normal insight  Capillary Refill: Brisk,3 seconds, normal  Peripheral Pulses: +2 palpable, equal bilaterallydue to passive atelectasis. Pneumonia and aspiration could appear similar. EKG     Rhythm: normal sinus   Rate: normal  Clinical Impression: no acute changes        The patient was seen and examined on day of discharge and this discharge summary is in conjunction with any daily progress note from day of discharge. Time Spent on discharge is 30 minutes  in the examination, evaluation, counseling and review of medications and discharge plan. Note that more than 30 minutes was spent in preparing discharge papers, discussing discharge with patient, medication review, etc.       Signed:    Eli Vaughn MD   7/30/2022      Thank you Barbara Martins MD for the opportunity to be involved in this patient's care.  If you have any questions or concerns please feel free to contact me at [facility]

## 2022-07-30 NOTE — PROGRESS NOTES
Cardiology Progress Note     Admit Date: 2022     Reason for follow up: sudden onset dyspnea    HPI:   72yo F h/o DM, HTN, recurrent diastolic CHF (LVEF 87-69%) presents with sudden onset dyspnea even after being recently admitted for acute diastolic CHF. Interval History: Patient seen and examined. Clinical notes reviewed. Telemetry reviewed - SR 80's bpm. No new complaint today. No major events overnight. Denies having angina, shortness of breath, dyspnea on exertion, Orthopnea, PND at the time of this visit. Review of System:  All other systems reviewed except for that noted above. Pertinent negatives and positives are:     General: negative for fever, chills   Ophthalmic ROS: negative for - eye pain or loss of vision  ENT ROS: negative for - headaches, sore throat   Respiratory: negative for - cough, sputum  Cardiovascular: Reviewed in HPI  Gastrointestinal: negative for - abdominal pain, diarrhea, N/V  Hematology: negative for - bleeding, blood clots, bruising or jaundice  Genito-Urinary:  negative for - Dysuria or incontinence  Musculoskeletal: negative for - Joint swelling, muscle pain  Neurological: negative for - confusion, dizziness, headaches   Psychiatric: No anxiety, no depression.   Dermatological: negative for - rash      Physical Examination:  Vitals:    22 0836   BP:    Pulse:    Resp:    Temp:    SpO2: 95%        Intake/Output Summary (Last 24 hours) at 2022 0950  Last data filed at 2022 2122  Gross per 24 hour   Intake 360 ml   Output 1300 ml   Net -940 ml     In: 360 [P.O.:360]  Out: 1300    Wt Readings from Last 3 Encounters:   22 160 lb 4.4 oz (72.7 kg)   22 182 lb 12.2 oz (82.9 kg)   22 163 lb (73.9 kg)     Temp  Av.1 °F (36.7 °C)  Min: 97.4 °F (36.3 °C)  Max: 98.5 °F (36.9 °C)  Pulse  Av.5  Min: 81  Max: 94  BP  Min: 127/90  Max: 183/79  SpO2  Av.2 %  Min: 91 %  Max: 96 %    Telemetry: Sinus rhythm with v-rates 80's bpm  Constitutional: Alert. Oriented to person, place, and time. No distress. Head: Normocephalic and atraumatic. Mouth/Throat: Lips appear moist. Oropharynx is clear and moist.  Eyes: Conjunctivae normal. EOM are normal.   Neck: Neck supple. No lymphadenopathy. No rigidity. No JVD present. Cardiovascular: Normal rate, regular rhythm. Normal S1&S2. Carotid pulse 2+ bilaterally. Pulmonary/Chest: Bilateral respiratory sounds present. No respiratory accessory muscle use. No wheezes, No rhonchi. No bibasilar rales. Abdominal: Soft. Normal bowel sounds present. No distension, No tenderness. No splenomegaly. No hernia. Musculoskeletal: No tenderness. Full range of motion in bilateral upper and lower extremities. No pitting BLE edema    Lymphadenopathy: Has no cervical adenopathy. Neurological: Alert and oriented. Cranial nerve II-XII grossly intact, No gross deficit to touch. Skin: Skin is warm and dry. No rash, lesions, ulcerations noted. Psychiatric: No anxiety nor agitation. Labs, telemetry, diagnostic and imaging results personally reviewed and interpreted. Recent Labs     07/28/22  0420 07/29/22  0421 07/30/22  0608    141 141   K 3.4* 3.7 3.7    100 100   CO2 31 32 31   BUN 26* 25* 27*   CREATININE 1.7* 1.8* 1.7*     Recent Labs     07/28/22  0420 07/29/22  0421 07/30/22  0608   WBC 5.1 4.5 5.8   HGB 9.2* 9.5* 10.0*   HCT 28.0* 28.2* 29.9*   MCV 85.7 85.6 84.6    217 246     Lab Results   Component Value Date/Time    TROPONINI 0.03 07/27/2022 11:15 AM     Estimated Creatinine Clearance: 29 mL/min (A) (based on SCr of 1.7 mg/dL (H)).    No results found for: BNP  Lab Results   Component Value Date/Time    PROTIME 11.4 01/17/2014 12:58 PM    INR 1.02 01/17/2014 12:58 PM     Lab Results   Component Value Date/Time    CHOL 143 05/09/2022 04:00 AM    HDL 50 05/09/2022 04:00 AM    TRIG 69 05/09/2022 04:00 AM       Scheduled Meds:   amLODIPine  10 mg Oral Daily    sodium chloride flush  5-40 mL IntraVENous 2 times per day    isosorbide mononitrate  60 mg Oral Daily    sodium chloride flush  5-40 mL IntraVENous 2 times per day    carvedilol  25 mg Oral BID WC    furosemide  80 mg IntraVENous BID    potassium chloride  20 mEq Oral BID WC    magnesium oxide  400 mg Oral BID    sodium chloride flush  10 mL IntraVENous 2 times per day    enoxaparin  30 mg SubCUTAneous Q24H    insulin lispro  0-4 Units SubCUTAneous Nightly    aspirin  81 mg Oral Daily    atorvastatin  40 mg Oral Daily    [START ON 8/1/2022] vitamin D  10,000 Units Oral Weekly    clopidogrel  75 mg Oral Daily    doxazosin  4 mg Oral Daily    hydrALAZINE  50 mg Oral 3 times per day    spironolactone  25 mg Oral Daily    insulin lispro  0-8 Units SubCUTAneous TID WC     Continuous Infusions:   sodium chloride      sodium chloride      sodium chloride      dextrose      dextrose       PRN Meds:sodium chloride flush, sodium chloride, sodium chloride flush, sodium chloride, acetaminophen, sodium chloride flush, sodium chloride, promethazine **OR** ondansetron, acetaminophen **OR** acetaminophen, dextrose, hydrALAZINE, labetalol, albuterol sulfate HFA, glucose, dextrose bolus **OR** dextrose bolus, glucagon (rDNA), dextrose     Patient Active Problem List    Diagnosis Date Noted    SOB (shortness of breath) 07/27/2022    Elevated troponin (chronically elevated) 07/22/2022    Chronic renal failure, stage 3 (moderate) ([address] 07/22/2022    Ischemic cerebral stroke due to small artery occlusion ([address] 05/10/2022    Diabetic nephropathy with proteinuria ([address]     Acute respiratory failure with hypoxia ([address] 04/27/2022    Acute pulmonary edema ([address] 04/27/2022    Acute kidney injury superimposed on CKD ([address] 04/27/2022    Hypertensive urgency 04/27/2022    Hyperlipidemia 04/27/2022    Morbid obesity due to excess calories ([address] 04/27/2022    Acute on chronic congestive heart failure (HCC)     Acute on chronic diastolic (congestive) heart failure ([facility] 04/26/2022    Chest pain 10/16/2018    Ankle fracture, left 01/17/2014    DMII (diabetes mellitus, type 2) ([facility] 01/17/2014    Primary hypertension 01/17/2014      Active Hospital Problems    Diagnosis Date Noted    SOB (shortness of breath) [R06.02] 07/27/2022     Priority: Medium    Acute on chronic congestive heart failure ([facility] [I50.9]      Priority: Medium    Primary hypertension [I10] 01/17/2014       Assessment and Plan:     CHF, acute, diastolic  -euvolemic today  -cont BB, Imdur/hydralazine  -no ACE/ARB/aldactone secondary to renal fx  -not indicated for SLGT2i  -cath yesterday revealed normal coronary arteries and normal intracardiac filling pressures and pulmonary pressures. CKD  -Cr. 1.7, baseline 1.7-2.0    Will sign off. Should follow-up with general cardiology or else CHF NP within 1-2 weeks. Thank you for allowing me to participate in the care of this patient. If you have any questions, please do not hesitate to contact me.     Anrold Kim MD, MS, [facility] [place], [address]  Cardiac Electrophysiology  [address]  [address] [facility], [address] [place] [address]  Dino Estrada [address]  [phone]

## 2022-07-30 NOTE — OP NOTE
[address]   Procedure Note    CLINICAL HISTORY:       Michael Calzada is a 74 y.o. female with a history of type II diabetes. She was admitted with complaints of dyspnea, secondary to HFpEF     Patient Active Problem List   Diagnosis    Ankle fracture, left    DMII (diabetes mellitus, type 2) (HCC)    Primary hypertension    Chest pain    Acute on chronic diastolic (congestive) heart failure (HCC)    Acute respiratory failure with hypoxia (HCC)    Acute pulmonary edema (HCC)    Acute kidney injury superimposed on CKD ([facility]    Hypertensive urgency    Hyperlipidemia    Morbid obesity due to excess calories (HCC)    Acute on chronic congestive heart failure (HCC)    Diabetic nephropathy with proteinuria (HCC)    Ischemic cerebral stroke due to small artery occlusion (HCC)    Elevated troponin (chronically elevated)    Chronic renal failure, stage 3 (moderate) (HCC)    SOB (shortness of breath)       Prior to Admission medications    Medication Sig Start Date End Date Taking? Authorizing Provider   hydroCHLOROthiazide (HYDRODIURIL) 25 MG tablet Take 25 mg by mouth in the morning. Yes Historical Provider, MD   spironolactone (ALDACTONE) 25 MG tablet Take 1 tablet by mouth in the morning. 7/26/22   Ciro Weston MD   torsemide (DEMADEX) 10 MG tablet Take 1 tablet by mouth in the morning.  7/26/22   Ciro Weston MD   atorvastatin (LIPITOR) 40 MG tablet Take 1 tablet by mouth daily 6/16/22   Lillie Drake MD   carvedilol (COREG) 12.5 MG tablet Take 1 tablet by mouth 2 times daily (with meals) 6/16/22   Lillie Drake MD   clopidogrel (PLAVIX) 75 MG tablet Take 1 tablet by mouth daily 6/16/22   Lillie Drake MD   isosorbide mononitrate (IMDUR) 30 MG extended release tablet Take 1 tablet by mouth daily 6/16/22   Lillie Drake MD   aspirin 81 MG EC tablet Take 1 tablet by mouth daily 6/16/22   Lillie Drake MD   amLODIPine (NORVASC) 5 MG tablet Take 1 tablet by mouth daily 6/16/22   Walker GALINDO Fatoumata Townsend MD   doxazosin (CARDURA) 4 MG tablet Take 1 tablet by mouth daily 5/21/22   Cullen Penaylin, DO   hydrALAZINE (APRESOLINE) 50 MG tablet Take 1 tablet by mouth every 8 hours 5/20/22   Cullen Segura, DO   albuterol sulfate HFA (PROVENTIL HFA) 108 (90 Base) MCG/ACT inhaler Inhale 2 puffs into the lungs every 6 hours as needed for Wheezing (Cough) 5/6/22   Malena Jonas MD   Cholecalciferol (VITAMIN D3) 125 MCG (5000 UT) CAPS Take 2 capsules by mouth once a week    Historical Provider, MD   L-METHYLFOLATE-B6-B12 PO Take 1 tablet by mouth daily    Historical Provider, MD          The risks, benefits, and details of the procedure were explained to the patient. The patient verbalized understanding and wanted to proceed. Informed written consent was obtained. INDICATION:  HFpEF     PROCEDURES PERFORMED:   [facility]  Coronary angiogram     PROCEDURE TECHNIQUE:  The patient was approached from the right radial artery using a 5-6  French slender sheath. Left coronary angiography was done using a Triston L3.5 diagnostic catheter. Right coronary angiography was done using a Triston R4 guide catheter. Venous accessed obtained into the right femoral vein using a 7F brightip catheter. CONTRAST:  Total of 40 cc. COMPLICATIONS:  None. At the end of the procedure a Mynx device was used for hemostasis. EBL:  5cc    HEMODYNAMICS:  Aortic pressure was 140/71/102     RA 3  RV 39/6  PA 39/13/24  PCWP 12   PA % 60  AO % 87  CO/CI 6.7 L/min 3.8 L/min/m2  SVR 1172 dynes . Sec/cm-5   dynes .  Sec/cm-5      ANGIOGRAM/CORONARY ARTERIOGRAM:       The left main coronary artery is normal .    The left anterior descending artery is normal .    The left circumflex artery is normal .    The right coronary artery is normal .      INTERVENTION  None     SUMMARY:   Normal coronary arteries  Normal intracardiac filling pressures and pulmonary pressues       RECOMMENDATION:      - continue medical therapy   - consider outpatient CardioMEMS device     Kaylah Cabrales MD [address], Interventional Cardiology, and Peripheral Vascular Disease   [address]   (C): [phone]  Natalie Ly): [phone]

## 2022-07-30 NOTE — PLAN OF CARE
Problem: Discharge Planning  Goal: Discharge to home or other facility with appropriate resources  7/30/2022 1035 by Richard Greene RN  Outcome: Progressing  7/30/2022 0214 by Jourdan Javier RN  Outcome: Progressing     Problem: Pain  Goal: Verbalizes/displays adequate comfort level or baseline comfort level  7/30/2022 1035 by Richard Greene RN  Outcome: Progressing  7/30/2022 0214 by Jourdan Javier RN  Outcome: Progressing     Problem: ABCDS Injury Assessment  Goal: Absence of physical injury  7/30/2022 1035 by Richard Greene RN  Outcome: Progressing  7/30/2022 0214 by Jourdan Javier RN  Outcome: Progressing     Problem: Safety - Adult  Goal: Free from fall injury  7/30/2022 1035 by Richard Greene RN  Outcome: Progressing  7/30/2022 0214 by Jourdan Javier RN  Outcome: Progressing     Problem: Skin/Tissue Integrity  Goal: Absence of new skin breakdown  Description: 1. Monitor for areas of redness and/or skin breakdown  2. Assess vascular access sites hourly  3. Every 4-6 hours minimum:  Change oxygen saturation probe site  4. Every 4-6 hours:  If on nasal continuous positive airway pressure, respiratory therapy assess nares and determine need for appliance change or resting period.   7/30/2022 1035 by Richard Gerene RN  Outcome: Progressing  7/30/2022 0214 by Jourdan Javier RN  Outcome: Progressing     Problem: Respiratory - Adult  Goal: Achieves optimal ventilation and oxygenation  7/30/2022 1035 by Richard Greene RN  Outcome: Progressing  7/30/2022 0214 by Jourdan Javier RN  Outcome: Progressing     Problem: Cardiovascular - Adult  Goal: Maintains optimal cardiac output and hemodynamic stability  7/30/2022 1035 by Richard Greene RN  Outcome: Progressing  7/30/2022 0214 by Jourdan Javier RN  Outcome: Progressing  Goal: Absence of cardiac dysrhythmias or at baseline  7/30/2022 1035 by Richard Greene RN  Outcome: Progressing  7/30/2022 0214 by Sunrise Georgette Pickens RN  Outcome: Progressing     Problem: Genitourinary - Adult  Goal: Absence of urinary retention  7/30/2022 1035 by Rogelio Norwood RN  Outcome: Progressing  7/30/2022 0214 by Shaun Muñoz RN  Outcome: Progressing     Problem: Metabolic/Fluid and Electrolytes - Adult  Goal: Electrolytes maintained within normal limits  7/30/2022 1035 by Rogelio Norwood RN  Outcome: Progressing  7/30/2022 0214 by Shaun Muñoz RN  Outcome: Progressing  Goal: Hemodynamic stability and optimal renal function maintained  7/30/2022 1035 by Rogelio Norwood RN  Outcome: Progressing  7/30/2022 0214 by Shaun Muñoz RN  Outcome: Progressing

## 2022-07-30 NOTE — PROGRESS NOTES
Call placed to security, they have patients blue bag of home medications. Security states they will bring home medication bag to patient shortly.

## 2022-07-30 NOTE — CARE COORDINATION
CASE MANAGEMENT DISCHARGE SUMMARY:    DISCHARGE DATE: 7/30/22    DISCHARGED TO: Home with Home Care              Discharging to Facility/ Agency   Name: [facility]  Address:  [address] [place] [address], [address] [place] [address], [address] [place] [address]   Phone:  [phone]  Fax:  [phone]     TRANSPORTATION: Family/ Friend               JEFF Updated   Case Management   Physician   Nurse    Destination updated (SNF/HHC)     Whiteboard Note Updated with above    Home Care Order Verified     Electronically signed by MAYELA Espinoza on 7/30/2022 at 1:42 PM

## 2022-07-30 NOTE — DISCHARGE INSTR - COC
Continuity of Care Form    Patient Name: Brianda Poole   :  1955  MRN:  [id number]    Admit date:  2022  Discharge date:  22    Code Status Order: DNR-CCA   Advance Directives:     Admitting Physician:  Aiden Rai MD  PCP: Percy Greenwood MD    Discharging Nurse: [address] Unit/Room#: E0F-1844/9338-03  Discharging Unit Phone Number: [phone]    Emergency Contact:   Extended Emergency Contact Information  Primary Emergency Contact: Brea Miller[address] Phone: [phone]  Relation: Child    Past Surgical History:  Past Surgical History:   Procedure Laterality Date    FRACTURE SURGERY Left 2014    orif tibula/fibula fixation    HYSTERECTOMY (CERVIX STATUS UNKNOWN)         Immunization History:   Immunization History   Administered Date(s) Administered    COVID-19, MODERNA BLUE border, Primary or Immunocompromised, (age 12y+), IM, 100 mcg/0.5mL 2021    COVID-19, PFIZER GRAY top, DO NOT Dilute, (age 15 y+), IM, 30 mcg/0.3 mL 2022       Active Problems:  Patient Active Problem List   Diagnosis Code    Ankle fracture, left S82.892A    DMII (diabetes mellitus, type 2) ([address] E11.9    Primary hypertension I10    Chest pain R07.9    Acute on chronic diastolic (congestive) heart failure (HCC) I50.33    Acute respiratory failure with hypoxia ([address] J96.01    Acute pulmonary edema (HCC) J81.0    Acute kidney injury superimposed on CKD ([address] N17.9, N18.9    Hypertensive urgency I16.0    Hyperlipidemia E78.5    Morbid obesity due to excess calories ([facility]) E66.01    Acute on chronic congestive heart failure ([address] I50.9    Diabetic nephropathy with proteinuria ([address] E11.21    Ischemic cerebral stroke due to small artery occlusion ([facility]) I63.81    Elevated troponin (chronically elevated) R77.8    Chronic renal failure, stage 3 (moderate) (HCC) N18.30    SOB (shortness of breath) R06.02       Isolation/Infection:   Isolation            No Isolation          Patient Infection Status       Infection Onset Added Last Indicated Last Indicated By Review Planned Expiration Resolved Resolved By    None active    Resolved    COVID-19 (Rule Out) 22 COVID-19, Rapid (Ordered)   22 Rule-Out Test Resulted    COVID-19 22 COVID-19, Rapid   22     +  ? Symptoms  fever 100.4  Stroke     COVID-19 (Rule Out) 22 Respiratory Panel, Molecular, with COVID-19 (Restricted: peds pts or suitable admitted adults) (Ordered)   22 Rule-Out Test Resulted    INFLUENZA 22 Mary Elkins RN   22 Mary Elkins RN    Added from external infection. Nurse Assessment:  Last Vital Signs: BP (!) 154/66   Pulse 87   Temp 98.3 °F (36.8 °C) (Oral)   Resp 18   Ht 5' (1.524 m)   Wt 160 lb 4.4 oz (72.7 kg)   SpO2 95%   BMI 31.30 kg/m²     Last documented pain score (0-10 scale): Pain Level: 7  Last Weight:   Wt Readings from Last 1 Encounters:   22 160 lb 4.4 oz (72.7 kg)     Mental Status:  oriented and alert    IV Access:  - None    Nursing Mobility/ADLs:  Walking   Assisted  Transfer  Assisted  Bathing  Assisted  Dressing  Assisted  Toileting  Assisted  Feeding  Assisted  Med Admin  Assisted  Med Delivery   whole    Wound Care Documentation and Therapy:        Elimination:  Continence: Bowel: Yes  Bladder: Yes  Urinary Catheter: None   Colostomy/Ileostomy/Ileal Conduit: No       Date of Last BM: ***    Intake/Output Summary (Last 24 hours) at 2022 1118  Last data filed at 2022  Gross per 24 hour   Intake 360 ml   Output 1300 ml   Net -940 ml     I/O last 3 completed shifts: In: 360 [P.O.:360]  Out: 2400 [Urine:2400]    Safety Concerns:      At Risk for Falls    Impairments/Disabilities:      None    Nutrition Therapy:  Current Nutrition Therapy:   - Oral Diet:  General    Routes of Feeding: Oral  Liquids: No Restrictions  Daily Fluid Restriction: no  Last Modified Barium Swallow with Video (Video Swallowing Test): not done    Treatments at the Time of Hospital Discharge:   Respiratory Treatments: ***  Oxygen Therapy:  is not on home oxygen therapy. Ventilator:    - No ventilator support    Rehab Therapies: Physical Therapy and Occupational Therapy  Weight Bearing Status/Restrictions: No weight bearing restrictions  Other Medical Equipment (for information only, NOT a DME order):  ***  Other Treatments: ***    Patient's personal belongings (please select all that are sent with patient):  None    RN SIGNATURE:  Electronically signed by Tabitha Reyes RN on 7/30/22 at 12:04 PM EDT    CASE MANAGEMENT/SOCIAL WORK SECTION    Inpatient Status Date: 7/27/22    Readmission Risk Assessment Score:  Readmission Risk              Risk of Unplanned Readmission:  [id number]           Discharging to Facility/ Agency   Name: [facility]  Address:  [address] [place] [address]  Phone:  [phone]  Fax:  [phone]       Dialysis Facility (if applicable)   Name:  Address:  Dialysis Schedule:  Phone:  Fax:    / signature: Electronically signed by MAYELA Farfan on 7/30/22 at 1:41 PM EDT    PHYSICIAN SECTION    Prognosis: Fair    Condition at Discharge: [address] [place] [address] Patient Condition:[id number]}    Rehab Potential (if transferring to Rehab): {Prognosis:[id number]}    Recommended Labs or Other Treatments After Discharge: none    Physician Certification: I certify the above information and transfer of Guadalupe Quezada  is necessary for the continuing treatment of the diagnosis listed and that she requires Home Care for {GREATER/LESS:[id number]} 30 days.      Update Admission H&P: No change in H&P    PHYSICIAN SIGNATURE:  Electronically signed by Eli Vaughn MD on 7/30/22 at 11:19 AM EDT

## 2022-08-01 ENCOUNTER — FOLLOWUP TELEPHONE ENCOUNTER (OUTPATIENT)
Dept: INPATIENT UNIT | Age: 67
End: 2022-08-01

## 2022-08-01 NOTE — FLOWSHEET NOTE
Jared Ladd was very short on the phone. Thanked me for calling, but did not have much time to talk. She stated she is doing better with her diet--no more bologna sandwiches.   She has a follow up appointment [email or web address].  She thanked me for the reminder

## 2022-08-02 ENCOUNTER — OFFICE VISIT (OUTPATIENT)
Dept: CARDIOLOGY CLINIC | Age: 67
End: 2022-08-02
Payer: COMMERCIAL

## 2022-08-02 VITALS
SYSTOLIC BLOOD PRESSURE: 152 MMHG | BODY MASS INDEX: 30.82 KG/M2 | DIASTOLIC BLOOD PRESSURE: 74 MMHG | HEART RATE: 74 BPM | WEIGHT: 157 LBS | HEIGHT: 60 IN | OXYGEN SATURATION: 98 %

## 2022-08-02 DIAGNOSIS — I63.9 ACUTE CVA (CEREBROVASCULAR ACCIDENT) (HCC): ICD-10-CM

## 2022-08-02 DIAGNOSIS — R13.10 DYSPHAGIA, UNSPECIFIED TYPE: ICD-10-CM

## 2022-08-02 DIAGNOSIS — E78.5 HYPERLIPIDEMIA, UNSPECIFIED HYPERLIPIDEMIA TYPE: ICD-10-CM

## 2022-08-02 DIAGNOSIS — I10 UNCONTROLLED HYPERTENSION: ICD-10-CM

## 2022-08-02 DIAGNOSIS — I50.32 CHRONIC DIASTOLIC HEART FAILURE (HCC): Primary | ICD-10-CM

## 2022-08-02 DIAGNOSIS — R00.1 SYMPTOMATIC BRADYCARDIA: ICD-10-CM

## 2022-08-02 DIAGNOSIS — I27.20 PULMONARY HYPERTENSION (HCC): ICD-10-CM

## 2022-08-02 PROCEDURE — 1124F ACP DISCUSS-NO DSCNMKR DOCD: CPT | Performed by: INTERNAL MEDICINE

## 2022-08-02 PROCEDURE — 99214 OFFICE O/P EST MOD 30 MIN: CPT | Performed by: INTERNAL MEDICINE

## 2022-08-02 RX ORDER — HYDRALAZINE HYDROCHLORIDE 50 MG/1
50 TABLET, FILM COATED ORAL 3 TIMES DAILY
Qty: 90 TABLET | Refills: 1 | Status: SHIPPED
Start: 2022-08-02

## 2022-08-02 RX ORDER — HYDROCHLOROTHIAZIDE 25 MG/1
25 TABLET ORAL DAILY
Qty: 30 TABLET | Refills: 6 | Status: SHIPPED
Start: 2022-08-02 | End: 2022-09-29

## 2022-08-02 RX ORDER — SPIRONOLACTONE 25 MG/1
25 TABLET ORAL
Qty: 30 TABLET | Refills: 3
Start: 2022-08-02

## 2022-08-02 RX ORDER — AMLODIPINE BESYLATE 10 MG/1
10 TABLET ORAL
Qty: 30 TABLET | Refills: 3 | Status: SHIPPED
Start: 2022-08-02 | End: 2022-09-04

## 2022-08-02 RX ORDER — CARVEDILOL 25 MG/1
25 TABLET ORAL 2 TIMES DAILY
Qty: 60 TABLET | Refills: 3 | Status: SHIPPED
Start: 2022-08-02 | End: 2022-09-04

## 2022-08-02 RX ORDER — ISOSORBIDE MONONITRATE 30 MG/1
30 TABLET, EXTENDED RELEASE ORAL DAILY
Qty: 30 TABLET | Refills: 11 | Status: SHIPPED
Start: 2022-08-02

## 2022-08-02 NOTE — PATIENT INSTRUCTIONS
Per Dr. Ramey Certain instructions:    1) Coreg/carvedilol at 9 am and 9 pm  2) Spironolactone at lunchtime  3) Norvasc did not bring today, prescription was sent previously to pharmacy at time of hospital discharge-go  and take at lunchtime   4) Hydrochlorothiazide-will remain off of for now. 5) Imdur/isosorbide- will remain off of for now.    6) Hydralazine 50 mg every 8 hours at 8 am, 2pm and 10 pm.  7) work on low salt diet  8) labs/blood work prior to 4-5 week f/u with Dr. Ramey Certain

## 2022-08-21 PROBLEM — R77.8 ELEVATED TROPONIN: Status: RESOLVED | Noted: 2022-07-22 | Resolved: 2022-08-21

## 2022-08-21 PROBLEM — R79.89 ELEVATED TROPONIN: Status: RESOLVED | Noted: 2022-07-22 | Resolved: 2022-08-21

## 2022-08-24 LAB — B-TYPE NATRIURETIC PEPTIDE: 441 PG/ML (ref 0–80)

## 2022-08-25 NOTE — DISCHARGE SUMMARY
Hospital Medicine Discharge Summary    Patient ID: Lian Weaver      Patient's PCP: Luis Bundy MD    Admit Date: 7/22/2022     Discharge Date: 7/26/2022    Admitting Physician: Maricarmen Urrutia MD     Discharge Physician: Ricardo Floyd MD     Discharge Diagnoses: Active Hospital Problems    Diagnosis Date Noted    Chronic renal failure, stage 3 (moderate) ([address] [N18.30] 07/22/2022     Priority: Medium    Morbid obesity due to excess calories ([address] [E66.01] 04/27/2022     Priority: Medium    Hyperlipidemia [E78.5] 04/27/2022     Priority: Medium    Acute pulmonary edema ([address] [J81.0] 04/27/2022     Priority: Medium    Acute respiratory failure with hypoxia ([address] [J96.01] 04/27/2022     Priority: Medium    Hypertensive urgency [I16.0] 04/27/2022     Priority: Medium    Acute on chronic diastolic (congestive) heart failure ([address] [I50.33] 04/26/2022     Priority: Medium    Primary hypertension [I10] 01/17/2014    DMII (diabetes mellitus, type 2) ([address] [E11.9] 01/17/2014       The patient was seen and examined on day of discharge and this discharge summary is in conjunction with any daily progress note from day of discharge. Condition at discharge - stable    Hospital Course: patient seen and evaluated on the day of discharge. Patient informed about following up with appointments. Patient verbalized understanding for follow-up appointments. The patient and / or the family were informed of the results of tests, a time was given to answer questions, a plan was proposed and they agreed with plan. Medical reconciliation performed. Patient discharged stable condition. On the date of discharge, the patient reported feeling stable. The patient was found to not be in any acute distress, with vital signs within normal limits, and no new abnormalities on physical examination.  Further, the patient expressed appropriate understanding of, and agreement with, the discharge recommendations, medications, and plan. Acute on chronic diastolic CHF (chronic BNP elevation) likely from fluid overload  2. CKD III  3. Diabetes mellitus  4. Hypertension     Follow-up appointments:  with cardiology as arranged     Outpatient to do list: none     Condition at Discharge:  Stable     Hospital Course:      Patient is a 74-year-old female with a h/o diabetes mellitus, HTN. CKD adm for SOB. She was recently Dcd for the same symptoms. She has SOA/orhtopnea/PND. She is adm for shortness of breath. Cardiology saw and did a L/RHC with normal coronaries and LV function. She was euvolemic and was ok to be Dcd on BB, imdur/hydralazine. Not for ACEI/ARB II/Aldactome. Exam:     BP (!) 143/71   Pulse 91   Temp 98.2 °F (36.8 °C) (Oral)   Resp 18   Ht 5' (1.524 m)   Wt 182 lb 12.2 oz (82.9 kg)   SpO2 96%   BMI 35.69 kg/m²     General appearance: No apparent distress  HEENT:  Conjunctivae/corneas clear. Neck: Supple, No jugular venous distention. Respiratory:  Normal respiratory effort. Clear to auscultation, bilaterally without Rales/Wheezes/Rhonchi. Cardiovascular: Regular rate and rhythm with normal S1/S2 without murmurs, rubs or gallops. Abdomen: Soft, non-tender, non-distended, normal bowel sounds. Musculoskelatal: No clubbing, cyanosis or edema bilaterally. Skin: Skin color, texture, turgor normal.   Neurologic: no focal neurologic deficits. grossly non-focal.  Psychiatric: Alert and oriented, normal mood    Consults:     IP CONSULT TO HEART FAILURE NURSE/COORDINATOR  IP CONSULT TO CARDIOLOGY  IP CONSULT TO NEPHROLOGY      Code Status:  Prior    Activity: activity as tolerated    Labs:  For convenience and continuity at follow-up the following most recent labs are provided:      CBC:    Lab Results   Component Value Date/Time    WBC 5.8 07/30/2022 06:08 AM    HGB 10.0 07/30/2022 06:08 AM    HCT 29.9 07/30/2022 06:08 AM     07/30/2022 06:08 AM       Renal:    Lab Results   Component Value Date/Time  07/30/2022 06:08 AM    K 3.7 07/30/2022 06:08 AM     07/30/2022 06:08 AM    CO2 31 07/30/2022 06:08 AM    BUN 27 07/30/2022 06:08 AM    CREATININE 1.7 07/30/2022 06:08 AM    CALCIUM 8.9 07/30/2022 06:08 AM    PHOS 4.2 07/26/2022 06:03 AM       Discharge Medications:     Discharge Medication List as of 7/26/2022  2:04 PM             Details   torsemide (DEMADEX) 10 MG tablet Take 1 tablet by mouth in the morning., Disp-30 tablet, R-3Normal      spironolactone (ALDACTONE) 25 MG tablet Take 1 tablet by mouth in the morning., Disp-30 tablet, R-3Normal                Details   atorvastatin (LIPITOR) 40 MG tablet Take 1 tablet by mouth daily, Disp-30 tablet, R-11Normal      clopidogrel (PLAVIX) 75 MG tablet Take 1 tablet by mouth daily, Disp-30 tablet, R-11Normal      aspirin 81 MG EC tablet Take 1 tablet by mouth daily, Disp-30 tablet, R-11Normal      carvedilol (COREG) 12.5 MG tablet Take 1 tablet by mouth 2 times daily (with meals), Disp-60 tablet, R-11Normal      isosorbide mononitrate (IMDUR) 30 MG extended release tablet Take 1 tablet by mouth daily, Disp-30 tablet, R-11Normal      amLODIPine (NORVASC) 5 MG tablet Take 1 tablet by mouth daily, Disp-30 tablet, R-11Normal      doxazosin (CARDURA) 4 MG tablet Take 1 tablet by mouth daily, Disp-30 tablet, R-1Normal      hydrALAZINE (APRESOLINE) 50 MG tablet Take 1 tablet by mouth every 8 hours, Disp-90 tablet, R-1Normal      albuterol sulfate HFA (PROVENTIL HFA) 108 (90 Base) MCG/ACT inhaler Inhale 2 puffs into the lungs every 6 hours as needed for Wheezing (Cough), Disp-18 g, R-3Normal      benzocaine-menthol (CEPACOL SORE THROAT) 15-3.6 MG lozenge Take 1 lozenge by mouth every 2 hours as needed for Sore Throat or Pain, Disp-18 lozenge, R-0Normal      Cholecalciferol (VITAMIN D3) 125 MCG (5000 UT) CAPS Take 2 capsules by mouth once a weekHistorical Med      L-METHYLFOLATE-B6-B12 PO Take 1 tablet by mouth dailyHistorical Med             Time Spent on discharge is more than 30 mints in the examination, evaluation, counseling and review of medications and discharge plan. Signed:    Leona Angel MD   8/25/2022      Thank you Hugo Moreau MD for the opportunity to be involved in this patient's care. If you have any questions or concerns please feel free to contact me at [phone].

## 2022-09-04 ENCOUNTER — HOSPITAL ENCOUNTER (EMERGENCY)
Age: 67
Discharge: HOME | End: 2022-09-04
Attending: EMERGENCY MEDICINE
Payer: COMMERCIAL

## 2022-09-04 VITALS
HEART RATE: 88 BPM | DIASTOLIC BLOOD PRESSURE: 61 MMHG | WEIGHT: 163.36 LBS | RESPIRATION RATE: 14 BRPM | TEMPERATURE: 97.7 F | SYSTOLIC BLOOD PRESSURE: 138 MMHG | BODY MASS INDEX: 31.9 KG/M2 | OXYGEN SATURATION: 97 %

## 2022-09-04 DIAGNOSIS — E16.2 HYPOGLYCEMIA: Primary | ICD-10-CM

## 2022-09-04 DIAGNOSIS — I10 ESSENTIAL HYPERTENSION: ICD-10-CM

## 2022-09-04 LAB
A/G RATIO: 1 (ref 1.1–2.2)
ALBUMIN SERPL-MCNC: 3.4 G/DL (ref 3.4–5)
ALP BLD-CCNC: 74 U/L (ref 40–129)
ALT SERPL-CCNC: 11 U/L (ref 10–40)
ANION GAP SERPL CALCULATED.3IONS-SCNC: 9 MMOL/L (ref 3–16)
AST SERPL-CCNC: 15 U/L (ref 15–37)
BASOPHILS ABSOLUTE: 0.1 K/UL (ref 0–0.2)
BASOPHILS RELATIVE PERCENT: 0.8 %
BILIRUB SERPL-MCNC: <0.2 MG/DL (ref 0–1)
BUN BLDV-MCNC: 46 MG/DL (ref 7–20)
CALCIUM SERPL-MCNC: 8.5 MG/DL (ref 8.3–10.6)
CHLORIDE BLD-SCNC: 104 MMOL/L (ref 99–110)
CO2: 26 MMOL/L (ref 21–32)
CREAT SERPL-MCNC: 2.4 MG/DL (ref 0.6–1.2)
EOSINOPHILS ABSOLUTE: 0.1 K/UL (ref 0–0.6)
EOSINOPHILS RELATIVE PERCENT: 2 %
GFR AFRICAN AMERICAN: 25
GFR NON-AFRICAN AMERICAN: 20
GLUCOSE BLD-MCNC: 101 MG/DL (ref 70–99)
GLUCOSE BLD-MCNC: 129 MG/DL (ref 70–99)
GLUCOSE BLD-MCNC: 19 MG/DL (ref 70–99)
HCT VFR BLD CALC: 28.6 % (ref 36–48)
HEMOGLOBIN: 9.4 G/DL (ref 12–16)
LYMPHOCYTES ABSOLUTE: 2.1 K/UL (ref 1–5.1)
LYMPHOCYTES RELATIVE PERCENT: 32 %
MCH RBC QN AUTO: 28 PG (ref 26–34)
MCHC RBC AUTO-ENTMCNC: 32.7 G/DL (ref 31–36)
MCV RBC AUTO: 85.8 FL (ref 80–100)
MONOCYTES ABSOLUTE: 0.7 K/UL (ref 0–1.3)
MONOCYTES RELATIVE PERCENT: 9.9 %
NEUTROPHILS ABSOLUTE: 3.7 K/UL (ref 1.7–7.7)
NEUTROPHILS RELATIVE PERCENT: 55.3 %
PDW BLD-RTO: 16.1 % (ref 12.4–15.4)
PERFORMED ON: ABNORMAL
PERFORMED ON: ABNORMAL
PLATELET # BLD: 324 K/UL (ref 135–450)
PMV BLD AUTO: 7.5 FL (ref 5–10.5)
POTASSIUM REFLEX MAGNESIUM: 3.6 MMOL/L (ref 3.5–5.1)
RBC # BLD: 3.34 M/UL (ref 4–5.2)
SODIUM BLD-SCNC: 139 MMOL/L (ref 136–145)
TOTAL PROTEIN: 6.7 G/DL (ref 6.4–8.2)
WBC # BLD: 6.7 K/UL (ref 4–11)

## 2022-09-04 PROCEDURE — 2500000003 HC RX 250 WO HCPCS

## 2022-09-04 PROCEDURE — 80053 COMPREHEN METABOLIC PANEL: CPT

## 2022-09-04 PROCEDURE — 85025 COMPLETE CBC W/AUTO DIFF WBC: CPT

## 2022-09-04 PROCEDURE — 99283 EMERGENCY DEPT VISIT LOW MDM: CPT

## 2022-09-04 PROCEDURE — 36415 COLL VENOUS BLD VENIPUNCTURE: CPT

## 2022-09-04 PROCEDURE — 96374 THER/PROPH/DIAG INJ IV PUSH: CPT

## 2022-09-04 RX ORDER — PREGABALIN 150 MG/1
150 CAPSULE ORAL 2 TIMES DAILY
COMMUNITY
Start: 2022-08-23 | End: 2022-11-21

## 2022-09-04 RX ORDER — POTASSIUM CHLORIDE 750 MG/1
TABLET, EXTENDED RELEASE ORAL
COMMUNITY
Start: 2022-08-08

## 2022-09-04 RX ORDER — AMLODIPINE BESYLATE 5 MG/1
TABLET ORAL
COMMUNITY
Start: 2022-08-11

## 2022-09-04 RX ORDER — DEXTROSE MONOHYDRATE 25 G/50ML
25 INJECTION, SOLUTION INTRAVENOUS ONCE
Status: COMPLETED | OUTPATIENT
Start: 2022-09-04 | End: 2022-09-04

## 2022-09-04 RX ORDER — CARVEDILOL 12.5 MG/1
1 TABLET ORAL 2 TIMES DAILY
COMMUNITY
Start: 2022-08-08

## 2022-09-04 RX ORDER — INSULIN GLARGINE 100 [IU]/ML
24 INJECTION, SOLUTION SUBCUTANEOUS NIGHTLY
COMMUNITY
Start: 2022-08-23

## 2022-09-04 RX ORDER — OLMESARTAN MEDOXOMIL 40 MG/1
TABLET ORAL
COMMUNITY
Start: 2022-08-08

## 2022-09-04 RX ORDER — DEXTROSE MONOHYDRATE 25 G/50ML
INJECTION, SOLUTION INTRAVENOUS
Status: DISPENSED
Start: 2022-09-04 | End: 2022-09-05

## 2022-09-04 RX ORDER — INSULIN LISPRO 100 [IU]/ML
INJECTION, SOLUTION INTRAVENOUS; SUBCUTANEOUS
COMMUNITY
Start: 2022-08-23

## 2022-09-04 RX ADMIN — DEXTROSE MONOHYDRATE 25 G: 25 INJECTION, SOLUTION INTRAVENOUS at 15:28

## 2022-09-04 ASSESSMENT — PAIN - FUNCTIONAL ASSESSMENT
PAIN_FUNCTIONAL_ASSESSMENT: NONE - DENIES PAIN
PAIN_FUNCTIONAL_ASSESSMENT: 0-10

## 2022-09-04 ASSESSMENT — PAIN SCALES - GENERAL: PAINLEVEL_OUTOF10: 0

## 2022-09-04 NOTE — ED TRIAGE NOTES
[facility]. Pt awake but not responding to commands upon arrival. Pt's friend stated that the pt began to be unable to speak at approx 1300 while riding in the care with her. Upon arrival BS 36. Administered an amp of D50 and pt became responsive within approx two minutes. States that she took 11 units of humalog as usual around noon right after she ate lunch at 1130. Skin warm and dry/normal color for ethnicity. Resp easy and unlabored. Pt placed in gown and on cardiac monitor. Call light in reach. Will continue to monitor.

## 2022-09-04 NOTE — ED PROVIDER NOTES
Attending Supervisory Note/Shared Visit   I have personally performed a face to face diagnostic evaluation on this patient. I have reviewed the mid-levels findings and agree. History and Exam by me shows allergic white female pulled from a car. Her friend brought her here. He states that she really has not been acting normal since around 1 PM.  Patient is an insulin-dependent diabetic. We later found out that she took some insulin this afternoon but had not eaten. The last time she ate was this morning. General: [de-identified] black female in no obvious distress. HEENT: Atraumatic. Pupils equal round reactive. Extraocular movements are intact. Questionable minimal right lower facial droop. Heart: Regular rate and rhythm. No murmurs or gallops noted. Lungs: Breath sounds equal bilaterally and clear. Neuro: Awake, alert, oriented to person not to place. Oriented to the year. Pupils equal round reactive. Minimal right lower facial droop as above. She is diffusely weak. She will move her upper and lower extremities symmetrically. She has difficulty cooperating for a focused stroke exam.    Patient's initial Accu-Chek was 32. She was given an amp of D50. Within 2 or 3 minutes she was awake, alert, oriented and performed a normal neurologic exam without any obvious facial droop. Her glucose on her lab draw was actually 19. Her BUN is 46 with a creatinine of 2.4. Her electrolytes are normal.  Her H&H is 9.4 and 28.6. The patient eaten while she has been here. She has been here about 2-1/2 hours since she was given the dextrose. Repeat Accu-Chek at 1714 was 101. The patient's clinical presentation was consistent with her hypoglycemia. She returned to her baseline immediately after being given the dextrose. She is remained awake alert and oriented. I think her hypoglycemia was secondary to taking her insulin but not eating anything.   She will be discharged home with instructions to not miss meals or snacks. Monitor blood sugar closely. Diagnosis hypoglycemia    Disposition: Discharge home.     (Please note that portions of this note were completed with a voice recognition program.  Efforts were made to edit the dictations but occasionally words are mis-transcribed.)    Latha Acuna MD  Attending Emergency Physician        Leona Haynes MD  09/04/22 [id number]

## 2022-09-04 NOTE — ED PROVIDER NOTES
[address] [facility]        Pt Name: Bruna Sifuentes  MRN: [id number]  [place] 1955  Date of evaluation: 9/4/2022  Provider: SARA Hardy - CNP  PCP: Lukas Alvarado MD  Note Started: 7:41 PM EDT       I have seen and evaluated this patient with my supervising physician Dr. Walden Lanes. Triage CHIEF COMPLAINT       Chief Complaint   Patient presents with    Hypoglycemia         HISTORY OF PRESENT ILLNESS   (Location/Symptom, Timing/Onset, Context/Setting, Quality, Duration, Modifying Factors, Severity)  Note limiting factors. Chief Complaint: Above    Bruna Sifuentes is a 74 y.o. female who presents to the ED by her friend. Initially the call was for assistance getting somebody out of the car. When I arrived to the car the patient is nonverbal.  She is unable to lift herself up. Her friend tells us that she does not know what happened in last saw the patient normal roughly 1:00 (2 hours prior to arrival). Nursing Notes were all reviewed and agreed with or any disagreements were addressed in the HPI.     REVIEW OF SYSTEMS    (2-9 systems for level 4, 10 or more for level 5)     Review of Systems   Unable to perform ROS: Acuity of condition     PAST MEDICAL HISTORY     Past Medical History:   Diagnosis Date    Ankle fracture, left 01/17/2014    CHF (congestive heart failure) ([facility])     CKD (chronic kidney disease) stage 4, GFR 15-29 ml/min ([facility])     Clotting disorder ([facility] [address]     Diabetes mellitus ([facility] [address]     HTN (hypertension), benign 01/17/2014    Hypertension        SURGICAL HISTORY     Past Surgical History:   Procedure Laterality Date    FRACTURE SURGERY Left 01/17/2014    orif tibula/fibula fixation    HYSTERECTOMY (CERVIX STATUS UNKNOWN)         CURRENTMEDICATIONS       Discharge Medication List as of 9/4/2022  6:13 PM        CONTINUE these medications which have NOT CHANGED    Details   insulin lispro, 1 Unit Dial, (HUMALOG KWIKPEN) 100 UNIT/ML SOPN TAKE 18 UNITS AT BREAKFAST, 18 UNITS AT LUNCH,AND 36 UNITS WITH DINNERHistorical Med      pregabalin (LYRICA) 150 MG capsule Take 150 mg by mouth 2 times daily. Historical Med      insulin glargine (LANTUS SOLOSTAR) 100 UNIT/ML injection pen Inject 24 Units into the skin nightlyHistorical Med      potassium chloride (KLOR-CON M) 10 MEQ extended release tablet TAKE 1 TABLET BY MOUTH ONCE DAILY *EMERGENCY REFILL*Historical Med      olmesartan (BENICAR) 40 MG tablet TAKE 1 TABLET BY MOUTH ONCE DAILY *EMERGENCY REFILL*Historical Med      carvedilol (COREG) 12.5 MG tablet Take 1 tablet by mouth in the morning and at bedtimeHistorical Med      amLODIPine (NORVASC) 5 MG tablet TAKE 1 TABLET BY MOUTH DAILYHistorical Med      spironolactone (ALDACTONE) 25 MG tablet Take 1 tablet by mouth Daily with lunch, Disp-30 tablet, R-3NO PRINT      hydroCHLOROthiazide (HYDRODIURIL) 25 MG tablet Take 1 tablet by mouth in the morning. HOLDING AS OF 8/2/22., Disp-30 tablet, R-6NO PRINT      isosorbide mononitrate (IMDUR) 30 MG extended release tablet Take 1 tablet by mouth in the morning. HOLDING AS OF 8/2/22., Disp-30 tablet, R-11NO PRINT      hydrALAZINE (APRESOLINE) 50 MG tablet Take 1 tablet by mouth in the morning and 1 tablet at noon and 1 tablet before bedtime.  CHANGE TO 8 AM, 2PM AND 10 PM., Disp-90 tablet, R-1NO PRINT      torsemide (DEMADEX) 10 MG tablet Take 1 tablet by mouth in the morning., Disp-30 tablet, R-3Normal      atorvastatin (LIPITOR) 40 MG tablet Take 1 tablet by mouth daily, Disp-30 tablet, R-11Normal      clopidogrel (PLAVIX) 75 MG tablet Take 1 tablet by mouth daily, Disp-30 tablet, R-11Normal      aspirin 81 MG EC tablet Take 1 tablet by mouth daily, Disp-30 tablet, R-11Normal      doxazosin (CARDURA) 4 MG tablet Take 1 tablet by mouth daily, Disp-30 tablet, R-1Normal      Cholecalciferol (VITAMIN D3) 125 MCG (5000 UT) CAPS Take 2 capsules by mouth once a weekHistorical Med L-METHYLFOLATE-B6-B12 PO Take 1 tablet by mouth dailyHistorical Med             ALLERGIES     Patient has no known allergies. FAMILYHISTORY     History reviewed. No pertinent family history. SOCIAL HISTORY       Social History     Socioeconomic History    Marital status:      Spouse name: None    Number of children: None    Years of education: None    Highest education level: None   Tobacco Use    Smoking status: Never    Smokeless tobacco: Never   Substance and Sexual Activity    Alcohol use: No    Drug use: No    Sexual activity: Not Currently       SCREENINGS    Nga Coma Scale  Eye Opening: Spontaneous  Best Verbal Response: Oriented  Best Motor Response: Obeys commands  McArthur Coma Scale Score: 15        PHYSICAL EXAM    (up to 7 for level 4, 8 or more for level 5)     ED Triage Vitals [09/04/22 1546]   BP Temp Temp Source Heart Rate Resp SpO2 Height Weight   (!) 154/60 97.7 °F (36.5 °C) Oral 90 18 99 % -- 163 lb 5.8 oz (74.1 kg)       Physical Exam  Vitals and nursing note reviewed. Constitutional:       General: She is in acute distress. Appearance: She is obese. She is ill-appearing. She is not toxic-appearing or diaphoretic. HENT:      Head: Normocephalic and atraumatic. Right Ear: External ear normal.      Left Ear: External ear normal.      Nose: Nose normal. No congestion or rhinorrhea. Mouth/Throat:      Mouth: Mucous membranes are moist.      Pharynx: Oropharynx is clear. No oropharyngeal exudate or posterior oropharyngeal erythema. Eyes:      General: No scleral icterus. Right eye: No discharge. Left eye: No discharge. Extraocular Movements: Extraocular movements intact. Conjunctiva/sclera: Conjunctivae normal.      Pupils: Pupils are equal, round, and reactive to light. Cardiovascular:      Rate and Rhythm: Normal rate and regular rhythm. Pulses: Normal pulses. Heart sounds: Normal heart sounds. No murmur heard.     No friction rub. No gallop. Pulmonary:      Effort: Pulmonary effort is normal. No respiratory distress. Breath sounds: Normal breath sounds. No stridor. No wheezing, rhonchi or rales. Abdominal:      General: Abdomen is flat. Bowel sounds are normal. There is no distension. Palpations: Abdomen is soft. Tenderness: There is no abdominal tenderness. There is no right CVA tenderness, left CVA tenderness or guarding. Musculoskeletal:         General: No deformity. Normal range of motion. Cervical back: Normal range of motion and neck supple. No rigidity or tenderness. Skin:     General: Skin is warm and dry. Capillary Refill: Capillary refill takes less than 2 seconds. Coloration: Skin is pale. Skin is not jaundiced. Findings: No rash. Neurological:      Comments: Questionable right-sided facial droop initially.   Weakness to all extremities       DIAGNOSTIC RESULTS   LABS:    Labs Reviewed   CBC WITH AUTO DIFFERENTIAL - Abnormal; Notable for the following components:       Result Value    RBC 3.34 (*)     Hemoglobin 9.4 (*)     Hematocrit 28.6 (*)     RDW 16.1 (*)     All other components within normal limits   COMPREHENSIVE METABOLIC PANEL W/ REFLEX TO MG FOR LOW K - Abnormal; Notable for the following components:    Glucose 19 (*)     BUN 46 (*)     Creatinine 2.4 (*)     GFR Non- 20 (*)     GFR African American 25 (*)     Albumin/Globulin Ratio 1.0 (*)     All other components within normal limits    Narrative:     Robin Smith tel. [phone],  Chemistry results called to and read back by JOSE CHANCE, 09/04/2022 17:15, by  [facility]   POCT GLUCOSE - Abnormal; Notable for the following components:    POC Glucose 129 (*)     All other components within normal limits   POCT GLUCOSE - Abnormal; Notable for the following components:    POC Glucose 101 (*)     All other components within normal limits   POCT GLUCOSE       When ordered, only abnormal lab results are patient, found to be hypoglycemic to 30s. Insulin-dependent diabetic. She taken insulin before eating and had not actually eaten before coming into the ED. Last meal was roughly 7 to 8 hours before arrival to ED    Patient was given an amp of D50 with excellent symptom resolution within a minute. On reassessment, awake, alert and oriented. Moving all extremities. No real droop noted on reassessment. No focal deficits noted on neurologic exam.    Patient was p.o. challenged successfully, she ate and was able to ambulate without distress. She was observed in the ED for approximately 3 hours. Her sugar did not drop below 100. As such, we will discharge her home with close return precautions. Is agreeable to close follow-up with her PCP. Labs in ED x-ray revealed anemia on the CBC. CMP revealed a glucose of 19. The remainder of her renal function is consistent with her history and prior labs from July of this year    The patient is at low risk for mortality based on demographic, history and clinical factors. Given the best available information and clinical assessment, I estimate the risk of hospitalization to be greater than risk of treatment at home. I have explained to the patient that the risk could rapidly change, given precautions for return and instructions. Explained to patient that the risk for mortality is low based on demographic, history and clinical factors. I discussed with patient the results of evaluation in the ED, diagnosis, care, and prognosis. The plan is to discharge to home. Patient is in agreement with plan and questions have been answered. I also discussed with patient the reasons which may require a return visit and the importance of follow-up care. The patient is well-appearing, nontoxic, and improved at the time of discharge. Patient agrees to call to arrange follow-up care as directed.    Patient understands to return immediately for worsening/change in symptoms. I am the Primary Clinician of Record. FINAL IMPRESSION      1. Hypoglycemia    2.  Essential hypertension          DISPOSITION/PLAN   DISPOSITION Decision To Discharge 09/04/2022 06:12:26 PM      PATIENT REFERREDTO:  Leif Stacy, [address] [place]  [phone]    Call in 1 day  Follow up on your recent ED visit      DISCHARGE MEDICATIONS:  Discharge Medication List as of 9/4/2022  6:13 PM          DISCONTINUED MEDICATIONS:  Discharge Medication List as of 9/4/2022  6:13 PM                 (Please note that portions ofthis note were completed with a voice recognition program.  Efforts were made to edit the dictations but occasionally words are mis-transcribed.)    SARA Cisneros CNP (electronically signed)             SARA Cisneros CNP  09/04/22 2022

## 2022-09-04 NOTE — DISCHARGE INSTRUCTIONS
Please be careful and do not take your insulin before you eat again. Please follow-up with your PCP regarding your ER visit in the next 48 hours.   Return to the ED for any worsening symptoms

## 2022-09-04 NOTE — ED NOTES
Pt provided with apple juice, turkey sandwich and trina crackers.       Amirah Da Silva RN  09/04/22 1330

## 2022-09-28 PROBLEM — I63.9 ACUTE CVA (CEREBROVASCULAR ACCIDENT) (HCC): Status: ACTIVE | Noted: 2022-09-28

## 2022-09-28 PROBLEM — R13.10 DYSPHAGIA: Status: ACTIVE | Noted: 2022-09-28

## 2022-09-28 PROBLEM — I27.20 PULMONARY HYPERTENSION (HCC): Status: ACTIVE | Noted: 2022-09-28

## 2022-09-28 PROBLEM — I50.32 CHRONIC DIASTOLIC HEART FAILURE (HCC): Status: ACTIVE | Noted: 2022-04-26

## 2022-09-28 NOTE — PROGRESS NOTES
Sig Dispense Refill    potassium chloride (KLOR-CON M) 10 MEQ extended release tablet TAKE 1 TABLET BY MOUTH ONCE DAILY *EMERGENCY REFILL*      olmesartan (BENICAR) 40 MG tablet TAKE 1 TABLET BY MOUTH ONCE DAILY *EMERGENCY REFILL*      carvedilol (COREG) 12.5 MG tablet Take 1 tablet by mouth in the morning and at bedtime      amLODIPine (NORVASC) 5 MG tablet TAKE 1 TABLET BY MOUTH DAILY      insulin lispro, 1 Unit Dial, 100 UNIT/ML SOPN TAKE 18 UNITS AT BREAKFAST, 18 UNITS AT LUNCH,AND 36 UNITS WITH DINNER      pregabalin (LYRICA) 150 MG capsule Take 150 mg by mouth 2 times daily. insulin glargine (LANTUS SOLOSTAR) 100 UNIT/ML injection pen Inject 24 Units into the skin nightly      spironolactone (ALDACTONE) 25 MG tablet Take 1 tablet by mouth Daily with lunch 30 tablet 3    isosorbide mononitrate (IMDUR) 30 MG extended release tablet Take 1 tablet by mouth in the morning. HOLDING AS OF 8/2/22. 30 tablet 11    hydrALAZINE (APRESOLINE) 50 MG tablet Take 1 tablet by mouth in the morning and 1 tablet at noon and 1 tablet before bedtime. CHANGE TO 8 AM, 2PM AND 10 PM. 90 tablet 1    torsemide (DEMADEX) 10 MG tablet Take 1 tablet by mouth in the morning. 30 tablet 3    atorvastatin (LIPITOR) 40 MG tablet Take 1 tablet by mouth daily 30 tablet 11    clopidogrel (PLAVIX) 75 MG tablet Take 1 tablet by mouth daily 30 tablet 11    aspirin 81 MG EC tablet Take 1 tablet by mouth daily 30 tablet 11    doxazosin (CARDURA) 4 MG tablet Take 1 tablet by mouth daily 30 tablet 1    L-METHYLFOLATE-B6-B12 PO Take 1 tablet by mouth daily       No current facility-administered medications for this visit. Review of Systems:  Constitutional: no unanticipated weight loss. There's been no change in energy level, sleep pattern, or activity level. No fevers, chills. Eyes: No visual changes or diplopia. No scleral icterus. ENT: No Headaches, hearing loss or vertigo. No mouth sores or sore throat.   Cardiovascular: as reviewed in HPI  Respiratory: No cough or wheezing, no sputum production. No hematemesis. Gastrointestinal: No abdominal pain, appetite loss, blood in stools. No change in bowel or bladder habits. Genitourinary: No dysuria, trouble voiding, or hematuria. Musculoskeletal:  No gait disturbance, no joint complaints. Integumentary: No rash or pruritis. Neurological: No headache, diplopia, change in muscle strength, numbness or tingling. Psychiatric: No anxiety or depression. Endocrine: No temperature intolerance. No excessive thirst, fluid intake, or urination. No tremor. Hematologic/Lymphatic: No abnormal bruising or bleeding, blood clots or swollen lymph nodes. Allergic/Immunologic: No nasal congestion or hives. Physical Exam:   /66   Pulse 70   Ht 5' (1.524 m)   Wt 162 lb (73.5 kg)   SpO2 96%   BMI 31.64 kg/m²   Wt Readings from Last 3 Encounters:   09/29/22 162 lb (73.5 kg)   09/04/22 163 lb 5.8 oz (74.1 kg)   08/02/22 157 lb (71.2 kg)     Constitutional: She is oriented to person, place, and time. She appears well-developed and well-nourished. In no acute distress. Head: Normocephalic and atraumatic. Pupils equal and round. Neck: Neck supple. No JVP or carotid bruit appreciated. No mass and no thyromegaly present. No lymphadenopathy present. Cardiovascular: Normal rate. Normal heart sounds. Exam reveals no gallop and no friction rub. No murmur heard. Pulmonary/Chest: Effort normal and breath sounds normal. No respiratory distress. She has no wheezes, rhonchi or rales. Abdominal: Soft, non-tender. Bowel sounds are normal. She exhibits no organomegaly, mass or bruit. Extremities: No edema, cyanosis, or clubbing. Pulses are 2+ radial/dorsalis pedis/posterior tibial/carotid bilaterally. Neurological: No gross cranial nerve deficit. Coordination normal.   Skin: Skin is warm and dry. There is no rash or diaphoresis. Psychiatric: She has a normal mood and affect.  Her speech is normal and behavior is normal.     Lab Review:   Lab Results   Component Value Date/Time    TRIG 69 05/09/2022 04:00 AM    HDL 50 05/09/2022 04:00 AM    LDLCALC 79 05/09/2022 04:00 AM    LABVLDL 17 10/17/2018 06:40 AM     Lab Results   Component Value Date/Time    BUN 46 09/04/2022 04:28 PM    CREATININE 2.4 09/04/2022 04:28 PM       EKG Interpretation: 8/2/22: not completed       Image Review:     Echo: 4/27/22  Global left ventricular function is normal with ejection fraction estimated   from 55 % to 60 %. Moderate concentric left ventricular hypertrophy. Grade II diastolic dysfunction with elevated LV filling pressures. Mild mitral regurgitation is present. The left atrium is mildly dilated. The right ventricle is normal in size and function. Systolic pulmonary artery pressure (SPAP) is estimated at 56 mmHg consistent   with moderate pulmonary hypertension (RA pressure 3 mmHg). Mild tricuspid   regurgitation. RHC: 5/4/22  OVERALL IMPRESSION:  1. Significantly elevated right atrial pressure. 2.  Mild to moderate pulmonary hypertension. 3.  Normal pulmonary capillary wedge pressure at 8 mmHg. 4.  Low normal cardiac output and indices. In view of the above findings, we will continue to hold the patient's Lasix and consider getting a cardiac MRI at a later stage to rule out any evidence of infiltrative heart disease. Limited echo: 5/10/22  Normal left ventricular size and systolic function. There were no regional wall motion abnormalities. Wall thickness was within normal limits. Ejection fraction was estimated at 55-60%. Bubble study does not show evidence if interatrial shunt, however   sensitivity is limited, consider VEE if clinically indicated. IVC size is within normal limits with normal respiratory phasic changes. Assessment/Plan:     Essential hypertension  - controlled today. Continue medical therapy.      CHF/pulmonary HTN    -Admitted 4/26-5/6/22 with worsening SOB, BLE edema and NP-cough. CHLOE on CKD with acute hypoxic respiratory failure, influenza A.   -admitted 7/27-7/30/22 SOB, acute on chronic heart failure, HTN.   -Today, she reports LE edema and cough  -creatinine elevated at 2.4 (9/2022)   -will have her hold Aldactone and Demadex for now due to worsening renal insufficiency and repeat renal panel in 1 week    -May consider Cardiac MRI if she has recurrent heart failure    Acute CVA with dysphagia   She was then admitted 5/7-5/20/22 for acute ischemic stroke with facial droop, RLE weakness, slurred speech, dysphagia, symptomatic bradycardia, CHLOE on CKD, uncontrolled HTN presents today in follow up. -denies any recurrence and reports right slurred, drooping has resolved  -using cane for walking aid.   -5/10/22 CBC improved but remains abnormal     Symptomatic bradycardia   -normal rate, rhythm on physical exam today  -denies any s/s of bradycardia  -will continue to monitor. Hyperlipidemia  -on lipitor  -no reported myalgias   -5/9/22 LDLc 79    DM-II    CKD  Baseline creatinine 1.7  Most recent creatinine 2.4 (9/2022)  Will have her hold Demadex and Aldactone for now and repeat renal function in 1 week and refer to kidney and hypertension. Encouraged influenza and COVID vaccinations per yearly guidelines. We will plan follow up between 4-5 weeks. Thank you very much for allowing me to participate in the care of your patient. Please do not hesitate to contact me if you have any questions.     Sincerely,  Royal John MD      [address], [address] [place] [address] [facility]  Ph: [phone]  Fax: [phone]

## 2022-09-29 ENCOUNTER — OFFICE VISIT (OUTPATIENT)
Dept: CARDIOLOGY CLINIC | Age: 67
End: 2022-09-29

## 2022-09-29 VITALS
DIASTOLIC BLOOD PRESSURE: 66 MMHG | BODY MASS INDEX: 31.8 KG/M2 | WEIGHT: 162 LBS | OXYGEN SATURATION: 96 % | HEART RATE: 70 BPM | HEIGHT: 60 IN | SYSTOLIC BLOOD PRESSURE: 134 MMHG

## 2022-09-29 DIAGNOSIS — I50.32 CHRONIC DIASTOLIC HEART FAILURE (HCC): ICD-10-CM

## 2022-09-29 DIAGNOSIS — I63.9 ACUTE CVA (CEREBROVASCULAR ACCIDENT) (HCC): ICD-10-CM

## 2022-09-29 DIAGNOSIS — N18.9 CHRONIC KIDNEY DISEASE, UNSPECIFIED CKD STAGE: Primary | ICD-10-CM

## 2022-09-29 DIAGNOSIS — R13.10 DYSPHAGIA, UNSPECIFIED TYPE: ICD-10-CM

## 2022-09-29 DIAGNOSIS — I27.20 PULMONARY HYPERTENSION (HCC): ICD-10-CM

## 2022-09-29 DIAGNOSIS — Z79.4 TYPE 2 DIABETES MELLITUS WITHOUT COMPLICATION, WITH LONG-TERM CURRENT USE OF INSULIN (HCC): ICD-10-CM

## 2022-09-29 DIAGNOSIS — R00.1 SYMPTOMATIC BRADYCARDIA: ICD-10-CM

## 2022-09-29 DIAGNOSIS — E11.9 TYPE 2 DIABETES MELLITUS WITHOUT COMPLICATION, WITH LONG-TERM CURRENT USE OF INSULIN (HCC): ICD-10-CM

## 2022-09-29 DIAGNOSIS — I10 ESSENTIAL HYPERTENSION: ICD-10-CM

## 2022-09-29 DIAGNOSIS — E78.2 MIXED HYPERLIPIDEMIA: ICD-10-CM

## 2022-09-29 NOTE — PATIENT INSTRUCTIONS
Your kidney function is elevated. For this reason please DO NOT TAKE SPIRONOLACTONE OR TORSEMIDE UNTIL FURTHER NOTICE. REPEAT LABS IN 1 WEEK. WE HAVE REFERRED YOU TO A KIDNEY SPECIALIST. THEY WILL CALL YOU TO SCHEDULE AN APPOINTMENT. RETURN TO THE OFFICE TO SEE DR. Felipe Vieyra IN 1 MONTH.

## 2023-01-17 ENCOUNTER — OFFICE VISIT (OUTPATIENT)
Dept: CARDIOLOGY CLINIC | Age: 68
End: 2023-01-17
Payer: MEDICARE

## 2023-01-17 VITALS
HEIGHT: 62 IN | BODY MASS INDEX: 30.91 KG/M2 | HEART RATE: 72 BPM | WEIGHT: 168 LBS | OXYGEN SATURATION: 97 % | DIASTOLIC BLOOD PRESSURE: 64 MMHG | SYSTOLIC BLOOD PRESSURE: 136 MMHG

## 2023-01-17 DIAGNOSIS — N18.9 CHRONIC KIDNEY DISEASE, UNSPECIFIED CKD STAGE: ICD-10-CM

## 2023-01-17 DIAGNOSIS — I10 ESSENTIAL HYPERTENSION: Primary | ICD-10-CM

## 2023-01-17 DIAGNOSIS — I50.32 CHRONIC DIASTOLIC HEART FAILURE (HCC): ICD-10-CM

## 2023-01-17 DIAGNOSIS — Z51.81 ENCOUNTER FOR MONITORING DIURETIC THERAPY: ICD-10-CM

## 2023-01-17 DIAGNOSIS — Z79.899 ENCOUNTER FOR MONITORING DIURETIC THERAPY: ICD-10-CM

## 2023-01-17 DIAGNOSIS — R00.1 SYMPTOMATIC BRADYCARDIA: ICD-10-CM

## 2023-01-17 DIAGNOSIS — I27.20 PULMONARY HYPERTENSION (HCC): ICD-10-CM

## 2023-01-17 DIAGNOSIS — I63.9 ACUTE CVA (CEREBROVASCULAR ACCIDENT) (HCC): ICD-10-CM

## 2023-01-17 DIAGNOSIS — R13.10 DYSPHAGIA, UNSPECIFIED TYPE: ICD-10-CM

## 2023-01-17 DIAGNOSIS — E78.2 MIXED HYPERLIPIDEMIA: ICD-10-CM

## 2023-01-17 DIAGNOSIS — R01.1 SYSTOLIC MURMUR: ICD-10-CM

## 2023-01-17 PROCEDURE — G8427 DOCREV CUR MEDS BY ELIG CLIN: HCPCS | Performed by: INTERNAL MEDICINE

## 2023-01-17 PROCEDURE — G8417 CALC BMI ABV UP PARAM F/U: HCPCS | Performed by: INTERNAL MEDICINE

## 2023-01-17 PROCEDURE — G8400 PT W/DXA NO RESULTS DOC: HCPCS | Performed by: INTERNAL MEDICINE

## 2023-01-17 PROCEDURE — 99214 OFFICE O/P EST MOD 30 MIN: CPT | Performed by: INTERNAL MEDICINE

## 2023-01-17 PROCEDURE — 3078F DIAST BP <80 MM HG: CPT | Performed by: INTERNAL MEDICINE

## 2023-01-17 PROCEDURE — 1090F PRES/ABSN URINE INCON ASSESS: CPT | Performed by: INTERNAL MEDICINE

## 2023-01-17 PROCEDURE — 1036F TOBACCO NON-USER: CPT | Performed by: INTERNAL MEDICINE

## 2023-01-17 PROCEDURE — G8484 FLU IMMUNIZE NO ADMIN: HCPCS | Performed by: INTERNAL MEDICINE

## 2023-01-17 PROCEDURE — 3075F SYST BP GE 130 - 139MM HG: CPT | Performed by: INTERNAL MEDICINE

## 2023-01-17 PROCEDURE — 3017F COLORECTAL CA SCREEN DOC REV: CPT | Performed by: INTERNAL MEDICINE

## 2023-01-17 PROCEDURE — 1124F ACP DISCUSS-NO DSCNMKR DOCD: CPT | Performed by: INTERNAL MEDICINE

## 2023-01-17 NOTE — PROGRESS NOTES
[address]      Cardiology Progress Note    Jasiel Becerra  1955 January 17, 2023    CC: \" I have no heart symptoms. \"     HPI:  The patient is 74 y.o. female with a past medical history significant for HTN, HLD, DM-II, CHF. Admitted 4/26-5/6/22 with worsening SOB, BLE edema and NP-cough. CHLOE on CKD with acute hypoxic respiratory failure, influenza A. She was then admitted 5/7-5/20/22 for acute ischemic stroke with facial droop, RLE weakness, slurred speech, dysphagia, symptomatic bradycardia, CHLOE on CKD, uncontrolled HTN. Today,  she currently denies any symptoms of recurrent TIA, CVA, angina, heart failure. She did establish with Nephrology 10/2022 per our instructions. Improvement in her renal function. Patient denies exertional chest pain/pressure, dyspnea at rest, COBB, PND, orthopnea, palpitations, lightheadedness, weight changes, changes in LE edema, and syncope. Reports medical therapy compliance and tolerating. She also denies any abnormal bruising or bleeding. Labs completed 12/2022 per [facility]. Past Medical History:   Diagnosis Date    Ankle fracture, left 01/17/2014    CHF (congestive heart failure) ([facility])     CKD (chronic kidney disease) stage 4, GFR 15-29 ml/min ([facility])     Clotting disorder ([facility] [address]     Diabetes mellitus ([facility] [address]     HTN (hypertension), benign 01/17/2014    Hypertension      Past Surgical History:   Procedure Laterality Date    FRACTURE SURGERY Left 01/17/2014    orif tibula/fibula fixation    HYSTERECTOMY (CERVIX STATUS UNKNOWN)       No family history on file.   Social History     Tobacco Use    Smoking status: Never    Smokeless tobacco: Never   Substance Use Topics    Alcohol use: No    Drug use: No       No Known Allergies  Current Outpatient Medications   Medication Sig Dispense Refill    potassium chloride (KLOR-CON M) 10 MEQ extended release tablet TAKE 1 TABLET BY MOUTH ONCE DAILY *EMERGENCY REFILL*      olmesartan (BENICAR) 40 MG tablet TAKE 1 TABLET BY MOUTH ONCE DAILY *EMERGENCY REFILL*      carvedilol (COREG) 12.5 MG tablet Take 1 tablet by mouth in the morning and at bedtime      amLODIPine (NORVASC) 5 MG tablet TAKE 1 TABLET BY MOUTH DAILY      insulin lispro, 1 Unit Dial, 100 UNIT/ML SOPN TAKE 18 UNITS AT BREAKFAST, 18 UNITS AT LUNCH,AND 36 UNITS WITH DINNER      insulin glargine (LANTUS SOLOSTAR) 100 UNIT/ML injection pen Inject 24 Units into the skin nightly      spironolactone (ALDACTONE) 25 MG tablet Take 1 tablet by mouth Daily with lunch 30 tablet 3    isosorbide mononitrate (IMDUR) 30 MG extended release tablet Take 1 tablet by mouth in the morning. HOLDING AS OF 8/2/22. 30 tablet 11    hydrALAZINE (APRESOLINE) 50 MG tablet Take 1 tablet by mouth in the morning and 1 tablet at noon and 1 tablet before bedtime. CHANGE TO 8 AM, 2PM AND 10 PM. 90 tablet 1    torsemide (DEMADEX) 10 MG tablet Take 1 tablet by mouth in the morning. 30 tablet 3    atorvastatin (LIPITOR) 40 MG tablet Take 1 tablet by mouth daily 30 tablet 11    clopidogrel (PLAVIX) 75 MG tablet Take 1 tablet by mouth daily 30 tablet 11    aspirin 81 MG EC tablet Take 1 tablet by mouth daily 30 tablet 11    doxazosin (CARDURA) 4 MG tablet Take 1 tablet by mouth daily 30 tablet 1    L-METHYLFOLATE-B6-B12 PO Take 1 tablet by mouth daily       No current facility-administered medications for this visit. Review of Systems:  Constitutional: no unanticipated weight loss. There's been no change in energy level, sleep pattern, or activity level. No fevers, chills. Eyes: No visual changes or diplopia. No scleral icterus. ENT: No Headaches, hearing loss or vertigo. No mouth sores or sore throat. Cardiovascular: as reviewed in HPI  Respiratory: No cough or wheezing, no sputum production. No hematemesis. Gastrointestinal: No abdominal pain, appetite loss, blood in stools. No change in bowel or bladder habits. Genitourinary: No dysuria, trouble voiding, or hematuria.   Musculoskeletal:  No gait disturbance, no joint complaints. Integumentary: No rash or pruritis. Neurological: No headache, diplopia, change in muscle strength, numbness or tingling. Psychiatric: No anxiety or depression. Endocrine: No temperature intolerance. No excessive thirst, fluid intake, or urination. No tremor. Hematologic/Lymphatic: No abnormal bruising or bleeding, blood clots or swollen lymph nodes. Allergic/Immunologic: No nasal congestion or hives. Physical Exam:   /64   Pulse 72   Ht 5' 2\" (1.575 m)   Wt 168 lb (76.2 kg)   SpO2 97%   BMI 30.73 kg/m²   Wt Readings from Last 3 Encounters:   01/17/23 168 lb (76.2 kg)   09/29/22 162 lb (73.5 kg)   09/04/22 163 lb 5.8 oz (74.1 kg)     Constitutional: She is oriented to person, place, and time. She appears well-developed and well-nourished. In no acute distress. Head: Normocephalic and atraumatic. Pupils equal and round. Neck: Neck supple. No JVP or carotid bruit appreciated. No mass and no thyromegaly present. No lymphadenopathy present. Cardiovascular: Normal rate. Normal heart sounds. Exam reveals no gallop and no friction rub. 2/6 systolic murmur heard at the left lower sternal border. Pulmonary/Chest: Effort normal and breath sounds normal. No respiratory distress. She has no wheezes, rhonchi or rales. Abdominal: Soft, non-tender. Bowel sounds are normal. She exhibits no organomegaly, mass or bruit. Extremities: No edema, cyanosis, or clubbing. Pulses are 2+ radial/dorsalis pedis/posterior tibial/carotid bilaterally. Neurological: No gross cranial nerve deficit. Coordination normal.   Skin: Skin is warm and dry. There is no rash or diaphoresis. Psychiatric: She has a normal mood and affect.  Her speech is normal and behavior is normal.     Lab Review:   Lab Results   Component Value Date/Time    TRIG 69 05/09/2022 04:00 AM    HDL 50 05/09/2022 04:00 AM    LDLCALC 79 05/09/2022 04:00 AM    LABVLDL 17 10/17/2018 06:40 AM     Lab Results Component Value Date/Time    BUN 46 09/04/2022 04:28 PM    CREATININE 2.4 09/04/2022 04:28 PM       EKG Interpretation: 1/17/23 not completed       Image Review:     Echo: 4/27/22  Global left ventricular function is normal with ejection fraction estimated   from 55 % to 60 %. Moderate concentric left ventricular hypertrophy. Grade II diastolic dysfunction with elevated LV filling pressures. Mild mitral regurgitation is present. The left atrium is mildly dilated. The right ventricle is normal in size and function. Systolic pulmonary artery pressure (SPAP) is estimated at 56 mmHg consistent   with moderate pulmonary hypertension (RA pressure 3 mmHg). Mild tricuspid   regurgitation. RHC: 5/4/22  OVERALL IMPRESSION:  1. Significantly elevated right atrial pressure. 2.  Mild to moderate pulmonary hypertension. 3.  Normal pulmonary capillary wedge pressure at 8 mmHg. 4.  Low normal cardiac output and indices. In view of the above findings, we will continue to hold the patient's Lasix and consider getting a cardiac MRI at a later stage to rule out any evidence of infiltrative heart disease. Limited echo: 5/10/22  Normal left ventricular size and systolic function. There were no regional wall motion abnormalities. Wall thickness was within normal limits. Ejection fraction was estimated at 55-60%. Bubble study does not show evidence if interatrial shunt, however   sensitivity is limited, consider VEE if clinically indicated. IVC size is within normal limits with normal respiratory phasic changes. Assessment/Plan:     Essential hypertension  - controlled today. Continue medical therapy. BMP 12/17/2022 abnormal but stable. CHF/pulmonary HTN    -Admitted 4/26-5/6/22 with worsening SOB, BLE edema and NP-cough.  CHLOE on CKD with acute hypoxic respiratory failure, influenza A.   -admitted 7/27-7/30/22 SOB, acute on chronic heart failure, HTN.   -Today, she reports LE edema, chronic and did not wear her compression stockings to the office today. -Appears compensated on physical exam today  -close monitoring of her renal function. -May consider Cardiac MRI if she has recurrent heart failure    Acute CVA with dysphagia   She was then admitted 5/7-5/20/22 for acute ischemic stroke with facial droop, RLE weakness, slurred speech, dysphagia, symptomatic bradycardia, CHLOE on CKD, uncontrolled HTN presents today in follow up. -denies any recurrence and reports right slurred, drooping has resolved a-using cane for walking aid.   -12/17/2022 abnormal but stable. Symptomatic bradycardia   -normal rate, rhythm on physical exam today  -denies any s/s of bradycardia with fatigue, L/D, near syncope. -will continue to monitor. I have told her to continue carvedilol and stop Lopressor completely  Hyperlipidemia  -on lipitor  -no reported myalgias   -5/9/22 LDLc 79  -12/13/22 LDLc 62    Systolic murmur   2/6 systolic murmur at theleft lower sternal border  Echo 5/2022 reviewed   Patient is asymptomatic  Will follow     DM-II  Managed per PCP. CKD  Baseline creatinine 1.7  Per our referral 10/28/22 Dr. Salena Mike [facility]  12/17/2022 Cr 1.76 with normal K and NA  She is on Demadex 10 mg daily and spironolactone 25 mg daily   Repeat BMP in 6 months      Instructed to obtain flu vaccine this season, annually and obtain COVID-19 vaccine per guidelines. We will plan follow up in 6 months with labs prior     Thank you very much for allowing me to participate in the care of your patient. Please do not hesitate to contact me if you have any questions. Sincerely,  García Avery MD      [facility], [address], [place] [address]  Ph: [phone]  Fax: [phone]      This note was scribed in the presence of Dr. Sulema MD by Chary Bocanegra RN.

## 2023-03-30 NOTE — PRE SEDATION
Sedation Pre-Procedure Note    Patient Name: Rupesh Patel   YOB: 1955  Room/Bed: Y1A-5470/5942-51  Medical Record Number: [id number]  Date: 7/30/2022   Time: 12:00 AM       Indication:  HFpEF     Consent: I have discussed with the patient and/or the patient representative the indication, alternatives, and the possible risks and/or complications of the planned procedure and the anesthesia methods. The patient and/or patient representative appear to understand and agree to proceed. Vital Signs:   Vitals:    07/29/22 2000   BP: (!) 171/81   Pulse: 81   Resp: 18   Temp: 98.1 °F (36.7 °C)   SpO2: 91%       Past Medical History:   has a past medical history of Ankle fracture, left, CHF (congestive heart failure) ([facility] [address] CKD (chronic kidney disease) stage 4, GFR 15-29 ml/min ([facility] [address] Diabetes mellitus ([facility] [address] HTN (hypertension), benign, and Hypertension. Past Surgical History:   has a past surgical history that includes Hysterectomy and fracture surgery (Left, 01/17/2014).     Medications:   Scheduled Meds:    sodium chloride flush  5-40 mL IntraVENous 2 times per day    isosorbide mononitrate  60 mg Oral Daily    sodium chloride flush  5-40 mL IntraVENous 2 times per day    carvedilol  25 mg Oral BID WC    furosemide  80 mg IntraVENous BID    potassium chloride  20 mEq Oral BID WC    magnesium oxide  400 mg Oral BID    sodium chloride flush  10 mL IntraVENous 2 times per day    enoxaparin  30 mg SubCUTAneous Q24H    insulin lispro  0-4 Units SubCUTAneous Nightly    amLODIPine  5 mg Oral Daily    aspirin  81 mg Oral Daily    atorvastatin  40 mg Oral Daily    [START ON 8/1/2022] vitamin D  10,000 Units Oral Weekly    clopidogrel  75 mg Oral Daily    doxazosin  4 mg Oral Daily    hydrALAZINE  50 mg Oral 3 times per day    spironolactone  25 mg Oral Daily    insulin lispro  0-8 Units SubCUTAneous TID WC     Continuous Infusions:    sodium chloride      sodium chloride      sodium chloride      dextrose Spoke to patient and conveyed message below.   dextrose       PRN Meds: sodium chloride flush, sodium chloride, sodium chloride flush, sodium chloride, acetaminophen, sodium chloride flush, sodium chloride, promethazine **OR** ondansetron, acetaminophen **OR** acetaminophen, dextrose, hydrALAZINE, labetalol, albuterol sulfate HFA, glucose, dextrose bolus **OR** dextrose bolus, glucagon (rDNA), dextrose  Home Meds:   Prior to Admission medications    Medication Sig Start Date End Date Taking? Authorizing Provider   hydroCHLOROthiazide (HYDRODIURIL) 25 MG tablet Take 25 mg by mouth in the morning. Yes Historical Provider, MD   spironolactone (ALDACTONE) 25 MG tablet Take 1 tablet by mouth in the morning. 7/26/22   Herlinda Ballesteros MD   torsemide (DEMADEX) 10 MG tablet Take 1 tablet by mouth in the morning.  7/26/22   Herlinda Ballesteros MD   atorvastatin (LIPITOR) 40 MG tablet Take 1 tablet by mouth daily 6/16/22   Kalia Steinberg MD   carvedilol (COREG) 12.5 MG tablet Take 1 tablet by mouth 2 times daily (with meals) 6/16/22   Kalia Steinberg MD   clopidogrel (PLAVIX) 75 MG tablet Take 1 tablet by mouth daily 6/16/22   Kalia Steinberg MD   isosorbide mononitrate (IMDUR) 30 MG extended release tablet Take 1 tablet by mouth daily 6/16/22   Kalia Steinberg MD   aspirin 81 MG EC tablet Take 1 tablet by mouth daily 6/16/22   Kalia Steinberg MD   amLODIPine (NORVASC) 5 MG tablet Take 1 tablet by mouth daily 6/16/22   Kalia Steinberg MD   doxazosin (CARDURA) 4 MG tablet Take 1 tablet by mouth daily 5/21/22   Jose Santiago DO   hydrALAZINE (APRESOLINE) 50 MG tablet Take 1 tablet by mouth every 8 hours 5/20/22   Jose Santiago DO   albuterol sulfate HFA (PROVENTIL HFA) 108 (90 Base) MCG/ACT inhaler Inhale 2 puffs into the lungs every 6 hours as needed for Wheezing (Cough) 5/6/22   Nelson Stratton MD   Cholecalciferol (VITAMIN D3) 125 MCG (5000 UT) CAPS Take 2 capsules by mouth once a week    Historical Provider, MD   L-METHYLFOLATE-B6-B12 PO Take 1 tablet by mouth daily    Historical Provider, MD     Coumadin Use Last 7 Days:  no  Antiplatelet drug therapy use last 7 days: no  Other anticoagulant use last 7 days: no  Additional Medication Information:  n/a      Pre-Sedation Documentation and Exam:   I have personally completed a history, physical exam & review of systems for this patient (see notes).     Mallampati Airway Assessment:  Mallampati Class I - (soft palate, fauces, uvula & anterior/posterior tonsillar pillars are visible)    Prior History of Anesthesia Complications:   none    ASA Classification:  Class 3 - A patient with severe systemic disease that limits activity but is not incapacitating    Sedation/ Anesthesia Plan:   intravenous sedation    Medications Planned:   midazolam (Versed) intravenously    Patient is an appropriate candidate for plan of sedation: yes    Electronically signed by Jean Chavarria MD on 7/30/2022 at 12:00 AM

## 2023-04-20 ENCOUNTER — OFFICE VISIT (OUTPATIENT)
Dept: CARDIOLOGY CLINIC | Age: 68
End: 2023-04-20
Payer: MEDICARE

## 2023-04-20 ENCOUNTER — TELEPHONE (OUTPATIENT)
Dept: CARDIOLOGY CLINIC | Age: 68
End: 2023-04-20

## 2023-04-20 VITALS
DIASTOLIC BLOOD PRESSURE: 76 MMHG | HEIGHT: 62 IN | SYSTOLIC BLOOD PRESSURE: 136 MMHG | BODY MASS INDEX: 29.81 KG/M2 | OXYGEN SATURATION: 98 % | WEIGHT: 162 LBS | HEART RATE: 86 BPM

## 2023-04-20 DIAGNOSIS — I27.20 PULMONARY HYPERTENSION (HCC): ICD-10-CM

## 2023-04-20 DIAGNOSIS — I50.32 CHRONIC DIASTOLIC HEART FAILURE (HCC): Primary | ICD-10-CM

## 2023-04-20 DIAGNOSIS — Z79.899 ENCOUNTER FOR MONITORING DIURETIC THERAPY: ICD-10-CM

## 2023-04-20 DIAGNOSIS — Z51.81 ENCOUNTER FOR MONITORING DIURETIC THERAPY: ICD-10-CM

## 2023-04-20 DIAGNOSIS — I10 ESSENTIAL HYPERTENSION: ICD-10-CM

## 2023-04-20 DIAGNOSIS — I50.32 CHRONIC DIASTOLIC HEART FAILURE (HCC): ICD-10-CM

## 2023-04-20 DIAGNOSIS — R01.1 SYSTOLIC MURMUR: ICD-10-CM

## 2023-04-20 DIAGNOSIS — E11.9 TYPE 2 DIABETES MELLITUS WITHOUT COMPLICATION, WITH LONG-TERM CURRENT USE OF INSULIN (HCC): ICD-10-CM

## 2023-04-20 DIAGNOSIS — E78.5 HYPERLIPIDEMIA, UNSPECIFIED HYPERLIPIDEMIA TYPE: ICD-10-CM

## 2023-04-20 DIAGNOSIS — Z79.4 TYPE 2 DIABETES MELLITUS WITHOUT COMPLICATION, WITH LONG-TERM CURRENT USE OF INSULIN (HCC): ICD-10-CM

## 2023-04-20 DIAGNOSIS — N18.9 CHRONIC KIDNEY DISEASE, UNSPECIFIED CKD STAGE: ICD-10-CM

## 2023-04-20 DIAGNOSIS — R00.1 ASYMPTOMATIC BRADYCARDIA: ICD-10-CM

## 2023-04-20 DIAGNOSIS — Z86.73 HISTORY OF CVA (CEREBROVASCULAR ACCIDENT): ICD-10-CM

## 2023-04-20 LAB
ALBUMIN SERPL-MCNC: 3.6 G/DL (ref 3.4–5)
ALBUMIN/GLOB SERPL: 0.9 {RATIO} (ref 1.1–2.2)
ALP SERPL-CCNC: 124 U/L (ref 40–129)
ALT SERPL-CCNC: 7 U/L (ref 10–40)
ANION GAP SERPL CALCULATED.3IONS-SCNC: 10 MMOL/L (ref 3–16)
AST SERPL-CCNC: 20 U/L (ref 15–37)
BILIRUB SERPL-MCNC: <0.2 MG/DL (ref 0–1)
BUN SERPL-MCNC: 33 MG/DL (ref 7–20)
CALCIUM SERPL-MCNC: 9.2 MG/DL (ref 8.3–10.6)
CHLORIDE SERPL-SCNC: 103 MMOL/L (ref 99–110)
CHOLEST SERPL-MCNC: 195 MG/DL (ref 0–199)
CO2 SERPL-SCNC: 24 MMOL/L (ref 21–32)
CREAT SERPL-MCNC: 2.2 MG/DL (ref 0.6–1.2)
DEPRECATED RDW RBC AUTO: 16.3 % (ref 12.4–15.4)
GFR SERPLBLD CREATININE-BSD FMLA CKD-EPI: 24 ML/MIN/{1.73_M2}
GLUCOSE SERPL-MCNC: 130 MG/DL (ref 70–99)
HCT VFR BLD AUTO: 34.5 % (ref 36–48)
HDLC SERPL-MCNC: 40 MG/DL (ref 40–60)
HGB BLD-MCNC: 11.4 G/DL (ref 12–16)
LDL CHOLESTEROL CALCULATED: 123 MG/DL
MCH RBC QN AUTO: 29.1 PG (ref 26–34)
MCHC RBC AUTO-ENTMCNC: 32.9 G/DL (ref 31–36)
MCV RBC AUTO: 88.2 FL (ref 80–100)
PLATELET # BLD AUTO: 306 K/UL (ref 135–450)
PMV BLD AUTO: 7.8 FL (ref 5–10.5)
POTASSIUM SERPL-SCNC: 5.5 MMOL/L (ref 3.5–5.1)
PROT SERPL-MCNC: 7.6 G/DL (ref 6.4–8.2)
RBC # BLD AUTO: 3.9 M/UL (ref 4–5.2)
SODIUM SERPL-SCNC: 137 MMOL/L (ref 136–145)
TRIGL SERPL-MCNC: 160 MG/DL (ref 0–150)
VLDLC SERPL CALC-MCNC: 32 MG/DL
WBC # BLD AUTO: 8 K/UL (ref 4–11)

## 2023-04-20 PROCEDURE — 3075F SYST BP GE 130 - 139MM HG: CPT | Performed by: INTERNAL MEDICINE

## 2023-04-20 PROCEDURE — G8400 PT W/DXA NO RESULTS DOC: HCPCS | Performed by: INTERNAL MEDICINE

## 2023-04-20 PROCEDURE — 2022F DILAT RTA XM EVC RTNOPTHY: CPT | Performed by: INTERNAL MEDICINE

## 2023-04-20 PROCEDURE — G8417 CALC BMI ABV UP PARAM F/U: HCPCS | Performed by: INTERNAL MEDICINE

## 2023-04-20 PROCEDURE — G8427 DOCREV CUR MEDS BY ELIG CLIN: HCPCS | Performed by: INTERNAL MEDICINE

## 2023-04-20 PROCEDURE — 3046F HEMOGLOBIN A1C LEVEL >9.0%: CPT | Performed by: INTERNAL MEDICINE

## 2023-04-20 PROCEDURE — 1090F PRES/ABSN URINE INCON ASSESS: CPT | Performed by: INTERNAL MEDICINE

## 2023-04-20 PROCEDURE — 99214 OFFICE O/P EST MOD 30 MIN: CPT | Performed by: INTERNAL MEDICINE

## 2023-04-20 PROCEDURE — 3017F COLORECTAL CA SCREEN DOC REV: CPT | Performed by: INTERNAL MEDICINE

## 2023-04-20 PROCEDURE — 1124F ACP DISCUSS-NO DSCNMKR DOCD: CPT | Performed by: INTERNAL MEDICINE

## 2023-04-20 PROCEDURE — 1036F TOBACCO NON-USER: CPT | Performed by: INTERNAL MEDICINE

## 2023-04-20 PROCEDURE — 3078F DIAST BP <80 MM HG: CPT | Performed by: INTERNAL MEDICINE

## 2023-04-20 RX ORDER — ERGOCALCIFEROL 1.25 MG/1
50000 CAPSULE ORAL WEEKLY
COMMUNITY

## 2023-04-20 RX ORDER — OXYBUTYNIN CHLORIDE 5 MG/1
5 TABLET ORAL DAILY
COMMUNITY

## 2023-04-20 RX ORDER — FUROSEMIDE 20 MG/1
20 TABLET ORAL DAILY
COMMUNITY

## 2023-04-20 RX ORDER — GABAPENTIN 100 MG/1
100 CAPSULE ORAL 3 TIMES DAILY
COMMUNITY

## 2023-04-20 NOTE — PROGRESS NOTES
dysphagia. -denies any recurrence and reports right slurred, drooping has resolved a-using cane for walking aid. Asymptomatic bradycardia   -normal rate, rhythm on physical exam today  -denies any s/s of bradycardia with fatigue, L/D, near syncope. -will continue to monitor.   - not on BB. Hyperlipidemia  - LDL 62 (12/13/2022)  - Continue stain therapy Atorvastatin 40 mg daily. Tolerating well with no reported myalgias. Systolic murmur   2/6 systolic murmur at theleft lower sternal border  Echo 5/2022 reviewed   Patient is asymptomatic  Will follow     Type 2 diabetes mellitus  -Continue current medical management.  -Hb A1c 7.3 (07/28/2022)  -Managed per PCP. CKD  Baseline creatinine 1.7  Per our referral 10/28/22 Dr. T[place]on [facility]  12/17/2022 Cr 1.76 with normal K and NA  She is on Demadex 10 mg daily and spironolactone 25 mg daily   Repeat CMP. Instructed to obtain flu vaccine this season, annually and obtain COVID-19 vaccine per guidelines. We will plan follow up in 6 months. Complexity of medical decision making-high    Thank you very much for allowing me to participate in the care of your patient. Please do not hesitate to contact me if you have any questions. Sincerely,  Rhett Russo MD      [facility], [address] [place], [place] [address]L[facility] Elenarashi Estrada [facility] [address]  Ph: [phone]  Fax: [phone]      This note was scribed in the presence of Dr. Abril Hale MD by Magdalena Cruz, JOSE  Physician Attestation:  The scribes documentation has been prepared under my direction and personally reviewed by me in its entirety. I confirm that the note above accurately reflects all work, treatment, procedures, and medical decision making performed by me.

## 2023-04-21 DIAGNOSIS — E87.5 HYPERKALEMIA: Primary | ICD-10-CM

## 2023-04-21 DIAGNOSIS — N18.9 CHRONIC KIDNEY DISEASE, UNSPECIFIED CKD STAGE: ICD-10-CM

## 2023-04-21 RX ORDER — SPIRONOLACTONE 25 MG/1
12.5 TABLET ORAL
Qty: 45 TABLET | Refills: 4 | Status: SHIPPED | OUTPATIENT
Start: 2023-04-21

## 2023-07-08 NOTE — PROGRESS NOTES
[facility] NEUROSCIENCES 4A  Speech - Language - Cognitive Evaluation + Clinical Swallow Evaluation    SLP Individual Minutes  Time In: 0900  Time Out: 7840  Minutes: 21  Timed Code Treatment Minutes: 0 Minutes   Speech, language, cognitive evaluation: 12 minutes  Clinical swallow evaluation: 9 minutes       Date: 5/10/2022  Patient Name: Kathie Marvin      CSN: [id number]   : 1955  (61 y.o.)  Gender: female   Referring Physician:  SARA Galindo CNP  Diagnosis: Symptomatic bradycardia   Precautions: Fall risk, aspiration precautions  History of Present Illness/Injury: Patient admitted to [facility] with above med dx; please refer to physician H&P for full details. Per chart review, \"60 y.o. female admitted to [facility] on 2022 with a history of hypertension, hyperlipidemia, type II diabetes mellitus and CHF.  She presented to the emergency room for evaluation following a 4-day history of worsening shortness of breath, a nonproductive cough and increasing lower extremity edema.  She reported that she had dull aching pain in her chest when she took a deep breath.  She stated that her shortness of breath was severe, worse with exertion and improved with rest.  She was seen at the clinic at [facility] and diagnosed with influenza A, but was negative here.  In the emergency room here she was found to have an acute CHF exacerbation with increased lower extremity edema and pulmonary edema.  She was also found to have acute renal failure.  She was admitted for further evaluation and treatment. Associated signs and symptoms did not include typical chest pain, lightheaded, dizziness, diaphoresis, orthopnea, paroxysmal nocturnal dyspnea, fever or chills. No recent medication changes. She does not use supplemental O2 at home.  Patient was admitted for symptomatic bradycardia which was thought to be beta-blocker induced.  On 2022 patient was noted by staff to have dysarthria and a new right facial droop which prompted a code stroke.  Initial NIH was 3. Patient was taken to imaging for CT head which revealed no ICH however it did demonstrate old stroke which was age-indeterminate.  For that reason tPA was not given as if this was a recent stroke she would be very high risk.  CTAs were deferred as this patient had a creatinine of 4.0. On 5/9/22, on exam, pt continued to have slurred speech and facial droop and she was lethargic. She had no specific additional complaints. She denied weakness, vision deficit. \" ST consulted to complete clinical swallow evaluation and to assess cognitive linguistic domains to assist with development of goals per POC as appropriate.      ? MRI of brain without contrast showed a few scattered punctate acute infarcts in the left corona radiata and right centrum semiovale  ? MRA H&N WO showed focal areas of moderate stenosis in the bilateral cavernous and clinoid segments of the internal carotid arteries & motion degraded exam without visualized areas of flow-limiting stenosis   ? 2D echo- EF 55-60%, LVH, dilated L atrium, pulm HTN  ? Past Medical History:   Diagnosis Date    Ankle fracture, left 1/17/2014    CHF (congestive heart failure) (HCC)     Diabetes mellitus ([facility] [address]     HTN (hypertension), benign 1/17/2014    Hypertension        Pain: No pain reported. Subjective:  RN [facility] with approval to proceed with evaluations. Upon arrival, patient resting in recliner chair, alert and pleasant; no family at bedside. Positive participation throughout assessments. Endorsed a baseline diet of regular solids+thin liquids.      SOCIAL HISTORY:   Living Arrangements: [place], independently; x1 child living in patient's primary location of residency to provide supplemental assistance  Work History: Retired; [place] [employer] [place],   Education Level: [employer], no college   Driving Status: Active   Finance Management: Independent  Medication Management: Independent  ADL's: Independent. Hobbies: Traveling  Vision Status: Glasses  Hearing: WFL  Type of Home: House  Home Layout: One level  Home Access: Level entry  Home Equipment: Walker, Vonna Chick / VOICE:  Respiration:   Breathing Pattern: Diaphragmatic/Abdominal  Respiration/Phonation Coordination: Impaired  Breath Support: Impaired: Poor Phrasing    Articulation: Impaired: Decreased Articulatory Precision, Blended Word Boundaries and Decreased Articulatory Coordination  DDK Rates (Norm Rates 4.5-7.5)   Rate: Too Slow   Rhythm: . Incoordinated   Precision: . Incoordinated  Word Level: Incoordinated  Sentence Level: Incoordinated  Paragraph Level: Incoordinated    Conversation: .   25%-50%    LANGUAGE:  Receptive:  1 Step Commands: 3/3  2 Step Commands: 2/2  Simple Yes/No Questions: 3/3  Complex Yes/No Questions: 3/3    Expressive:  Automatic Speech: WFL numerical counting 1-10  Sentence Completion (open-ended): 3/3   Confrontational Naming: 3/3  Responsive Namin/2  Sentence Formulation: Intact for basic wants/needs  Conversational Speech: Impaired  Paraphasias: None evidenced  Repetition: 2/2 sentence level    COGNITION:  Tal Cognitive Assessment (MOCA) version 7.2 completed. Pt scored 15/30. Normal is greater than or equal to 21/25.   Inclusion of +1 point given highest level of education achieved less than/equal to 12th grade or GED with limited-0 post-secondary schooling   *adjusted score given patient not able to complete written subtests (R hand dominant)  Orientation: 6/6  Immediate Recall: 4/5  Short-Term Recall: 0/5  Divergent Namin item named in 1 minute time frame (target-11)  Problem Solving: 3/3  Reasonin/2 verbal  Sequencin/1  Thought Organization: Impaired  Insight: Adequate  Attention: Adequate sustained and selective attention, basic level  Math Computation: 1/3 serial subtraction   Executive Functioning: Impaired    SWALLOWING:    Respiratory Status: Room Air      Behavioral Observation: Alert, Oriented and Dysarthric    CRANIAL NERVE ASSESSMENT   CN V (Trigeminal) Closes and Opens Mandible WFL    Rotary Jaw Movement Impaired      CN VII (Facial) Cheeks Hold Food out of Sulci Impaired: Unilateral - Right    Opens, Closes/Seals, Protrudes, Retracts Lips Impaired: Unilateral - Right    General Appearance Impaired; mild R facial droop    Sensation WFL      CN X (Vagus - Pharyngeal) Raises Back of Tongue Impaired      CN XI (Accessory) Lifts Soft Palate WFL      CN XII (Hypoglossal) Elevates Tongue Up and Back WFL    Lateralizes Tongue Impaired; deviation to the R upon protrusion    Sensation WFL      Other Observations Dentition Sparse natural dentition     Vocal Quality WFL    Cough WFL     PATIENT WAS EVALUATED USING:  Ice Chips, Thin Liquids and Puree    ORAL PHASE:  Impaired:  Loss of Bolus from Lips, Impaired AP Movement and Impaired Mastication     PHARYNGEAL PHASE:  WFL:  Pharyngeal phase appears WFL but cannot rule out pharyngeal phase deficits from a bedside swallowing evaluation alone. SIGNS AND SYMPTOMS OF LARYNGEAL PENETRATION / ASPIRATION:  No signs/symptoms of aspiration evident in this evaluation, but cannot rule out silent aspiration. INSTRUMENTAL EVALUATION: Modified Barium Swallow (MBS)    DIET RECOMMENDATIONS:  NPO; critical/crucial medications crushed in puree (as pharmacy permits)    STRATEGIES: Recommend NPO and Strategies pending MBS completion     RECOMMENDATIONS/ASSESSMENT:  DIAGNOSTIC IMPRESSIONS:  Clinical Swallow Evaluation: Patient presents with moderate oral dysphagia with inability to fully discern potential presence of pharyngeal phase deficits without formal instrumentation. Mild R facial droop with lingual weakness highly contributing to oral deficits presenting with patient exhibiting anterolateral loss of viscosity trialed (reduced labial pressure/containment).  Significantly increased time required to manipulate bolus to achieve AP transit. No overt s/s aspiration exhibited across all consistencies/trials consumed, certainly not able to r/o totality of airway invasion events and/or pharyngeal dysfunction at bedside alone given acute intracranial findings. Recommendations to maintain NPO diet level, critical/crucial medications crushed in puree (as pharmacy permits) with f/u formal instrumentation via MBS warranted to optimize dysphagia management POC. Speech, Language, Cognitive Evaluation: Patient presents with a moderate cognitive impairment d/t deficits within immediate/delayed recall, working memory, higher level problem solving, sequencing, verbal/visual reasoning, math computation, executive functioning, thought organization, processing speed, and mental flexibility. Expressive and receptive language skills grossly intact (in relation to no presence of paraphasias) with no apparent communicative breakdowns. Speech intelligibility best approximates 50% in known contexts d/t overt difficulties for articulatory precision, blended word boundaries, and fast rate of production; dysarthria noted with concerns for oral motor planning (peripheral monitoring encouraged). Skilled ST services are recommended to address the above aforementioned impairments to improve cognitive performance and speech production skills to optimize re-integration into home setting and PLOF. Rehabilitation Potential: good    EDUCATION:  Learner: Patient  Education:  Reviewed results and recommendations of this evaluation, Reviewed diet and strategies, Reviewed ST goals and Plan of Care, Reviewed recommendations for follow-up and Education Related to Potential Risks and Complications Due to Impairment/Illness/Injury  Evaluation of Education: Verbalizes understanding, Needs further instruction and Family not present    PLAN:  Recommendations pending MBS    PATIENT GOAL:    Did not state.   Will further assess during treatment. SHORT TERM GOALS:  Short-term Goals  Timeframe for Short-term Goals: 1-3 days  Goal 1: Complete formal instrumentation via MBS to further evaluate pharyngeal integrity to r/o dysfunction and to assist with development of dysphagia management POC. Goal 2: Staff will exhibit return demonstration for completion of comprehensive oral care procedural analysis to maximize oral integrity and to reduce potential bacteria colonization. Goal 3: Patient will complete immediate/delayed recall and working memory tasks with 70% accuracy, mod cues to improve retention of pertinent information. Goal 4: Patient will complete problem solving, verbal/visual reasoning, thought organization, and executive functioning (time, money/math/finances, medications) tasks with 70% accuracy, mod cues to improve contributions within ADLs/IADLs. Goal 5: Patient will complete structured attention tasks (selective, alternating, divided) with no more than 5 errors until task completion to permit potential return to multi-tasking, IADLs, driving, and occupational hobbies. Goal 6: Patient will complete structured speech drills/tasks (DDK rates, AMRs, sentence repetition) with usage of SOS speech strategies to improve intelligibility to 60% to optimize communicative exchanges and to prevent listener breakdown.     LONG TERM GOALS:  No LTG established given short ALEXY Adams M.A., [address] [facility] Opt out

## 2023-08-24 ENCOUNTER — HOSPITAL ENCOUNTER (OUTPATIENT)
Dept: WOMENS IMAGING | Age: 68
Discharge: HOME | End: 2023-08-24
Payer: COMMERCIAL

## 2023-08-24 VITALS — BODY MASS INDEX: 29.44 KG/M2 | WEIGHT: 160 LBS | HEIGHT: 62 IN

## 2023-08-24 DIAGNOSIS — Z12.31 VISIT FOR SCREENING MAMMOGRAM: ICD-10-CM

## 2023-08-24 PROCEDURE — 77063 BREAST TOMOSYNTHESIS BI: CPT

## 2023-09-12 ENCOUNTER — OFFICE VISIT (OUTPATIENT)
Dept: CARDIOLOGY CLINIC | Age: 68
End: 2023-09-12
Payer: COMMERCIAL

## 2023-09-12 VITALS
SYSTOLIC BLOOD PRESSURE: 150 MMHG | HEART RATE: 85 BPM | WEIGHT: 161 LBS | DIASTOLIC BLOOD PRESSURE: 70 MMHG | BODY MASS INDEX: 29.45 KG/M2 | OXYGEN SATURATION: 95 %

## 2023-09-12 DIAGNOSIS — Z86.73 HISTORY OF CVA (CEREBROVASCULAR ACCIDENT): ICD-10-CM

## 2023-09-12 DIAGNOSIS — R01.1 SYSTOLIC MURMUR: ICD-10-CM

## 2023-09-12 DIAGNOSIS — R00.1 ASYMPTOMATIC BRADYCARDIA: ICD-10-CM

## 2023-09-12 DIAGNOSIS — I50.32 CHRONIC DIASTOLIC HEART FAILURE (HCC): Primary | ICD-10-CM

## 2023-09-12 DIAGNOSIS — E78.5 HYPERLIPIDEMIA, UNSPECIFIED HYPERLIPIDEMIA TYPE: ICD-10-CM

## 2023-09-12 DIAGNOSIS — I10 ESSENTIAL HYPERTENSION: ICD-10-CM

## 2023-09-12 DIAGNOSIS — I27.20 PULMONARY HYPERTENSION (HCC): ICD-10-CM

## 2023-09-12 PROCEDURE — 99214 OFFICE O/P EST MOD 30 MIN: CPT | Performed by: INTERNAL MEDICINE

## 2023-09-12 PROCEDURE — 3078F DIAST BP <80 MM HG: CPT | Performed by: INTERNAL MEDICINE

## 2023-09-12 PROCEDURE — 3077F SYST BP >= 140 MM HG: CPT | Performed by: INTERNAL MEDICINE

## 2023-09-12 PROCEDURE — 1124F ACP DISCUSS-NO DSCNMKR DOCD: CPT | Performed by: INTERNAL MEDICINE

## 2023-09-12 PROCEDURE — 93000 ELECTROCARDIOGRAM COMPLETE: CPT | Performed by: INTERNAL MEDICINE

## 2023-09-12 NOTE — PROGRESS NOTES
[facility]      Cardiology Progress Note    Stephanie Brewer  1955 September 12, 2023    CC: \"I have been doing well with no complaint. \"      HPI:  The patient is 79 y.o. female with a past medical history significant for HTN, HLD, DM-II, CHF. Admitted 4/26-5/6/22 with worsening SOB, BLE edema and NP-cough. CHLOE on CKD with acute hypoxic respiratory failure, influenza A. She was then admitted 5/7-5/20/22 for acute ischemic stroke with facial droop, RLE weakness, slurred speech, dysphagia, symptomatic bradycardia, CHLOE on CKD, uncontrolled HTN. She follows with nephrology for management of her CKD. Today, Overall she reports she is doing well and feeling well from a cardiac standpoint with no specific cardiac complaints. Patient denies exertional chest pain/pressure, dyspnea at rest, COBB, PND, orthopnea, palpitations, lightheadedness, weight changes, changes in LE edema, and syncope. The patient admits to medical therapy compliance and feels she is tolerating. Routine labs 9/1/2023 per [facility]. Past Medical History:   Diagnosis Date    Ankle fracture, left 01/17/2014    CHF (congestive heart failure) (HCC)     CKD (chronic kidney disease) stage 4, GFR 15-29 ml/min ([facility])     Clotting disorder ([address])     Diabetes mellitus ([address])     HTN (hypertension), benign 01/17/2014    Hypertension      Past Surgical History:   Procedure Laterality Date    FRACTURE SURGERY Left 01/17/2014    orif tibula/fibula fixation    HYSTERECTOMY (CERVIX STATUS UNKNOWN)       No family history on file.   Social History     Tobacco Use    Smoking status: Never    Smokeless tobacco: Never   Substance Use Topics    Alcohol use: No    Drug use: No       No Known Allergies  Current Outpatient Medications   Medication Sig Dispense Refill    spironolactone (ALDACTONE) 25 MG tablet Take 0.5 tablets by mouth Daily with lunch 45 tablet 4    furosemide (LASIX) 20 MG tablet Take 1 tablet by mouth daily      oxybutynin

## 2023-09-12 NOTE — PATIENT INSTRUCTIONS
1) Work on a low salt/sodium diet by reading food labels  2) space apart your blood pressure medications:    -Hydralazine 8am, 2pm, 8pm   -spironolactone and amlodipine lunchtime  -Imdur mornings upon rising  -Please call the office to up date us on torsemide versus furosemide (water pill) AND if on carvedilol/Coreg.    3) Nephrology f/u 8/24 with plans to return in 4-6 weeks-keep f/u.   4) Follow up with Dr. Cony Fairchild in 5-6 months

## 2024-06-04 RX ORDER — SPIRONOLACTONE 25 MG/1
12.5 TABLET ORAL
Qty: 30 TABLET | Refills: 0 | Status: SHIPPED | OUTPATIENT
Start: 2024-06-04 | End: 2024-06-05

## 2024-06-04 NOTE — TELEPHONE ENCOUNTER
LOV 9/12/23  NOV X follow up 5-6 months      Please phone patient and schedule a diastolic heart failure appointment.

## 2024-06-05 RX ORDER — SPIRONOLACTONE 25 MG/1
TABLET ORAL
Qty: 45 TABLET | Refills: 1 | Status: SHIPPED | OUTPATIENT
Start: 2024-06-05

## 2024-06-21 NOTE — PROGRESS NOTES
Patient : Augusta Argueta Age: 89 year old Sex: female   MRN: [id number] Encounter Date: 6/17/2024    History     Chief Complaint   Patient presents with    Constipation       HPI    Augusta Argueta is a 89 year old female with history of atrial fibrillation and right upper extremity DVT and superficial VT on eliquis presents to the emergency department accompanied by daughter for evaluation of decreased cardiac history with associated decreased bowel movements which have been ongoing for the last few months.  Daughter reports that patient lost her spouse and daughter's father Cuca months ago preceding the symptoms. Denies f/c, cp, sob, back pain, flank pain, abd pain, n/v/d, dysuria, hematuria, vaginal bleeding or discharge, medic easier, melena, hematemesis, lightheadedness, dizziness, syncope.     I have reviewed Augusta Argueta's previous office visit note from 5/9/24 .  Note Review Summary:   ASSESSMENT and PLAN:  Right upper extremity DVT and SVT, diagnosed 4/28/22.  Dopplers showed complete resolution of her blood clots.  Continue longterm Eliquis 2.5mg bid for atrial fibrillation.  Check CBC and CMP today.  Anemia, normocytic, improved.  Mild thrombocytopenia.  Likely due to renal dysfunction.  No hemolysis or elevated protein gap.  Inappropriately low erythropoietin level.  Intermittently iron deficiency.  Check B12, folate, and iron.  Continue Retacrit for Hb < or = 11.  She will eat more dark leafy greens.  Atrial fibrillation, on Eliquis 2.5mg bid.    Past/Family/Social History     Allergies   Allergen Reactions    Lyrica DIZZINESS       No current facility-administered medications for this encounter.     Current Outpatient Medications   Medication Sig    AMIODarone (PACERONE) 200 MG tablet TAKE 1 TABLET DAILY    aspirin (ECOTRIN) 81 MG EC tablet Take 81 mg by mouth daily.    vitamin B-12 (CYANOCOBALAMIN) 1000 MCG tablet Take 1,000 mcg by mouth daily.    Cholecalciferol (Vitamin D-3) 25 mcg (1,000  Secure message sent to MD Lesvia Alegre. Message stated MD Neli Miranda covering     Message Patient is new admit. She states she covers blood sugar with insulin at home. No sliding scale in MAR      Waiting response    MD response: Dr Nasrin Dutton pt    RN response: Perfect serve is stating you are covering for her.   Patient requesting cough medication and throat lozenges    Waiting on response units) capsule     dilTIAZem (CARDIZEM CD) 360 MG 24 hr capsule TAKE 1 CAPSULE DAILY    DULoxetine (CYMBALTA) 30 MG capsule     Omeprazole 20 MG Tablet Delayed Release Dispersible Take 20 mg by mouth daily. Indications: 1 daily     tolterodine (DETROL LA) 4 MG 24 hr capsule Take 4 mg by mouth daily.     pravastatin (PRAVACHOL) 20 MG tablet Take 20 mg by mouth at bedtime.     rOPINIRole (REQUIP) 1 MG tablet Take 1 mg by mouth nightly.        Past Medical History:   Diagnosis Date    Aortic valve insufficiency     Asthma (CMD)     Diastolic heart failure  (CMD)     Essential hypertension     GERD (gastroesophageal reflux disease)     Hyperlipidemia     Hypertension     Iron deficiency anemia secondary to inadequate dietary iron intake 7/21/2022    Long term current use of anticoagulant 8/20/2022    Melanoma  (CMD)     Mitral regurgitation     Pacemaker 8/12/2022    Renal insufficiency     Vertigo 9/19/2020    VT (ventricular tachycardia)  (CMD)        Past Surgical History:   Procedure Laterality Date    Bartholin gland cyst excision      Breast surgery      Cardiac catherization  03/2004    Carpal tunnel release      Case request clinic to cath/vasc      March 2004    Cataract extraction, bilateral      Ppm dual implant  04/2022    Total knee arthroplasty Bilateral     Total knee replacement Left        Family History   Problem Relation Age of Onset    Coronary Artery Disease Neg Hx         Negative for premature CAD    Aneurysm Neg Hx         Negative for AAA       Social History     Tobacco Use    Smoking status: Never     Passive exposure: Never    Smokeless tobacco: Never    Tobacco comments:     Never used tobacco. denies smoking.   Vaping Use    Vaping status: never used   Substance Use Topics    Alcohol use: Not Currently     Comment: denies drinking.     Drug use: Never     Comment:  denies use of street drugs          Review of Systems   Review of Symptoms     Review of Systems   Constitutional:  Positive  for appetite change and fatigue.          Physical Exam   Physical Exam     ED Triage Vitals [06/17/24 1352]   ED Triage Vitals Group      Temp 99.5 °F (37.5 °C)      Heart Rate 80      Resp 18      BP (!) 155/69      SpO2 97 %      EtCO2 mmHg       Height       Weight       Weight Scale Used       BMI (Calculated)       IBW/kg (Calculated)        Physical Exam  Vitals and nursing note reviewed.   Constitutional:       General: She is not in acute distress.     Appearance: Normal appearance. She is not ill-appearing, toxic-appearing or diaphoretic.   HENT:      Head: Normocephalic and atraumatic.      Right Ear: External ear normal.      Left Ear: External ear normal.      Nose: Nose normal. No congestion.      Mouth/Throat:      Mouth: Mucous membranes are moist.      Pharynx: Oropharynx is clear.      Neck: Normal range of motion and neck supple. No rigidity.   Eyes:      General: No scleral icterus.     Extraocular Movements: Extraocular movements intact.      Conjunctiva/sclera: Conjunctivae normal.      Pupils: Pupils are equal, round, and reactive to light.   Cardiovascular:      Rate and Rhythm: Normal rate and regular rhythm.      Pulses: Normal pulses.   Pulmonary:      Effort: Pulmonary effort is normal. No respiratory distress.   Abdominal:      General: Abdomen is flat. There is no distension.      Palpations: Abdomen is soft.      Tenderness: There is no abdominal tenderness.   Musculoskeletal:         General: No swelling. Normal range of motion.   Skin:     General: Skin is warm and dry.      Capillary Refill: Capillary refill takes less than 2 seconds.      Findings: No rash.   Neurological:      General: No focal deficit present.      Mental Status: She is alert and oriented to person, place, and time.            Procedures   ED Procedures     Procedures       Lab Results   ED Lab     Results for orders placed or performed during the hospital encounter of 06/17/24   Comprehensive Metabolic Panel    Result Value Ref Range    Fasting Status      Sodium 137 135 - 145 mmol/L    Potassium 4.3 3.4 - 5.1 mmol/L    Chloride 106 97 - 110 mmol/L    Carbon Dioxide 27 21 - 32 mmol/L    Anion Gap 8 7 - 19 mmol/L    Glucose 93 70 - 99 mg/dL    BUN 35 (H) 6 - 20 mg/dL    Creatinine 1.75 (H) 0.51 - 0.95 mg/dL    Glomerular Filtration Rate 27 (L) >=60    BUN/Cr 20 7 - 25    Calcium 10.9 (H) 8.4 - 10.2 mg/dL    Bilirubin, Total 0.7 0.2 - 1.0 mg/dL    GOT/AST 65 (H) <=37 Units/L    GPT/ALT 60 <64 Units/L    Alkaline Phosphatase 90 45 - 117 Units/L    Albumin 3.3 (L) 3.6 - 5.1 g/dL    Protein, Total 6.6 6.4 - 8.2 g/dL    Globulin 3.3 2.0 - 4.0 g/dL    A/G Ratio 1.0 1.0 - 2.4   Lipase   Result Value Ref Range    Lipase 17 15 - 77 Units/L   CBC with Automated Differential (performable only)   Result Value Ref Range    WBC 9.6 4.2 - 11.0 K/mcL    RBC 3.82 (L) 4.00 - 5.20 mil/mcL    HGB 12.2 12.0 - 15.5 g/dL    HCT 37.9 36.0 - 46.5 %    MCV 99.2 78.0 - 100.0 fl    MCH 31.9 26.0 - 34.0 pg    MCHC 32.2 32.0 - 36.5 g/dL    RDW-CV 16.2 (H) 11.0 - 15.0 %    RDW-SD 59.7 (H) 39.0 - 50.0 fL     140 - 450 K/mcL    NRBC 0 <=0 /100 WBC    Neutrophil, Percent 78 %    Lymphocytes, Percent 9 %    Mono, Percent 9 %    Eosinophils, Percent 3 %    Basophils, Percent 1 %    Immature Granulocytes 0 %    Absolute Neutrophils 7.5 1.8 - 7.7 K/mcL    Absolute Lymphocytes 0.9 (L) 1.0 - 4.0 K/mcL    Absolute Monocytes 0.9 0.3 - 0.9 K/mcL    Absolute Eosinophils  0.3 0.0 - 0.5 K/mcL    Absolute Basophils 0.1 0.0 - 0.3 K/mcL    Absolute Immature Granulocytes 0.0 0.0 - 0.2 K/mcL   Light Blue Top   Result Value Ref Range    Extra Tube Hold for Add Ons    Gold Top   Result Value Ref Range    Extra Tube Hold for Add Ons      Radiology Results   ED Radiology Results     Imaging Results              XR ABDOMEN 2 VIEWS (Final result)  Result time 06/17/24 20:32:24      Final result                   Impression:    1.  Large volume of stool throughout the  colon.  2.  Nonobstructive, nondilated small bowel gas pattern.    Electronically Signed by: AMANDA DEL RIO M.D.   Signed on: 6/17/2024 8:32 PM   Workstation ID: [id number]               Narrative:    EXAM: XR ABDOMEN 2 VIEWS    INDICATION: stool burden.  Pain  constipation.    COMPARISON: None    TECHNIQUE: Portable supine and upright AP radiographs of the abdomen    FINDINGS: Right chest pacer is partially included on the field-of-view with  leads projecting at the right atrium and right ventricle.  Lower lumbar  posterior instrumented and interbody fusion are seen with posterior  decompression.  Pelvic phleboliths are seen.  The small bowel gas pattern  is not dilated.  Large volume of stool is seen throughout the colon.  There  is air in the rectum.  Scoliotic curvature and degenerative changes of the  spine are seen.  Mild bilateral hip osteoarthritis is seen.  Enthesophytes  are seen at the pelvis.  A few healed rib fractures are seen predominantly  anteriorly on the right at what appears to be the 9th and 10th ribs.  There  is no intraperitoneal free air.  Rigler sign is negative.  No portal venous  air or pneumatosis is seen.                                         ED Medications   ED Medications     ED Medication Orders (From admission, onward)      None               ED MDM     Vitals:    06/17/24 2050 06/17/24 2120 06/17/24 2158 06/17/24 2259   BP: 134/59 129/55 130/55 128/61   BP Location:    LUE - Left upper extremity   Patient Position:    Sitting/High-Amor's   Pulse:    63   Resp:    17   Temp:    98.4 °F (36.9 °C)   TempSrc:    Oral   SpO2: 97% 99% 98% 99%     Augusta Argueta is a 89 year old female with history of atrial fibrillation and right upper extremity DVT and superficial VT on eliquis presents to the emergency department accompanied by daughter for evaluation of decreased cardiac history with associated decreased bowel movements which have been ongoing for the last few months.   Daughter reports that patient lost her spouse and daughter's father Cuca months ago preceding the symptoms. Denies f/c, cp, sob, back pain, flank pain, abd pain, n/v/d, dysuria, hematuria, vaginal bleeding or discharge, medic easier, melena, hematemesis, lightheadedness, dizziness, syncope.     He has been trying some constipation modalities at home with stool softeners.  On my assessment patient is afebrile normal cardiac not tachypneic normotensive not hypoxic her affect is slightly flat she is responsive nontoxic-appearing.  Patient is not suicidal.  Labs obtained for evaluation as well as a KUB.  Patient is still passing gas and is having small bowel movements however does not feel fully empty.  She does report a decreased intake as well.  Differentials considered including chronic constipation slow transit and also a level of depression secondary to loss.    ED Course as of 06/20/24 2317   Mon Jun 17, 2024   2135 CBC with no leukocytosis no anemia no thrombocytopenia metabolic panel with creatinine 1.75 which is near patient's baseline otherwise no contributory electrolyte derangements.  Lipase within normal limits. [AH]   2232 Pt tolerated po, no vomiting [AH]      ED Course User Index  [AH] Denia Melton, DO     Advised to add high fiber and magnesium citrate to regimen.  Patient and daughter in agreement with plan.  Spoke w/pt and daughter, advised to continue Op f/u with pcp. Pt and daughter in agreement with plan.     Cardiac Monitor Review:  4942   I have independently reviewed the patients cardiac monitor. The patient's rhythm shows normal sinus rhythm  with rate of 63 bpm.      Does the Patient have sepsis: NO     Critical Care     No Critical Care    Disposition     Clinical Impression and Diagnosis  11:19 PM     ED Diagnoses       Diagnosis Comment Associated Orders       Final diagnoses    Constipation, chronic -- --    Weight loss -- --    Decreased appetite -- --            Follow Up:  James  MD Rory  [address] [address]  [phone]    In 2 days            Summary of your Discharge Medications      You have not been prescribed any medications.         Pt is discharged to home/self care in stable condition.              Discharge 6/17/2024  8:53 PM  Augusta Argueta discharge to home/self care.                       Denia Melton,   06/20/24 7302

## 2024-11-21 ENCOUNTER — OFFICE VISIT (OUTPATIENT)
Dept: CARDIOLOGY CLINIC | Age: 69
End: 2024-11-21
Payer: COMMERCIAL

## 2024-11-21 VITALS
WEIGHT: 156 LBS | DIASTOLIC BLOOD PRESSURE: 76 MMHG | HEIGHT: 62 IN | BODY MASS INDEX: 28.71 KG/M2 | OXYGEN SATURATION: 97 % | SYSTOLIC BLOOD PRESSURE: 136 MMHG | HEART RATE: 80 BPM

## 2024-11-21 DIAGNOSIS — R06.02 SOB (SHORTNESS OF BREATH) ON EXERTION: ICD-10-CM

## 2024-11-21 DIAGNOSIS — Z86.73 HISTORY OF CVA (CEREBROVASCULAR ACCIDENT): ICD-10-CM

## 2024-11-21 DIAGNOSIS — I27.20 PULMONARY HYPERTENSION (HCC): ICD-10-CM

## 2024-11-21 DIAGNOSIS — I50.32 CHRONIC DIASTOLIC HEART FAILURE (HCC): Primary | ICD-10-CM

## 2024-11-21 DIAGNOSIS — E78.5 HYPERLIPIDEMIA, UNSPECIFIED HYPERLIPIDEMIA TYPE: ICD-10-CM

## 2024-11-21 DIAGNOSIS — M79.89 LEG SWELLING: ICD-10-CM

## 2024-11-21 DIAGNOSIS — I10 ESSENTIAL HYPERTENSION: ICD-10-CM

## 2024-11-21 DIAGNOSIS — R00.1 ASYMPTOMATIC BRADYCARDIA: ICD-10-CM

## 2024-11-21 DIAGNOSIS — R01.1 SYSTOLIC MURMUR: ICD-10-CM

## 2024-11-21 PROCEDURE — 3078F DIAST BP <80 MM HG: CPT | Performed by: INTERNAL MEDICINE

## 2024-11-21 PROCEDURE — 93000 ELECTROCARDIOGRAM COMPLETE: CPT | Performed by: INTERNAL MEDICINE

## 2024-11-21 PROCEDURE — 1124F ACP DISCUSS-NO DSCNMKR DOCD: CPT | Performed by: INTERNAL MEDICINE

## 2024-11-21 PROCEDURE — 1159F MED LIST DOCD IN RCRD: CPT | Performed by: INTERNAL MEDICINE

## 2024-11-21 PROCEDURE — 3075F SYST BP GE 130 - 139MM HG: CPT | Performed by: INTERNAL MEDICINE

## 2024-11-21 PROCEDURE — 99214 OFFICE O/P EST MOD 30 MIN: CPT | Performed by: INTERNAL MEDICINE

## 2024-11-21 RX ORDER — CARVEDILOL 12.5 MG/1
12.5 TABLET ORAL 2 TIMES DAILY
COMMUNITY

## 2024-11-21 RX ORDER — ALBUTEROL SULFATE 90 UG/1
INHALANT RESPIRATORY (INHALATION) PRN
COMMUNITY
Start: 2024-10-14

## 2024-11-21 RX ORDER — LOSARTAN POTASSIUM 50 MG/1
50 TABLET ORAL DAILY
COMMUNITY
Start: 2024-05-29

## 2024-11-21 NOTE — PROGRESS NOTES
123  - Continue stain therapy Atorvastatin 40 mg daily.   -stable and tolerating well with no reported myalgias.     Systolic murmur   2/6 systolic murmur at theleft lower sternal border  Echo 5/2022 reviewed   Patient is asymptomatic since our last vsiit.   Will follow closely.     CKD  Goal < 130/80  Per our referral 10/28/22 Dr. Primitivo [facility]  Clarification of coreg and torsmide as states above.     Type 2 diabetes mellitus  -Continue current medical management.  -Managed per PCP.      Instructed to obtain flu vaccine this season, along with any vaccination due annually per guidelines.          We will plan follow up in 6-7 months.  Physician Attestation:  The scribes documentation has been prepared under my direction and personally reviewed by me in its entirety.     I confirm that the note above accurately reflects all work, treatment, procedures, and medical decision making performed by me.    Thank you very much for allowing me to participate in the care of your patient. Please do not hesitate to contact me if you have any questions.    Sincerely,  Walker Bass MD      [facility]  [address], [address]   [place] [address]  Ph: [phone]  Fax: [phone]    This note was scribed in the presence of Dr. MARGOT Bass MD by Yaima Collier RN.  Physician Attestation:  The scribes documentation has been prepared under my direction and personally reviewed by me in its entirety.     I confirm that the note above accurately reflects all work, treatment, procedures, and medical decision making performed by me.

## 2024-11-21 NOTE — PATIENT INSTRUCTIONS
Complete labs with next blood draw at PCP or kidney doctors office     FAST BEFORE BLOOD WORK/LABS FOR 10-12 HOURS  MEDS OKAY, WATER OKAY    OUTPATIENT LAB-[facility]  [address]  M-F [id number]  Sat. 8-noon

## 2025-01-27 ENCOUNTER — HOSPITAL ENCOUNTER (OUTPATIENT)
Dept: WOMENS IMAGING | Age: 70
Discharge: HOME | End: 2025-01-27
Payer: COMMERCIAL

## 2025-01-27 VITALS — HEIGHT: 62 IN | BODY MASS INDEX: 26.87 KG/M2 | WEIGHT: 146 LBS

## 2025-01-27 DIAGNOSIS — Z12.31 VISIT FOR SCREENING MAMMOGRAM: ICD-10-CM

## 2025-01-27 PROCEDURE — 77063 BREAST TOMOSYNTHESIS BI: CPT

## 2025-03-26 ENCOUNTER — TRANSCRIBE ORDERS (OUTPATIENT)
Dept: ADMINISTRATIVE | Age: 70
End: 2025-03-26

## 2025-03-26 DIAGNOSIS — I27.20 PULMONARY HYPERTENSION (HCC): ICD-10-CM

## 2025-03-26 DIAGNOSIS — J96.11 CHRONIC RESPIRATORY FAILURE WITH HYPOXIA: Primary | ICD-10-CM

## 2025-04-10 ENCOUNTER — HOSPITAL ENCOUNTER (OUTPATIENT)
Dept: CT IMAGING | Age: 70
Discharge: HOME | End: 2025-04-10
Payer: COMMERCIAL

## 2025-04-10 DIAGNOSIS — J96.11 CHRONIC RESPIRATORY FAILURE WITH HYPOXIA: ICD-10-CM

## 2025-04-10 DIAGNOSIS — I27.20 PULMONARY HYPERTENSION (HCC): ICD-10-CM

## 2025-04-10 PROCEDURE — 71250 CT THORAX DX C-: CPT

## 2025-04-11 ENCOUNTER — TELEPHONE (OUTPATIENT)
Dept: CARDIOLOGY CLINIC | Age: 70
End: 2025-04-11

## 2025-04-11 DIAGNOSIS — I31.39 PERICARDIAL EFFUSION: Primary | ICD-10-CM

## 2025-04-11 NOTE — TELEPHONE ENCOUNTER
Called Kailey back and updated. They will complete next week and fax to our office for Dr. Bass's review and further instructions.

## 2025-04-11 NOTE — TELEPHONE ENCOUNTER
Chest CT results reviewed.  Patient has moderate pericardial effusion.  Patient should first get an echocardiogram to accurately assess pericardial effusion before proceeding with cardiac MRI.  Please schedule echocardiogram early next week

## 2025-04-11 NOTE — TELEPHONE ENCOUNTER
Dr. Bass,   PCP at [facility] would like for you to review the CT chest wo contrast. Ordered for chronic respiratory failure, pulmonary HTN. This has been resulted in Imaging tab.

## 2025-04-11 NOTE — TELEPHONE ENCOUNTER
Kailey from [facility] called to relay that Dr. Wallis ordered a CT chest for Karina and that resulted in showing a moderated pericardial effusion. Dr. Wallis is wondering if Kali would recommend a poss MRI.     Kailey will fax over CT chest for review. Gave  fax. Once received will attach to encounter.     Please advise    Kailey's callback: [phone]

## 2025-04-23 ENCOUNTER — TELEPHONE (OUTPATIENT)
Dept: CARDIOLOGY CLINIC | Age: 70
End: 2025-04-23

## 2025-04-23 NOTE — TELEPHONE ENCOUNTER
Kailey, called in from [facility] she had sent over the results for Archanatarahs echo that was done on 04/15/2025.  She was wanting to know if Dr. Bass had a chance to look at her echo and wanted to know if they need to proceed with the Cardiac MRI?     Kailey, can be reached at [phone] thanks

## 2025-04-28 NOTE — TELEPHONE ENCOUNTER
Attempted to return call Kailey with [facility]. .  Relayed message to Michael who will relay message to whoever is covering Kailey ESTES this week since she is off this week. They will call back if any questions.

## 2025-04-28 NOTE — TELEPHONE ENCOUNTER
Per Dr. Bass, echo shows only small pericardial effusion and no evidence of pericardial tamponade. No additional cardiac work up needed at this time.   Please notify thanks

## 2025-05-14 ENCOUNTER — TELEPHONE (OUTPATIENT)
Dept: CARDIOLOGY CLINIC | Age: 70
End: 2025-05-14

## 2025-05-14 NOTE — TELEPHONE ENCOUNTER
CARDIAC CLEARANCE     What type of procedure are you having? [place] Vitrectomy-Right Eye    Which physician is performing your procedure? Dr. Blackwood     When is your procedure scheduled? 05/19/2025    Where are you having this procedure? [facility]    Are you taking Blood Thinners? Yes    If so what? Aspirin 81 MG EC tablet. Take 1 tablet by mouth daily Dispense: 30 tablet.    Does the surgeon want you to stop your blood thinner? Yes     If so for how long?    Are you having any new or worsening cardiac symptoms?     Phone Number and Contact Name for Physicians office: JOSE Keyes [phone]    Fax number to send information: [phone]    Cardiac History:   The patient is 69 y.o. female with a past medical history significant for HTN, HLD, DM-II, CHF. Admitted 4/26-5/6/22 with worsening SOB, BLE edema and NP-cough. CHLOE on CKD with acute hypoxic respiratory failure, influenza A. She was then admitted 5/7-5/20/22 for acute ischemic stroke with facial droop, RLE weakness, slurred speech, dysphagia, symptomatic bradycardia, CHLOE on CKD, uncontrolled HTN.  She follows with nephrology for management of her CKD.     Last office visit with Cardiologist: 11/21/2024

## 2025-05-15 NOTE — TELEPHONE ENCOUNTER
[facility] inuiring on clearance. Informed letter was faxed. Confirmed that clearance was faxed to [facility] fax instead of [facility].     Correct fax: [phone]. Faxed letter. Received success.

## 2025-06-11 DIAGNOSIS — I10 ESSENTIAL HYPERTENSION: ICD-10-CM

## 2025-06-11 DIAGNOSIS — I27.20 PULMONARY HYPERTENSION (HCC): ICD-10-CM

## 2025-06-11 DIAGNOSIS — M79.89 LEG SWELLING: ICD-10-CM

## 2025-06-11 DIAGNOSIS — R06.02 SOB (SHORTNESS OF BREATH) ON EXERTION: ICD-10-CM

## 2025-06-11 DIAGNOSIS — I50.32 CHRONIC DIASTOLIC HEART FAILURE (HCC): ICD-10-CM

## 2025-06-11 DIAGNOSIS — E78.5 HYPERLIPIDEMIA, UNSPECIFIED HYPERLIPIDEMIA TYPE: ICD-10-CM

## 2025-06-11 LAB
ALBUMIN SERPL-MCNC: 3.7 G/DL (ref 3.4–5)
ALBUMIN/GLOB SERPL: 1.5 {RATIO} (ref 1.1–2.2)
ALP SERPL-CCNC: 75 U/L (ref 40–129)
ALT SERPL-CCNC: 9 U/L (ref 10–40)
ANION GAP SERPL CALCULATED.3IONS-SCNC: 10 MMOL/L (ref 3–16)
AST SERPL-CCNC: 18 U/L (ref 15–37)
BILIRUB SERPL-MCNC: 0.3 MG/DL (ref 0–1)
BUN SERPL-MCNC: 28 MG/DL (ref 7–20)
CALCIUM SERPL-MCNC: 8.8 MG/DL (ref 8.3–10.6)
CHLORIDE SERPL-SCNC: 102 MMOL/L (ref 99–110)
CHOLEST SERPL-MCNC: 133 MG/DL (ref 0–199)
CO2 SERPL-SCNC: 26 MMOL/L (ref 21–32)
CREAT SERPL-MCNC: 3.5 MG/DL (ref 0.6–1.2)
DEPRECATED RDW RBC AUTO: 16.4 % (ref 12.4–15.4)
GFR SERPLBLD CREATININE-BSD FMLA CKD-EPI: 14 ML/MIN/{1.73_M2}
GLUCOSE SERPL-MCNC: 113 MG/DL (ref 70–99)
HCT VFR BLD AUTO: 31.2 % (ref 36–48)
HDLC SERPL-MCNC: 46 MG/DL (ref 40–60)
HGB BLD-MCNC: 10.4 G/DL (ref 12–16)
LDL CHOLESTEROL: 73 MG/DL
MCH RBC QN AUTO: 28.7 PG (ref 26–34)
MCHC RBC AUTO-ENTMCNC: 33.2 G/DL (ref 31–36)
MCV RBC AUTO: 86.5 FL (ref 80–100)
NT-PROBNP SERPL-MCNC: 3731 PG/ML (ref 0–124)
PLATELET # BLD AUTO: 244 K/UL (ref 135–450)
PMV BLD AUTO: 7.9 FL (ref 5–10.5)
POTASSIUM SERPL-SCNC: 4.1 MMOL/L (ref 3.5–5.1)
PROT SERPL-MCNC: 6.2 G/DL (ref 6.4–8.2)
RBC # BLD AUTO: 3.61 M/UL (ref 4–5.2)
SODIUM SERPL-SCNC: 138 MMOL/L (ref 136–145)
TRIGL SERPL-MCNC: 70 MG/DL (ref 0–150)
VLDLC SERPL CALC-MCNC: 14 MG/DL
WBC # BLD AUTO: 4.6 K/UL (ref 4–11)

## 2025-06-12 ENCOUNTER — RESULTS FOLLOW-UP (OUTPATIENT)
Dept: CARDIOLOGY CLINIC | Age: 70
End: 2025-06-12

## 2025-06-17 NOTE — ED PROVIDER NOTES
[address]  [address] [address]  Dept: [phone]  Loc: [phone]  eMERGENCYdEPARTMENT eNCOUnter      Pt Name: Christiano Bal  MRN: [id number]  Brett 1955  Date of evaluation: 4/26/2022  Provider:Carmina Bernal PA-C    CHIEF COMPLAINT       Chief Complaint   Patient presents with    Cough     cough, sob, chest pain. onset 4 ays ago. denies fever. nonproductive cough. BLE edema       CRITICAL CARE TIME   Total Critical Care time was 34 minutes, excluding separately reportable procedures. There was a high probability of clinically significant/life threatening deterioration in the patient's condition which required my urgentintervention. HISTORY OF PRESENT ILLNESS  (Location/Symptom, Timing/Onset, Context/Setting, Quality, Duration,Modifying Factors, Severity.)   Christiano Bal is a 61 y.o. female PMHx for DM type 2, HTN, who presents to the emergency department via with nonproductive cough, chills, diaphoresis, pleurisy, rhinorrhea, nasal congestion, myalgias, bilateral lower extremity and upper eyelid edema. Symptoms onset 4 days ago. Denies fever, chest pain, abdominal pain. She has COVID and influenza vaccination. Patient tested positive for influenza at urgent care. She had labored breathing and evidence of fluid retention. She was directed to ED for further evaluation at that time. Nursing Notes were reviewedand agreed with or any disagreements were addressed in the HPI. REVIEW OF SYSTEMS    (2-9 systems for level 4, 10 or more for level 5)     Review of Systems   Constitutional: Positive for activity change, chills and diaphoresis. Negative for fatigue and fever. HENT: Positive for congestion and rhinorrhea. Negative for drooling, sinus pressure, sneezing, sore throat, trouble swallowing and voice change. Eyes: Negative for visual disturbance.    Respiratory: Positive for cough and ALEXUS reviewed by Dr Gonzalez.  Proceed with Watchman   shortness of breath. Negative for chest tightness and wheezing. Cardiovascular: Positive for chest pain and leg swelling. Negative for palpitations. Gastrointestinal: Negative for abdominal pain, diarrhea, nausea and vomiting. Genitourinary: Negative. Musculoskeletal: Positive for myalgias. Skin: Negative for color change and pallor. Neurological: Negative for weakness and headaches. Psychiatric/Behavioral: Negative for agitation and behavioral problems. Except as noted above the remainder of the review of systems was reviewed and negative. PAST MEDICAL HISTORY         Diagnosis Date    Ankle fracture, left 1/17/2014    HTN (hypertension), benign 1/17/2014    Hypertension        SURGICAL HISTORY           Procedure Laterality Date    FRACTURE SURGERY Left 01/17/2014    orif tibula/fibula fixation    HYSTERECTOMY         CURRENT MEDICATIONS     [unfilled]    ALLERGIES     Patient has no known allergies. FAMILY HISTORY     No family history on file. No family status information on file. SOCIAL HISTORY      reports that she has been smoking. She has never used smokeless tobacco. She reports that she does not drink alcohol and does not use drugs. PHYSICAL EXAM    (up to 7 for level 4, 8 or more for level 5)     ED Triage Vitals [04/26/22 1902]   Enc Vitals Group      BP (!) 208/97      Pulse 86      Resp 19      Temp 97.8 °F (36.6 °C)      Temp Source Oral      SpO2 (!) 87 %      Weight       Height       Head Circumference       Peak Flow       Pain Score       Pain Loc       Pain Edu? Excl. in [address]? Physical Exam  Vitals reviewed. Constitutional:       General: She is in acute distress (mild). Appearance: She is not ill-appearing or diaphoretic. HENT:      Head: Normocephalic and atraumatic. Mouth/Throat:      Lips: Pink. Mouth: Mucous membranes are moist.   Eyes:      General: Vision grossly intact.       Extraocular Movements: Extraocular movements intact. Conjunctiva/sclera: Conjunctivae normal.      Comments: Swelling of the left upper eye lid    Cardiovascular:      Rate and Rhythm: Normal rate and regular rhythm. Heart sounds: S1 normal and S2 normal. No murmur heard. No friction rub. No gallop. Pulmonary:      Effort: Accessory muscle usage present. No respiratory distress or retractions. Breath sounds: No decreased air movement. Rhonchi present. No decreased breath sounds or rales. Abdominal:      Palpations: Abdomen is soft. Tenderness: There is no abdominal tenderness. Musculoskeletal:      Cervical back: Normal range of motion and neck supple. Right lower leg: Edema present. Left lower leg: Edema present. Skin:     General: Skin is warm. Capillary Refill: Capillary refill takes less than 2 seconds. Coloration: Skin is not cyanotic or pale. Neurological:      General: No focal deficit present. Mental Status: She is alert and oriented to person, place, and time.    Psychiatric:         Attention and Perception: Attention normal.         Mood and Affect: Mood normal.           DIAGNOSTIC RESULTS     EKG: All EKG's are interpreted by the Emergency Department Physician who either signs or Co-signs this chart in the absence of a cardiologist.    RADIOLOGY:   Non-plain film images such as CT, Ultrasound and MRI are read by the radiologist. Plain radiographic images are visualized and preliminarilyinterpreted by the emergency physician with the below findings:    Interpretation per the Radiologist below,if available at the time of this note:    XR CHEST (2 VW)   Final Result   Bilateral parahilar infiltrates could represent edema or atypical pneumonia         CT CHEST PULMONARY EMBOLISM W CONTRAST    (Results Pending)         LABS:  Labs Reviewed   CBC WITH AUTO DIFFERENTIAL - Abnormal; Notable for the following components:       Result Value    RBC 3.71 (*)     Hemoglobin 11.1 (*) Hematocrit 33.6 (*)     All other components within normal limits   COMPREHENSIVE METABOLIC PANEL W/ REFLEX TO MG FOR LOW K - Abnormal; Notable for the following components:    Chloride 98 (*)     Glucose 346 (*)     BUN 24 (*)     CREATININE 2.0 (*)     GFR Non- 25 (*)     GFR  30 (*)     Albumin 3.1 (*)     Albumin/Globulin Ratio 0.9 (*)     Alkaline Phosphatase 159 (*)     ALT 77 (*)     AST 47 (*)     All other components within normal limits   BRAIN NATRIURETIC PEPTIDE - Abnormal; Notable for the following components:    Pro-BNP 4,266 (*)     All other components within normal limits   TROPONIN - Abnormal; Notable for the following components:    Troponin 0.03 (*)     All other components within normal limits   RAPID INFLUENZA A/B ANTIGENS   TROPONIN   TROPONIN   BASIC METABOLIC PANEL W/ REFLEX TO MG FOR LOW K   CBC WITH AUTO DIFFERENTIAL   POCT GLUCOSE   POCT GLUCOSE       All other labs were within normal range or not returned as of this dictation. EMERGENCY DEPARTMENT COURSE and DIFFERENTIAL DIAGNOSIS/MDM:   Vitals:    Vitals:    04/26/22 2230 04/26/22 2245 04/26/22 2345 04/27/22 0000   BP: (!) 200/88 (!) 188/106 (!) 196/88 (!) 178/96   Pulse: 76 79 78 77   Resp: 20 18 14 13   Temp:       TempSrc:       SpO2:  95% 100% 100%   Weight:           MDM     Presents ED with HPI noted above. On arrival she is hypoxic with oxygen saturation at 87% on room air. She is placed on 3 L supplemental oxygen in the ED, was eventually titrated down to 2 L. Patient tested positive for influenza A at urgent care prior to arrival.    Work-up in the ED found patient to have findings consistent with CHF. She has bilateral lower extremity pitting edema, elevated BNP, bilateral perihilar infiltrates (edema versus atypical pneumonia). Patient given Lasix. Blood pressure persistently elevated, given findings of CHF she was placed on Nitropaste.   Blood pressure improved at time of admission. Troponin was slightly elevated 0.03. Again patient with findings consistent with CHF as well as CHLOE, she has mild chest pain however it is pleuritic in nature. Do not suspect ACS. EKG per my attending. Initial plan was to obtain a CT however given CHLOE imaging held pending discussion with hospitalist.  She was given Tamiflu for influenza A.  Dr. Tom Hodges kindly admitted patient. Patient seen independently in ED with my attending available for consultation as needed. CONSULTS:  None    PROCEDURES:  Procedures    FINAL IMPRESSION      1. Influenza A    2. Acute respiratory failure with hypoxia (HCC)    3. Elevated troponin    4. Acute congestive heart failure, unspecified heart failure type ([facility] [address]    5. CHLOE (acute kidney injury) ([facility] [address]          DISPOSITION/PLAN   [unfilled]    PATIENT REFERRED TO:  No follow-up provider specified.     DISCHARGE MEDICATIONS:  New Prescriptions    No medications on file       (Please note that portions of this note were completed with a voice recognition program.  Efforts were made to edit the dictations but occasionally words are mis-transcribed.)    [address] [facility] HospitalADELA          [address] [place], [place]  04/27/22 3470

## 2025-08-13 ENCOUNTER — OFFICE VISIT (OUTPATIENT)
Dept: CARDIOLOGY CLINIC | Age: 70
End: 2025-08-13
Payer: COMMERCIAL

## 2025-08-13 VITALS
HEART RATE: 64 BPM | HEIGHT: 62 IN | SYSTOLIC BLOOD PRESSURE: 136 MMHG | DIASTOLIC BLOOD PRESSURE: 80 MMHG | WEIGHT: 143 LBS | OXYGEN SATURATION: 97 % | BODY MASS INDEX: 26.31 KG/M2

## 2025-08-13 DIAGNOSIS — R01.1 SYSTOLIC MURMUR: ICD-10-CM

## 2025-08-13 DIAGNOSIS — N18.9 CHRONIC KIDNEY DISEASE, UNSPECIFIED CKD STAGE: ICD-10-CM

## 2025-08-13 DIAGNOSIS — I50.32 CHRONIC DIASTOLIC HEART FAILURE (HCC): Primary | ICD-10-CM

## 2025-08-13 DIAGNOSIS — D64.9 CHRONIC ANEMIA: ICD-10-CM

## 2025-08-13 DIAGNOSIS — I10 ESSENTIAL HYPERTENSION: ICD-10-CM

## 2025-08-13 DIAGNOSIS — Z86.73 HISTORY OF CVA (CEREBROVASCULAR ACCIDENT): ICD-10-CM

## 2025-08-13 DIAGNOSIS — R00.1 ASYMPTOMATIC BRADYCARDIA: ICD-10-CM

## 2025-08-13 DIAGNOSIS — E78.5 HYPERLIPIDEMIA, UNSPECIFIED HYPERLIPIDEMIA TYPE: ICD-10-CM

## 2025-08-13 DIAGNOSIS — I27.20 PULMONARY HYPERTENSION (HCC): ICD-10-CM

## 2025-08-13 DIAGNOSIS — I50.32 CHRONIC DIASTOLIC HEART FAILURE (HCC): ICD-10-CM

## 2025-08-13 LAB
ALBUMIN SERPL-MCNC: 3.9 G/DL (ref 3.4–5)
ALBUMIN/GLOB SERPL: 1.4 {RATIO} (ref 1.1–2.2)
ALP SERPL-CCNC: 72 U/L (ref 40–129)
ALT SERPL-CCNC: 12 U/L (ref 10–40)
ANION GAP SERPL CALCULATED.3IONS-SCNC: 11 MMOL/L (ref 3–16)
AST SERPL-CCNC: 18 U/L (ref 15–37)
BILIRUB SERPL-MCNC: 0.4 MG/DL (ref 0–1)
BUN SERPL-MCNC: 28 MG/DL (ref 7–20)
CALCIUM SERPL-MCNC: 8.9 MG/DL (ref 8.3–10.6)
CHLORIDE SERPL-SCNC: 103 MMOL/L (ref 99–110)
CO2 SERPL-SCNC: 27 MMOL/L (ref 21–32)
CREAT SERPL-MCNC: 3.5 MG/DL (ref 0.6–1.2)
DEPRECATED RDW RBC AUTO: 14.9 % (ref 12.4–15.4)
GFR SERPLBLD CREATININE-BSD FMLA CKD-EPI: 14 ML/MIN/{1.73_M2}
GLUCOSE SERPL-MCNC: 115 MG/DL (ref 70–99)
HCT VFR BLD AUTO: 35.2 % (ref 36–48)
HGB BLD-MCNC: 11.4 G/DL (ref 12–16)
MCH RBC QN AUTO: 28.4 PG (ref 26–34)
MCHC RBC AUTO-ENTMCNC: 32.3 G/DL (ref 31–36)
MCV RBC AUTO: 87.9 FL (ref 80–100)
NT-PROBNP SERPL-MCNC: 5359 PG/ML (ref 0–124)
PLATELET # BLD AUTO: 261 K/UL (ref 135–450)
PMV BLD AUTO: 8.1 FL (ref 5–10.5)
POTASSIUM SERPL-SCNC: 4.1 MMOL/L (ref 3.5–5.1)
PROT SERPL-MCNC: 6.7 G/DL (ref 6.4–8.2)
RBC # BLD AUTO: 4 M/UL (ref 4–5.2)
SODIUM SERPL-SCNC: 141 MMOL/L (ref 136–145)
WBC # BLD AUTO: 4.8 K/UL (ref 4–11)

## 2025-08-13 PROCEDURE — 1159F MED LIST DOCD IN RCRD: CPT | Performed by: INTERNAL MEDICINE

## 2025-08-13 PROCEDURE — 99214 OFFICE O/P EST MOD 30 MIN: CPT | Performed by: INTERNAL MEDICINE

## 2025-08-13 PROCEDURE — 1124F ACP DISCUSS-NO DSCNMKR DOCD: CPT | Performed by: INTERNAL MEDICINE

## 2025-08-13 PROCEDURE — 3075F SYST BP GE 130 - 139MM HG: CPT | Performed by: INTERNAL MEDICINE

## 2025-08-13 PROCEDURE — 3079F DIAST BP 80-89 MM HG: CPT | Performed by: INTERNAL MEDICINE

## 2025-08-14 ENCOUNTER — TRANSCRIBE ORDERS (OUTPATIENT)
Dept: ADMINISTRATIVE | Age: 70
End: 2025-08-14

## 2025-08-14 DIAGNOSIS — Z12.31 SCREENING MAMMOGRAM FOR BREAST CANCER: Primary | ICD-10-CM
